# Patient Record
Sex: MALE | Race: WHITE | NOT HISPANIC OR LATINO | Employment: OTHER
[De-identification: names, ages, dates, MRNs, and addresses within clinical notes are randomized per-mention and may not be internally consistent; named-entity substitution may affect disease eponyms.]

---

## 2017-07-31 ENCOUNTER — RECORDS - HEALTHEAST (OUTPATIENT)
Dept: ADMINISTRATIVE | Facility: OTHER | Age: 77
End: 2017-07-31

## 2017-08-01 ENCOUNTER — HOSPITAL ENCOUNTER (OUTPATIENT)
Dept: ULTRASOUND IMAGING | Facility: CLINIC | Age: 77
Discharge: HOME | End: 2017-08-01
Attending: INTERNAL MEDICINE

## 2017-08-01 DIAGNOSIS — I10 ESSENTIAL HYPERTENSION, BENIGN: ICD-10-CM

## 2017-08-01 DIAGNOSIS — N18.30 STAGE 3 CHRONIC KIDNEY DISEASE (H): ICD-10-CM

## 2017-08-01 DIAGNOSIS — N18.3 CKD (CHRONIC KIDNEY DISEASE), STAGE 3 (MODERATE): ICD-10-CM

## 2021-08-18 ENCOUNTER — TRANSCRIBE ORDERS (OUTPATIENT)
Dept: OTHER | Age: 81
End: 2021-08-18

## 2021-08-26 ENCOUNTER — TRANSCRIBE ORDERS (OUTPATIENT)
Dept: OTHER | Age: 81
End: 2021-08-26

## 2021-08-26 DIAGNOSIS — I25.10 CAD (CORONARY ARTERY DISEASE): Primary | ICD-10-CM

## 2021-08-26 DIAGNOSIS — Z95.5 STATUS POST PLACEMENT OF STENT IN RIGHT CORONARY ARTERY: ICD-10-CM

## 2022-08-12 ENCOUNTER — APPOINTMENT (OUTPATIENT)
Dept: CARDIOLOGY | Facility: HOSPITAL | Age: 82
DRG: 871 | End: 2022-08-12
Attending: INTERNAL MEDICINE
Payer: MEDICARE

## 2022-08-12 ENCOUNTER — APPOINTMENT (OUTPATIENT)
Dept: RADIOLOGY | Facility: HOSPITAL | Age: 82
DRG: 871 | End: 2022-08-12
Attending: EMERGENCY MEDICINE
Payer: MEDICARE

## 2022-08-12 ENCOUNTER — HOSPITAL ENCOUNTER (INPATIENT)
Facility: HOSPITAL | Age: 82
LOS: 4 days | Discharge: HOME | DRG: 871 | End: 2022-08-16
Attending: EMERGENCY MEDICINE | Admitting: INTERNAL MEDICINE
Payer: MEDICARE

## 2022-08-12 ENCOUNTER — APPOINTMENT (OUTPATIENT)
Dept: CT IMAGING | Facility: HOSPITAL | Age: 82
DRG: 871 | End: 2022-08-12
Attending: EMERGENCY MEDICINE
Payer: MEDICARE

## 2022-08-12 DIAGNOSIS — J96.01 ACUTE RESPIRATORY FAILURE WITH HYPOXIA (H): ICD-10-CM

## 2022-08-12 DIAGNOSIS — D86.9 SARCOIDOSIS: ICD-10-CM

## 2022-08-12 DIAGNOSIS — R78.81 GRAM-POSITIVE BACTEREMIA: ICD-10-CM

## 2022-08-12 DIAGNOSIS — E11.9 TYPE 2 DIABETES MELLITUS WITHOUT COMPLICATION, WITHOUT LONG-TERM CURRENT USE OF INSULIN (H): Primary | ICD-10-CM

## 2022-08-12 DIAGNOSIS — I50.32 CHRONIC DIASTOLIC HEART FAILURE (H): ICD-10-CM

## 2022-08-12 DIAGNOSIS — E87.70 HYPERVOLEMIA, UNSPECIFIED HYPERVOLEMIA TYPE: ICD-10-CM

## 2022-08-12 DIAGNOSIS — R06.2 WHEEZING: ICD-10-CM

## 2022-08-12 LAB
ALBUMIN SERPL-MCNC: 3.9 G/DL (ref 3.5–5)
ALBUMIN UR-MCNC: 100 MG/DL
ALP SERPL-CCNC: 122 U/L (ref 45–120)
ALT SERPL W P-5'-P-CCNC: 26 U/L (ref 0–45)
ANION GAP SERPL CALCULATED.3IONS-SCNC: 9 MMOL/L (ref 5–18)
APPEARANCE UR: CLEAR
AST SERPL W P-5'-P-CCNC: 31 U/L (ref 0–40)
BASE EXCESS BLDV CALC-SCNC: -2.1 MMOL/L
BASOPHILS # BLD AUTO: 0 10E3/UL (ref 0–0.2)
BASOPHILS NFR BLD AUTO: 0 %
BILIRUB SERPL-MCNC: 0.9 MG/DL (ref 0–1)
BILIRUB UR QL STRIP: NEGATIVE
BNP SERPL-MCNC: 225 PG/ML (ref 0–93)
BUN SERPL-MCNC: 24 MG/DL (ref 8–28)
C REACTIVE PROTEIN LHE: 0.6 MG/DL (ref 0–?)
CALCIUM SERPL-MCNC: 9.4 MG/DL (ref 8.5–10.5)
CHLORIDE BLD-SCNC: 105 MMOL/L (ref 98–107)
CO2 SERPL-SCNC: 25 MMOL/L (ref 22–31)
COLOR UR AUTO: ABNORMAL
CREAT SERPL-MCNC: 1.52 MG/DL (ref 0.7–1.3)
CREAT SERPL-MCNC: 1.52 MG/DL (ref 0.7–1.3)
EOSINOPHIL # BLD AUTO: 0 10E3/UL (ref 0–0.7)
EOSINOPHIL NFR BLD AUTO: 0 %
ERYTHROCYTE [DISTWIDTH] IN BLOOD BY AUTOMATED COUNT: 13.2 % (ref 10–15)
FLUAV RNA SPEC QL NAA+PROBE: NEGATIVE
FLUBV RNA RESP QL NAA+PROBE: NEGATIVE
GFR SERPL CREATININE-BSD FRML MDRD: 45 ML/MIN/1.73M2
GFR SERPL CREATININE-BSD FRML MDRD: 45 ML/MIN/1.73M2
GLUCOSE BLD-MCNC: 112 MG/DL (ref 70–125)
GLUCOSE BLDC GLUCOMTR-MCNC: 123 MG/DL (ref 70–99)
GLUCOSE BLDC GLUCOMTR-MCNC: 207 MG/DL (ref 70–99)
GLUCOSE UR STRIP-MCNC: 150 MG/DL
HBA1C MFR BLD: 6.8 %
HCO3 BLDV-SCNC: 23 MMOL/L (ref 24–30)
HCT VFR BLD AUTO: 43.1 % (ref 40–53)
HGB BLD-MCNC: 14.2 G/DL (ref 13.3–17.7)
HGB UR QL STRIP: ABNORMAL
HOLD SPECIMEN: NORMAL
IMM GRANULOCYTES # BLD: 0 10E3/UL
IMM GRANULOCYTES NFR BLD: 0 %
KETONES UR STRIP-MCNC: NEGATIVE MG/DL
LACTATE SERPL-SCNC: 1.6 MMOL/L (ref 0.7–2)
LACTATE SERPL-SCNC: 2.6 MMOL/L (ref 0.7–2)
LEUKOCYTE ESTERASE UR QL STRIP: NEGATIVE
LVEF ECHO: NORMAL
LYMPHOCYTES # BLD AUTO: 1.9 10E3/UL (ref 0.8–5.3)
LYMPHOCYTES NFR BLD AUTO: 20 %
MAGNESIUM SERPL-MCNC: 1.5 MG/DL (ref 1.8–2.6)
MAGNESIUM SERPL-MCNC: 2.8 MG/DL (ref 1.8–2.6)
MCH RBC QN AUTO: 29.6 PG (ref 26.5–33)
MCHC RBC AUTO-ENTMCNC: 32.9 G/DL (ref 31.5–36.5)
MCV RBC AUTO: 90 FL (ref 78–100)
MONOCYTES # BLD AUTO: 0.7 10E3/UL (ref 0–1.3)
MONOCYTES NFR BLD AUTO: 7 %
MUCOUS THREADS #/AREA URNS LPF: PRESENT /LPF
NEUTROPHILS # BLD AUTO: 6.8 10E3/UL (ref 1.6–8.3)
NEUTROPHILS NFR BLD AUTO: 73 %
NITRATE UR QL: NEGATIVE
NRBC # BLD AUTO: 0 10E3/UL
NRBC BLD AUTO-RTO: 0 /100
OXYHGB MFR BLDV: 84.9 % (ref 70–75)
PCO2 BLDV: 38 MM HG (ref 35–50)
PH BLDV: 7.39 [PH] (ref 7.35–7.45)
PH UR STRIP: 6 [PH] (ref 5–7)
PLATELET # BLD AUTO: 141 10E3/UL (ref 150–450)
PO2 BLDV: 50 MM HG (ref 25–47)
POTASSIUM BLD-SCNC: 4.3 MMOL/L (ref 3.5–5)
PROCALCITONIN SERPL-MCNC: 0.97 NG/ML (ref 0–0.49)
PROT SERPL-MCNC: 7.7 G/DL (ref 6–8)
RBC # BLD AUTO: 4.79 10E6/UL (ref 4.4–5.9)
RBC URINE: <1 /HPF
RSV RNA SPEC NAA+PROBE: NEGATIVE
SAO2 % BLDV: 85.8 % (ref 70–75)
SARS-COV-2 RNA RESP QL NAA+PROBE: NEGATIVE
SODIUM SERPL-SCNC: 139 MMOL/L (ref 136–145)
SP GR UR STRIP: 1.01 (ref 1–1.03)
SQUAMOUS EPITHELIAL: 1 /HPF
TROPONIN I SERPL-MCNC: 0.02 NG/ML (ref 0–0.29)
TROPONIN I SERPL-MCNC: 0.04 NG/ML (ref 0–0.29)
TROPONIN I SERPL-MCNC: 0.05 NG/ML (ref 0–0.29)
UROBILINOGEN UR STRIP-MCNC: <2 MG/DL
WBC # BLD AUTO: 9.4 10E3/UL (ref 4–11)
WBC URINE: 6 /HPF

## 2022-08-12 PROCEDURE — 84484 ASSAY OF TROPONIN QUANT: CPT | Performed by: EMERGENCY MEDICINE

## 2022-08-12 PROCEDURE — 250N000013 HC RX MED GY IP 250 OP 250 PS 637: Performed by: EMERGENCY MEDICINE

## 2022-08-12 PROCEDURE — 36415 COLL VENOUS BLD VENIPUNCTURE: CPT | Performed by: INTERNAL MEDICINE

## 2022-08-12 PROCEDURE — 71045 X-RAY EXAM CHEST 1 VIEW: CPT

## 2022-08-12 PROCEDURE — 250N000013 HC RX MED GY IP 250 OP 250 PS 637: Performed by: INTERNAL MEDICINE

## 2022-08-12 PROCEDURE — 87149 DNA/RNA DIRECT PROBE: CPT | Performed by: EMERGENCY MEDICINE

## 2022-08-12 PROCEDURE — 96361 HYDRATE IV INFUSION ADD-ON: CPT

## 2022-08-12 PROCEDURE — 83036 HEMOGLOBIN GLYCOSYLATED A1C: CPT | Performed by: INTERNAL MEDICINE

## 2022-08-12 PROCEDURE — 96367 TX/PROPH/DG ADDL SEQ IV INF: CPT

## 2022-08-12 PROCEDURE — 87637 SARSCOV2&INF A&B&RSV AMP PRB: CPT | Performed by: EMERGENCY MEDICINE

## 2022-08-12 PROCEDURE — 85004 AUTOMATED DIFF WBC COUNT: CPT | Performed by: EMERGENCY MEDICINE

## 2022-08-12 PROCEDURE — 99285 EMERGENCY DEPT VISIT HI MDM: CPT | Mod: CS,25

## 2022-08-12 PROCEDURE — 255N000002 HC RX 255 OP 636: Performed by: INTERNAL MEDICINE

## 2022-08-12 PROCEDURE — 96374 THER/PROPH/DIAG INJ IV PUSH: CPT

## 2022-08-12 PROCEDURE — 83605 ASSAY OF LACTIC ACID: CPT | Performed by: INTERNAL MEDICINE

## 2022-08-12 PROCEDURE — 87077 CULTURE AEROBIC IDENTIFY: CPT | Performed by: EMERGENCY MEDICINE

## 2022-08-12 PROCEDURE — 83880 ASSAY OF NATRIURETIC PEPTIDE: CPT | Performed by: EMERGENCY MEDICINE

## 2022-08-12 PROCEDURE — 36415 COLL VENOUS BLD VENIPUNCTURE: CPT | Performed by: EMERGENCY MEDICINE

## 2022-08-12 PROCEDURE — 83735 ASSAY OF MAGNESIUM: CPT | Performed by: INTERNAL MEDICINE

## 2022-08-12 PROCEDURE — 258N000003 HC RX IP 258 OP 636: Performed by: INTERNAL MEDICINE

## 2022-08-12 PROCEDURE — 86140 C-REACTIVE PROTEIN: CPT | Performed by: EMERGENCY MEDICINE

## 2022-08-12 PROCEDURE — 94640 AIRWAY INHALATION TREATMENT: CPT

## 2022-08-12 PROCEDURE — 250N000011 HC RX IP 250 OP 636: Performed by: EMERGENCY MEDICINE

## 2022-08-12 PROCEDURE — 81001 URINALYSIS AUTO W/SCOPE: CPT | Performed by: STUDENT IN AN ORGANIZED HEALTH CARE EDUCATION/TRAINING PROGRAM

## 2022-08-12 PROCEDURE — 96365 THER/PROPH/DIAG IV INF INIT: CPT | Mod: 59

## 2022-08-12 PROCEDURE — 96366 THER/PROPH/DIAG IV INF ADDON: CPT

## 2022-08-12 PROCEDURE — 83605 ASSAY OF LACTIC ACID: CPT | Performed by: EMERGENCY MEDICINE

## 2022-08-12 PROCEDURE — G1010 CDSM STANSON: HCPCS

## 2022-08-12 PROCEDURE — 250N000011 HC RX IP 250 OP 636: Performed by: INTERNAL MEDICINE

## 2022-08-12 PROCEDURE — 82040 ASSAY OF SERUM ALBUMIN: CPT | Performed by: EMERGENCY MEDICINE

## 2022-08-12 PROCEDURE — 210N000001 HC R&B IMCU HEART CARE

## 2022-08-12 PROCEDURE — 93005 ELECTROCARDIOGRAM TRACING: CPT | Performed by: EMERGENCY MEDICINE

## 2022-08-12 PROCEDURE — C9803 HOPD COVID-19 SPEC COLLECT: HCPCS

## 2022-08-12 PROCEDURE — 93306 TTE W/DOPPLER COMPLETE: CPT | Mod: 26 | Performed by: INTERNAL MEDICINE

## 2022-08-12 PROCEDURE — 80053 COMPREHEN METABOLIC PANEL: CPT | Performed by: EMERGENCY MEDICINE

## 2022-08-12 PROCEDURE — 84484 ASSAY OF TROPONIN QUANT: CPT | Performed by: INTERNAL MEDICINE

## 2022-08-12 PROCEDURE — 250N000009 HC RX 250: Performed by: EMERGENCY MEDICINE

## 2022-08-12 PROCEDURE — 82805 BLOOD GASES W/O2 SATURATION: CPT | Performed by: INTERNAL MEDICINE

## 2022-08-12 PROCEDURE — 84145 PROCALCITONIN (PCT): CPT | Performed by: EMERGENCY MEDICINE

## 2022-08-12 PROCEDURE — 258N000003 HC RX IP 258 OP 636: Performed by: EMERGENCY MEDICINE

## 2022-08-12 PROCEDURE — 99223 1ST HOSP IP/OBS HIGH 75: CPT | Performed by: INTERNAL MEDICINE

## 2022-08-12 PROCEDURE — 96368 THER/DIAG CONCURRENT INF: CPT

## 2022-08-12 RX ORDER — NITROGLYCERIN 80 MG/1
1 PATCH TRANSDERMAL ONCE
Status: COMPLETED | OUTPATIENT
Start: 2022-08-12 | End: 2022-08-12

## 2022-08-12 RX ORDER — ATORVASTATIN CALCIUM 40 MG/1
40 TABLET, FILM COATED ORAL DAILY
Status: DISCONTINUED | OUTPATIENT
Start: 2022-08-12 | End: 2022-08-16 | Stop reason: HOSPADM

## 2022-08-12 RX ORDER — IPRATROPIUM BROMIDE AND ALBUTEROL SULFATE 2.5; .5 MG/3ML; MG/3ML
3 SOLUTION RESPIRATORY (INHALATION) ONCE
Status: COMPLETED | OUTPATIENT
Start: 2022-08-12 | End: 2022-08-12

## 2022-08-12 RX ORDER — CLOPIDOGREL BISULFATE 75 MG/1
75 TABLET ORAL DAILY
Status: ON HOLD | COMMUNITY
End: 2024-07-04

## 2022-08-12 RX ORDER — FUROSEMIDE 10 MG/ML
40 INJECTION INTRAMUSCULAR; INTRAVENOUS ONCE
Status: COMPLETED | OUTPATIENT
Start: 2022-08-12 | End: 2022-08-12

## 2022-08-12 RX ORDER — IOPAMIDOL 755 MG/ML
75 INJECTION, SOLUTION INTRAVASCULAR ONCE
Status: COMPLETED | OUTPATIENT
Start: 2022-08-12 | End: 2022-08-12

## 2022-08-12 RX ORDER — ASPIRIN 81 MG/1
81 TABLET, CHEWABLE ORAL DAILY
Status: DISCONTINUED | OUTPATIENT
Start: 2022-08-12 | End: 2022-08-16 | Stop reason: HOSPADM

## 2022-08-12 RX ORDER — FUROSEMIDE 10 MG/ML
20 INJECTION INTRAMUSCULAR; INTRAVENOUS EVERY 6 HOURS
Status: COMPLETED | OUTPATIENT
Start: 2022-08-12 | End: 2022-08-13

## 2022-08-12 RX ORDER — VANCOMYCIN HYDROCHLORIDE 1 G/200ML
1000 INJECTION, SOLUTION INTRAVENOUS EVERY 24 HOURS
Status: DISCONTINUED | OUTPATIENT
Start: 2022-08-13 | End: 2022-08-13

## 2022-08-12 RX ORDER — CEFAZOLIN SODIUM 1 G/50ML
1750 SOLUTION INTRAVENOUS ONCE
Status: COMPLETED | OUTPATIENT
Start: 2022-08-12 | End: 2022-08-12

## 2022-08-12 RX ORDER — SODIUM CHLORIDE 9 MG/ML
INJECTION, SOLUTION INTRAVENOUS CONTINUOUS
Status: DISCONTINUED | OUTPATIENT
Start: 2022-08-12 | End: 2022-08-12

## 2022-08-12 RX ORDER — ENOXAPARIN SODIUM 100 MG/ML
40 INJECTION SUBCUTANEOUS EVERY 24 HOURS
Status: DISCONTINUED | OUTPATIENT
Start: 2022-08-12 | End: 2022-08-16 | Stop reason: HOSPADM

## 2022-08-12 RX ORDER — NICOTINE POLACRILEX 4 MG
15-30 LOZENGE BUCCAL
Status: DISCONTINUED | OUTPATIENT
Start: 2022-08-12 | End: 2022-08-16 | Stop reason: HOSPADM

## 2022-08-12 RX ORDER — LIDOCAINE 40 MG/G
CREAM TOPICAL
Status: DISCONTINUED | OUTPATIENT
Start: 2022-08-12 | End: 2022-08-16 | Stop reason: HOSPADM

## 2022-08-12 RX ORDER — ACETAMINOPHEN 325 MG/1
650 TABLET ORAL ONCE
Status: COMPLETED | OUTPATIENT
Start: 2022-08-12 | End: 2022-08-12

## 2022-08-12 RX ORDER — CLOPIDOGREL BISULFATE 75 MG/1
75 TABLET ORAL DAILY
Status: DISCONTINUED | OUTPATIENT
Start: 2022-08-12 | End: 2022-08-16 | Stop reason: HOSPADM

## 2022-08-12 RX ORDER — ATORVASTATIN CALCIUM 40 MG/1
40 TABLET, FILM COATED ORAL DAILY
Status: ON HOLD | COMMUNITY
End: 2024-08-21

## 2022-08-12 RX ORDER — DEXTROSE MONOHYDRATE 25 G/50ML
25-50 INJECTION, SOLUTION INTRAVENOUS
Status: DISCONTINUED | OUTPATIENT
Start: 2022-08-12 | End: 2022-08-16 | Stop reason: HOSPADM

## 2022-08-12 RX ORDER — AMLODIPINE BESYLATE 5 MG/1
5 TABLET ORAL DAILY
Status: DISCONTINUED | OUTPATIENT
Start: 2022-08-12 | End: 2022-08-16 | Stop reason: HOSPADM

## 2022-08-12 RX ORDER — AMLODIPINE BESYLATE 5 MG/1
5 TABLET ORAL DAILY
Status: ON HOLD | COMMUNITY
End: 2024-07-04

## 2022-08-12 RX ORDER — AMOXICILLIN 250 MG
1 CAPSULE ORAL 2 TIMES DAILY PRN
Status: DISCONTINUED | OUTPATIENT
Start: 2022-08-12 | End: 2022-08-16 | Stop reason: HOSPADM

## 2022-08-12 RX ORDER — MAGNESIUM SULFATE 4 G/50ML
4 INJECTION INTRAVENOUS ONCE
Status: COMPLETED | OUTPATIENT
Start: 2022-08-12 | End: 2022-08-12

## 2022-08-12 RX ORDER — METOPROLOL TARTRATE 25 MG/1
12.5 TABLET, FILM COATED ORAL 2 TIMES DAILY
Status: ON HOLD | COMMUNITY
End: 2024-07-04

## 2022-08-12 RX ORDER — GLIPIZIDE 10 MG/1
10 TABLET ORAL
COMMUNITY
End: 2022-08-16

## 2022-08-12 RX ORDER — PIPERACILLIN SODIUM, TAZOBACTAM SODIUM 4; .5 G/20ML; G/20ML
4.5 INJECTION, POWDER, LYOPHILIZED, FOR SOLUTION INTRAVENOUS EVERY 6 HOURS
Status: DISCONTINUED | OUTPATIENT
Start: 2022-08-12 | End: 2022-08-12 | Stop reason: CLARIF

## 2022-08-12 RX ORDER — PIPERACILLIN SODIUM, TAZOBACTAM SODIUM 3; .375 G/15ML; G/15ML
3.38 INJECTION, POWDER, LYOPHILIZED, FOR SOLUTION INTRAVENOUS ONCE
Status: COMPLETED | OUTPATIENT
Start: 2022-08-12 | End: 2022-08-12

## 2022-08-12 RX ORDER — PIPERACILLIN SODIUM, TAZOBACTAM SODIUM 3; .375 G/15ML; G/15ML
3.38 INJECTION, POWDER, LYOPHILIZED, FOR SOLUTION INTRAVENOUS EVERY 8 HOURS
Status: DISCONTINUED | OUTPATIENT
Start: 2022-08-12 | End: 2022-08-15

## 2022-08-12 RX ORDER — AMOXICILLIN 250 MG
2 CAPSULE ORAL 2 TIMES DAILY PRN
Status: DISCONTINUED | OUTPATIENT
Start: 2022-08-12 | End: 2022-08-16 | Stop reason: HOSPADM

## 2022-08-12 RX ORDER — ASPIRIN 81 MG/1
81 TABLET, CHEWABLE ORAL DAILY
Status: ON HOLD | COMMUNITY
End: 2024-08-21

## 2022-08-12 RX ADMIN — FUROSEMIDE 20 MG: 10 INJECTION, SOLUTION INTRAMUSCULAR; INTRAVENOUS at 21:06

## 2022-08-12 RX ADMIN — ATORVASTATIN CALCIUM 40 MG: 40 TABLET, FILM COATED ORAL at 14:25

## 2022-08-12 RX ADMIN — ACETAMINOPHEN 650 MG: 325 TABLET, FILM COATED ORAL at 06:25

## 2022-08-12 RX ADMIN — SODIUM CHLORIDE: 9 INJECTION, SOLUTION INTRAVENOUS at 08:07

## 2022-08-12 RX ADMIN — SODIUM CHLORIDE 1000 ML: 9 INJECTION, SOLUTION INTRAVENOUS at 06:24

## 2022-08-12 RX ADMIN — FUROSEMIDE 20 MG: 10 INJECTION, SOLUTION INTRAMUSCULAR; INTRAVENOUS at 14:20

## 2022-08-12 RX ADMIN — PERFLUTREN 3 ML: 6.52 INJECTION, SUSPENSION INTRAVENOUS at 10:10

## 2022-08-12 RX ADMIN — CLOPIDOGREL BISULFATE 75 MG: 75 TABLET ORAL at 14:25

## 2022-08-12 RX ADMIN — IOPAMIDOL 75 ML: 755 INJECTION, SOLUTION INTRAVENOUS at 07:39

## 2022-08-12 RX ADMIN — FUROSEMIDE 40 MG: 10 INJECTION, SOLUTION INTRAMUSCULAR; INTRAVENOUS at 08:32

## 2022-08-12 RX ADMIN — AMLODIPINE BESYLATE 5 MG: 5 TABLET ORAL at 15:00

## 2022-08-12 RX ADMIN — IPRATROPIUM BROMIDE AND ALBUTEROL SULFATE 3 ML: .5; 3 SOLUTION RESPIRATORY (INHALATION) at 06:25

## 2022-08-12 RX ADMIN — MAGNESIUM SULFATE HEPTAHYDRATE 4 G: 80 INJECTION, SOLUTION INTRAVENOUS at 14:23

## 2022-08-12 RX ADMIN — ASPIRIN 81 MG: 81 TABLET, CHEWABLE ORAL at 14:25

## 2022-08-12 RX ADMIN — ENOXAPARIN SODIUM 40 MG: 40 INJECTION SUBCUTANEOUS at 09:26

## 2022-08-12 RX ADMIN — PIPERACILLIN AND TAZOBACTAM 3.38 G: 3; .375 INJECTION, POWDER, LYOPHILIZED, FOR SOLUTION INTRAVENOUS at 16:07

## 2022-08-12 RX ADMIN — VANCOMYCIN HYDROCHLORIDE 1750 MG: 5 INJECTION, POWDER, LYOPHILIZED, FOR SOLUTION INTRAVENOUS at 10:17

## 2022-08-12 RX ADMIN — NITROGLYCERIN 1 PATCH: 0.4 PATCH TRANSDERMAL at 08:01

## 2022-08-12 RX ADMIN — PIPERACILLIN AND TAZOBACTAM 3.38 G: 3; .375 INJECTION, POWDER, LYOPHILIZED, FOR SOLUTION INTRAVENOUS at 09:26

## 2022-08-12 RX ADMIN — METOPROLOL TARTRATE 12.5 MG: 25 TABLET, FILM COATED ORAL at 21:05

## 2022-08-12 ASSESSMENT — ACTIVITIES OF DAILY LIVING (ADL)
ADLS_ACUITY_SCORE: 35
ADLS_ACUITY_SCORE: 35
WALKING_OR_CLIMBING_STAIRS_DIFFICULTY: NO
CONCENTRATING,_REMEMBERING_OR_MAKING_DECISIONS_DIFFICULTY: NO
DRESSING/BATHING_DIFFICULTY: NO
ADLS_ACUITY_SCORE: 35
CHANGE_IN_FUNCTIONAL_STATUS_SINCE_ONSET_OF_CURRENT_ILLNESS/INJURY: YES
ADLS_ACUITY_SCORE: 35
ADLS_ACUITY_SCORE: 35
HEARING_DIFFICULTY_OR_DEAF: NO
ADLS_ACUITY_SCORE: 18
DIFFICULTY_EATING/SWALLOWING: NO
DOING_ERRANDS_INDEPENDENTLY_DIFFICULTY: NO
FALL_HISTORY_WITHIN_LAST_SIX_MONTHS: YES
ADLS_ACUITY_SCORE: 35
TOILETING_ISSUES: NO
WEAR_GLASSES_OR_BLIND: NO
ADLS_ACUITY_SCORE: 35
ADLS_ACUITY_SCORE: 35
NUMBER_OF_TIMES_PATIENT_HAS_FALLEN_WITHIN_LAST_SIX_MONTHS: 1
DIFFICULTY_COMMUNICATING: NO

## 2022-08-12 ASSESSMENT — ENCOUNTER SYMPTOMS
COLOR CHANGE: 0
ARTHRALGIAS: 0
SHORTNESS OF BREATH: 1
EYE REDNESS: 0
NECK STIFFNESS: 0
ABDOMINAL PAIN: 0
CONFUSION: 0
HEADACHES: 0
FEVER: 1
WHEEZING: 1
DIFFICULTY URINATING: 0
CHILLS: 1
FATIGUE: 1
CHEST TIGHTNESS: 1

## 2022-08-12 NOTE — ED NOTES
Meal tray cleared from room, garbage emptied. Pt has tolerated the oral intake of mashed potatoes, chicken and gravy and diet sprite well. He ate all the food on the tray.

## 2022-08-12 NOTE — ED PROVIDER NOTES
EMERGENCY DEPARTMENT ENCOUNTER       ED Course & Medical Decision Making     I saw and examined the patient.  IV was established and he was placed on the monitor.  Diagnostics ordered.    I did independently interpret EKG done this morning at 0634.  Sinus tachycardia with a rate of 111 bpm.  Prolonged KY with first-degree AV block.  Narrow complex QRS, the QT C is slightly prolonged. Axis is normal.  No significant ST elevation or depression.  No pathological Q waves.  No previous for comparison    He is tachycardic, tachypneic and is having wheezing.  He is given a DuoNeb treatment.  I will start with a chest x-ray but given his tachycardia I cannot PERC him out and will need to evaluate for PE.      Returns negative for COVID and influenza.  There is no infiltrate on x-ray or CT.  No PE.  All signs are pointing to volume overload at this point.    I did still send cultures and there is mildly elevated lactic and procalcitonin and so I will discuss this with the admitting physician but I have held off on antibiotics at this point.  I did start 40 of Lasix.    I did reevaluate him and his wife tells me that he was supposed to be on a diuretic but self discontinued it about 3 months ago and has been worsening since that time.    Plan is for admission, diuresis, weaning off of oxygen, currently requiring 4 L nasal cannula to maintain sats greater than 92    I will contact the admitting physician, discussed the case and have him admitted at this time.    He is stable, updated and in agreement      6:01 AM I introduced myself to the patient, obtained patient history, performed a physical exam, and discussed plan for ED workup including potential diagnostic laboratory/imaging studies and interventions.  8:22 AM Rechecked and updated the patient.             Prior to making a final disposition on this patient the results of patient's tests and other diagnostic studies were discussed with the patient. All questions  were answered. Patient expressed understanding of the plan and was amenable to it.    Medications   nitroGLYcerin (NITRODUR) 0.4 MG/HR 24 hr patch 1 patch (1 patch Transdermal Patch/Med Applied 8/12/22 0801)   acetaminophen (TYLENOL) tablet 650 mg (650 mg Oral Given 8/12/22 0625)   0.9% sodium chloride BOLUS (0 mLs Intravenous Stopped 8/12/22 0800)   ipratropium - albuterol 0.5 mg/2.5 mg/3 mL (DUONEB) neb solution 3 mL (3 mLs Nebulization Given 8/12/22 0625)   iopamidol (ISOVUE-370) solution 75 mL (75 mLs Intravenous Given 8/12/22 0739)   furosemide (LASIX) injection 40 mg (40 mg Intravenous Given 8/12/22 0832)       Final Impression     1. Acute respiratory failure with hypoxia (H)    2. Hypervolemia, unspecified hypervolemia type    3. Wheezing            Chief Complaint     Chief Complaint   Patient presents with     Shortness of Breath            HPI       Per Beasley is a 82 year old male with a past pertinent history of heart failure, TAVR, CKD stage III, COPD, DM2, HTN who presents for evaluation of shortness of breath.     Patient is a previously healthy 82 year old who denies heart or lung problems. He reports that the past 6 months, he has had persistent shortness of breath that worsened dramatically in the past 24 hours. He denies anything making the shortness of breath better or worse, and also denies chest pain. He endorses some subjective fever, and was reportedly febrile for EMS in the 100's, as well as 86% O2 saturation on room air.     Patient reports having had multiple positive COVID tests over the past several months, and denies any known sick contacts.     I, Smith Jackson am serving as a scribe to document services personally performed by Ricardo Ruby M.D. based on my observation and the provider's statements to me. I, Ricardo Ruby M.D attest that Smith Jackson is acting in a scribe capacity, has observed my performance of the services and has documented them in accordance with my  "direction.    Past Medical History     No past medical history on file.  No past surgical history on file.  No family history on file.        Relevant past medical, surgical, family and social history as documented above, has been reviewed and discussed with patient. No changes or additions, unless otherwise noted in the HPI.    Current Medications     No current outpatient medications on file.      Allergies     No Known Allergies    Review of Systems     Review of Systems   Constitutional: Positive for chills, fatigue and fever.   HENT: Negative for congestion.    Eyes: Negative for redness.   Respiratory: Positive for chest tightness, shortness of breath and wheezing.    Cardiovascular: Negative for chest pain.   Gastrointestinal: Negative for abdominal pain.   Genitourinary: Negative for difficulty urinating.   Musculoskeletal: Negative for arthralgias and neck stiffness.   Skin: Negative for color change.   Neurological: Negative for headaches.   Psychiatric/Behavioral: Negative for confusion.   All other systems reviewed and are negative.       Remainder of systems reviewed, unless noted in HPI all others negative.    Physical Exam     /59   Pulse 104   Temp 99.2  F (37.3  C) (Oral)   Resp 23   Ht 1.753 m (5' 9\")   Wt 93 kg (205 lb)   SpO2 93%   BMI 30.27 kg/m      Physical Exam  Vitals and nursing note reviewed.   Constitutional:       General: He is not in acute distress.  HENT:      Head: Normocephalic.      Nose: Nose normal.   Eyes:      General: No scleral icterus.  Cardiovascular:      Rate and Rhythm: Tachycardia present.   Pulmonary:      Breath sounds: Wheezing present.      Comments: Significant tachypnea.  Diffuse inspiratory and expiratory wheezing, slightly prolonged expiratory phase.  Breath sounds are equal/symmetric  Musculoskeletal:      Cervical back: Neck supple.   Skin:     Findings: No rash.   Neurological:      Mental Status: He is alert. Mental status is at baseline. "   Psychiatric:         Mood and Affect: Mood normal.             Labs & Imaging         Labs Ordered and Resulted from Time of ED Arrival to Time of ED Departure   COMPREHENSIVE METABOLIC PANEL - Abnormal       Result Value    Sodium 139      Potassium 4.3      Chloride 105      Carbon Dioxide (CO2) 25      Anion Gap 9      Urea Nitrogen 24      Creatinine 1.52 (*)     Calcium 9.4      Glucose 112      Alkaline Phosphatase 122 (*)     AST 31      ALT 26      Protein Total 7.7      Albumin 3.9      Bilirubin Total 0.9      GFR Estimate 45 (*)    LACTIC ACID WHOLE BLOOD - Abnormal    Lactic Acid 2.6 (*)    B-TYPE NATRIURETIC PEPTIDE ([facility] ONLY) - Abnormal     (*)    PROCALCITONIN - Abnormal    Procalcitonin 0.97 (*)    CBC WITH PLATELETS AND DIFFERENTIAL - Abnormal    WBC Count 9.4      RBC Count 4.79      Hemoglobin 14.2      Hematocrit 43.1      MCV 90      MCH 29.6      MCHC 32.9      RDW 13.2      Platelet Count 141 (*)     % Neutrophils 73      % Lymphocytes 20      % Monocytes 7      % Eosinophils 0      % Basophils 0      % Immature Granulocytes 0      NRBCs per 100 WBC 0      Absolute Neutrophils 6.8      Absolute Lymphocytes 1.9      Absolute Monocytes 0.7      Absolute Eosinophils 0.0      Absolute Basophils 0.0      Absolute Immature Granulocytes 0.0      Absolute NRBCs 0.0     ROUTINE UA WITH MICROSCOPIC REFLEX TO CULTURE - Abnormal    Color Urine Light Yellow      Appearance Urine Clear      Glucose Urine 150  (*)     Bilirubin Urine Negative      Ketones Urine Negative      Specific Gravity Urine 1.015      Blood Urine 0.03 mg/dL (*)     pH Urine 6.0      Protein Albumin Urine 100  (*)     Urobilinogen Urine <2.0      Nitrite Urine Negative      Leukocyte Esterase Urine Negative      Mucus Urine Present (*)     RBC Urine <1      WBC Urine 6 (*)     Squamous Epithelials Urine 1     TROPONIN I - Normal    Troponin I 0.02     CRP INFLAMMATION - Normal    CRP 0.6     INFLUENZA A/B & SARS-COV2 PCR  MULTIPLEX - Normal    Influenza A PCR Negative      Influenza B PCR Negative      RSV PCR Negative      SARS CoV2 PCR Negative     BLOOD CULTURE         Results for orders placed or performed during the hospital encounter of 08/12/22   XR Chest Port 1 View    Impression    IMPRESSION: The cardiac silhouette is unchanged in size and contour. Prominence of the central pulmonary venous markings is noted, likely exaggerated by portable technique, correlate for symptoms of volume overload. Left hilar scarring versus atelectasis   is again noted, not significant change. Old healed left-sided rib fractures are again identified.   CT Chest Pulmonary Embolism w Contrast    Impression    IMPRESSION:    1.  No pulmonary embolism.    2.  Findings of pulmonary edema. Tiny pleural effusions.    3.  Basilar predominant opacities and groundglass are presumed manifestations of pulmonary edema. However, correlate for superimposed inflammatory symptoms.    4.  Moderate-severe airway thickening, from edema or airways disease.    5.  Interval increased mediastinal and hilar adenopathy. Although reactive change is possible, recommend 3 month follow-up contrast enhanced chest CT.    6.  No significant change of scattered pulmonary micronodules. Given nerissa calcifications and several of these nodules being perilymphatic, sequela of old granulomatous disease disease suspected (to include sarcoid).     7.  Septal and airway thickening has been present on the prior chest CTs. Sequela of edema is statistically more likely (particularly if correlates with a history of cardiac dysfunction clinically). However, given other findings on this exam which can be   seen with sarcoid, alveolar-septal sarcoid can uncommonly be seen. Sarcoid airway involvement can also be seen. Pulmonary consultation as warranted.      NOTE: ABNORMAL REPORT    THE DICTATION ABOVE DESCRIBES AN ABNORMALITY FOR WHICH FOLLOW-UP IS NEEDED.    Comprehensive metabolic panel    Result Value Ref Range    Sodium 139 136 - 145 mmol/L    Potassium 4.3 3.5 - 5.0 mmol/L    Chloride 105 98 - 107 mmol/L    Carbon Dioxide (CO2) 25 22 - 31 mmol/L    Anion Gap 9 5 - 18 mmol/L    Urea Nitrogen 24 8 - 28 mg/dL    Creatinine 1.52 (H) 0.70 - 1.30 mg/dL    Calcium 9.4 8.5 - 10.5 mg/dL    Glucose 112 70 - 125 mg/dL    Alkaline Phosphatase 122 (H) 45 - 120 U/L    AST 31 0 - 40 U/L    ALT 26 0 - 45 U/L    Protein Total 7.7 6.0 - 8.0 g/dL    Albumin 3.9 3.5 - 5.0 g/dL    Bilirubin Total 0.9 0.0 - 1.0 mg/dL    GFR Estimate 45 (L) >60 mL/min/1.73m2   Lactic acid whole blood   Result Value Ref Range    Lactic Acid 2.6 (H) 0.7 - 2.0 mmol/L   Result Value Ref Range    Troponin I 0.02 0.00 - 0.29 ng/mL   B-Type Natriuretic Peptide ([facility] Only)   Result Value Ref Range     (H) 0 - 93 pg/mL   CRP inflammation   Result Value Ref Range    CRP 0.6 0.0 - <0.8 mg/dL   Result Value Ref Range    Procalcitonin 0.97 (H) 0.00 - 0.49 ng/mL   Symptomatic; Unknown Influenza A/B & SARS-CoV2 (COVID-19) Virus PCR Multiplex Nasopharyngeal    Specimen: Nasopharyngeal; Swab   Result Value Ref Range    Influenza A PCR Negative Negative    Influenza B PCR Negative Negative    RSV PCR Negative Negative    SARS CoV2 PCR Negative Negative   CBC with platelets and differential   Result Value Ref Range    WBC Count 9.4 4.0 - 11.0 10e3/uL    RBC Count 4.79 4.40 - 5.90 10e6/uL    Hemoglobin 14.2 13.3 - 17.7 g/dL    Hematocrit 43.1 40.0 - 53.0 %    MCV 90 78 - 100 fL    MCH 29.6 26.5 - 33.0 pg    MCHC 32.9 31.5 - 36.5 g/dL    RDW 13.2 10.0 - 15.0 %    Platelet Count 141 (L) 150 - 450 10e3/uL    % Neutrophils 73 %    % Lymphocytes 20 %    % Monocytes 7 %    % Eosinophils 0 %    % Basophils 0 %    % Immature Granulocytes 0 %    NRBCs per 100 WBC 0 <1 /100    Absolute Neutrophils 6.8 1.6 - 8.3 10e3/uL    Absolute Lymphocytes 1.9 0.8 - 5.3 10e3/uL    Absolute Monocytes 0.7 0.0 - 1.3 10e3/uL    Absolute Eosinophils 0.0 0.0 - 0.7 10e3/uL     Absolute Basophils 0.0 0.0 - 0.2 10e3/uL    Absolute Immature Granulocytes 0.0 <=0.4 10e3/uL    Absolute NRBCs 0.0 10e3/uL   UA with Microscopic reflex to Culture    Specimen: Urine, Midstream   Result Value Ref Range    Color Urine Light Yellow Colorless, Straw, Light Yellow, Yellow    Appearance Urine Clear Clear    Glucose Urine 150  (A) Negative mg/dL    Bilirubin Urine Negative Negative    Ketones Urine Negative Negative mg/dL    Specific Gravity Urine 1.015 1.001 - 1.030    Blood Urine 0.03 mg/dL (A) Negative    pH Urine 6.0 5.0 - 7.0    Protein Albumin Urine 100  (A) Negative mg/dL    Urobilinogen Urine <2.0 <2.0 mg/dL    Nitrite Urine Negative Negative    Leukocyte Esterase Urine Negative Negative    Mucus Urine Present (A) None Seen /LPF    RBC Urine <1 <=2 /HPF    WBC Urine 6 (H) <=5 /HPF    Squamous Epithelials Urine 1 <=1 /HPF   Extra Blue Top Tube   Result Value Ref Range    Hold Specimen JI        Ricardo Ruby MD  Emergency Medicine  [facility] EMERGENCY DEPARTMENT  [facility] [facility] [id number]  [phone]  8/12/2022         Ricardo Ruby MD  08/12/22 5192

## 2022-08-12 NOTE — H&P
[facility]    History and Physical - Hospitalist Service       Date of Admission:  8/12/2022    Assessment & Plan      Per Beasley is a 82 year old male with past medical history significant for severe aortic valve stenosis status post TAVR in 2021, coronary artery disease status post PCI with NUBIA to mid LCx in 2021, persistent atrial fibrillation, hypertension, dyslipidemia, diabetes mellitus type 2, PVD, and CKD stage III who was admitted on 8/12/2022.     Acute hypoxic respiratory failure likely secondary to volume overload  Probable Sarcoidosis  Former smoker  Mediastinal and hilar lymphadenopathy  -CT PE 8/12: Pulmonary edema, tiny pleural effusions, basilar groundglass opacities, moderate to severe airway thickening from edema or airway disease, interval increase in mediastinal and hilar lymphadenopathy, septal and airway thickening could correlate with sarcoidosis  -Will need follow-up CT in 3 months for lymphadenopathy  -Does follow with a pulmonologist outpatient with concerns for sarcoidosis  -May need steroid therapy if breathing does not improve with volume status improving  -Furosemide 20 mg IV every 6 for 3 doses    Suspected sepsis without definitive source, Ddx: bronchitis, LLE celluitis  Lactic acidosis-improved  -Was noted to be febrile by EMS, presented with tachycardia, tachypnea, and hypoxia  -Has left lower extremity redness and warmth without any open wounds, will monitor for improvement   -Lactic acid 2.6>1.6, procalcitonin 0.97  -UA negative for signs of infection  -Blood cultures drawn, pending  -Due to volume overload will not be starting IVF    Acute heart failure  Volume overload  History of severe aortic stenosis status post TAVR 9/14/21  -Has not been taking home diuretic for the past 3 months with progressive shortness of breath at baseline  -Given Lasix 40 mg IV in the ED, started on 20 mg every 6 for 3 doses  -Echo ordered    Persistent atrial  "fibrillation  -Continue metoprolol 12.5 mg twice daily    Essential hypertension  -Continue amlodipine 5 mg daily    CKD stage IIIb  -Baseline creatinine approximately 1.2-1.5, currently at baseline  -We will monitor closely while on diuretics  -Avoid nephrotoxic agents    Type 2 diabetes mellitus not on long-term insulin  -Most recent A1c 6.8%  -Takes oral medications at home, holding glipizide and metformin  -Sliding scale insulin with meals and at bedtime    Coronary artery disease without angina  Dyslipidemia  -s/p NUBIA x 1 to mid LCX into OM1, 8/13/2021  -Continue Plavix 75 mg daily, atorvastatin 40 mg daily    Obesity class I  -BMI 30       Diet: Combination Diet Moderate Consistent Carb (60 g CHO per Meal) Diet; Low Saturated Fat Diet, Low Saturated Fat Na <2400mg Diet  DVT Prophylaxis: Enoxaparin (Lovenox) SQ  Helm Catheter: Not present  Central Lines: None  Cardiac Monitoring: None  Code Status: Full Code    Clinically Significant Risk Factors Present on Admission                # Platelet Defect: home medication list includes an antiplatelet medication     # DMII: A1C = 6.8 % (Ref range: <=5.6 %) within past 3 months  # Obesity: Estimated body mass index is 30.27 kg/m  as calculated from the following:    Height as of this encounter: 1.753 m (5' 9\").    Weight as of this encounter: 93 kg (205 lb).        Disposition Plan      Expected Discharge Date: 08/14/2022,  9:00 AM              The patient's care was discussed with the Patient and Patient's Family.    Yaz Peña MD  Hospitalist Service  [facility]  Securely message with the Vocera Web Console (learn more here)  Text page via Photoblog Paging/Directory         ______________________________________________________________________    Chief Complaint   Shortness of breath    History is obtained from the patient    History of Present Illness   Per Beasley is a 82 year old male who has been having increasing shortness of " "breath over the past 3 months since stopping his diuretic.  It got to the point today where he was having so much difficulty breathing him and his wife decided to call EMS and he was brought to the hospital.  On examination by EMS at his house he was febrile which resolved by the time he got to the ED.  Otherwise he is not been having any other significant symptoms besides the shortness of breath.  He is somewhat noncompliant with medications, his wife states that he is \"his own doctor\".  His wife states that since 2 years ago when he had COVID he has not had very good health since with episodes of heart failure and shortness of breath.  He was prescribed an inhaler in the past but stopped taking it.  He does see a pulmonologist outpatient and was being worked up for sarcoidosis.  He is left lower extremity from the knee down is red and warm compared to the other leg.  Both are edematous but the redness and warmth is only on the left lower extremity.  There is no ulceration but could be early cellulitis.  He is on antibiotics for concern for sepsis with the source being pulmonary, bronchitis, or cellulitis.  He admits to having episodes of fever and chills.  During exam he was having chills but temperature was 99.      Review of Systems    The 10 point Review of Systems is negative other than noted in the HPI.    Past Medical History    I have reviewed this patient's medical history and updated it with pertinent information if needed.   No past medical history on file.    Past Surgical History   I have reviewed this patient's surgical history and updated it with pertinent information if needed.  No past surgical history on file.    Social History   I have reviewed this patient's social history and updated it with pertinent information if needed.       Family History     Prior to Admission Medications   Prior to Admission Medications   Prescriptions Last Dose Informant Patient Reported? Taking?   amLODIPine (NORVASC) " 5 MG tablet 8/11/2022 at am  Yes Yes   Sig: Take 5 mg by mouth daily   aspirin (ASA) 81 MG chewable tablet 8/11/2022 at am  Yes Yes   Sig: Take 81 mg by mouth daily   atorvastatin (LIPITOR) 40 MG tablet 8/11/2022 at am  Yes Yes   Sig: Take 40 mg by mouth daily   clopidogrel (PLAVIX) 75 MG tablet 8/11/2022 at am  Yes Yes   Sig: Take 75 mg by mouth daily   glipiZIDE (GLUCOTROL) 10 MG tablet 8/11/2022 at pm  Yes Yes   Sig: Take 10 mg by mouth 2 times daily (before meals)   metFORMIN (GLUCOPHAGE) 1000 MG tablet 8/11/2022 at pm  Yes Yes   Sig: Take 1,000 mg by mouth 2 times daily (with meals)   metoprolol tartrate (LOPRESSOR) 25 MG tablet 8/11/2022 at am  Yes Yes   Sig: Take 12.5 mg by mouth 2 times daily      Facility-Administered Medications: None     Allergies   No Known Allergies    Physical Exam   Vital Signs: Temp: 98.1  F (36.7  C) Temp src: Oral BP: 125/56 Pulse: 96   Resp: (!) 39 SpO2: 92 % O2 Device: Nasal cannula Oxygen Delivery: 3 LPM  Weight: 205 lbs 0 oz  Constitutional: awake, alert, cooperative, no apparent distress, and appears stated age  Eyes: Lids and lashes normal, pupils equal, round, conjunctiva normal  Respiratory: Scattered wheezing inspiratory and expiratory, tachypnea  Cardiovascular: Normal apical impulse, tachycardia, and no murmur noted  GI: normal bowel sounds, soft, non-distended, non-tender  Skin: Redness on the left lower extremity from the knee down with warmth, no clear demarcation, no wound or lesion noted other than the erythema  Musculoskeletal: 2+ pitting edema in the bilateral lower extremities  tone is normal, no weakness noted  Neurologic: Awake, alert, No gross focal abnormalities   Neuropsychiatric: Appropriate mood and affect    Data   Data reviewed today: I reviewed all medications, new labs and imaging results over the last 24 hours. I personally reviewed no images or EKG's today.    Recent Labs   Lab 08/12/22  1259 08/12/22  0628   WBC  --  9.4   HGB  --  14.2   MCV  --   90   PLT  --  141*   NA  --  139   POTASSIUM  --  4.3   CHLORIDE  --  105   CO2  --  25   BUN  --  24   CR  --  1.52*  1.52*   ANIONGAP  --  9   SANDRA  --  9.4   * 112   ALBUMIN  --  3.9   PROTTOTAL  --  7.7   BILITOTAL  --  0.9   ALKPHOS  --  122*   ALT  --  26   AST  --  31     Recent Results (from the past 24 hour(s))   XR Chest Port 1 View    Narrative    EXAM: XR CHEST PORT 1 VIEW  LOCATION: [facility]  DATE/TIME: 8/12/2022 6:51 AM    INDICATION: SOB, Sirs  COMPARISON: 9/13/2021      Impression    IMPRESSION: The cardiac silhouette is unchanged in size and contour. Prominence of the central pulmonary venous markings is noted, likely exaggerated by portable technique, correlate for symptoms of volume overload. Left hilar scarring versus atelectasis   is again noted, not significant change. Old healed left-sided rib fractures are again identified.   CT Chest Pulmonary Embolism w Contrast    Narrative    EXAM: CT CHEST PULMONARY EMBOLISM W CONTRAST  LOCATION: [facility]  DATE/TIME: 8/12/2022 7:36 AM    INDICATION: Chest pain, shortness of breath, tachycardia, hypoxia.  COMPARISON: 08/25/2021. 05/18/2021.  TECHNIQUE: CT chest pulmonary angiogram during arterial phase injection of IV contrast. Multiplanar reformats and MIP reconstructions were performed. Dose reduction techniques were used.   CONTRAST: IsoVue 370 75mL.    FINDINGS:  ANGIOGRAM CHEST: No pulmonary embolism. Nonaneurysmal aorta without dissection. Mild to moderate aortic mixed plaque.    LUNGS AND PLEURA: Motion artifact. Moderate to severe bilateral airway thickening, pronounced in the lung bases. Mild to moderate basal predominant septal thickening. Superimposed groundglass and patchy opacities, again pronounced in the lung bases.   Redemonstrated scattered bilateral micronodules under 5 mm (many are perilymphatic in distribution). Tiny pleural effusions. No pneumothorax.    MEDIASTINUM/AXILLAE:  Adenopathy has increased since 2021. Right subcarinal adenopathy measures 20 mm short axis versus 17 mm (series 5, image 52). Left perihilar adenopathy has also increased measuring 18 x 30 mm versus 12 x 28 mm (series 5, image 54).   Some nodes are partially calcified, as before.    Upper normal heart size. No pericardial effusion. TAVR.    CORONARY ARTERY CALCIFICATION: Moderate. Coronary stenting.    UPPER ABDOMEN: Mild to moderate narrowing of the superior mesenteric artery off the abdominal aorta. At least mild left renal artery origin narrowing. Severe partially seen abdominal aortic mixed plaque.    MUSCULOSKELETAL: Bony demineralization and degenerative changes.      Impression    IMPRESSION:    1.  No pulmonary embolism.    2.  Findings of pulmonary edema. Tiny pleural effusions.    3.  Basilar predominant opacities and groundglass are presumed manifestations of pulmonary edema. However, correlate for superimposed inflammatory symptoms.    4.  Moderate-severe airway thickening, from edema or airways disease.    5.  Interval increased mediastinal and hilar adenopathy. Although reactive change is possible, recommend 3 month follow-up contrast enhanced chest CT.    6.  No significant change of scattered pulmonary micronodules. Given nerissa calcifications and several of these nodules being perilymphatic, sequela of old granulomatous disease disease suspected (to include sarcoid).     7.  Septal and airway thickening has been present on the prior chest CTs. Sequela of edema is statistically more likely (particularly if correlates with a history of cardiac dysfunction clinically). However, given other findings on this exam which can be   seen with sarcoid, alveolar-septal sarcoid can uncommonly be seen. Sarcoid airway involvement can also be seen. Pulmonary consultation as warranted.      NOTE: ABNORMAL REPORT    THE DICTATION ABOVE DESCRIBES AN ABNORMALITY FOR WHICH FOLLOW-UP IS NEEDED.    Echocardiogram Complete    Result Value    LVEF  60-65%    [facility]    [id number]  [id number]  [id number]  [id number]^ASHLYN^[place] [address]     Name: MAT NOVA  MRN: [id number]  : 1940  Study Date: 2022 09:37 AM  Age: 82 yrs  Gender: Male  Patient Location: [facility]  Reason For Study: Heart Failure  Ordering Physician: EITAN GOLDBERG  Performed By: CM     BSA: 2.1 m2  Height: 69 in  Weight: 205 lb  HR: 101  ______________________________________________________________________________  Procedure  Complete Echo Adult. Definity (NDC #12733-331) given intravenously.  ______________________________________________________________________________  Interpretation Summary     Left ventricular size, wall motion and function are normal. The ejection  fraction is 60-65%.  Normal right ventricle size and systolic function.  There is a bioprosthetic aortic valve.  The prosthetic aortic valve appears abnormal.  The study was technically difficult.  ______________________________________________________________________________  Left Ventricle  Left ventricular size, wall motion and function are normal. The ejection  fraction is 60-65%. Left ventricular diastolic function is indeterminate. No  regional wall motion abnormalities noted.     Right Ventricle  Normal right ventricle size and systolic function.     Atria  The left atrium is mildly dilated. The right atrium is mildly dilated. There  is no color Doppler evidence of an atrial shunt.     Mitral Valve  Mitral valve leaflets appear normal. There is no evidence of mitral stenosis  or clinically significant mitral regurgitation.     Tricuspid Valve  Tricuspid valve leaflets appear normal. Right ventricle systolic pressure  estimate normal. There is mild (1+) tricuspid regurgitation.     Aortic Valve  There is a bioprosthetic aortic valve. The prosthetic aortic valve appears  abnormal.     Pulmonic Valve  The pulmonic valve is  not well seen, but is grossly normal. This degree of  valvular regurgitation is within normal limits.     Vessels  The aorta root is normal. Normal size ascending aorta. Inferior vena cava not  well visualized for estimation of right atrial pressure.     Pericardium  There is no pericardial effusion.     ______________________________________________________________________________  MMode/2D Measurements & Calculations     LA Volume Indexed (AL/bp): 34.0 ml/m2     Doppler Measurements & Calculations  Ao V2 max: 199.5 cm/sec  Ao max P.0 mmHg  Ao V2 mean: 122.2 cm/sec  Ao mean P.7 mmHg  Ao V2 VTI: 31.0 cm  LV V1 max P.8 mmHg  LV V1 max: 130.5 cm/sec  LV V1 VTI: 19.3 cm  TR max kevin: 243.0 cm/sec  TR max P.6 mmHg  AV Kevin Ratio (DI): 0.65     ______________________________________________________________________________  Report approved by: Amalia Walsh 2022 11:17 AM

## 2022-08-12 NOTE — ED TRIAGE NOTES
Triage Assessment     Row Name 08/12/22 0643       Triage Assessment (Adult)    Airway WDL WDL       Respiratory WDL    Respiratory WDL cough;rhythm/pattern;expansion/retractions    Rhythm/Pattern, Respiratory breathlessness;labored;shallow;tachypneic    Cough Frequency infrequent       Skin Circulation/Temperature WDL    Skin Circulation/Temperature WDL temperature    Skin Temperature warm       Cardiac WDL    Cardiac WDL rhythm    Cardiac Rhythm tachycardic       Peripheral/Neurovascular WDL    Peripheral Neurovascular WDL WDL       Cognitive/Neuro/Behavioral WDL    Cognitive/Neuro/Behavioral WDL WDL

## 2022-08-12 NOTE — ED NOTES
Pt is sleeping-he awakens easily to voice; wife at bedside. Per reports no CP but endorses sob at rest. Vancomycin infusing. Right mouth droops at rest. Per is able to smile without any right sided facial/mouth droop. Sensation is equal bilaterally. Neuro WNL.

## 2022-08-12 NOTE — ED NOTES
Bed: JNED-23  Expected date: 8/12/22  Expected time: 5:28 AM  Means of arrival: Ambulance  Comments:  Mely

## 2022-08-12 NOTE — ED NOTES
"[facility] ED Handoff Report    ED Chief Complaint: sob    ED Diagnosis:  (J96.01) Acute respiratory failure with hypoxia (H)  Comment: admission  Plan: admission    (E87.70) Hypervolemia, unspecified hypervolemia type  Comment: admission  Plan: admission    (R06.2) Wheezing  Comment: admission  Plan: admission       PMH:  No past medical history on file.     Code Status:  Full Code     Falls Risk: Yes Band: Applied    Current Living Situation/Residence: lives with a significant other     Elimination Status: Continent: Yes     Activity Level: 2 assist    Patients Preferred Language:  English     Needed: No    Vital Signs:  /56   Pulse 96   Temp 98.1  F (36.7  C) (Oral)   Resp (!) 39   Ht 1.753 m (5' 9\")   Wt 93 kg (205 lb)   SpO2 92%   BMI 30.27 kg/m       Cardiac Rhythm: sinus tachycardia    Pain Score: 0/10    Is the Patient Confused:  No    Last Food or Drink: 08/12/22 at 1500    Focused Assessment:  LLE redness, swelling, warmth, sob at rest and with activity, faint wheezes upon auscultation; diuresing well in urinal in bed. Supportive wife at beside most of the time; she is away at dinner right now.    Tests Performed: Done: Labs and Imaging    Treatments Provided:  IV antibiotics    Family Dynamics/Concerns: No    Family Updated On Visitor Policy: Yes    Plan of Care Communicated to Family: Yes    Who Was Updated about Plan of Care: wife    Belongings Checklist Done and Signed by Patient: Yes    Medications sent with patient: No    Covid: symptomatic, negative    Additional Information: none    RN:   Darling Villa 8/12/2022 6:21 PM       "

## 2022-08-12 NOTE — PHARMACY-VANCOMYCIN DOSING SERVICE
"Pharmacy Vancomycin Initial Note  Date of Service 2022  Patient's  1940  82 year old, male    Indication: Skin and Soft Tissue Infection    Current estimated CrCl = Estimated Creatinine Clearance: 41.5 mL/min (A) (based on SCr of 1.52 mg/dL (H)).    Creatinine for last 3 days  2022:  6:28 AM Creatinine 1.52 mg/dL;  6:28 AM Creatinine 1.52 mg/dL    Recent Vancomycin Level(s) for last 3 days  No results found for requested labs within last 72 hours.      Vancomycin IV Administrations (past 72 hours)      No vancomycin orders with administrations in past 72 hours.                Nephrotoxins and other renal medications (From now, onward)    Start     Dose/Rate Route Frequency Ordered Stop    22 1000  vancomycin (VANCOCIN) 1000 mg in dextrose 5% 200 mL PREMIX        \"Followed by\" Linked Group Details    1,000 mg  200 mL/hr over 1 Hours Intravenous EVERY 24 HOURS 22 0918      22 1506  piperacillin-tazobactam (ZOSYN) 3.375 g vial to attach to  mL bag        Note to Pharmacy: Extended infusion dosing to start 6 hours after initial infusion.   \"Followed by\" Linked Group Details    3.375 g  over 240 Minutes Intravenous EVERY 8 HOURS 22 0906      22 1400  furosemide (LASIX) injection 20 mg         20 mg  over 1-3 Minutes Intravenous EVERY 6 HOURS 22 0905 22 0759    22 1000  vancomycin (VANCOCIN) 1,750 mg in sodium chloride 0.9 % 500 mL intermittent infusion        \"Followed by\" Linked Group Details    1,750 mg  over 2 Hours Intravenous ONCE 22 0918      22 0930  piperacillin-tazobactam (ZOSYN) 3.375 g vial to attach to  mL bag        \"Followed by\" Linked Group Details    3.375 g  over 30 Minutes Intravenous ONCE 22 0906            Contrast Orders - past 72 hours (72h ago, onward)    Start     Dose/Rate Route Frequency Stop    22 0800  iopamidol (ISOVUE-370) solution 75 mL         75 mL Intravenous ONCE 22 0739    "       [employer] Prediction of Planned Initial Vancomycin Regimen  Loading dose: 1750 mg at 10:00 08/12/2022.  Regimen: 1000 mg IV every 24 hours.  Start time: 09:17 on 08/12/2022  Exposure target: AUC24 (range)400-600 mg/L.hr   AUC24,ss: 450 mg/L.hr  Probability of AUC24 > 400: 63 %  Ctrough,ss: 14.5 mg/L  Probability of Ctrough,ss > 20: 22 %  Probability of nephrotoxicity (Lodise QUINN 2009): 10 %          Plan:  1. Start vancomycin  1750mg IV once, followed by vancomycin 1000 mg IV q24h.   2. Vancomycin monitoring method: AUC  3. Vancomycin therapeutic monitoring goal: 400-600 mg*h/L  4. Pharmacy will check vancomycin levels as appropriate in 1-3 Days.    5. Serum creatinine levels will be ordered daily for the first week of therapy and at least twice weekly for subsequent weeks.      Sarahi Mclaughlin, [facility]

## 2022-08-12 NOTE — PHARMACY-ADMISSION MEDICATION HISTORY
Pharmacy Note - Admission Medication History   ______________________________________________________________________    Prior To Admission (PTA) med list completed and updated in EMR.       PTA Med List   Medication Sig Last Dose     amLODIPine (NORVASC) 5 MG tablet Take 5 mg by mouth daily 8/11/2022 at am     aspirin (ASA) 81 MG chewable tablet Take 81 mg by mouth daily 8/11/2022 at am     atorvastatin (LIPITOR) 40 MG tablet Take 40 mg by mouth daily 8/11/2022 at am     clopidogrel (PLAVIX) 75 MG tablet Take 75 mg by mouth daily 8/11/2022 at am     glipiZIDE (GLUCOTROL) 10 MG tablet Take 10 mg by mouth 2 times daily (before meals) 8/11/2022 at pm     metFORMIN (GLUCOPHAGE) 1000 MG tablet Take 1,000 mg by mouth 2 times daily (with meals) 8/11/2022 at pm     metoprolol tartrate (LOPRESSOR) 25 MG tablet Take 12.5 mg by mouth 2 times daily 8/11/2022 at am       Information source(s): Patient and Clinic records  Method of interview communication: in-person    Summary of Changes to PTA Med List  New: amlodipine, Aspirin, atorvastatin, clopidogrel, glipizide, metformin, metoprolol  Discontinued: None  Changed: None    Patient was asked about OTC/herbal products specifically.  PTA med list reflects this.    In the past week, patient estimated taking medication this percent of the time:  greater than 90%.    Allergies were reviewed, assessed, and updated with the patient.      Patient does not use any multi-dose medications prior to admission.    The information provided in this note is only as accurate as the sources available at the time of the update(s).    Thank you.    Gloria Wilson, Pharmacy Resident  8/12/2022 11:44 AM

## 2022-08-12 NOTE — ED NOTES
Pt remains in the sitting up position in bed. He is comfortable. His MG infusion is complete. He is getting his antibiotic infusion. Urinal emptied, rinsed and returned to room.

## 2022-08-12 NOTE — ED NOTES
Pt walks to bathroom and back with steady gait and no return of tachycardia. He has had a normal BM.

## 2022-08-12 NOTE — ED TRIAGE NOTES
"    [facility]. Pt comes from home after what he reports as issues with his breathing for the past 6 months, but \"got worse tonight, couldn't move anymore.\" Pt was unable to catch his breath, was 86% on room air when ems arrived. Pt lives at home with his wife, was febrile for ems in the 100's, tachycardic and very short of breath with expiratory wheezes. Pt is alert and oriented.   "

## 2022-08-13 LAB
ALBUMIN SERPL-MCNC: 2.7 G/DL (ref 3.5–5)
ALP SERPL-CCNC: 64 U/L (ref 45–120)
ALT SERPL W P-5'-P-CCNC: 16 U/L (ref 0–45)
ANION GAP SERPL CALCULATED.3IONS-SCNC: 8 MMOL/L (ref 5–18)
AST SERPL W P-5'-P-CCNC: 19 U/L (ref 0–40)
ATRIAL RATE - MUSE: 111 BPM
BILIRUB SERPL-MCNC: 1 MG/DL (ref 0–1)
BUN SERPL-MCNC: 30 MG/DL (ref 8–28)
CALCIUM SERPL-MCNC: 8.8 MG/DL (ref 8.5–10.5)
CHLORIDE BLD-SCNC: 104 MMOL/L (ref 98–107)
CO2 SERPL-SCNC: 25 MMOL/L (ref 22–31)
CREAT SERPL-MCNC: 1.88 MG/DL (ref 0.7–1.3)
DIASTOLIC BLOOD PRESSURE - MUSE: 81 MMHG
ENTEROCOCCUS FAECALIS: NOT DETECTED
ENTEROCOCCUS FAECIUM: NOT DETECTED
ERYTHROCYTE [DISTWIDTH] IN BLOOD BY AUTOMATED COUNT: 13.7 % (ref 10–15)
GFR SERPL CREATININE-BSD FRML MDRD: 35 ML/MIN/1.73M2
GLUCOSE BLD-MCNC: 155 MG/DL (ref 70–125)
GLUCOSE BLDC GLUCOMTR-MCNC: 117 MG/DL (ref 70–99)
GLUCOSE BLDC GLUCOMTR-MCNC: 149 MG/DL (ref 70–99)
GLUCOSE BLDC GLUCOMTR-MCNC: 178 MG/DL (ref 70–99)
GLUCOSE BLDC GLUCOMTR-MCNC: 209 MG/DL (ref 70–99)
HCT VFR BLD AUTO: 33.9 % (ref 40–53)
HGB BLD-MCNC: 11.4 G/DL (ref 13.3–17.7)
INTERPRETATION ECG - MUSE: NORMAL
LISTERIA SPECIES (DETECTED/NOT DETECTED): NOT DETECTED
MAGNESIUM SERPL-MCNC: 2.3 MG/DL (ref 1.8–2.6)
MCH RBC QN AUTO: 29.5 PG (ref 26.5–33)
MCHC RBC AUTO-ENTMCNC: 33.6 G/DL (ref 31.5–36.5)
MCV RBC AUTO: 88 FL (ref 78–100)
P AXIS - MUSE: 59 DEGREES
PLATELET # BLD AUTO: 108 10E3/UL (ref 150–450)
POTASSIUM BLD-SCNC: 4.1 MMOL/L (ref 3.5–5)
PR INTERVAL - MUSE: 230 MS
PROT SERPL-MCNC: 6.2 G/DL (ref 6–8)
QRS DURATION - MUSE: 70 MS
QT - MUSE: 298 MS
QTC - MUSE: 405 MS
R AXIS - MUSE: 32 DEGREES
RBC # BLD AUTO: 3.87 10E6/UL (ref 4.4–5.9)
SODIUM SERPL-SCNC: 137 MMOL/L (ref 136–145)
STAPHYLOCOCCUS AUREUS: NOT DETECTED
STAPHYLOCOCCUS EPIDERMIDIS: NOT DETECTED
STAPHYLOCOCCUS LUGDUNENSIS: NOT DETECTED
STAPHYLOCOCCUS SPECIES: NOT DETECTED
STREPTOCOCCUS AGALACTIAE: NOT DETECTED
STREPTOCOCCUS ANGINOSUS GROUP: NOT DETECTED
STREPTOCOCCUS PNEUMONIAE: NOT DETECTED
STREPTOCOCCUS PYOGENES: NOT DETECTED
STREPTOCOCCUS SPECIES: DETECTED
SYSTOLIC BLOOD PRESSURE - MUSE: 186 MMHG
T AXIS - MUSE: 74 DEGREES
VENTRICULAR RATE- MUSE: 111 BPM
WBC # BLD AUTO: 9.3 10E3/UL (ref 4–11)

## 2022-08-13 PROCEDURE — 85014 HEMATOCRIT: CPT | Performed by: INTERNAL MEDICINE

## 2022-08-13 PROCEDURE — 250N000011 HC RX IP 250 OP 636: Performed by: INTERNAL MEDICINE

## 2022-08-13 PROCEDURE — 80053 COMPREHEN METABOLIC PANEL: CPT | Performed by: INTERNAL MEDICINE

## 2022-08-13 PROCEDURE — 36415 COLL VENOUS BLD VENIPUNCTURE: CPT | Performed by: INTERNAL MEDICINE

## 2022-08-13 PROCEDURE — 87040 BLOOD CULTURE FOR BACTERIA: CPT | Performed by: INTERNAL MEDICINE

## 2022-08-13 PROCEDURE — 250N000012 HC RX MED GY IP 250 OP 636 PS 637: Performed by: INTERNAL MEDICINE

## 2022-08-13 PROCEDURE — 210N000001 HC R&B IMCU HEART CARE

## 2022-08-13 PROCEDURE — 99233 SBSQ HOSP IP/OBS HIGH 50: CPT | Performed by: INTERNAL MEDICINE

## 2022-08-13 PROCEDURE — 83735 ASSAY OF MAGNESIUM: CPT | Performed by: INTERNAL MEDICINE

## 2022-08-13 PROCEDURE — 250N000013 HC RX MED GY IP 250 OP 250 PS 637: Performed by: INTERNAL MEDICINE

## 2022-08-13 RX ORDER — PREDNISONE 20 MG/1
40 TABLET ORAL DAILY
Status: DISCONTINUED | OUTPATIENT
Start: 2022-08-13 | End: 2022-08-16 | Stop reason: HOSPADM

## 2022-08-13 RX ORDER — FUROSEMIDE 20 MG
20 TABLET ORAL DAILY
Status: DISCONTINUED | OUTPATIENT
Start: 2022-08-14 | End: 2022-08-14

## 2022-08-13 RX ADMIN — ENOXAPARIN SODIUM 40 MG: 40 INJECTION SUBCUTANEOUS at 08:46

## 2022-08-13 RX ADMIN — PREDNISONE 40 MG: 20 TABLET ORAL at 19:48

## 2022-08-13 RX ADMIN — FUROSEMIDE 20 MG: 10 INJECTION, SOLUTION INTRAMUSCULAR; INTRAVENOUS at 01:41

## 2022-08-13 RX ADMIN — INSULIN ASPART 1 UNITS: 100 INJECTION, SOLUTION INTRAVENOUS; SUBCUTANEOUS at 08:45

## 2022-08-13 RX ADMIN — CLOPIDOGREL BISULFATE 75 MG: 75 TABLET ORAL at 08:46

## 2022-08-13 RX ADMIN — AMLODIPINE BESYLATE 5 MG: 5 TABLET ORAL at 08:46

## 2022-08-13 RX ADMIN — INSULIN ASPART 1 UNITS: 100 INJECTION, SOLUTION INTRAVENOUS; SUBCUTANEOUS at 12:15

## 2022-08-13 RX ADMIN — METOPROLOL TARTRATE 12.5 MG: 25 TABLET, FILM COATED ORAL at 08:46

## 2022-08-13 RX ADMIN — ATORVASTATIN CALCIUM 40 MG: 40 TABLET, FILM COATED ORAL at 08:46

## 2022-08-13 RX ADMIN — PIPERACILLIN AND TAZOBACTAM 3.38 G: 3; .375 INJECTION, POWDER, LYOPHILIZED, FOR SOLUTION INTRAVENOUS at 16:27

## 2022-08-13 RX ADMIN — PIPERACILLIN AND TAZOBACTAM 3.38 G: 3; .375 INJECTION, POWDER, LYOPHILIZED, FOR SOLUTION INTRAVENOUS at 00:07

## 2022-08-13 RX ADMIN — ASPIRIN 81 MG: 81 TABLET, CHEWABLE ORAL at 08:46

## 2022-08-13 RX ADMIN — VANCOMYCIN HYDROCHLORIDE 1000 MG: 1 INJECTION, SOLUTION INTRAVENOUS at 10:47

## 2022-08-13 RX ADMIN — METOPROLOL TARTRATE 12.5 MG: 25 TABLET, FILM COATED ORAL at 19:47

## 2022-08-13 RX ADMIN — PIPERACILLIN AND TAZOBACTAM 3.38 G: 3; .375 INJECTION, POWDER, LYOPHILIZED, FOR SOLUTION INTRAVENOUS at 08:45

## 2022-08-13 ASSESSMENT — ACTIVITIES OF DAILY LIVING (ADL)
ADLS_ACUITY_SCORE: 20
ADLS_ACUITY_SCORE: 26
DEPENDENT_IADLS:: INDEPENDENT
ADLS_ACUITY_SCORE: 26

## 2022-08-13 NOTE — PHARMACY-VANCOMYCIN DOSING SERVICE
Given increased creatinine today, will hold next vanco dose and get level with AM labs tomorrow.    Juana Adams, [facility]  1:58 PM

## 2022-08-13 NOTE — PROGRESS NOTES
Patient arrived to unit at 1915. Patient oriented to room and call light use. On 3 L O2. Denies pain. Wife at bedside. Patient and wife express no immediate concerns at this time.

## 2022-08-13 NOTE — PROGRESS NOTES
[facility]    Medicine Progress Note - Hospitalist Service    Date of Admission:  8/12/2022    Assessment & Plan        Per Beasley is a 82 year old male with past medical history significant for severe aortic valve stenosis status post TAVR in 2021, coronary artery disease status post PCI with NUBIA to mid LCx in 2021, persistent atrial fibrillation, hypertension, dyslipidemia, diabetes mellitus type 2, PVD, and CKD stage III who was admitted on 8/12/2022.     Acute hypoxic respiratory failure  Probable Sarcoidosis  Former smoker  Mediastinal and hilar lymphadenopathy  -CT PE 8/12: Pulmonary edema, tiny pleural effusions, basilar groundglass opacities, moderate to severe airway thickening from edema or airway disease, interval increase in mediastinal and hilar lymphadenopathy, septal and airway thickening could correlate with sarcoidosis  -Will need follow-up CT in 3 months for lymphadenopathy  -Does follow with a pulmonologist outpatient with concerns for sarcoidosis  -Significant wheezing today despite diuresis, starting prednisone 40 mg daily for sarcoidosis treatment, will discuss need for follow-up with pulmonologist at discharge  -Furosemide 20 mg IV every 6 for 3 doses-completed    Group G strep bacteremia  LLE cellulitis- improving  Lactic acidosis-resolved  Sepsis- resolved  -Was noted to be febrile by EMS, presented with tachycardia, tachypnea, and hypoxia  -Has left lower extremity redness and warmth without any open wounds, now improving   -Lactic acid 2.6>1.6, procalcitonin 0.97  -UA negative for signs of infection  -Blood cultures drawn and positive for group G strep this collected UA, repeat to be drawn today  -Due to volume overload will not be starting IVF    Volume overload- improved  History of severe aortic stenosis status post TAVR 9/14/21  -Has not been taking home diuretic for the past 3 months with progressive shortness of breath at baseline  -Given Lasix 40 mg IV  "in the ED, started on 20 mg every 6 for 3 doses  -Echo 8/12: EF 60 to 65%, prosthetic aortic valve appears normal    Persistent atrial fibrillation  -Continue metoprolol 12.5 mg twice daily    Essential hypertension  -Continue amlodipine 5 mg daily    JONNATHAN on CKD stage IIIb  -JONNATHAN secondary to diuretics, have put on hold for now  -Baseline creatinine approximately 1.2-1.5  -We will monitor closely while on diuretics  -Avoid nephrotoxic agents    Type 2 diabetes mellitus not on long-term insulin  -Most recent A1c 6.8%  -Takes oral medications at home, holding glipizide and metformin  -Sliding scale insulin with meals and at bedtime  -We will need to monitor closely with addition of glucocorticoids    Coronary artery disease without angina  Dyslipidemia  -s/p NUBIA x 1 to mid LCX into OM1, 8/13/2021  -Continue Plavix 75 mg daily, atorvastatin 40 mg daily    Obesity class I  -BMI 30     Diet: Combination Diet Moderate Consistent Carb (60 g CHO per Meal) Diet; Low Saturated Fat Diet, Low Saturated Fat Na <2400mg Diet    DVT Prophylaxis: Enoxaparin (Lovenox) SQ  Helm Catheter: Not present  Central Lines: None  Cardiac Monitoring: None  Code Status: Full Code      Disposition Plan      Expected Discharge Date: 08/14/2022      Destination: home;home with family  Discharge Comments: + BC, IV Vanco, Zosyn, IV Lasix        The patient's care was discussed with the Patient.    Yaz Peña MD  Hospitalist Service  [facility]  Securely message with the Vocera Web Console (learn more here)  Text page via JumpIn Paging/Directory         Clinically Significant Risk Factors Present on Admission               # DMII: A1C = 6.8 % (Ref range: <=5.6 %) within past 3 months  # Overweight: Estimated body mass index is 29.49 kg/m  as calculated from the following:    Height as of this encounter: 1.753 m (5' 9\").    Weight as of this encounter: 90.6 kg (199 lb 11.2 oz).    "     ______________________________________________________________________    Interval History   Mr. Rubin is having an easier time breathing and his lung exam does sound better.  He continues to have significant wheezing which I think is less likely due to volume overload and more likely due to sarcoidosis so I will be starting him on prednisone.  He will need to follow-up with his pulmonologist for this and likely be on a long-term taper.  Otherwise he is not having any complaints.  His blood cultures were positive for gram-positive cocci growing group G strep which is known to cause bacteremia.  Likely the source of his sepsis.  This can be from a multipple sources including respiratory or skin source so still unsure where the infection came from but his lower extremity is looking better today after 24 hours of antibiotics.  Could have been from cellulitis most likely.    Data reviewed today: I reviewed all medications, new labs and imaging results over the last 24 hours. I personally reviewed no images or EKG's today.    Physical Exam   Vital Signs: Temp: 98.1  F (36.7  C) Temp src: Oral BP: 128/59 Pulse: 78   Resp: 18 SpO2: 98 % O2 Device: Nasal cannula Oxygen Delivery: 3 LPM  Weight: 199 lbs 11.2 oz  General Appearance: Awake, alert, in no acute distress  Respiratory: Scattered wheeze mainly on the left side, improvement overall from yesterday  Cardiovascular: RRR, no murmur noted  GI: soft, nontender, non distended, normal bowel sounds  Skin: no jaundice, left lower extremity has decreased redness, still warmer than the right side but improved      Data   Recent Labs   Lab 08/13/22  1636 08/13/22  1200 08/13/22  0805 08/13/22  0527 08/12/22  1259 08/12/22  0628   WBC  --   --   --  9.3  --  9.4   HGB  --   --   --  11.4*  --  14.2   MCV  --   --   --  88  --  90   PLT  --   --   --  108*  --  141*   NA  --   --   --  137  --  139   POTASSIUM  --   --   --  4.1  --  4.3   CHLORIDE  --   --   --  104  --  105    CO2  --   --   --  25  --  25   BUN  --   --   --  30*  --  24   CR  --   --   --  1.88*  --  1.52*  1.52*   ANIONGAP  --   --   --  8  --  9   SANDRA  --   --   --  8.8  --  9.4   * 178* 149* 155*   < > 112   ALBUMIN  --   --   --  2.7*  --  3.9   PROTTOTAL  --   --   --  6.2  --  7.7   BILITOTAL  --   --   --  1.0  --  0.9   ALKPHOS  --   --   --  64  --  122*   ALT  --   --   --  16  --  26   AST  --   --   --  19  --  31    < > = values in this interval not displayed.     Medications       amLODIPine  5 mg Oral Daily     aspirin  81 mg Oral Daily     atorvastatin  40 mg Oral Daily     clopidogrel  75 mg Oral Daily     enoxaparin ANTICOAGULANT  40 mg Subcutaneous Q24H     [Held by provider] furosemide  20 mg Oral Daily     insulin aspart  1-7 Units Subcutaneous TID AC     insulin aspart  1-5 Units Subcutaneous At Bedtime     metoprolol tartrate  12.5 mg Oral BID     piperacillin-tazobactam  3.375 g Intravenous Q8H     predniSONE  40 mg Oral Daily     sodium chloride (PF)  3 mL Intracatheter Q8H     sodium chloride (PF)  3 mL Intracatheter Q8H     vancomycin place chavez - receiving intermittent dosing  1 each Intravenous See Admin Instructions

## 2022-08-13 NOTE — PLAN OF CARE
Goal Outcome Evaluation:      Pt alert and oriented x4, VSS, tele afib earlier but now SR with 1 degree block, sats wnl on 3LNC, BLE edema elevated with pillows, critical lab blood cultures from 8/12 per record MD informed, will continue to  monitor.  Problem: Dysrhythmia (Heart Failure)  Goal: Stable Heart Rate and Rhythm  Outcome: Ongoing, Progressing     Problem: Fluid Imbalance (Heart Failure)  Goal: Fluid Balance  Outcome: Ongoing, Progressing     Problem: Respiratory Compromise (Heart Failure)  Goal: Effective Oxygenation and Ventilation  Outcome: Ongoing, Progressing  Intervention: Promote Airway Secretion Clearance  Recent Flowsheet Documentation  Taken 8/13/2022 0030 by Walter Truong, RN  Cough And Deep Breathing: done with encouragement  Taken 8/12/2022 2100 by Walter Truong, RN  Cough And Deep Breathing: done with encouragement

## 2022-08-13 NOTE — PLAN OF CARE
Problem: Dysrhythmia (Heart Failure)  Goal: Stable Heart Rate and Rhythm  Outcome: Ongoing, Progressing     Problem: Respiratory Compromise (Heart Failure)  Goal: Effective Oxygenation and Ventilation  Outcome: Ongoing, Progressing  Intervention: Promote Airway Secretion Clearance  Recent Flowsheet Documentation  Taken 8/13/2022 1200 by Yolanda Richardson, RN  Cough And Deep Breathing: done with encouragement  Taken 8/13/2022 0800 by Yolanda Richardson, RN  Cough And Deep Breathing: done with encouragement   Goal Outcome Evaluation:      VSS. Monitor shows NSR 1st degree AV Block with freq. PAC's,? occ, Afib. Denied any chest pain or SOB. O2 at 3L/NC with O2 sats of 97%. Continued on IV antibiotics for + BC.

## 2022-08-13 NOTE — SIGNIFICANT EVENT
Significant Event Note    Time of event: 10:56 PM August 12, 2022    Description of event:  Patient is admitted today for sepsis of unknown source. Blood culture 1/2 shows gram positive cocci, pt is on vancomycin and zosyn.     Plan:  Pt has adequate antibiotic coverage.      Haleigh Falcon MD    Addendum 1:36 AM - Streptococcus species

## 2022-08-13 NOTE — CONSULTS
Care Management Initial Consult    General Information  Assessment completed with: Patient, Per  Type of CM/SW Visit: Initial Assessment    Primary Care Provider verified and updated as needed: Yes   Readmission within the last 30 days:        Reason for Consult: discharge planning  Advance Care Planning:            Communication Assessment  Patient's communication style: spoken language (English or Bilingual)    Hearing Difficulty or Deaf: no   Wear Glasses or Blind: no    Cognitive  Cognitive/Neuro/Behavioral: WDL                      Living Environment:   People in home: spouse  Tana  Current living Arrangements: apartment      Able to return to prior arrangements: yes       Family/Social Support:  Care provided by: self  Provides care for: no one  Marital Status:   Wife, Children  Tana       Description of Support System: Supportive, Involved    Support Assessment: Adequate social supports, Adequate family and caregiver support    Current Resources:   Patient receiving home care services:       Community Resources:    Equipment currently used at home: none  Supplies currently used at home:      Employment/Financial:  Employment Status: retired        Financial Concerns:             Lifestyle & Psychosocial Needs:  Social Determinants of Health     Tobacco Use: Not on file   Alcohol Use: Not on file   Financial Resource Strain: Not on file   Food Insecurity: Not on file   Transportation Needs: Not on file   Physical Activity: Not on file   Stress: Not on file   Social Connections: Not on file   Intimate Partner Violence: Not on file   Depression: Not on file   Housing Stability: Not on file       Functional Status:  Prior to admission patient needed assistance:   Dependent ADLs:: Independent  Dependent IADLs:: Independent       Mental Health Status:  Mental Health Status: No Current Concerns       Chemical Dependency Status:                Values/Beliefs:  Spiritual, Cultural Beliefs, Episcopal  Practices, Values that affect care:                 Additional Information:  Met with patient at bedside. Patient is independent at home with wife, Tana. Lives in an apt. With no stairs. Son will provide transportation at time of discharge.  Patient reported that he is a  - [employer]. [facility] notified. Notification ID: [id number].     Wendi Angulo RN

## 2022-08-14 ENCOUNTER — APPOINTMENT (OUTPATIENT)
Dept: PHYSICAL THERAPY | Facility: HOSPITAL | Age: 82
DRG: 871 | End: 2022-08-14
Attending: INTERNAL MEDICINE
Payer: MEDICARE

## 2022-08-14 ENCOUNTER — APPOINTMENT (OUTPATIENT)
Dept: OCCUPATIONAL THERAPY | Facility: HOSPITAL | Age: 82
DRG: 871 | End: 2022-08-14
Attending: INTERNAL MEDICINE
Payer: MEDICARE

## 2022-08-14 LAB
ANION GAP SERPL CALCULATED.3IONS-SCNC: 8 MMOL/L (ref 5–18)
BUN SERPL-MCNC: 38 MG/DL (ref 8–28)
CALCIUM SERPL-MCNC: 8.8 MG/DL (ref 8.5–10.5)
CHLORIDE BLD-SCNC: 102 MMOL/L (ref 98–107)
CO2 SERPL-SCNC: 23 MMOL/L (ref 22–31)
CREAT SERPL-MCNC: 1.76 MG/DL (ref 0.7–1.3)
ERYTHROCYTE [DISTWIDTH] IN BLOOD BY AUTOMATED COUNT: 13.4 % (ref 10–15)
GFR SERPL CREATININE-BSD FRML MDRD: 38 ML/MIN/1.73M2
GLUCOSE BLD-MCNC: 217 MG/DL (ref 70–125)
GLUCOSE BLDC GLUCOMTR-MCNC: 215 MG/DL (ref 70–99)
GLUCOSE BLDC GLUCOMTR-MCNC: 271 MG/DL (ref 70–99)
GLUCOSE BLDC GLUCOMTR-MCNC: 389 MG/DL (ref 70–99)
HCT VFR BLD AUTO: 35.1 % (ref 40–53)
HGB BLD-MCNC: 11.6 G/DL (ref 13.3–17.7)
MAGNESIUM SERPL-MCNC: 2.3 MG/DL (ref 1.8–2.6)
MCH RBC QN AUTO: 29.1 PG (ref 26.5–33)
MCHC RBC AUTO-ENTMCNC: 33 G/DL (ref 31.5–36.5)
MCV RBC AUTO: 88 FL (ref 78–100)
PLATELET # BLD AUTO: 106 10E3/UL (ref 150–450)
POTASSIUM BLD-SCNC: 4.2 MMOL/L (ref 3.5–5)
RBC # BLD AUTO: 3.98 10E6/UL (ref 4.4–5.9)
SODIUM SERPL-SCNC: 133 MMOL/L (ref 136–145)
VANCOMYCIN SERPL-MCNC: 13.3 MG/L
WBC # BLD AUTO: 9.3 10E3/UL (ref 4–11)

## 2022-08-14 PROCEDURE — 97162 PT EVAL MOD COMPLEX 30 MIN: CPT | Mod: GP

## 2022-08-14 PROCEDURE — 97165 OT EVAL LOW COMPLEX 30 MIN: CPT | Mod: GO

## 2022-08-14 PROCEDURE — 36415 COLL VENOUS BLD VENIPUNCTURE: CPT | Performed by: INTERNAL MEDICINE

## 2022-08-14 PROCEDURE — 250N000012 HC RX MED GY IP 250 OP 636 PS 637: Performed by: INTERNAL MEDICINE

## 2022-08-14 PROCEDURE — 250N000013 HC RX MED GY IP 250 OP 250 PS 637: Performed by: INTERNAL MEDICINE

## 2022-08-14 PROCEDURE — 83735 ASSAY OF MAGNESIUM: CPT | Performed by: INTERNAL MEDICINE

## 2022-08-14 PROCEDURE — 99232 SBSQ HOSP IP/OBS MODERATE 35: CPT | Performed by: INTERNAL MEDICINE

## 2022-08-14 PROCEDURE — 250N000011 HC RX IP 250 OP 636: Performed by: INTERNAL MEDICINE

## 2022-08-14 PROCEDURE — 99207 PR CDG-CUT & PASTE-POTENTIAL IMPACT ON LEVEL: CPT | Performed by: INTERNAL MEDICINE

## 2022-08-14 PROCEDURE — 80048 BASIC METABOLIC PNL TOTAL CA: CPT | Performed by: INTERNAL MEDICINE

## 2022-08-14 PROCEDURE — 97530 THERAPEUTIC ACTIVITIES: CPT | Mod: GO

## 2022-08-14 PROCEDURE — 85027 COMPLETE CBC AUTOMATED: CPT | Performed by: INTERNAL MEDICINE

## 2022-08-14 PROCEDURE — 97116 GAIT TRAINING THERAPY: CPT | Mod: GP

## 2022-08-14 PROCEDURE — 80202 ASSAY OF VANCOMYCIN: CPT | Performed by: INTERNAL MEDICINE

## 2022-08-14 PROCEDURE — 210N000001 HC R&B IMCU HEART CARE

## 2022-08-14 RX ORDER — VANCOMYCIN HYDROCHLORIDE 1 G/200ML
1000 INJECTION, SOLUTION INTRAVENOUS EVERY 24 HOURS
Status: DISCONTINUED | OUTPATIENT
Start: 2022-08-14 | End: 2022-08-15

## 2022-08-14 RX ADMIN — ENOXAPARIN SODIUM 40 MG: 40 INJECTION SUBCUTANEOUS at 08:05

## 2022-08-14 RX ADMIN — CLOPIDOGREL BISULFATE 75 MG: 75 TABLET ORAL at 08:04

## 2022-08-14 RX ADMIN — VANCOMYCIN HYDROCHLORIDE 1000 MG: 1 INJECTION, SOLUTION INTRAVENOUS at 10:20

## 2022-08-14 RX ADMIN — PIPERACILLIN AND TAZOBACTAM 3.38 G: 3; .375 INJECTION, POWDER, LYOPHILIZED, FOR SOLUTION INTRAVENOUS at 00:07

## 2022-08-14 RX ADMIN — PIPERACILLIN AND TAZOBACTAM 3.38 G: 3; .375 INJECTION, POWDER, LYOPHILIZED, FOR SOLUTION INTRAVENOUS at 15:53

## 2022-08-14 RX ADMIN — METOPROLOL TARTRATE 12.5 MG: 25 TABLET, FILM COATED ORAL at 20:47

## 2022-08-14 RX ADMIN — ASPIRIN 81 MG: 81 TABLET, CHEWABLE ORAL at 08:04

## 2022-08-14 RX ADMIN — PREDNISONE 40 MG: 20 TABLET ORAL at 08:05

## 2022-08-14 RX ADMIN — PIPERACILLIN AND TAZOBACTAM 3.38 G: 3; .375 INJECTION, POWDER, LYOPHILIZED, FOR SOLUTION INTRAVENOUS at 07:07

## 2022-08-14 RX ADMIN — METOPROLOL TARTRATE 12.5 MG: 25 TABLET, FILM COATED ORAL at 08:04

## 2022-08-14 RX ADMIN — AMLODIPINE BESYLATE 5 MG: 5 TABLET ORAL at 08:04

## 2022-08-14 RX ADMIN — INSULIN ASPART 2 UNITS: 100 INJECTION, SOLUTION INTRAVENOUS; SUBCUTANEOUS at 07:57

## 2022-08-14 RX ADMIN — ATORVASTATIN CALCIUM 40 MG: 40 TABLET, FILM COATED ORAL at 08:04

## 2022-08-14 RX ADMIN — INSULIN ASPART 3 UNITS: 100 INJECTION, SOLUTION INTRAVENOUS; SUBCUTANEOUS at 11:46

## 2022-08-14 RX ADMIN — INSULIN ASPART 5 UNITS: 100 INJECTION, SOLUTION INTRAVENOUS; SUBCUTANEOUS at 16:54

## 2022-08-14 RX ADMIN — INSULIN GLARGINE 5 UNITS: 100 INJECTION, SOLUTION SUBCUTANEOUS at 10:19

## 2022-08-14 ASSESSMENT — ACTIVITIES OF DAILY LIVING (ADL)
ADLS_ACUITY_SCORE: 26

## 2022-08-14 NOTE — PLAN OF CARE
Problem: Dysrhythmia (Heart Failure)  Goal: Stable Heart Rate and Rhythm  Outcome: Ongoing, Progressing     Problem: Respiratory Compromise (Heart Failure)  Goal: Effective Oxygenation and Ventilation  Outcome: Ongoing, Progressing  Intervention: Promote Airway Secretion Clearance  Recent Flowsheet Documentation  Taken 8/14/2022 1200 by Yolanda Richardson RN  Cough And Deep Breathing: done with encouragement  Taken 8/14/2022 0800 by Yolanda Richardson RN  Cough And Deep Breathing: done with encouragement   Goal Outcome Evaluation:      VSS. Oxygen off and pt's O2 sats on RA were 96%. Denied any dyspnea at rest. Up in chair for several hours over lunch. PT saw pt and  ambulated in stat[place] with walker length of staton to elevator x2. Continued on IV Antibiotics for + BC. A&O x 4. Wife here visiting this afternoon. Heart Monitor shows NSR 1st Degree AVBlock with freq. PAC's.

## 2022-08-14 NOTE — PROGRESS NOTES
Occupational Therapy       08/14/22 0931   Quick Adds   Type of Visit Initial Occupational Therapy Evaluation   Living Environment   People in Home spouse   Current Living Arrangements house   Living Environment Comments Patient independent at baseline. Tub/shower with grab bars, no shower chair. (pt typically showers at the [facility])   Self-Care   Usual Activity Tolerance good   Regular Exercise Yes   Activity/Exercise Type   (pt goes to the gym 5x/wk and does leg strengthening)   Equipment Currently Used at Home none   Fall history within last six months yes   Number of times patient has fallen within last six months 1   General Information   Onset of Illness/Injury or Date of Surgery 08/12/22   Referring Physician Yaz Peña MD   Patient/Family Therapy Goal Statement (OT) get home   Additional Occupational Profile Info/Pertinent History of Current Problem Per Beasley is a 82 year old male with past medical history significant for severe aortic valve stenosis status post TAVR in 2021, coronary artery disease status post PCI with NUBIA to mid LCx in 2021, persistent atrial fibrillation, hypertension, dyslipidemia, diabetes mellitus type 2, PVD, and CKD stage III who was admitted on 8/12/2022 and found to havhe respiratory failure, LLE cellulitis and volume overload.   Existing Precautions/Restrictions fall   Cognitive Status Examination   Orientation Status orientation to person, place and time   Range of Motion Comprehensive   General Range of Motion no range of motion deficits identified   Strength Comprehensive (MMT)   General Manual Muscle Testing (MMT) Assessment upper extremity strength deficits identified   Bed Mobility   Bed Mobility supine-sit   Supine-Sit [place] (Bed Mobility) contact guard   Comment (Bed Mobility) HOB elevated, use of bedrail   Transfers   Transfers sit-stand transfer   Transfer Comments Patient ambulated short distance in room without an AD, required  Min-Mod A, several  LOB.   Sit-Stand Transfer   Sit-Stand [place] (Transfers) contact guard   Sit/Stand Transfer Comments Min A for donning socks while seated.   Clinical Impression   Criteria for Skilled Therapeutic Interventions Met (OT) Yes, treatment indicated   OT Diagnosis Decreased ADL independence   OT Problem List-Impairments impacting ADL problems related to;activity tolerance impaired;balance   Assessment of Occupational Performance 1-3 Performance Deficits   Identified Performance Deficits trsfs, dressing, endurance   Planned Therapy Interventions (OT) ADL retraining;balance training;bed mobility training;home program guidelines   Clinical Decision Making Complexity (OT) low complexity   Risk & Benefits of therapy have been explained evaluation/treatment results reviewed;care plan/treatment goals reviewed   OT Discharge Planning   OT Discharge Recommendation (DC Rec) home with home care occupational therapy   OT Rationale for DC Rec Patient likely able to discharge home, pending physical therapy's evaluation of appropriate assistive device. Patient would benefit from home care occupational therapy assessment to improve home safety at discharge.   Total Evaluation Time (Minutes)   Total Evaluation Time (Minutes) 16   OT Goals   Therapy Frequency (OT) Daily   OT Predicted Duration/Target Date for Goal Attainment 08/21/22   OT Goals Hygiene/Grooming;Lower Body Dressing;Toilet Transfer/Toileting   OT: Hygiene/Grooming independent   OT: Lower Body Dressing Modified independent;using adaptive equipment   OT: Toilet Transfer/Toileting Independent

## 2022-08-14 NOTE — PLAN OF CARE
Problem: Plan of Care - These are the overarching goals to be used throughout the patient stay.    Goal: Plan of Care Review/Shift Note  Description: The Plan of Care Review/Shift note should be completed every shift.  The Outcome Evaluation is a brief statement about your assessment that the patient is improving, declining, or no change.  This information will be displayed automatically on your shift note.  Outcome: Ongoing, Progressing     Problem: Plan of Care - These are the overarching goals to be used throughout the patient stay.    Goal: Optimal Comfort and Wellbeing  Outcome: Ongoing, Progressing     Goal Outcome Evaluation:  Pt denies pain, slept mostly the night.  3 lnc sating >95%. Pt states breathing a little better now.  No acute changes overnight, IV antibiotics in place.

## 2022-08-14 NOTE — PROGRESS NOTES
08/14/22 1500   Quick Adds   Type of Visit Initial PT Evaluation   Living Environment   People in Home spouse   Current Living Arrangements house   Self-Care   Usual Activity Tolerance good   Current Activity Tolerance moderate   Regular Exercise Yes   Equipment Currently Used at Home none   Activity/Exercise/Self-Care Comment very active, indep all ADL's/IADL's   General Information   Onset of Illness/Injury or Date of Surgery 08/12/22   Referring Physician CHELSEA Peña   Patient/Family Therapy Goals Statement (PT) go home   Pertinent History of Current Problem (include personal factors and/or comorbidities that impact the POC) 82 year old male with past medical history significant for severe aortic valve stenosis status post TAVR in 2021, coronary artery disease status post PCI with NUBIA to mid LCx in 2021, persistent atrial fibrillation, hypertension, dyslipidemia, diabetes mellitus type 2, PVD, and CKD stage III who was admitted on 8/12/2022.      Acute hypoxic respiratory failure  Probable Sarcoidosis   General Observations alert, very willing to participate with PT, on RA at 96%   Cognition   Affect/Mental Status (Cognition) WFL   Orientation Status (Cognition) oriented x 4   Follows Commands (Cognition) WFL   Pain Assessment   Patient Currently in Pain No   Range of Motion (ROM)   Range of Motion ROM is WNL   Strength (Manual Muscle Testing)   Strength Comments LE strength WFL, fatigues fairly easily   Bed Mobility   Bed Mobility sit-supine;scooting/bridging   Scooting/Bridging [place] (Bed Mobility) modified independence;verbal cues   Sit-Supine [place] (Bed Mobility) independent   Assistive Device (Bed Mobility) bed rails   Comment, (Bed Mobility) self assists LE's up on to the bed   Transfers   Transfers sit-stand transfer   Sit-Stand Transfer   Sit-Stand [place] (Transfers) minimum assist (75% patient effort)   Assistive Device (Sit-Stand Transfers) other (see comments)  (none)   Comment,  (Sit-Stand Transfer) very unsteady, sat quickly back sown in the chair.  Will use FWW.   Gait/Stairs (Locomotion)   [place] Level (Gait) contact guard   Assistive Device (Gait) walker, front-wheeled   Distance in Feet (Required for LE Total Joints) 300'   Pattern (Gait) step-through   Comment, (Gait/Stairs) slower pace, no LOB but noted some fatigue, one standing rest at group home point   Clinical Impression   Criteria for Skilled Therapeutic Intervention Yes, treatment indicated   PT Diagnosis (PT) decreased functional mobility   Influenced by the following impairments fatigue/deconditioned   Functional limitations due to impairments transfers, gait   Clinical Presentation (PT Evaluation Complexity) Stable/Uncomplicated   Clinical Presentation Rationale presents as diagnosed   Clinical Decision Making (Complexity) moderate complexity   Planned Therapy Interventions (PT) bed mobility training;transfer training;gait training;strengthening;balance training   Anticipated Equipment Needs at Discharge (PT) walker, rolling;other (see comments)  (may not need)   Risk & Benefits of therapy have been explained care plan/treatment goals reviewed;patient;spouse/significant other   PT Discharge Planning   PT Discharge Recommendation (DC Rec) home with assist   PT Rationale for DC Rec assist with ADL's, safetyas he continues to recover   PT Brief overview of current status pt tolerated PT well   Total Evaluation Time   Total Evaluation Time (Minutes) 10   Physical Therapy Goals   PT Frequency Daily   PT Predicted Duration/Target Date for Goal Attainment 08/17/22   PT Goals Bed Mobility;Transfers;Gait;Stairs   PT: Bed Mobility Independent   PT: Transfers Modified independent;Sit to/from stand;Bed to/from chair;Assistive device   PT: Gait Supervision/stand-by assist;Rolling walker;Greater than 200 feet  (or without AD)   PT: Stairs Supervision/stand-by assist;4 stairs  (one rail)

## 2022-08-14 NOTE — PROGRESS NOTES
"Care Management Follow Up    Length of Stay (days): 2    Expected Discharge Date: 08/15/2022     Concerns to be Addressed:       Patient plan of care discussed at interdisciplinary rounds: Yes    Anticipated Discharge Disposition: Home     Anticipated Discharge Services:    Anticipated Discharge DME:      Patient/family educated on Medicare website which has current facility and service quality ratings:    Education Provided on the Discharge Plan:    Patient/Family in Agreement with the Plan:      Referrals Placed by CM/SW:    Private pay costs discussed: Not applicable    Additional Information:  Reviewed PT/ OT eval recommendations. Met with patient at bedside, discussed discharge options, patient declined homecare. Stated \"I go to the gym and will keep going and get exercise there\". No further needs at this time.     Wendi Angulo RN        "

## 2022-08-14 NOTE — PHARMACY-VANCOMYCIN DOSING SERVICE
Pharmacy Vancomycin Note  Date of Service 2022  Patient's  1940   82 year old, male    Indication: Sepsis  Day of Therapy: 3  Current vancomycin regimen:  1000 mg IV q24h  Current vancomycin monitoring method: AUC  Current vancomycin therapeutic monitoring goal: 400-600 mg*h/L    InsightRX Prediction of Current Vancomycin Regimen  Regimen: 1000 mg IV every 24 hours.  Start time: 08:09 on 2022  Exposure target: AUC24 (range)400-600 mg/L.hr   AUC24,ss: 504 mg/L.hr  Probability of AUC24 > 400: 86 %  Ctrough,ss: 16.8 mg/L  Probability of Ctrough,ss > 20: 28 %  Probability of nephrotoxicity (Lodise QUINN ): 12 %      Current estimated CrCl = Estimated Creatinine Clearance: 35.6 mL/min (A) (based on SCr of 1.76 mg/dL (H)).    Creatinine for last 3 days  2022:  6:28 AM Creatinine 1.52 mg/dL;  6:28 AM Creatinine 1.52 mg/dL  2022:  5:27 AM Creatinine 1.88 mg/dL  2022:  4:42 AM Creatinine 1.76 mg/dL    Recent Vancomycin Levels (past 3 days)  2022:  4:42 AM Vancomycin 13.3 mg/L    Vancomycin IV Administrations (past 72 hours)                   vancomycin (VANCOCIN) 1000 mg in dextrose 5% 200 mL PREMIX (mg) 1,000 mg New Bag 22 1047    vancomycin (VANCOCIN) 1,750 mg in sodium chloride 0.9 % 500 mL intermittent infusion (mg) 1,750 mg Given 22 1017                Nephrotoxins and other renal medications (From now, onward)    Start     Dose/Rate Route Frequency Ordered Stop    22 1000  vancomycin (VANCOCIN) 1000 mg in dextrose 5% 200 mL PREMIX         1,000 mg  200 mL/hr over 1 Hours Intravenous EVERY 24 HOURS 22 0711      22 0900  [Held by provider]  furosemide (LASIX) tablet 20 mg        (Held by provider since Sat 2022 at 1836 by Yaz Peña MD.Hold Reason: Acute Kidney Injury.)    20 mg Oral DAILY 22 1832      22 1506  piperacillin-tazobactam (ZOSYN) 3.375 g vial to attach to  mL bag        Note to Pharmacy: Extended  "infusion dosing to start 6 hours after initial infusion.   \"Followed by\" Linked Group Details    3.375 g  over 240 Minutes Intravenous EVERY 8 HOURS 08/12/22 0906               Contrast Orders - past 72 hours (72h ago, onward)    Start     Dose/Rate Route Frequency Stop    08/12/22 1010  perflutren lipid microsphere (DEFINITY) injection SUSP 3 mL         3 mL Intravenous ONCE 08/12/22 1010    08/12/22 0800  iopamidol (ISOVUE-370) solution 75 mL         75 mL Intravenous ONCE 08/12/22 0739          Interpretation of levels and current regimen:  Vancomycin level is reflective of -600    Has serum creatinine changed greater than 50% in last 72 hours: No    Urine output:  good urine output    Renal Function: Stable    Plan:  1. Continue Current Dose  2. Vancomycin monitoring method: AUC  3. Vancomycin therapeutic monitoring goal: 400-600 mg*h/L  4. Pharmacy will check vancomycin levels as appropriate  5. Serum creatinine levels will be ordered daily for the first week of therapy and at least twice weekly for subsequent weeks.    [facility]    "

## 2022-08-15 LAB
ANION GAP SERPL CALCULATED.3IONS-SCNC: 8 MMOL/L (ref 5–18)
BACTERIA BLD CULT: ABNORMAL
BACTERIA BLD CULT: ABNORMAL
BUN SERPL-MCNC: 31 MG/DL (ref 8–28)
CALCIUM SERPL-MCNC: 9 MG/DL (ref 8.5–10.5)
CHLORIDE BLD-SCNC: 103 MMOL/L (ref 98–107)
CO2 SERPL-SCNC: 25 MMOL/L (ref 22–31)
CREAT SERPL-MCNC: 1.52 MG/DL (ref 0.7–1.3)
ERYTHROCYTE [DISTWIDTH] IN BLOOD BY AUTOMATED COUNT: 13.2 % (ref 10–15)
GFR SERPL CREATININE-BSD FRML MDRD: 45 ML/MIN/1.73M2
GLUCOSE BLD-MCNC: 204 MG/DL (ref 70–125)
GLUCOSE BLDC GLUCOMTR-MCNC: 255 MG/DL (ref 70–99)
GLUCOSE BLDC GLUCOMTR-MCNC: 277 MG/DL (ref 70–99)
GLUCOSE BLDC GLUCOMTR-MCNC: 315 MG/DL (ref 70–99)
GLUCOSE BLDC GLUCOMTR-MCNC: 344 MG/DL (ref 70–99)
HCT VFR BLD AUTO: 34.6 % (ref 40–53)
HGB BLD-MCNC: 11.6 G/DL (ref 13.3–17.7)
MAGNESIUM SERPL-MCNC: 2.1 MG/DL (ref 1.8–2.6)
MCH RBC QN AUTO: 29.4 PG (ref 26.5–33)
MCHC RBC AUTO-ENTMCNC: 33.5 G/DL (ref 31.5–36.5)
MCV RBC AUTO: 88 FL (ref 78–100)
PLATELET # BLD AUTO: 130 10E3/UL (ref 150–450)
POTASSIUM BLD-SCNC: 4.1 MMOL/L (ref 3.5–5)
RBC # BLD AUTO: 3.94 10E6/UL (ref 4.4–5.9)
SODIUM SERPL-SCNC: 136 MMOL/L (ref 136–145)
WBC # BLD AUTO: 11.1 10E3/UL (ref 4–11)

## 2022-08-15 PROCEDURE — 250N000011 HC RX IP 250 OP 636: Performed by: INTERNAL MEDICINE

## 2022-08-15 PROCEDURE — 120N000004 HC R&B MS OVERFLOW

## 2022-08-15 PROCEDURE — 99232 SBSQ HOSP IP/OBS MODERATE 35: CPT | Performed by: INTERNAL MEDICINE

## 2022-08-15 PROCEDURE — 99222 1ST HOSP IP/OBS MODERATE 55: CPT | Performed by: INTERNAL MEDICINE

## 2022-08-15 PROCEDURE — 250N000012 HC RX MED GY IP 250 OP 636 PS 637: Performed by: INTERNAL MEDICINE

## 2022-08-15 PROCEDURE — 83735 ASSAY OF MAGNESIUM: CPT | Performed by: INTERNAL MEDICINE

## 2022-08-15 PROCEDURE — 85027 COMPLETE CBC AUTOMATED: CPT | Performed by: INTERNAL MEDICINE

## 2022-08-15 PROCEDURE — 36415 COLL VENOUS BLD VENIPUNCTURE: CPT | Performed by: INTERNAL MEDICINE

## 2022-08-15 PROCEDURE — 80048 BASIC METABOLIC PNL TOTAL CA: CPT | Performed by: INTERNAL MEDICINE

## 2022-08-15 PROCEDURE — 250N000013 HC RX MED GY IP 250 OP 250 PS 637: Performed by: INTERNAL MEDICINE

## 2022-08-15 RX ORDER — AMPICILLIN 2 G/1
2 INJECTION, POWDER, FOR SOLUTION INTRAVENOUS EVERY 4 HOURS
Status: DISCONTINUED | OUTPATIENT
Start: 2022-08-15 | End: 2022-08-15

## 2022-08-15 RX ORDER — FUROSEMIDE 20 MG
20 TABLET ORAL DAILY
Status: DISCONTINUED | OUTPATIENT
Start: 2022-08-15 | End: 2022-08-16 | Stop reason: HOSPADM

## 2022-08-15 RX ORDER — CEFTRIAXONE 1 G/1
1 INJECTION, POWDER, FOR SOLUTION INTRAMUSCULAR; INTRAVENOUS EVERY 24 HOURS
Status: DISCONTINUED | OUTPATIENT
Start: 2022-08-15 | End: 2022-08-16 | Stop reason: HOSPADM

## 2022-08-15 RX ADMIN — CEFTRIAXONE SODIUM 1 G: 1 INJECTION, POWDER, FOR SOLUTION INTRAMUSCULAR; INTRAVENOUS at 17:45

## 2022-08-15 RX ADMIN — AMLODIPINE BESYLATE 5 MG: 5 TABLET ORAL at 08:51

## 2022-08-15 RX ADMIN — METOPROLOL TARTRATE 12.5 MG: 25 TABLET, FILM COATED ORAL at 08:51

## 2022-08-15 RX ADMIN — PREDNISONE 40 MG: 20 TABLET ORAL at 08:51

## 2022-08-15 RX ADMIN — CLOPIDOGREL BISULFATE 75 MG: 75 TABLET ORAL at 08:51

## 2022-08-15 RX ADMIN — FUROSEMIDE 20 MG: 20 TABLET ORAL at 17:45

## 2022-08-15 RX ADMIN — ASPIRIN 81 MG: 81 TABLET, CHEWABLE ORAL at 08:51

## 2022-08-15 RX ADMIN — INSULIN ASPART 2 UNITS: 100 INJECTION, SOLUTION INTRAVENOUS; SUBCUTANEOUS at 08:51

## 2022-08-15 RX ADMIN — ENOXAPARIN SODIUM 40 MG: 40 INJECTION SUBCUTANEOUS at 08:51

## 2022-08-15 RX ADMIN — INSULIN ASPART 4 UNITS: 100 INJECTION, SOLUTION INTRAVENOUS; SUBCUTANEOUS at 12:37

## 2022-08-15 RX ADMIN — ATORVASTATIN CALCIUM 40 MG: 40 TABLET, FILM COATED ORAL at 08:51

## 2022-08-15 RX ADMIN — PIPERACILLIN AND TAZOBACTAM 3.38 G: 3; .375 INJECTION, POWDER, LYOPHILIZED, FOR SOLUTION INTRAVENOUS at 00:08

## 2022-08-15 RX ADMIN — PIPERACILLIN AND TAZOBACTAM 3.38 G: 3; .375 INJECTION, POWDER, LYOPHILIZED, FOR SOLUTION INTRAVENOUS at 09:05

## 2022-08-15 RX ADMIN — VANCOMYCIN HYDROCHLORIDE 1000 MG: 1 INJECTION, SOLUTION INTRAVENOUS at 09:32

## 2022-08-15 RX ADMIN — METOPROLOL TARTRATE 12.5 MG: 25 TABLET, FILM COATED ORAL at 21:21

## 2022-08-15 ASSESSMENT — ACTIVITIES OF DAILY LIVING (ADL)
ADLS_ACUITY_SCORE: 26

## 2022-08-15 NOTE — PLAN OF CARE
Problem: Plan of Care - These are the overarching goals to be used throughout the patient stay.    Goal: Plan of Care Review/Shift Note  Description: The Plan of Care Review/Shift note should be completed every shift.  The Outcome Evaluation is a brief statement about your assessment that the patient is improving, declining, or no change.  This information will be displayed automatically on your shift note.  Outcome: Ongoing, Progressing     Problem: Plan of Care - These are the overarching goals to be used throughout the patient stay.    Goal: Optimal Comfort and Wellbeing  Outcome: Ongoing, Progressing     Goal Outcome Evaluation:  Pt denies pain, alert and oriented.  Primofit applied on with good output 1050 ml overnight.   Room air sating >92%.   No fevers overnight, IV antibiotics in place.

## 2022-08-15 NOTE — PLAN OF CARE
Problem: Plan of Care - These are the overarching goals to be used throughout the patient stay.    Goal: Absence of Hospital-Acquired Illness or Injury  Outcome: Ongoing, Progressing  Intervention: Identify and Manage Fall Risk  Recent Flowsheet Documentation  Taken 8/15/2022 1244 by Shyann Anaya RN  Safety Promotion/Fall Prevention:   activity supervised   bed alarm on   chair alarm on   clutter free environment maintained   fall prevention program maintained   nonskid shoes/slippers when out of bed   patient and family education   room organization consistent   safety round/check completed   supervised activity  Taken 8/15/2022 1000 by Shyann Anaya RN  Safety Promotion/Fall Prevention:   activity supervised   bed alarm on   chair alarm on   clutter free environment maintained   fall prevention program maintained   nonskid shoes/slippers when out of bed   patient and family education   room organization consistent   safety round/check completed   supervised activity  Intervention: Prevent Skin Injury  Recent Flowsheet Documentation  Taken 8/15/2022 0723 by Shyann Anaya RN  Body Position: position changed independently  Intervention: Prevent and Manage VTE (Venous Thromboembolism) Risk  Recent Flowsheet Documentation  Taken 8/15/2022 0723 by Shyann Anaya RN  Activity Management:   activity adjusted per tolerance   activity encouraged  Intervention: Prevent Infection  Recent Flowsheet Documentation  Taken 8/15/2022 1244 by Shyann Anaya RN  Infection Prevention: hand hygiene promoted  Taken 8/15/2022 1000 by Shyann Anaya RN  Infection Prevention: hand hygiene promoted     Problem: Dysrhythmia (Heart Failure)  Goal: Stable Heart Rate and Rhythm  Outcome: Ongoing, Progressing     Problem: Fluid Imbalance (Heart Failure)  Goal: Fluid Balance  Outcome: Ongoing, Progressing     Goal Outcome Evaluation:    Pt aox4 but forgetful. 2+ edema on legs and feet, 3+ edema on L leg and  redness. Tele a-fib. Pt continues on IV antibiotics. Plan is for pt to go home tomorrow after RICHIE. K+ and Mg protocol AM recheck.

## 2022-08-15 NOTE — PROGRESS NOTES
[facility]    Medicine Progress Note - Hospitalist Service    Date of Admission:  8/12/2022    Assessment & Plan        Per Beasley is a 82 year old male with past medical history significant for severe aortic valve stenosis status post TAVR in 2021, coronary artery disease status post PCI with NUBIA to mid LCx in 2021, persistent atrial fibrillation, hypertension, dyslipidemia, diabetes mellitus type 2, PVD, and CKD stage III who was admitted on 8/12/2022.     Acute hypoxic respiratory failure-resolved  Probable Sarcoidosis  Former smoker  Mediastinal and hilar lymphadenopathy  -CT PE 8/12: Pulmonary edema, tiny pleural effusions, basilar groundglass opacities, moderate to severe airway thickening from edema or airway disease, interval increase in mediastinal and hilar lymphadenopathy, septal and airway thickening could correlate with sarcoidosis  -Will need follow-up CT in 3 months for lymphadenopathy  -Does follow with a pulmonologist outpatient with concerns for sarcoidosis  -prednisone 40 mg daily for sarcoidosis treatment, discussed need for follow-up with pulmonologist at discharge, wheezing has significantly improved since yesterday  -Furosemide 20 mg IV every 6 for 3 doses-completed    Group G strep bacteremia  LLE cellulitis- improving  Lactic acidosis-resolved  Sepsis- resolved  -Was noted to be febrile by EMS, presented with tachycardia, tachypnea, and hypoxia  -Has left lower extremity redness and warmth without any open wounds, now improving   -Lactic acid 2.6>1.6, procalcitonin 0.97  -UA negative for signs of infection  -Blood cultures drawn and positive for group G strep this collected UA, repeat showing no growth so far  -Due to volume overload will not be starting IVF    Volume overload-resolved  History of severe aortic stenosis status post TAVR 9/14/21  -Has not been taking home diuretic for the past 3 months with progressive shortness of breath at baseline  -Given  Lasix 40 mg IV in the ED, started on 20 mg every 6 for 3 doses  -Echo 8/12: EF 60 to 65%, prosthetic aortic valve appears normal    Persistent atrial fibrillation  -Continue metoprolol 12.5 mg twice daily    Essential hypertension  -Continue amlodipine 5 mg daily    JONNATHAN on CKD stage IIIb- improving  -JONNATHAN secondary to diuretics, have put on hold for now  -Baseline creatinine approximately 1.2-1.5  -We will monitor closely while on diuretics  -Avoid nephrotoxic agents    Type 2 diabetes mellitus not on long-term insulin  Hyperglycemia secondary to steroid use  -Most recent A1c 6.8%  -Takes oral medications at home, holding glipizide and metformin  -Sliding scale insulin with meals and at bedtime  -Starting on Lantus 9 units at bedtime    Coronary artery disease without angina  Dyslipidemia  -s/p NUBIA x 1 to mid LCX into OM1, 8/13/2021  -Continue Plavix 75 mg daily, atorvastatin 40 mg daily    Obesity class I  -BMI 30     Diet: Combination Diet Moderate Consistent Carb (60 g CHO per Meal) Diet; Low Saturated Fat Diet, Low Saturated Fat Na <2400mg Diet    DVT Prophylaxis: Enoxaparin (Lovenox) SQ  Helm Catheter: Not present  Central Lines: None  Cardiac Monitoring: None  Code Status: Full Code      Disposition Plan      Expected Discharge Date: 08/15/2022      Destination: home;home with family  Discharge Comments: + BC, IV Vanco, Zosyn, PO prednisone, On O2 at 3L/NC, PT/OT  home; son        The patient's care was discussed with the Bedside Nurse, Care Coordinator/, Patient and Patient's Family.    Yaz Peña MD  Hospitalist Service  [facility]  Securely message with the [employer] Web Console (learn more here)  Text page via [employer] Paging/Directory         Clinically Significant Risk Factors Present on Admission               # DMII: A1C = 6.8 % (Ref range: <=5.6 %) within past 3 months  # Overweight: Estimated body mass index is 29.84 kg/m  as calculated from the following:     "Height as of this encounter: 1.753 m (5' 9\").    Weight as of this encounter: 91.7 kg (202 lb 1.6 oz).        ______________________________________________________________________    Interval History   Mr. Beasley is doing well today.  I discussed with him and his wife at bedside the issue with glucose management while on prednisone.  As he will likely be on prednisone for an extended time we may have to send him home with insulin.  Will have diabetes educator speak with him.  Otherwise his breathing has significantly improved.  His oxygen was set at 3 L however it was not on his nose and he was saturating in the mid 90s throughout our conversation.  His respiratory failure has resolved.  He was also able to walk in the halls today without oxygen.  He was seen by PT and OT today and he will be going home at discharge which he is very happy about.    Data reviewed today: I reviewed all medications, new labs and imaging results over the last 24 hours. I personally reviewed no images or EKG's today.    Physical Exam   Vital Signs: Temp: 98.2  F (36.8  C) Temp src: Oral BP: (!) 151/75 Pulse: 81   Resp: 20 SpO2: 95 % O2 Device: None (Room air) Oxygen Delivery: 3 LPM  Weight: 202 lbs 1.6 oz  General Appearance: Awake, alert, in no acute distress  Respiratory: CTAB, no wheeze  Cardiovascular: RRR, no murmur noted  GI: soft, nontender, non distended, normal bowel sounds  Skin: no jaundice, no rash      Data   Recent Labs   Lab 08/14/22  1648 08/14/22  1135 08/14/22  0730 08/14/22  0442 08/13/22  0805 08/13/22  0527 08/12/22  1259 08/12/22  0628   WBC  --   --   --  9.3  --  9.3  --  9.4   HGB  --   --   --  11.6*  --  11.4*  --  14.2   MCV  --   --   --  88  --  88  --  90   PLT  --   --   --  106*  --  108*  --  141*   NA  --   --   --  133*  --  137  --  139   POTASSIUM  --   --   --  4.2  --  4.1  --  4.3   CHLORIDE  --   --   --  102  --  104  --  105   CO2  --   --   --  23  --  25  --  25   BUN  --   --   --  38*  -- "  30*  --  24   CR  --   --   --  1.76*  --  1.88*  --  1.52*  1.52*   ANIONGAP  --   --   --  8  --  8  --  9   SANDRA  --   --   --  8.8  --  8.8  --  9.4   * 271* 215* 217*   < > 155*   < > 112   ALBUMIN  --   --   --   --   --  2.7*  --  3.9   PROTTOTAL  --   --   --   --   --  6.2  --  7.7   BILITOTAL  --   --   --   --   --  1.0  --  0.9   ALKPHOS  --   --   --   --   --  64  --  122*   ALT  --   --   --   --   --  16  --  26   AST  --   --   --   --   --  19  --  31    < > = values in this interval not displayed.     Medications       amLODIPine  5 mg Oral Daily     aspirin  81 mg Oral Daily     atorvastatin  40 mg Oral Daily     clopidogrel  75 mg Oral Daily     enoxaparin ANTICOAGULANT  40 mg Subcutaneous Q24H     insulin aspart  1-7 Units Subcutaneous TID AC     insulin aspart  1-5 Units Subcutaneous At Bedtime     [START ON 8/15/2022] insulin glargine  9 Units Subcutaneous QAM AC     metoprolol tartrate  12.5 mg Oral BID     piperacillin-tazobactam  3.375 g Intravenous Q8H     predniSONE  40 mg Oral Daily     sodium chloride (PF)  3 mL Intracatheter Q8H     sodium chloride (PF)  3 mL Intracatheter Q8H     vancomycin  1,000 mg Intravenous Q24H

## 2022-08-15 NOTE — DISCHARGE INSTRUCTIONS
DIABETES REMINDERS:  1) Check your blood sugar first thing in morning and occassionally 2 hours after meals. Goals in #2  Always bring your blood sugar log and meter to your diabetes-related appointments.  2) Your blood sugar goals:  80-130mg/dL before eating  and  mg/dL 2 hours after eating (or per your doctor).  3) Always be prepared to treat a low blood sugar should it happen. Keep a sugar-containing beverage or food nearby.  4) When to call your clinic:   Blood sugar over 400 mg/dL.   If you have 2 to 3 low blood sugars (under 70mg/dL) in a row,   Low reading the same time of day several days in a row,  Blood sugars elevated and you can not get them down with your usual diabetes regimen,  You are ill and can't keep blood sugars controlled.   5) When to call 911:  If your blood glucose does not get better with treatment, or if you/someone else is unable to give you treatment.  6) Follow insulin regimen on discharge orders until able to see provider where doses may be adjusted based on blood sugar patterns.

## 2022-08-15 NOTE — PROGRESS NOTES
Care Management Follow Up    Length of Stay (days): 3    Expected Discharge Date: 08/18/2022     Concerns to be Addressed:       Patient plan of care discussed at interdisciplinary rounds: Yes    Anticipated Discharge Disposition: Home     Anticipated Discharge Services:    Anticipated Discharge DME:      Patient/family educated on Medicare website which has current facility and service quality ratings:    Education Provided on the Discharge Plan:    Patient/Family in Agreement with the Plan:      Referrals Placed by CM/SW:    Private pay costs discussed: Not applicable    Additional Information:  MD provided update that patient on IV abx due to cultures, ID consulted, will likely need long term IV abx. [facility] referral sent to review benefit.     Wendi Angulo RN

## 2022-08-15 NOTE — PROGRESS NOTES
[facility]    Medicine Progress Note - Hospitalist Service    Date of Admission:  8/12/2022    Assessment & Plan        Per Beasley is a 82 year old male with past medical history significant for severe aortic valve stenosis status post TAVR in 2021, coronary artery disease status post PCI with NUBIA to mid LCx in 2021, persistent atrial fibrillation, hypertension, dyslipidemia, diabetes mellitus type 2, PVD, and CKD stage III who was admitted on 8/12/2022.     Acute hypoxic respiratory failure-resolved  Probable Sarcoidosis  Former smoker  Mediastinal and hilar lymphadenopathy  -CT PE 8/12: Pulmonary edema, tiny pleural effusions, basilar groundglass opacities, moderate to severe airway thickening from edema or airway disease, interval increase in mediastinal and hilar lymphadenopathy, septal and airway thickening could correlate with sarcoidosis  -Will need follow-up CT in 3 months for lymphadenopathy  -Does follow with a pulmonologist outpatient with concerns for sarcoidosis  -prednisone 40 mg daily for sarcoidosis treatment, he will need to be on this dose for 4 to 6 weeks and then taper, discussed need for follow-up with pulmonologist at discharge, wheezing has significantly improved since yesterday  -Furosemide 20 mg IV every 6 for 3 doses-completed    Group G strep bacteremia  LLE cellulitis- improving  Lactic acidosis-resolved  Sepsis- resolved  -Was noted to be febrile by EMS, presented with tachycardia, tachypnea, and hypoxia  -Has left lower extremity redness and warmth without any open wounds, now improving   -Lactic acid 2.6>1.6, procalcitonin 0.97  -UA negative for signs of infection  -Blood cultures drawn and positive for group G strep, repeat negative so far  -Due to volume overload will not be starting IVF  -ID consulted, appreciate recommendations: Proceed with RICHIE, if negative can discharge on amoxicillin p.o. x10 days  -Updated social work on possibility of need for IV  infusion pending RICHIE results    Volume overload-resolved  History of severe aortic stenosis status post TAVR 9/14/21  -Has not been taking home diuretic for the past 3 months with progressive shortness of breath at baseline  -Given Lasix 40 mg IV in the ED, started on 20 mg every 6 for 3 doses  -Echo 8/12: EF 60 to 65%, prosthetic aortic valve appears normal    Persistent atrial fibrillation  -Continue metoprolol 12.5 mg twice daily    Essential hypertension  -Continue amlodipine 5 mg daily  -Restarted home furosemide dose today    CKD stage IIIb  JONNATHAN-resolved  -Baseline creatinine approximately 1.2-1.5  -Avoid nephrotoxic agents    Type 2 diabetes mellitus not on long-term insulin  Steroid-induced hyperglycemia  -Most recent A1c 6.8%  -Takes oral medications at home, holding glipizide and metformin  -Sliding scale insulin with meals and at bedtime increased to high correction dose  -Increasing Lantus to 12 units every morning, adding mealtime insulin 4 units  -On moderate consistent carb diet  -Was seen by diabetes educator today, recommending NPH at discharge as he does not have prescription coverage for long-acting insulin, may have coverage through [facility]    Coronary artery disease without angina  Dyslipidemia  -s/p NUBIA x 1 to mid LCX into OM1, 8/13/2021  -Continue Plavix 75 mg daily, atorvastatin 40 mg daily    Obesity class I  -BMI 30       Diet: Combination Diet Moderate Consistent Carb (60 g CHO per Meal) Diet; Low Saturated Fat Diet, Low Saturated Fat Na <2400mg Diet    DVT Prophylaxis: Enoxaparin (Lovenox) SQ  Helm Catheter: Not present  Central Lines: None  Cardiac Monitoring: None  Code Status: Full Code      Disposition Plan      Expected Discharge Date: 08/18/2022      Destination: home;home with family  Discharge Comments: + BC, IV Vanco, Zosyn, PO prednisone, home; son        The patient's care was discussed with the Bedside Nurse, Care Coordinator/ and Patient.    Yaz Peña,  "MD  Hospitalist Service  [facility]  Securely message with the [employer] Web Console (learn more here)  Text page via [employer] Paging/Directory         Clinically Significant Risk Factors Present on Admission               # DMII: A1C = N/A within past 3 months  # Overweight: Estimated body mass index is 29.84 kg/m  as calculated from the following:    Height as of this encounter: 1.753 m (5' 9\").    Weight as of this encounter: 91.7 kg (202 lb 1.6 oz).        ______________________________________________________________________    Interval History   Mr. Beasley is doing well today.  His edema has increased and his kidney function has improved so will restart his home dose of furosemide today.  ID was consulted and are recommending RICHIE.  Echo was abnormal but no vegetations noted.  If RICHIE is negative he will be able to go home on oral amoxicillin.  I updated social work in the event that it is abnormal so they can prepare for possible IV infusion if need be.  They have already sent referrals for this.  Otherwise he is feeling well and not having any complaints today.  His left leg is still somewhat erythematous.  His blood sugars continue to worsen on steroids.  I did have the diabetes educator come talk to him as he will likely need insulin until he is off the steroids.  He is only covered under part B but may have [facility] benefits so we will have to see what insulin we can send him home on.  NPH was suggested as at [facility] can be somewhat affordable.  His blood pressure has been elevated so have started amlodipine 5 mg daily.    Data reviewed today: I reviewed all medications, new labs and imaging results over the last 24 hours. I personally reviewed no images or EKG's today.    Physical Exam   Vital Signs: Temp: 97.8  F (36.6  C) Temp src: Oral BP: (!) 169/75 Pulse: 96   Resp: 18 SpO2: 97 % O2 Device: None (Room air)    Weight: 202 lbs 1.6 oz  General Appearance: Awake, alert, in no acute " distress  Respiratory: CTAB, no wheeze  Cardiovascular: RRR, no murmur noted, edema L>R  GI: soft, nontender, non distended, normal bowel sounds  Skin: no jaundice, left LE redness       Data   Recent Labs   Lab 08/15/22  1235 08/15/22  0505 08/14/22  2101 08/14/22  0730 08/14/22  0442 08/13/22  0805 08/13/22  0527 08/12/22  1259 08/12/22  0628   WBC  --  11.1*  --   --  9.3  --  9.3  --  9.4   HGB  --  11.6*  --   --  11.6*  --  11.4*  --  14.2   MCV  --  88  --   --  88  --  88  --  90   PLT  --  130*  --   --  106*  --  108*  --  141*   NA  --  136  --   --  133*  --  137  --  139   POTASSIUM  --  4.1  --   --  4.2  --  4.1  --  4.3   CHLORIDE  --  103  --   --  102  --  104  --  105   CO2  --  25  --   --  23  --  25  --  25   BUN  --  31*  --   --  38*  --  30*  --  24   CR  --  1.52*  --   --  1.76*  --  1.88*  --  1.52*  1.52*   ANIONGAP  --  8  --   --  8  --  8  --  9   SANDRA  --  9.0  --   --  8.8  --  8.8  --  9.4   * 204* 344*   < > 217*   < > 155*   < > 112   ALBUMIN  --   --   --   --   --   --  2.7*  --  3.9   PROTTOTAL  --   --   --   --   --   --  6.2  --  7.7   BILITOTAL  --   --   --   --   --   --  1.0  --  0.9   ALKPHOS  --   --   --   --   --   --  64  --  122*   ALT  --   --   --   --   --   --  16  --  26   AST  --   --   --   --   --   --  19  --  31    < > = values in this interval not displayed.   Medications       amLODIPine  5 mg Oral Daily     aspirin  81 mg Oral Daily     atorvastatin  40 mg Oral Daily     cefTRIAXone  1 g Intravenous Q24H     clopidogrel  75 mg Oral Daily     enoxaparin ANTICOAGULANT  40 mg Subcutaneous Q24H     furosemide  20 mg Oral Daily     insulin aspart  1-9 Units Subcutaneous TID AC     insulin aspart  4 Units Subcutaneous TID w/meals     insulin aspart  1-5 Units Subcutaneous At Bedtime     [START ON 8/16/2022] insulin glargine  12 Units Subcutaneous QAM AC     metoprolol tartrate  12.5 mg Oral BID     predniSONE  40 mg Oral Daily     sodium chloride  (PF)  3 mL Intracatheter Q8H     sodium chloride (PF)  3 mL Intracatheter Q8H

## 2022-08-15 NOTE — CONSULTS
"DIABETES CARE  Consulted by Provider for Diabetes Education    82 year old male with type 2 diabetes. Patient was admitted for acute hypoxic respiratory failure and suspected sepsis with out definitive source, acute heart failure likely due to volume overload.  Related Co-morbidities include: HF, AF, HTN, CKD, type 2 diabetes, CAD and dyslipidemia    PCP: Maykel Copeland  Social:Lives in apartment with spouse.     Nutrition & Diabetes History: History of type 2 Diabetes managed with orals.  Will be put on basal insulin for use while on steroid.     Meds for BG Management PTA:  Glipizide 10 mg  Twice daily before meals  -Metformin 1000 mg twice daily with meals    Current Inpatient Meds for BG Management:  -Novolog medium insulin correction 1 drops 50 over 140 mg/dl  -Lantus 9 units every am.       Labs:  Hemoglobin A1C:6.8            Hgb:14.2 SCr:1.52  GFR:45   BGs:    Latest Reference Range & Units 08/13/22 20:31 08/14/22 04:42 08/14/22 07:30 08/14/22 11:35 08/14/22 16:48 08/14/22 21:01 08/15/22 05:05   Glucose 70 - 125 mg/dL  217 (H)     204 (H)   GLUCOSE BY METER POCT 70 - 99 mg/dL 209 (H)  215 (H) 271 (H) 389 (H) 344 (H)      Diet Order:  60 gram CHO, low sat fat, Na ,2400 mg Intake: Good   Weight: 91.7kg  BMI: 29.84kg    DM EDUCATION/COUNSELING:  Barriers to Learning and/or DM Self-Management: None known  Previous DM Education:   Yes  Current Education and/or visit with Patient and/or caregiver(s):  Reviewed diabetes disease with patient .     Reviewed/Instructed patient on basal insulin pen use. Medications were explained, including how they each acted on blood sugar and timing. . The insulin pen was demonstrated by patient. . Also discussed site rotation, proper storage and safe needle disposal.   Gave patient handouts about injections, rotation of site, site selection and low blood sugars.  Also about safe needle disposal hand out given.   Patient has meter and uses.         (See also \"Diabetic Ed " "Flowsheet\"  Or Education tab-diabetes for any education topic details.)    ASSESSMENT:  Discussed that patient will be taking long acting insulin only while on steroid at home.  If steroid is stopped he will stop the long acting insulin. Patient seems able to do insulin injections.     RECOMMENDATIONS:  Patient will need to know dose of steroid and dose of long acting insulin at discharge.     For inpatient diabetes management guidelines refer to \"Guidelines for Subcutaneous Insulin Dosing\" found in the DIAB Subcutaneous Insulin Management Adult order set.       DISCHARGE NEEDS:   Long acting insulin, only part B coverage. [facility]. Otherwise NPH pens available at [facility] for 42.88 per box.    Pen needles 32 gauge x 4 mm , box of needles.        Thank you,   Lindsay Cazares RN, [facility]  Diabetes Care   Pager: [phone]             "

## 2022-08-15 NOTE — PROVIDER NOTIFICATION
CHRISTEN Vivar. There are no active cardiac telemetry orders. Do you still want pt on tele? Please place order for telemetry if needed. Thank you! Rosita JIMENEZ RN [id number]    Yaz Peña notified.    Telemetry orders discharged. Pt is now med/surg.

## 2022-08-15 NOTE — CONSULTS
"[facility]  General ID Service Consult      Patient: Per Beasley  YOB: 1940, MRN: [id number]  Date of Admission:  8/12/2022  Date of Consult: 08/15/2022  Consult Requested by: Yaz Peña MD  Admission Diagnosis: Wheezing [R06.2]  Acute respiratory failure with hypoxia (H) [J96.01]  Hypervolemia, unspecified hypervolemia type [E87.70]  Consult Question: BC    ID Assessment & Plan   Strep G bacteremia, 1 out of 1 set  With described cellulitis of the left lower extremity on admission  Transthoracic echocardiogram no vegetation but described as abnormal  Diabetes  TAVR September 2021  Prosthetic knees    Plan  I discussed with cardio who reports the echo is \"very poor quality\"  Proceed with RICHIE  If Negative, can discontinue on Po amoxicillin x 10 days    Narendra Dodge MD  [facility]  ______________________________________________________________________    Chief Complaint   SOB    History of Present Illness   This is a 89 years old male with history of diabetes, severe aortic stenosis status post TAVR September 2021 not taking his diuretics, prosthetic knee infections, atrial fibrillation hypertension, coronary artery disease and obesity, who was admitted with weakness, shortness of breath, chilliness, left lower extremity erythema noted on admission (but not today).  CT chest as below no PE but edema.  COVID-negative.  He was started on IV Zosyn and vancomycin.  Blood culture 1 out of 1 set positive for Streptococcus G.  He is evidently significantly improved both in terms of erythema of the lower extremity and shortness of breath.  No URTI syx.  He was with his wife who is working today.  He has no pets.  He is anticipating discharge today.    Transthoracic echocardiogram done no vegetation described but for some reason it was read as abnormal aortic valve    Review of Systems   The 10 point Review of Systems is negative other than noted " in the [facility] or here.     Past Medical History    No past medical history on file.  As above  Past Surgical History   No past surgical history on file.    Social History        Family History     negative fever    Medications   I have reviewed this patient's current medications    Allergies   No Known Allergies    Physical Exam   Vital Signs: Temp: 97.4  F (36.3  C) Temp src: Oral BP: (!) 140/76 Pulse: 77   Resp: 18 SpO2: 97 % O2 Device: None (Room air)    Weight: 202 lbs 1.6 oz    Gen. appearance nontoxic  Eyes no conjunctivitis or icterus  Neck no stiffness or neck vein distention, no LN  Heart  No edema  Lungs breathing comfortably  Abdomen soft not tender  Extremities no synovitis, severe edema left lower extremity pitting in nature  Prosthetic knees without inflammation  Skin  no rash or emboli  Neurologic alert oriented no focal deficits  }      Data   Inflammatory Markers   Recent Labs   Lab Test 08/12/22  0628   CRP 0.6        Hematology Studies   Recent Labs   Lab Test 08/15/22  0505 08/14/22  0442 08/13/22  0527 08/12/22  0628   WBC 11.1* 9.3 9.3 9.4   HGB 11.6* 11.6* 11.4* 14.2   MCV 88 88 88 90   * 106* 108* 141*       Metabolic Studies   Recent Labs   Lab Test 08/15/22  0505 08/14/22  0442 08/13/22  0527 08/12/22  0628    133* 137 139   POTASSIUM 4.1 4.2 4.1 4.3   CHLORIDE 103 102 104 105   CO2 25 23 25 25   BUN 31* 38* 30* 24   CR 1.52* 1.76* 1.88* 1.52*  1.52*   GFRESTIMATED 45* 38* 35* 45*  45*       Hepatic Studies    Recent Labs   Lab Test 08/13/22  0527 08/12/22  0628   BILITOTAL 1.0 0.9   ALKPHOS 64 122*   ALBUMIN 2.7* 3.9   AST 19 31   ALT 16 26       Most Recent 6 Bacteria Isolates From Any Culture (See EPIC Reports for Culture Details):No lab results found.    Urine Studies    Recent Labs   Lab Test 08/12/22  0629   LEUKEST Negative   WBCU 6*       Vancomycin Levels    Recent Labs   Lab Test 08/14/22  0442   VANCOMYCIN 13.3       Hepatitis B Testing No lab results  found.  Hepatitis C Testing   No results found for: HCVAB, HQTG, HCGENO, HCPCR, HQTRNA, HEPRNA  HIVTesting No lab results found.    Respiratory Virus Testing    No results found for: RS, FLUAG  COVID-19 Antibody Results, Testing for Immunity    COVID-19 Antibody Results, Testing for Immunity   No data to display.         COVID-19 PCR Results    COVID-19 PCR Results 8/10/21 9/3/21 9/10/21 10/27/21 12/30/21 8/12/22   COVID-19 Virus by PCR (External Result) Negative Negative Negative Negative Negative    SARS CoV2 PCR      Negative      Comments are available for some flowsheets but are not being displayed.

## 2022-08-16 ENCOUNTER — APPOINTMENT (OUTPATIENT)
Dept: OCCUPATIONAL THERAPY | Facility: HOSPITAL | Age: 82
DRG: 871 | End: 2022-08-16
Payer: MEDICARE

## 2022-08-16 ENCOUNTER — HOME INFUSION (PRE-WILLOW HOME INFUSION) (OUTPATIENT)
Dept: PHARMACY | Facility: CLINIC | Age: 82
End: 2022-08-16

## 2022-08-16 ENCOUNTER — APPOINTMENT (OUTPATIENT)
Dept: CARDIOLOGY | Facility: HOSPITAL | Age: 82
DRG: 871 | End: 2022-08-16
Attending: INTERNAL MEDICINE
Payer: MEDICARE

## 2022-08-16 VITALS
RESPIRATION RATE: 25 BRPM | OXYGEN SATURATION: 95 % | TEMPERATURE: 97.5 F | SYSTOLIC BLOOD PRESSURE: 162 MMHG | HEART RATE: 86 BPM | BODY MASS INDEX: 29.93 KG/M2 | HEIGHT: 69 IN | DIASTOLIC BLOOD PRESSURE: 77 MMHG | WEIGHT: 202.1 LBS

## 2022-08-16 LAB
ANION GAP SERPL CALCULATED.3IONS-SCNC: 8 MMOL/L (ref 5–18)
BUN SERPL-MCNC: 33 MG/DL (ref 8–28)
CALCIUM SERPL-MCNC: 9.1 MG/DL (ref 8.5–10.5)
CHLORIDE BLD-SCNC: 102 MMOL/L (ref 98–107)
CO2 SERPL-SCNC: 25 MMOL/L (ref 22–31)
CREAT SERPL-MCNC: 1.53 MG/DL (ref 0.7–1.3)
ERYTHROCYTE [DISTWIDTH] IN BLOOD BY AUTOMATED COUNT: 13.2 % (ref 10–15)
GFR SERPL CREATININE-BSD FRML MDRD: 45 ML/MIN/1.73M2
GLUCOSE BLD-MCNC: 201 MG/DL (ref 70–125)
GLUCOSE BLDC GLUCOMTR-MCNC: 165 MG/DL (ref 70–99)
GLUCOSE BLDC GLUCOMTR-MCNC: 174 MG/DL (ref 70–99)
HCT VFR BLD AUTO: 35.9 % (ref 40–53)
HGB BLD-MCNC: 12.1 G/DL (ref 13.3–17.7)
LVEF ECHO: NORMAL
MAGNESIUM SERPL-MCNC: 2.1 MG/DL (ref 1.8–2.6)
MCH RBC QN AUTO: 29.4 PG (ref 26.5–33)
MCHC RBC AUTO-ENTMCNC: 33.7 G/DL (ref 31.5–36.5)
MCV RBC AUTO: 87 FL (ref 78–100)
PLATELET # BLD AUTO: 145 10E3/UL (ref 150–450)
POTASSIUM BLD-SCNC: 4.1 MMOL/L (ref 3.5–5)
RBC # BLD AUTO: 4.12 10E6/UL (ref 4.4–5.9)
SODIUM SERPL-SCNC: 135 MMOL/L (ref 136–145)
WBC # BLD AUTO: 8.7 10E3/UL (ref 4–11)

## 2022-08-16 PROCEDURE — 258N000003 HC RX IP 258 OP 636: Performed by: INTERNAL MEDICINE

## 2022-08-16 PROCEDURE — 85027 COMPLETE CBC AUTOMATED: CPT | Performed by: INTERNAL MEDICINE

## 2022-08-16 PROCEDURE — 250N000013 HC RX MED GY IP 250 OP 250 PS 637: Performed by: INTERNAL MEDICINE

## 2022-08-16 PROCEDURE — 36415 COLL VENOUS BLD VENIPUNCTURE: CPT | Performed by: INTERNAL MEDICINE

## 2022-08-16 PROCEDURE — 93312 ECHO TRANSESOPHAGEAL: CPT | Mod: 26 | Performed by: INTERNAL MEDICINE

## 2022-08-16 PROCEDURE — 250N000009 HC RX 250: Performed by: INTERNAL MEDICINE

## 2022-08-16 PROCEDURE — 93320 DOPPLER ECHO COMPLETE: CPT | Mod: 26 | Performed by: INTERNAL MEDICINE

## 2022-08-16 PROCEDURE — 99152 MOD SED SAME PHYS/QHP 5/>YRS: CPT | Performed by: INTERNAL MEDICINE

## 2022-08-16 PROCEDURE — 83735 ASSAY OF MAGNESIUM: CPT | Performed by: INTERNAL MEDICINE

## 2022-08-16 PROCEDURE — 250N000012 HC RX MED GY IP 250 OP 636 PS 637: Performed by: INTERNAL MEDICINE

## 2022-08-16 PROCEDURE — 99239 HOSP IP/OBS DSCHRG MGMT >30: CPT | Performed by: INTERNAL MEDICINE

## 2022-08-16 PROCEDURE — 97535 SELF CARE MNGMENT TRAINING: CPT | Mod: GO

## 2022-08-16 PROCEDURE — 250N000011 HC RX IP 250 OP 636: Performed by: INTERNAL MEDICINE

## 2022-08-16 PROCEDURE — 93325 DOPPLER ECHO COLOR FLOW MAPG: CPT

## 2022-08-16 PROCEDURE — 93325 DOPPLER ECHO COLOR FLOW MAPG: CPT | Mod: 26 | Performed by: INTERNAL MEDICINE

## 2022-08-16 PROCEDURE — 80048 BASIC METABOLIC PNL TOTAL CA: CPT | Performed by: INTERNAL MEDICINE

## 2022-08-16 RX ORDER — FUROSEMIDE 20 MG
20 TABLET ORAL DAILY
Qty: 30 TABLET | Refills: 0 | Status: SHIPPED | OUTPATIENT
Start: 2022-08-17 | End: 2024-01-11

## 2022-08-16 RX ORDER — LIDOCAINE HYDROCHLORIDE 20 MG/ML
SOLUTION OROPHARYNGEAL
Status: COMPLETED | OUTPATIENT
Start: 2022-08-16 | End: 2022-08-16

## 2022-08-16 RX ORDER — CONTAINER,EMPTY
EACH MISCELLANEOUS
Qty: 1 EACH | Refills: 0 | Status: SHIPPED | OUTPATIENT
Start: 2022-08-16

## 2022-08-16 RX ORDER — PREDNISONE 20 MG/1
40 TABLET ORAL DAILY
Qty: 84 TABLET | Refills: 0 | Status: SHIPPED | OUTPATIENT
Start: 2022-08-17 | End: 2022-09-28

## 2022-08-16 RX ORDER — AMOXICILLIN 500 MG/1
500 CAPSULE ORAL 2 TIMES DAILY
Qty: 20 CAPSULE | Refills: 0 | Status: SHIPPED | OUTPATIENT
Start: 2022-08-16 | End: 2022-08-26

## 2022-08-16 RX ORDER — BLOOD PRESSURE TEST KIT
KIT MISCELLANEOUS
Qty: 100 EACH | Refills: 0 | Status: SHIPPED | OUTPATIENT
Start: 2022-08-16

## 2022-08-16 RX ORDER — INSULIN GLARGINE 100 [IU]/ML
12 INJECTION, SOLUTION SUBCUTANEOUS EVERY MORNING
Qty: 15 ML | Refills: 1 | Status: SHIPPED | OUTPATIENT
Start: 2022-08-16 | End: 2024-01-11

## 2022-08-16 RX ORDER — FENTANYL CITRATE 50 UG/ML
INJECTION, SOLUTION INTRAMUSCULAR; INTRAVENOUS
Status: COMPLETED | OUTPATIENT
Start: 2022-08-16 | End: 2022-08-16

## 2022-08-16 RX ORDER — SODIUM CHLORIDE 9 MG/ML
INJECTION, SOLUTION INTRAVENOUS CONTINUOUS
Status: DISCONTINUED | OUTPATIENT
Start: 2022-08-16 | End: 2022-08-16

## 2022-08-16 RX ADMIN — FENTANYL CITRATE 25 MCG: 50 INJECTION, SOLUTION INTRAMUSCULAR; INTRAVENOUS at 09:06

## 2022-08-16 RX ADMIN — MIDAZOLAM 1 MG: 1 INJECTION INTRAMUSCULAR; INTRAVENOUS at 09:01

## 2022-08-16 RX ADMIN — FENTANYL CITRATE 25 MCG: 50 INJECTION, SOLUTION INTRAMUSCULAR; INTRAVENOUS at 09:02

## 2022-08-16 RX ADMIN — LIDOCAINE HYDROCHLORIDE 15 ML: 20 SOLUTION ORAL; TOPICAL at 08:58

## 2022-08-16 RX ADMIN — AMLODIPINE BESYLATE 5 MG: 5 TABLET ORAL at 08:00

## 2022-08-16 RX ADMIN — SODIUM CHLORIDE: 9 INJECTION, SOLUTION INTRAVENOUS at 08:48

## 2022-08-16 RX ADMIN — CLOPIDOGREL BISULFATE 75 MG: 75 TABLET ORAL at 10:32

## 2022-08-16 RX ADMIN — FUROSEMIDE 20 MG: 20 TABLET ORAL at 10:32

## 2022-08-16 RX ADMIN — ATORVASTATIN CALCIUM 40 MG: 40 TABLET, FILM COATED ORAL at 07:57

## 2022-08-16 RX ADMIN — ENOXAPARIN SODIUM 40 MG: 40 INJECTION SUBCUTANEOUS at 08:01

## 2022-08-16 RX ADMIN — MIDAZOLAM 1 MG: 1 INJECTION INTRAMUSCULAR; INTRAVENOUS at 09:04

## 2022-08-16 RX ADMIN — METOPROLOL TARTRATE 12.5 MG: 25 TABLET, FILM COATED ORAL at 07:57

## 2022-08-16 RX ADMIN — PREDNISONE 40 MG: 20 TABLET ORAL at 10:32

## 2022-08-16 RX ADMIN — BENZOCAINE 3 SPRAY: 220 SPRAY, METERED PERIODONTAL at 08:59

## 2022-08-16 RX ADMIN — ASPIRIN 81 MG: 81 TABLET, CHEWABLE ORAL at 07:57

## 2022-08-16 ASSESSMENT — ACTIVITIES OF DAILY LIVING (ADL)
ADLS_ACUITY_SCORE: 20
ADLS_ACUITY_SCORE: 26
ADLS_ACUITY_SCORE: 20
ADLS_ACUITY_SCORE: 26
ADLS_ACUITY_SCORE: 26

## 2022-08-16 NOTE — PLAN OF CARE
Assumed care 1500 to 0730. A&O x 4. Stand by assist. Tele discontinued by provider. Denies pain. Urinal at bedside, up to toilet. Pt refuses urinal and uses toilet. NPO @ 0000. Perseverating on discharge. Call light within reach, able to make needs known. Bed alarm on for safety.    Problem: Respiratory Compromise (Heart Failure)  Goal: Effective Oxygenation and Ventilation  Outcome: Ongoing, Progressing     Problem: Functional Ability Impaired (Heart Failure)  Goal: Optimal Functional Ability  Intervention: Optimize Functional Ability  Recent Flowsheet Documentation  Taken 8/16/2022 0435 by SUYAPA PHAM  Activity Management: activity encouraged

## 2022-08-16 NOTE — INTERVAL H&P NOTE
I have reviewed the surgical (or preoperative) H&P that is linked to this encounter, and examined the patient. There are no significant changes      Clinical Conditions Present on Arrival:  Clinically Significant Risk Factors Present on Admission

## 2022-08-16 NOTE — PLAN OF CARE
Physical Therapy Discharge Summary    Reason for therapy discharge:    Discharged to home.    Progress towards therapy goal(s). See goals on Care Plan in [facility] electronic health record for goal details.  Goals partially met.  Barriers to achieving goals:   discharge from facility.    Therapy recommendation(s):    Further recommended therapy is related to documented deficits, and is necessary to maximize functional independence in order for patient to return to prior level of function.      Nicole Galaviz, SARIKA 8/16/2022

## 2022-08-16 NOTE — PLAN OF CARE
"  Problem: Plan of Care - These are the overarching goals to be used throughout the patient stay.    Goal: Plan of Care Review/Shift Note  Description: The Plan of Care Review/Shift note should be completed every shift.  The Outcome Evaluation is a brief statement about your assessment that the patient is improving, declining, or no change.  This information will be displayed automatically on your shift note.  Outcome: Met  Goal: Patient-Specific Goal (Individualized)  Description: You can add care plan individualizations to a care plan. Examples of Individualization might be:  \"Parent requests to be called daily at 9am for status\", \"I have a hard time hearing out of my right ear\", or \"Do not touch me to wake me up as it startles me\".  Outcome: Met  Goal: Absence of Hospital-Acquired Illness or Injury  Outcome: Met  Intervention: Identify and Manage Fall Risk  Recent Flowsheet Documentation  Taken 8/16/2022 1600 by Yolanda Richardson RN  Safety Promotion/Fall Prevention:   fall prevention program maintained   nonskid shoes/slippers when out of bed   patient and family education   clutter free environment maintained   activity supervised  Taken 8/16/2022 0745 by Yolanda Richardson RN  Safety Promotion/Fall Prevention:   fall prevention program maintained   nonskid shoes/slippers when out of bed   patient and family education   clutter free environment maintained   activity supervised  Intervention: Prevent Skin Injury  Recent Flowsheet Documentation  Taken 8/16/2022 1600 by Yolanda Richardson RN  Body Position: position changed independently  Taken 8/16/2022 0745 by Yolanda Richardson RN  Body Position: position changed independently  Intervention: Prevent and Manage VTE (Venous Thromboembolism) Risk  Recent Flowsheet Documentation  Taken 8/16/2022 1600 by Yolanda Richardson, RN  Activity Management: activity encouraged  Taken 8/16/2022 0745 by Yolanda Richardson, RN  Activity Management: activity " encouraged  Goal: Optimal Comfort and Wellbeing  Outcome: Met  Goal: Readiness for Transition of Care  Outcome: Met     Problem: Risk for Delirium  Goal: Optimal Coping  Outcome: Met  Goal: Improved Behavioral Control  Outcome: Met  Goal: Improved Attention and Thought Clarity  Outcome: Met  Goal: Improved Sleep  Outcome: Met     Problem: Dysrhythmia (Heart Failure)  Goal: Stable Heart Rate and Rhythm  8/16/2022 1608 by Yolanda Richardson RN  Outcome: Met  8/16/2022 1352 by Yolanda Richardson RN  Outcome: Ongoing, Progressing     Problem: Fluid Imbalance (Heart Failure)  Goal: Fluid Balance  Outcome: Met     Problem: Functional Ability Impaired (Heart Failure)  Goal: Optimal Functional Ability  Outcome: Met  Intervention: Optimize Functional Ability  Recent Flowsheet Documentation  Taken 8/16/2022 1600 by Yolanda Richardson RN  Activity Management: activity encouraged  Taken 8/16/2022 0745 by Yolanda Richardson RN  Activity Management: activity encouraged     Problem: Respiratory Compromise (Heart Failure)  Goal: Effective Oxygenation and Ventilation  8/16/2022 1608 by Yolanda Richardson RN  Outcome: Met  8/16/2022 1352 by Yolanda Richardson RN  Outcome: Ongoing, Progressing   Goal Outcome Evaluation:

## 2022-08-16 NOTE — PLAN OF CARE
Occupational Therapy Discharge Summary    Reason for therapy discharge:    Discharged to home with home therapy.    Progress towards therapy goal(s). See goals on Care Plan in [facility] electronic health record for goal details.  Goals partially met.  Barriers to achieving goals:   discharge from facility.    Therapy recommendation(s):    Continued therapy is recommended.  Rationale/Recommendations:  Assist with safe transition home. .

## 2022-08-16 NOTE — PROGRESS NOTES
Therapy: IV ABX  Insurance:Medicare & BCBS Supplement    Pt has all Medicare products, which does not cover IV ABX in the home. (Pt would have coverage for short term TCU or IC). Below is what pt would be responsible for if pt wanted to go w [facility]      Drug would go to Part-D (pt would be responsible for the co-pay per dispense)    Pt would have to self-pay for the per-shamika (daily)    If not homebound, nursing would also be self-pay ($90.00 per visit)    Cost for Ceftriaxone 1GM Q24 is $34.90 per day     Please contact Intake with any questions, [phone] or In Basket pool, [facility] ([phone]).

## 2022-08-16 NOTE — PLAN OF CARE
Goal Outcome Evaluation:      VSS. Discharge instructions reviewed with pt. Including Diet, Activity, Medications and F/U with primary MD in a week, to check A1C at clinic. Pt also to make appt.. with pulmonologist to discuss tapering prednisone. Pt had been seen per Diabetic Educator yesterday and pt instructed in giving own Lantus insulin per pen and checking Blood glucose per Dr. Pñea. Pt to F/u with primary clinic on Blood sugars. To lobby per w/c with belongings per Nurse.

## 2022-08-16 NOTE — PROGRESS NOTES
Transesophageal Echocardiogram:  Patient tolerated procedure well. VSS. SBP initially elevated but improved after sedation given. See MAR for specific sedation dosages. NPO until 1018 and no hot foods/liquids til 1518 today. Talked to patient's nurse Yeison to update. Pt transferred back to his room 329 at 0945.     Results pending cardiology interpretation.    Elen Teague RN

## 2022-08-16 NOTE — PROGRESS NOTES
Care Management Discharge Note    Discharge Date: 08/16/2022       Discharge Disposition: Home    Discharge Services:  Per team    Discharge DME:  none    Discharge Transportation: family or friend will provide    Private pay costs discussed: Not applicable  Patient/family educated on Medicare website which has current facility and service quality ratings:      Education Provided on the Discharge Plan:  yes  Persons Notified of Discharge Plans: patient and family  Patient/Family in Agreement with the Plan: yes    Handoff Referral Completed: Yes    Additional Information:  Chart reviewed, RICHIE negative and patient will be discharged home with follow up as recommended. Family will provide transportation.         Heike Huber RN

## 2022-08-16 NOTE — PLAN OF CARE
Problem: Dysrhythmia (Heart Failure)  Goal: Stable Heart Rate and Rhythm  Outcome: Ongoing, Progressing     Problem: Respiratory Compromise (Heart Failure)  Goal: Effective Oxygenation and Ventilation  Outcome: Ongoing, Progressing   Goal Outcome Evaluation:      BP was 192/58 this am. Given AM BP meds. Not on telemetry. NPO in am for RICHIE. Down to GI lab per w/c for RICHIE at 0830. Report given to Romi CERON  Returned from RICHIE at 0930. VSS. NPO till 1015. No hot foods till 3:15PM (6 hrs). Pt hoping to discharge home today. Denies pain, SOB. Up independently to BR.

## 2022-08-18 LAB
BACTERIA BLD CULT: NO GROWTH
BACTERIA BLD CULT: NO GROWTH

## 2022-08-19 NOTE — DISCHARGE SUMMARY
[facility]  Hospitalist Discharge Summary      Date of Admission:  8/12/2022  Date of Discharge:  8/16/2022  5:01 PM  Discharging Provider: Yaz Peña MD  Discharge Service: Hospitalist Service    Discharge Diagnoses   Acute on chronic heart failure with preserved ejection fraction, volume overload  Sarcoidosis, mediastinal and hilar lymphadenopathy  Former smoker  Group G strep bacteremia  Left lower extremity cellulitis-improved  Lactic acidosis-resolved  Sepsis-resolved  History of severe aortic stenosis status post TAVR 9/14/2021  Persistent atrial fibrillation  Essential hypertension  CKD stage IIIb  JONNATHAN-resolved  Diabetes mellitus type 2 not on long-term insulin  Steroid-induced hyperglycemia  Coronary artery disease without angina  Dyslipidemia  Obesity class I    Follow-ups Needed After Discharge   Follow-up Appointments     Follow-up and recommended labs and tests       Follow up with primary care provider, LUPE HYMAN, within 7 days to   evaluate medication change, to evaluate treatment change, for hospital   follow- up, and regarding new diagnosis.  The following labs/tests are   recommended: A1C.  You will need to see your pulmonologist as soon as   possible because you will need to taper the prednisone. You can not stop   the medication without tapering.             Unresulted Labs Ordered in the Past 30 Days of this Admission     Date and Time Order Name Status Description    8/13/2022  6:35 PM Blood Culture Peripheral Blood Preliminary     8/13/2022  6:35 PM Blood Culture Peripheral Blood Preliminary       These results will be followed up by Yaz Peña    Discharge Disposition   Discharged to home  Condition at discharge: Stable    Hospital Course   Per Beasley is a 82 year old male with past medical history significant for severe aortic valve stenosis status post TAVR in 2021, coronary artery disease status post PCI with NUBIA to mid LCx in 2021,  persistent atrial fibrillation, hypertension, dyslipidemia, diabetes mellitus type 2, PVD, and CKD stage III who was admitted on 8/12/2022.      Acute hypoxic respiratory failure-resolved  Sarcoidosis  Former smoker  Mediastinal and hilar lymphadenopathy  -Progressive shortness of breath over the past 3 months  -CT PE 8/12: Pulmonary edema, tiny pleural effusions, basilar groundglass opacities, moderate to severe airway thickening from edema or airway disease, interval increase in mediastinal and hilar lymphadenopathy, septal and airway thickening could correlate with sarcoidosis  -Will need follow-up CT in 3 months for lymphadenopathy, discussed with pt  -Does follow with a pulmonologist outpatient with concerns for sarcoidosis  -prednisone 40 mg daily for sarcoidosis treatment, he will need to be on this dose for 4 to 6 weeks and then taper, discussed need for follow-up with pulmonologist at discharge, wheezing has significantly improved since yesterday  -Furosemide 20 mg IV every 6 for 3 doses-completed     Group G strep bacteremia  LLE cellulitis- improving  Lactic acidosis-resolved  Sepsis- resolved  -Was noted to be febrile by EMS, presented with tachycardia, tachypnea, and hypoxia  -Has left lower extremity redness and warmth without any open wounds, now improving  -Lactic acid 2.6>1.6, procalcitonin 0.97  -UA negative for signs of infection  -Blood cultures drawn and positive for group G strep, repeat negative  -Due to volume overload will not be starting IVF  -ID consulted, appreciate recommendations  -RICHIE 8/16: Negative for signs of endocarditis     Mild acute on chronic heart failure with preserved ejection fraction mild volume overload-resolved  History of severe aortic stenosis status post TAVR 9/14/21  -Has not been taking home diuretic for the past 3 months with progressive shortness of breath at baseline  -Given Lasix 40 mg IV in the ED, started on 20 mg every 6 for 3 doses  -Echo 8/12: EF 60 to 65%,  prosthetic aortic valve appears normal     Persistent atrial fibrillation  -Continue metoprolol 12.5 mg twice daily     Essential hypertension  -Continue amlodipine 5 mg daily  -Restarted home furosemide dose     CKD stage IIIb  JONNATHAN-resolved  -Baseline creatinine approximately 1.2-1.5  -Avoid nephrotoxic agents     Type 2 diabetes mellitus not on long-term insulin  Steroid-induced hyperglycemia  -Most recent A1c 6.8%  -Takes oral medications at home, holding glipizide and metformin  -Sliding scale insulin with meals and at bedtime increased to high correction dose  -Increasing Lantus to 12 units every morning, adding mealtime insulin 4 units  -On moderate consistent carb diet  -Was seen by diabetes educator and shown how to use insulin pens as will be discharged on daily insulin due to steroids, shown how to check blood sugars  -Contacted outpatient pharmacy to verify coverage for insulin pens prior to discharge     Coronary artery disease without angina  Dyslipidemia  -s/p NUBIA x 1 to mid LCX into OM1, 8/13/2021  -Continue Plavix 75 mg daily, atorvastatin 40 mg daily     Obesity class I  -BMI 30       Consultations This Hospital Stay   CARE MANAGEMENT / SOCIAL WORK IP CONSULT  PHARMACY TO DOSE VANCO  PHYSICAL THERAPY ADULT IP CONSULT  OCCUPATIONAL THERAPY ADULT IP CONSULT  DIABETES EDUCATION IP CONSULT  INFECTIOUS DISEASES IP CONSULT    Code Status   Prior    Time Spent on this Encounter   I, Yaz Peña MD, personally saw the patient today and spent greater than 30 minutes discharging this patient.       Yaz Peña MD  [facility]  [address] [place] [id number]  Phone: [phone]  Fax: [phone]  ______________________________________________________________________    Physical Exam   Vital Signs:                   Weight: 202 lbs 1.6 oz  General Appearance: Awake, alert, in no acute distress  Respiratory: CTAB, no wheeze  Cardiovascular: RRR, no  murmur noted  GI: soft, nontender, non distended, normal bowel sounds  Skin: no jaundice, left lower extremity still has some erythema and significant edema         Primary Care Physician   LUPE HYMAN    Discharge Orders      Reason for your hospital stay    Volume overload, diastolic heart failure, group G strep bacteremia, sarcoidosis     Follow-up and recommended labs and tests     Follow up with primary care provider, LUPE HYMAN, within 7 days to evaluate medication change, to evaluate treatment change, for hospital follow- up, and regarding new diagnosis.  The following labs/tests are recommended: A1C.  You will need to see your pulmonologist as soon as possible because you will need to taper the prednisone. You can not stop the medication without tapering.     Activity    Your activity upon discharge: activity as tolerated     Diet    Follow this diet upon discharge:       Moderate Consistent Carb (60 g CHO per Meal) Diet Other - please comment       Significant Results and Procedures   Most Recent 3 CBC's:Recent Labs   Lab Test 08/16/22  0444 08/15/22  0505 08/14/22  0442   WBC 8.7 11.1* 9.3   HGB 12.1* 11.6* 11.6*   MCV 87 88 88   * 130* 106*     Most Recent 3 BMP's:Recent Labs   Lab Test 08/16/22  1142 08/16/22  0748 08/16/22  0444 08/15/22  1235 08/15/22  0505 08/14/22  0730 08/14/22  0442   NA  --   --  135*  --  136  --  133*   POTASSIUM  --   --  4.1  --  4.1  --  4.2   CHLORIDE  --   --  102  --  103  --  102   CO2  --   --  25  --  25  --  23   BUN  --   --  33*  --  31*  --  38*   CR  --   --  1.53*  --  1.52*  --  1.76*   ANIONGAP  --   --  8  --  8  --  8   SANDRA  --   --  9.1  --  9.0  --  8.8   * 174* 201*   < > 204*   < > 217*    < > = values in this interval not displayed.   ,   Results for orders placed or performed during the hospital encounter of 08/12/22   XR Chest Port 1 View    Narrative    EXAM: XR CHEST PORT 1 VIEW  LOCATION: [facility]  HOSPITAL  DATE/TIME: 8/12/2022 6:51 AM    INDICATION: SOB, Sirs  COMPARISON: 9/13/2021      Impression    IMPRESSION: The cardiac silhouette is unchanged in size and contour. Prominence of the central pulmonary venous markings is noted, likely exaggerated by portable technique, correlate for symptoms of volume overload. Left hilar scarring versus atelectasis   is again noted, not significant change. Old healed left-sided rib fractures are again identified.   CT Chest Pulmonary Embolism w Contrast    Narrative    EXAM: CT CHEST PULMONARY EMBOLISM W CONTRAST  LOCATION: [facility]  DATE/TIME: 8/12/2022 7:36 AM    INDICATION: Chest pain, shortness of breath, tachycardia, hypoxia.  COMPARISON: 08/25/2021. 05/18/2021.  TECHNIQUE: CT chest pulmonary angiogram during arterial phase injection of IV contrast. Multiplanar reformats and MIP reconstructions were performed. Dose reduction techniques were used.   CONTRAST: IsoVue 370 75mL.    FINDINGS:  ANGIOGRAM CHEST: No pulmonary embolism. Nonaneurysmal aorta without dissection. Mild to moderate aortic mixed plaque.    LUNGS AND PLEURA: Motion artifact. Moderate to severe bilateral airway thickening, pronounced in the lung bases. Mild to moderate basal predominant septal thickening. Superimposed groundglass and patchy opacities, again pronounced in the lung bases.   Redemonstrated scattered bilateral micronodules under 5 mm (many are perilymphatic in distribution). Tiny pleural effusions. No pneumothorax.    MEDIASTINUM/AXILLAE: Adenopathy has increased since 2021. Right subcarinal adenopathy measures 20 mm short axis versus 17 mm (series 5, image 52). Left perihilar adenopathy has also increased measuring 18 x 30 mm versus 12 x 28 mm (series 5, image 54).   Some nodes are partially calcified, as before.    Upper normal heart size. No pericardial effusion. TAVR.    CORONARY ARTERY CALCIFICATION: Moderate. Coronary stenting.    UPPER ABDOMEN: Mild to  moderate narrowing of the superior mesenteric artery off the abdominal aorta. At least mild left renal artery origin narrowing. Severe partially seen abdominal aortic mixed plaque.    MUSCULOSKELETAL: Bony demineralization and degenerative changes.      Impression    IMPRESSION:    1.  No pulmonary embolism.    2.  Findings of pulmonary edema. Tiny pleural effusions.    3.  Basilar predominant opacities and groundglass are presumed manifestations of pulmonary edema. However, correlate for superimposed inflammatory symptoms.    4.  Moderate-severe airway thickening, from edema or airways disease.    5.  Interval increased mediastinal and hilar adenopathy. Although reactive change is possible, recommend 3 month follow-up contrast enhanced chest CT.    6.  No significant change of scattered pulmonary micronodules. Given nerissa calcifications and several of these nodules being perilymphatic, sequela of old granulomatous disease disease suspected (to include sarcoid).     7.  Septal and airway thickening has been present on the prior chest CTs. Sequela of edema is statistically more likely (particularly if correlates with a history of cardiac dysfunction clinically). However, given other findings on this exam which can be   seen with sarcoid, alveolar-septal sarcoid can uncommonly be seen. Sarcoid airway involvement can also be seen. Pulmonary consultation as warranted.      NOTE: ABNORMAL REPORT    THE DICTATION ABOVE DESCRIBES AN ABNORMALITY FOR WHICH FOLLOW-UP IS NEEDED.    Echocardiogram Complete     Value    LVEF  60-65%    Narrative    [id number]  [id number]  [id number]  [id number]^ASHLYN^[place] [address]     Name: MAT NOVA  MRN: [id number]  : 1940  Study Date: 2022 09:37 AM  Age: 82 yrs  Gender: Male  Patient Location: [facility]  Reason For Study: Heart Failure  Ordering Physician: EITAN GOLDBERG  Performed By: CM     BSA: 2.1 m2  Height: 69  in  Weight: 205 lb  HR: 101  ______________________________________________________________________________  Procedure  Complete Echo Adult. Definity (NDC #42727-390) given intravenously.  ______________________________________________________________________________  Interpretation Summary     Left ventricular size, wall motion and function are normal. The ejection  fraction is 60-65%.  Normal right ventricle size and systolic function.  There is a bioprosthetic aortic valve.  The prosthetic aortic valve appears abnormal.  The study was technically difficult.  ______________________________________________________________________________  Left Ventricle  Left ventricular size, wall motion and function are normal. The ejection  fraction is 60-65%. Left ventricular diastolic function is indeterminate. No  regional wall motion abnormalities noted.     Right Ventricle  Normal right ventricle size and systolic function.     Atria  The left atrium is mildly dilated. The right atrium is mildly dilated. There  is no color Doppler evidence of an atrial shunt.     Mitral Valve  Mitral valve leaflets appear normal. There is no evidence of mitral stenosis  or clinically significant mitral regurgitation.     Tricuspid Valve  Tricuspid valve leaflets appear normal. Right ventricle systolic pressure  estimate normal. There is mild (1+) tricuspid regurgitation.     Aortic Valve  There is a bioprosthetic aortic valve. The prosthetic aortic valve appears  abnormal.     Pulmonic Valve  The pulmonic valve is not well seen, but is grossly normal. This degree of  valvular regurgitation is within normal limits.     Vessels  The aorta root is normal. Normal size ascending aorta. Inferior vena cava not  well visualized for estimation of right atrial pressure.     Pericardium  There is no pericardial effusion.     ______________________________________________________________________________  MMode/2D Measurements & Calculations     LA  Volume Indexed (AL/bp): 34.0 ml/m2     Doppler Measurements & Calculations  Ao V2 max: 199.5 cm/sec  Ao max P.0 mmHg  Ao V2 mean: 122.2 cm/sec  Ao mean P.7 mmHg  Ao V2 VTI: 31.0 cm  LV V1 max P.8 mmHg  LV V1 max: 130.5 cm/sec  LV V1 VTI: 19.3 cm  TR max kevin: 243.0 cm/sec  TR max P.6 mmHg  AV Kevin Ratio (DI): 0.65     ______________________________________________________________________________  Report approved by: Amalia Walsh 2022 11:17 AM         Echocardiogram RICHIE     Value    LVEF  60-65%    Narrative    [id number]  [id number]  [id number]  [id number]^COREEN^SWATHI^[place] [address]     Name: MAT NOVA  MRN: [id number]  : 1940  Study Date: 2022 08:48 AM  Age: 82 yrs  Gender: Male  Patient Location: [facility]  Reason For Study: Endocarditis  Ordering Physician: SWATHI ANGELA  Referring Physician: SWATHI ANGELA  Performed By: CL/LH     BSA: 2.1 m2  Height: 69 in  Weight: 202 lb  HR: 73  ______________________________________________________________________________  Procedure  Complete RICHIE Adult. Good quality two-dimensional was performed and  interpreted. Good quality color and spectral Doppler were performed and  interpreted.  ______________________________________________________________________________  Interpretation Summary     The left ventricle is normal in size.  Left ventricular function is normal.The ejection fraction is 60-65%.  Normal right ventricle size and systolic function.  The left atrium is moderately dilated.  No thrombus is detected in the left atrial appendage.  A contrast injection (Bubble Study) was performed that was negative for flow  across the interatrial septum.  There is a documented 26-mm Mooeny Catarino 3 Ultra (transcatheter) aortic  bioprosthetic valve.  Aortic prosthetic systolic mean gradient is 7 mmHg.  Trivial paravalvular  leak.  ______________________________________________________________________________  RICHIE  Consent to the procedure was obtained prior to sedation. There were no  complications associated with this procedure. Patient was sedated using  Fentanyl 50 mcg. Patient was sedated using Versed 2 mg. The heart rate,  respiratory rate, oxygen saturations, blood pressure, and response to care  were monitored throughout the procedure with the assistance of the nurse. I  determined this patient to be an appropriate candidate for the planned  sedation and procedure and have reassessed the patient immediately prior to  sedation and procedure. Total sedation time: 18 minutes of continuous bedside  1:1 monitoring. 3 sprays of Benzocaine, 15mL of Lidocaine. The Transducer was  inserted without difficulty . The procedure was performed in the Echo Lab.     Left Ventricle  The left ventricle is normal in size. Left ventricular function is normal.The  ejection fraction is 60-65%. There is mild concentric left ventricular  hypertrophy. No regional wall motion abnormalities noted.     Right Ventricle  Normal right ventricle size and systolic function.     Atria  The left atrium is moderately dilated. Spontaneous contrast in left atrium.  The right atrium is mildly dilated. There is no color Doppler evidence of an  atrial shunt. A contrast injection (Bubble Study) was performed that was  negative for flow across the interatrial septum. No thrombus is detected in  the left atrial appendage.     Mitral Valve  Mitral valve leaflets appear normal. There is mild (1+) mitral regurgitation.     Tricuspid Valve  The tricuspid valve is normal in structure and function. There is mild (1+)  tricuspid regurgitation.     Aortic Valve  There is a documented 26-mm Mooney Catarino 3 Ultra (transcatheter) aortic  bioprosthetic valve.  Aortic prosthetic systolic mean gradient is 7 mmHg.  Trivial paravalvular leak.     Pulmonic Valve  The pulmonic valve is not  well seen, but is grossly normal. There is trace  pulmonic valvular regurgitation.     Vessels  The aortic root is normal size. Normal size ascending aorta. The inferior vena  cava is normal.     Pericardial/Pleural  There is no pericardial effusion.  ______________________________________________________________________________  Doppler Measurements & Calculations  Ao V2 max: 192.5 cm/sec  Ao max PG: 15.0 mmHg  Ao V2 mean: 121.8 cm/sec  Ao mean P.1 mmHg  Ao V2 VTI: 28.3 cm     ______________________________________________________________________________  Report approved by: Amalia Curiel 2022 10:12 AM               Discharge Medications   Discharge Medication List as of 2022  4:34 PM      START taking these medications    Details   Alcohol Swabs PADS Use to swab the area of the injection or rosenda as directed Per insurance coverage, Disp-100 each, R-0, E-Prescribe      amoxicillin (AMOXIL) 500 MG capsule Take 1 capsule (500 mg) by mouth 2 times daily for 10 days, Disp-20 capsule, R-0, E-Prescribe      blood glucose (NO BRAND SPECIFIED) lancets standard To use to test glucose level in the blood Use to test blood sugar  2  times daily as directed. To accompany glucose monitor brands per insurance coverage.Disp-100 each, [id number]      blood glucose (NO BRAND SPECIFIED) test strip To use to test glucose level in the blood Use to test blood sugar  2 times daily as directed. To accompany glucose monitor brands per insurance coverage., Disp-100 strip, R-0, E-Prescribe      blood glucose calibration (NO BRAND SPECIFIED) solution Used to calibrate the blood glucose monitor as needed and as directed.  To accompany  blood glucose brands per insurance coverage, Disp-1 each, R-0, E-Prescribe      blood glucose monitoring (NO BRAND SPECIFIED) meter device kit Use as directed , Per insurance coverageDisp-1 kit, [id number]      furosemide (LASIX) 20 MG tablet Take 1 tablet (20 mg) by mouth daily  for 30 days, Disp-30 tablet, R-0, E-Prescribe      glucose (BD GLUCOSE) 4 g chewable tablet Take 4 tablets by mouth every 15 minutes as needed for low blood sugar, Disp-50 tablet, R-0, E-Prescribe      insulin glargine (BASAGLAR KWIKPEN) 100 UNIT/ML pen Inject 12 Units Subcutaneous every morning for 90 days, Disp-15 mL, R-1, E-PrescribeIf Basaglar is not covered by insurance, may substitute Lantus or Semglee or other insulin glargine product per insurance preference at same dose and frequency.        insulin pen needle (32G X 4 MM) 32G X 4 MM miscellaneous Use as directed by provider Per insurance coverageDisp-100 each, [id number]      predniSONE (DELTASONE) 20 MG tablet Take 2 tablets (40 mg) by mouth daily for 42 days, Disp-84 tablet, R-0, E-Prescribe      Sharps Container MISC Use as directed to dispose of needles, lancets and other sharps Per Insurance coverage, Disp-1 each, R-0, E-Prescribe         CONTINUE these medications which have NOT CHANGED    Details   amLODIPine (NORVASC) 5 MG tablet Take 5 mg by mouth daily, Historical      aspirin (ASA) 81 MG chewable tablet Take 81 mg by mouth daily, Historical      atorvastatin (LIPITOR) 40 MG tablet Take 40 mg by mouth daily, Historical      clopidogrel (PLAVIX) 75 MG tablet Take 75 mg by mouth daily, Historical      metFORMIN (GLUCOPHAGE) 1000 MG tablet Take 1,000 mg by mouth 2 times daily (with meals), Historical      metoprolol tartrate (LOPRESSOR) 25 MG tablet Take 12.5 mg by mouth 2 times daily, Historical         STOP taking these medications       glipiZIDE (GLUCOTROL) 10 MG tablet Comments:   Reason for Stopping:             Allergies   No Known Allergies

## 2022-09-16 ENCOUNTER — HOSPITAL ENCOUNTER (EMERGENCY)
Facility: HOSPITAL | Age: 82
Discharge: HOME | End: 2022-09-16
Attending: EMERGENCY MEDICINE | Admitting: EMERGENCY MEDICINE
Payer: MEDICARE

## 2022-09-16 ENCOUNTER — APPOINTMENT (OUTPATIENT)
Dept: ULTRASOUND IMAGING | Facility: HOSPITAL | Age: 82
End: 2022-09-16
Attending: EMERGENCY MEDICINE
Payer: MEDICARE

## 2022-09-16 VITALS
OXYGEN SATURATION: 98 % | TEMPERATURE: 96.6 F | BODY MASS INDEX: 30.96 KG/M2 | HEIGHT: 69 IN | DIASTOLIC BLOOD PRESSURE: 80 MMHG | WEIGHT: 209 LBS | RESPIRATION RATE: 12 BRPM | SYSTOLIC BLOOD PRESSURE: 162 MMHG | HEART RATE: 82 BPM

## 2022-09-16 DIAGNOSIS — L03.116 CELLULITIS OF LEFT LOWER EXTREMITY: ICD-10-CM

## 2022-09-16 PROBLEM — J84.10 GRANULOMATOUS LUNG DISEASE (H): Status: ACTIVE | Noted: 2019-12-04

## 2022-09-16 PROBLEM — R06.09 EXERTIONAL DYSPNEA: Status: ACTIVE | Noted: 2021-07-09

## 2022-09-16 PROBLEM — J18.9 CAP (COMMUNITY ACQUIRED PNEUMONIA): Status: ACTIVE | Noted: 2019-12-04

## 2022-09-16 PROBLEM — H02.834 DERMATOCHALASIS OF BOTH UPPER EYELIDS: Status: ACTIVE | Noted: 2022-03-15

## 2022-09-16 PROBLEM — H02.831 DERMATOCHALASIS OF BOTH UPPER EYELIDS: Status: ACTIVE | Noted: 2022-03-15

## 2022-09-16 PROBLEM — I35.0 AORTIC VALVE STENOSIS: Status: ACTIVE | Noted: 2021-07-09

## 2022-09-16 PROBLEM — I50.9 HEART FAILURE (H): Status: ACTIVE | Noted: 2022-02-28

## 2022-09-16 PROBLEM — E66.01 MORBID (SEVERE) OBESITY DUE TO EXCESS CALORIES (H): Status: ACTIVE | Noted: 2022-02-28

## 2022-09-16 LAB
ALBUMIN SERPL-MCNC: 3.3 G/DL (ref 3.5–5)
ALP SERPL-CCNC: 131 U/L (ref 45–120)
ALT SERPL W P-5'-P-CCNC: 12 U/L (ref 0–45)
ANION GAP SERPL CALCULATED.3IONS-SCNC: 10 MMOL/L (ref 5–18)
AST SERPL W P-5'-P-CCNC: 16 U/L (ref 0–40)
BASOPHILS # BLD AUTO: 0 10E3/UL (ref 0–0.2)
BASOPHILS NFR BLD AUTO: 1 %
BILIRUB SERPL-MCNC: 0.6 MG/DL (ref 0–1)
BUN SERPL-MCNC: 14 MG/DL (ref 8–28)
CALCIUM SERPL-MCNC: 9.5 MG/DL (ref 8.5–10.5)
CHLORIDE BLD-SCNC: 101 MMOL/L (ref 98–107)
CO2 SERPL-SCNC: 25 MMOL/L (ref 22–31)
CREAT SERPL-MCNC: 1.32 MG/DL (ref 0.7–1.3)
EOSINOPHIL # BLD AUTO: 0.2 10E3/UL (ref 0–0.7)
EOSINOPHIL NFR BLD AUTO: 3 %
ERYTHROCYTE [DISTWIDTH] IN BLOOD BY AUTOMATED COUNT: 13.4 % (ref 10–15)
GFR SERPL CREATININE-BSD FRML MDRD: 54 ML/MIN/1.73M2
GLUCOSE BLD-MCNC: 192 MG/DL (ref 70–125)
HCT VFR BLD AUTO: 37.1 % (ref 40–53)
HGB BLD-MCNC: 12 G/DL (ref 13.3–17.7)
HOLD SPECIMEN: NORMAL
IMM GRANULOCYTES # BLD: 0 10E3/UL
IMM GRANULOCYTES NFR BLD: 1 %
LYMPHOCYTES # BLD AUTO: 1.9 10E3/UL (ref 0.8–5.3)
LYMPHOCYTES NFR BLD AUTO: 34 %
MCH RBC QN AUTO: 28.9 PG (ref 26.5–33)
MCHC RBC AUTO-ENTMCNC: 32.3 G/DL (ref 31.5–36.5)
MCV RBC AUTO: 89 FL (ref 78–100)
MONOCYTES # BLD AUTO: 0.7 10E3/UL (ref 0–1.3)
MONOCYTES NFR BLD AUTO: 12 %
NEUTROPHILS # BLD AUTO: 2.9 10E3/UL (ref 1.6–8.3)
NEUTROPHILS NFR BLD AUTO: 49 %
NRBC # BLD AUTO: 0 10E3/UL
NRBC BLD AUTO-RTO: 0 /100
PLATELET # BLD AUTO: 234 10E3/UL (ref 150–450)
POTASSIUM BLD-SCNC: 4.4 MMOL/L (ref 3.5–5)
PROT SERPL-MCNC: 7.4 G/DL (ref 6–8)
RBC # BLD AUTO: 4.15 10E6/UL (ref 4.4–5.9)
SODIUM SERPL-SCNC: 136 MMOL/L (ref 136–145)
WBC # BLD AUTO: 5.7 10E3/UL (ref 4–11)

## 2022-09-16 PROCEDURE — 80053 COMPREHEN METABOLIC PANEL: CPT | Performed by: EMERGENCY MEDICINE

## 2022-09-16 PROCEDURE — 99284 EMERGENCY DEPT VISIT MOD MDM: CPT | Mod: 25

## 2022-09-16 PROCEDURE — 36415 COLL VENOUS BLD VENIPUNCTURE: CPT | Performed by: STUDENT IN AN ORGANIZED HEALTH CARE EDUCATION/TRAINING PROGRAM

## 2022-09-16 PROCEDURE — 93971 EXTREMITY STUDY: CPT | Mod: LT

## 2022-09-16 PROCEDURE — 85025 COMPLETE CBC W/AUTO DIFF WBC: CPT | Performed by: EMERGENCY MEDICINE

## 2022-09-16 PROCEDURE — 250N000013 HC RX MED GY IP 250 OP 250 PS 637: Performed by: EMERGENCY MEDICINE

## 2022-09-16 RX ORDER — SULFAMETHOXAZOLE/TRIMETHOPRIM 800-160 MG
1 TABLET ORAL ONCE
Status: COMPLETED | OUTPATIENT
Start: 2022-09-16 | End: 2022-09-16

## 2022-09-16 RX ORDER — SULFAMETHOXAZOLE/TRIMETHOPRIM 800-160 MG
1 TABLET ORAL 2 TIMES DAILY
Qty: 14 TABLET | Refills: 0 | Status: SHIPPED | OUTPATIENT
Start: 2022-09-16 | End: 2022-09-23

## 2022-09-16 RX ADMIN — SULFAMETHOXAZOLE AND TRIMETHOPRIM 1 TABLET: 800; 160 TABLET ORAL at 12:36

## 2022-09-16 ASSESSMENT — ACTIVITIES OF DAILY LIVING (ADL): ADLS_ACUITY_SCORE: 33

## 2022-09-16 NOTE — ED TRIAGE NOTES
Pt has had increasing swelling and redness over the last 3 weeks in his left inner foot and in the leg to knee. The foot is most painful. Pt was hospitalized 1 month ago for CHF. No hx of blood clot. Not on thinners.

## 2022-09-16 NOTE — ED PROVIDER NOTES
"EMERGENCY DEPARTMENT NOTE     Name: Per Beasley    Age/Sex: 82 year old male   MRN: [id number]   Evaluation Date & Time:  No admission date for patient encounter.    PCP:    Maykel Copeland   ED Provider: Reggie Johnson D.O.       CHIEF COMPLAINT    Leg Swelling       DIAGNOSIS & DISPOSITION     1. Cellulitis of left lower extremity      DISPOSITION: Home    At the conclusion of the encounter I discussed the results of all of the tests and the disposition. The questions were answered. The patient or family acknowledged understanding and was agreeable with the care plan.    TOTAL CRITICAL CARE TIME (EXCLUDING PROCEDURES): Not applicable    PROCEDURES:   None    EMERGENCY DEPARTMENT COURSE/MEDICAL DECISION MAKING   11:27 AM I met with the patient to gather history and to perform my initial exam.  We discussed treatment options and the plan for care while in the Emergency Department.    PPE: Provider wore gloves and N95 mask.     Per Beasley is a 82 year old year old male with a relevant past history of anemia, diabetes mellitus type II, cellulitis of lower extremity, HTN, hyperlipidemia, and heart failure, who presents to this ED for evaluation of leg swelling.    Triage note reviewed:   Differential  diagnosis  considered included but not limited to cellulitis, fasciitis, DVT  Vital signs:BP (!) 162/80   Pulse 82   Temp (!) 96.6  F (35.9  C) (Tympanic)   Resp 12   Ht 1.753 m (5' 9\")   Wt 94.8 kg (209 lb)   SpO2 98%   BMI 30.86 kg/m    Pertinent physical exam findings:            Diagnostic studies:  Imaging:  US Lower Extremity Venous Duplex Left   Final Result   IMPRESSION:      1.  No deep venous thrombosis in the left lower extremity.   2.  Circumscribed ovoid hypoechoic structure within the soft tissues of the left mid calf without vascularity, most likely small hematoma.         Lab:  Labs Ordered and Resulted from Time of ED Arrival to Time of ED Departure   COMPREHENSIVE METABOLIC PANEL - " Abnormal       Result Value    Sodium 136      Potassium 4.4      Chloride 101      Carbon Dioxide (CO2) 25      Anion Gap 10      Urea Nitrogen 14      Creatinine 1.32 (*)     Calcium 9.5      Glucose 192 (*)     Alkaline Phosphatase 131 (*)     AST 16      ALT 12      Protein Total 7.4      Albumin 3.3 (*)     Bilirubin Total 0.6      GFR Estimate 54 (*)    CBC WITH PLATELETS AND DIFFERENTIAL - Abnormal    WBC Count 5.7      RBC Count 4.15 (*)     Hemoglobin 12.0 (*)     Hematocrit 37.1 (*)     MCV 89      MCH 28.9      MCHC 32.3      RDW 13.4      Platelet Count 234      % Neutrophils 49      % Lymphocytes 34      % Monocytes 12      % Eosinophils 3      % Basophils 1      % Immature Granulocytes 1      NRBCs per 100 WBC 0      Absolute Neutrophils 2.9      Absolute Lymphocytes 1.9      Absolute Monocytes 0.7      Absolute Eosinophils 0.2      Absolute Basophils 0.0      Absolute Immature Granulocytes 0.0      Absolute NRBCs 0.0        Interventions: Bactrim  Medical decision making: He is very male presenting the emergency department for evaluation of left lower extremity swelling and erythema.  Patient states he had erythema left leg earlier this week.  He has been doing local care with application of warm compresses alternating with ice compresses and elevating.  Patient denied any history of trauma.  Exam showed circumscribed erythema over the medial malleolus of the left leg with minimal desquamation.  Does not appear to be ecchymotic.  No bony tenderness.  Ultrasound negative for DVT.  Patient does have a left knee replacement.  Discussed options with the patient.  He prefer not to be hospitalized.  Will initiate Bactrim twice daily with instructions for continued local care with warm compresses elevation limitation ambulation.  Patient will see primary care physician in follow-up early next week.  If progressive of swelling or redness of the leg or new symptoms fever will return to the emergency  department.    ED INTERVENTIONS     Medications   sulfamethoxazole-trimethoprim (BACTRIM DS) 800-160 MG per tablet 1 tablet (1 tablet Oral Given 9/16/22 1236)       DISCHARGE MEDICATIONS        Review of your medicines      UNREVIEWED medicines. Ask your doctor about these medicines      Dose / Directions   amLODIPine 5 MG tablet  Commonly known as: NORVASC      Dose: 5 mg  Take 5 mg by mouth daily  Refills: 0     aspirin 81 MG chewable tablet  Commonly known as: ASA      Dose: 81 mg  Take 81 mg by mouth daily  Refills: 0     atorvastatin 40 MG tablet  Commonly known as: LIPITOR      Dose: 40 mg  Take 40 mg by mouth daily  Refills: 0     clopidogrel 75 MG tablet  Commonly known as: PLAVIX      Dose: 75 mg  Take 75 mg by mouth daily  Refills: 0     furosemide 20 MG tablet  Commonly known as: LASIX  Used for: Chronic diastolic heart failure (H)      Dose: 20 mg  Take 1 tablet (20 mg) by mouth daily for 30 days  Quantity: 30 tablet  Refills: 0     glucose 4 g chewable tablet  Commonly known as: BD GLUCOSE  Used for: Type 2 diabetes mellitus without complication, without long-term current use of insulin (H)      Dose: 4 tablet  Take 4 tablets by mouth every 15 minutes as needed for low blood sugar  Quantity: 50 tablet  Refills: 0     insulin glargine 100 UNIT/ML pen  Used for: Type 2 diabetes mellitus without complication, without long-term current use of insulin (H)      Dose: 12 Units  Inject 12 Units Subcutaneous every morning for 90 days  Quantity: 15 mL  Refills: 1     metFORMIN 1000 MG tablet  Commonly known as: GLUCOPHAGE      Dose: 1,000 mg  Take 1,000 mg by mouth 2 times daily (with meals)  Refills: 0     metoprolol tartrate 25 MG tablet  Commonly known as: LOPRESSOR      Dose: 12.5 mg  Take 12.5 mg by mouth 2 times daily  Refills: 0     predniSONE 20 MG tablet  Commonly known as: DELTASONE  Used for: Sarcoidosis      Dose: 40 mg  Take 2 tablets (40 mg) by mouth daily for 42 days  Quantity: 84 tablet  Refills:  0        START taking      Dose / Directions   sulfamethoxazole-trimethoprim 800-160 MG tablet  Commonly known as: Bactrim DS      Dose: 1 tablet  Take 1 tablet by mouth 2 times daily for 7 days  Quantity: 14 tablet  Refills: 0        CONTINUE these medicines which have NOT CHANGED      Dose / Directions   Alcohol Swabs Pads  Used for: Type 2 diabetes mellitus without complication, without long-term current use of insulin (H)      Use to swab the area of the injection or rosenda as directed Per insurance coverage  Quantity: 100 each  Refills: 0     blood glucose calibration solution  Commonly known as: NO BRAND SPECIFIED  Used for: Type 2 diabetes mellitus without complication, without long-term current use of insulin (H)      Used to calibrate the blood glucose monitor as needed and as directed.  To accompany  blood glucose brands per insurance coverage  Quantity: 1 each  Refills: 0     blood glucose lancets standard  Commonly known as: NO BRAND SPECIFIED  Used for: Type 2 diabetes mellitus without complication, without long-term current use of insulin (H)      To use to test glucose level in the blood Use to test blood sugar  2  times daily as directed. To accompany glucose monitor brands per insurance coverage.  Quantity: 100 each  Refills: 0     blood glucose monitoring meter device kit  Commonly known as: NO BRAND SPECIFIED  Used for: Type 2 diabetes mellitus without complication, without long-term current use of insulin (H)      Use as directed , Per insurance coverage  Quantity: 1 kit  Refills: 0     blood glucose test strip  Commonly known as: NO BRAND SPECIFIED  Used for: Type 2 diabetes mellitus without complication, without long-term current use of insulin (H)      To use to test glucose level in the blood Use to test blood sugar  2 times daily as directed. To accompany glucose monitor brands per insurance coverage.  Quantity: 100 strip  Refills: 0     insulin pen needle 32G X 4 MM miscellaneous  Commonly  known as: 32G X 4 MM  Used for: Type 2 diabetes mellitus without complication, without long-term current use of insulin (H)      Use as directed by provider Per insurance coverage  Quantity: 100 each  Refills: 0     Sharps Container Misc  Used for: Type 2 diabetes mellitus without complication, without long-term current use of insulin (H)      Use as directed to dispose of needles, lancets and other sharps Per Insurance coverage  Quantity: 1 each  Refills: 0           Where to get your medicines      Some of these will need a paper prescription and others can be bought over the counter. Ask your nurse if you have questions.    Bring a paper prescription for each of these medications    sulfamethoxazole-trimethoprim 800-160 MG tablet           INFORMATION SOURCE AND LIMITATIONS    History/Exam limitations: N/A   Patient information was obtained from: The patient   Use of : N/A    HISTORY OF PRESENT ILLNESS   Per Beasley is a 82 year old year old male with a relevant past history of anemia, diabetes mellitus type II, cellulitis of lower extremity, HTN, hyperlipidemia, and heart failure, who presents to this ED for evaluation of leg swelling.    Patient endorses swelling and erythema in his left lower extremity. Notes that one month ago he was in the hospital for a breathing issue which was accompanied with similar swelling. Notes the erythema is a new symptom, but has been getting better. No other concerns or symptoms reported at this time.      REVIEW OF SYSTEMS:   Constitutional: Negative for fever.   HENT: Negative for URI symptoms or sore throat.    Cardiac: Negative for chest pain, palpitations, near syncope or syncope. Positive for edema and erythema of left lower extremity.   Respiratory: Negative for cough and shortness of breath.    Gastrointestinal: Negative for abdominal pain, nausea, vomiting, constipation, diarrhea, rectal bleeding or melena.  Genitourinary: Negative for dysuria, flank pain  and hematuria.   Musculoskeletal: Negative for back pain.   Skin: Negative for rash.  Neurological: Negative for dizziness, headache, syncope, speech difficulty, unilateral weakness or imbalance with walking.   Hematological: Negative for adenopathy. Does not bruise/bleed easily.   Psychiatric/Behavioral: Negative for confusion.       PATIENT HISTORY   History reviewed. No pertinent past medical history.  Patient Active Problem List   Diagnosis     Wheezing     Acute respiratory failure with hypoxia (H)     Hypervolemia, unspecified hypervolemia type     Anemia, unspecified     Aortic valve stenosis     CAP (community acquired pneumonia)     Cellulitis of left lower extremity     Chronic obstructive pulmonary disease (H)     Coronary artery disease involving native coronary artery of native heart with angina pectoris (H)     Dermatochalasis of both upper eyelids     Type 2 diabetes mellitus with other circulatory complications (H)     Essential hypertension     Exertional dyspnea     Granulomatous lung disease (H)     Heart failure (H)     Hyperlipidemia     Hyposmolality and/or hyponatremia     Morbid (severe) obesity due to excess calories (H)     Status post transcatheter aortic valve replacement (TAVR) using bioprosthesis     Routine general medical examination at a health care facility     Pseudophakia, both eyes     Persistent atrial fibrillation (H)     History reviewed. No pertinent surgical history.  Social Histrory  Smoking:  Alcohol Use:  No Known Allergies      OUTPATIENT MEDICATIONS     Discharge Medication List as of 9/16/2022 12:40 PM      START taking these medications    Details   sulfamethoxazole-trimethoprim (BACTRIM DS) 800-160 MG tablet Take 1 tablet by mouth 2 times daily for 7 days, Disp-14 tablet, R-0, Local Print            Vitals:    09/16/22 0826   BP: (!) 162/80   Pulse: 82   Resp: 12   Temp: (!) 96.6  F (35.9  C)   TempSrc: Tympanic   SpO2: 98%   Weight: 94.8 kg (209 lb)   Height: 1.753  "m (5' 9\")       Physical Exam   Constitutional: Oriented to person, place, and time. Appears well-developed and well-nourished.   HEENT: Normal.  Head: Atraumatic.   Neck: Normal range of motion. Neck supple.   Cardiovascular: Normal rate, regular rhythm and normal heart sounds.    Pulmonary/Chest: Normal effort  and breath sounds normal.   Abdominal: Soft. Bowel sounds are normal.   Musculoskeletal: Normal range of motion.   Neurological: Alert and oriented to person, place, and time. Normal strength.No sensory deficit. No cranial nerve deficit .   Skin: Skin is warm and dry. See photos.  Psychiatric: Normal mood and affect. Behavior is normal. Thought content normal.       DIAGNOSTICS    LABORATORY FINDINGS (REVIEWED AND INTERPRETED):  Labs Ordered and Resulted from Time of ED Arrival to Time of ED Departure   COMPREHENSIVE METABOLIC PANEL - Abnormal       Result Value    Sodium 136      Potassium 4.4      Chloride 101      Carbon Dioxide (CO2) 25      Anion Gap 10      Urea Nitrogen 14      Creatinine 1.32 (*)     Calcium 9.5      Glucose 192 (*)     Alkaline Phosphatase 131 (*)     AST 16      ALT 12      Protein Total 7.4      Albumin 3.3 (*)     Bilirubin Total 0.6      GFR Estimate 54 (*)    CBC WITH PLATELETS AND DIFFERENTIAL - Abnormal    WBC Count 5.7      RBC Count 4.15 (*)     Hemoglobin 12.0 (*)     Hematocrit 37.1 (*)     MCV 89      MCH 28.9      MCHC 32.3      RDW 13.4      Platelet Count 234      % Neutrophils 49      % Lymphocytes 34      % Monocytes 12      % Eosinophils 3      % Basophils 1      % Immature Granulocytes 1      NRBCs per 100 WBC 0      Absolute Neutrophils 2.9      Absolute Lymphocytes 1.9      Absolute Monocytes 0.7      Absolute Eosinophils 0.2      Absolute Basophils 0.0      Absolute Immature Granulocytes 0.0      Absolute NRBCs 0.0           IMAGING (REVIEWED AND INTERPRETED):  US Lower Extremity Venous Duplex Left   Final Result   IMPRESSION:      1.  No deep venous " thrombosis in the left lower extremity.   2.  Circumscribed ovoid hypoechoic structure within the soft tissues of the left mid calf without vascularity, most likely small hematoma.            I, Anamaria Groves, am serving as a scribe to document services personally performed by Reggie Johnson D.O., based on my observation and the provider s statements to me.    I, Reggie Johnson D.O., attest that Anamaria Groves is acting in a scribe capacity, has observed my performance of the services and has documented them in accordance with my direction.    Reggie Johnson D.O.  EMERGENCY MEDICINE   09/16/22  [facility] EMERGENCY DEPARTMENT  [facility] [facility] [id number] [phone]  Dept: [phone]     Reggie Johnson DO  09/17/22 0747

## 2022-09-16 NOTE — DISCHARGE INSTRUCTIONS
Continue warm compresses several times a day and elevate the leg.  Limit ambulation as possible.  Continue Bactrim twice daily for 7 days.  See your primary care physician in follow-up on Monday.  If you have increased redness of your leg despite the antibiotics or develop new symptoms including fever return to the emergency department.

## 2024-01-11 ENCOUNTER — APPOINTMENT (OUTPATIENT)
Dept: RADIOLOGY | Facility: HOSPITAL | Age: 84
End: 2024-01-11
Attending: EMERGENCY MEDICINE
Payer: MEDICARE

## 2024-01-11 ENCOUNTER — APPOINTMENT (OUTPATIENT)
Dept: CARDIOLOGY | Facility: HOSPITAL | Age: 84
End: 2024-01-11
Attending: INTERNAL MEDICINE
Payer: MEDICARE

## 2024-01-11 ENCOUNTER — HOSPITAL ENCOUNTER (OUTPATIENT)
Facility: HOSPITAL | Age: 84
Setting detail: OBSERVATION
Discharge: HOME | End: 2024-01-12
Attending: EMERGENCY MEDICINE | Admitting: INTERNAL MEDICINE
Payer: MEDICARE

## 2024-01-11 DIAGNOSIS — R06.09 DYSPNEA ON EXERTION: ICD-10-CM

## 2024-01-11 DIAGNOSIS — J90 BILATERAL PLEURAL EFFUSION: ICD-10-CM

## 2024-01-11 DIAGNOSIS — J93.9 PNEUMOTHORAX ON RIGHT: ICD-10-CM

## 2024-01-11 LAB
ALBUMIN SERPL BCG-MCNC: 4.1 G/DL (ref 3.5–5.2)
ALP SERPL-CCNC: 136 U/L (ref 40–150)
ALT SERPL W P-5'-P-CCNC: 12 U/L (ref 0–70)
ANION GAP SERPL CALCULATED.3IONS-SCNC: 13 MMOL/L (ref 7–15)
AST SERPL W P-5'-P-CCNC: 20 U/L (ref 0–45)
BASOPHILS # BLD AUTO: 0 10E3/UL (ref 0–0.2)
BASOPHILS NFR BLD AUTO: 0 %
BILIRUB SERPL-MCNC: 0.4 MG/DL
BUN SERPL-MCNC: 19.8 MG/DL (ref 8–23)
CALCIUM SERPL-MCNC: 9.5 MG/DL (ref 8.8–10.2)
CHLORIDE SERPL-SCNC: 102 MMOL/L (ref 98–107)
CREAT SERPL-MCNC: 1.52 MG/DL (ref 0.67–1.17)
DEPRECATED HCO3 PLAS-SCNC: 25 MMOL/L (ref 22–29)
EGFRCR SERPLBLD CKD-EPI 2021: 45 ML/MIN/1.73M2
EOSINOPHIL # BLD AUTO: 0.4 10E3/UL (ref 0–0.7)
EOSINOPHIL NFR BLD AUTO: 5 %
ERYTHROCYTE [DISTWIDTH] IN BLOOD BY AUTOMATED COUNT: 14.6 % (ref 10–15)
GLUCOSE BLDC GLUCOMTR-MCNC: 141 MG/DL (ref 70–99)
GLUCOSE BLDC GLUCOMTR-MCNC: 224 MG/DL (ref 70–99)
GLUCOSE SERPL-MCNC: 171 MG/DL (ref 70–99)
HCT VFR BLD AUTO: 39.1 % (ref 40–53)
HGB BLD-MCNC: 12.6 G/DL (ref 13.3–17.7)
HOLD SPECIMEN: NORMAL
IMM GRANULOCYTES # BLD: 0 10E3/UL
IMM GRANULOCYTES NFR BLD: 0 %
LIPASE SERPL-CCNC: 24 U/L (ref 13–60)
LVEF ECHO: NORMAL
LYMPHOCYTES # BLD AUTO: 3.6 10E3/UL (ref 0.8–5.3)
LYMPHOCYTES NFR BLD AUTO: 41 %
MAGNESIUM SERPL-MCNC: 1.6 MG/DL (ref 1.7–2.3)
MCH RBC QN AUTO: 28.3 PG (ref 26.5–33)
MCHC RBC AUTO-ENTMCNC: 32.2 G/DL (ref 31.5–36.5)
MCV RBC AUTO: 88 FL (ref 78–100)
MONOCYTES # BLD AUTO: 0.9 10E3/UL (ref 0–1.3)
MONOCYTES NFR BLD AUTO: 10 %
NEUTROPHILS # BLD AUTO: 3.9 10E3/UL (ref 1.6–8.3)
NEUTROPHILS NFR BLD AUTO: 44 %
NRBC # BLD AUTO: 0 10E3/UL
NRBC BLD AUTO-RTO: 0 /100
NT-PROBNP SERPL-MCNC: 3056 PG/ML (ref 0–1800)
PLATELET # BLD AUTO: 154 10E3/UL (ref 150–450)
POTASSIUM SERPL-SCNC: 4.2 MMOL/L (ref 3.4–5.3)
PROT SERPL-MCNC: 7.2 G/DL (ref 6.4–8.3)
RBC # BLD AUTO: 4.46 10E6/UL (ref 4.4–5.9)
SODIUM SERPL-SCNC: 140 MMOL/L (ref 135–145)
TROPONIN T SERPL HS-MCNC: 21 NG/L
TSH SERPL DL<=0.005 MIU/L-ACNC: 1.57 UIU/ML (ref 0.3–4.2)
WBC # BLD AUTO: 8.8 10E3/UL (ref 4–11)

## 2024-01-11 PROCEDURE — 250N000013 HC RX MED GY IP 250 OP 250 PS 637: Performed by: EMERGENCY MEDICINE

## 2024-01-11 PROCEDURE — 250N000012 HC RX MED GY IP 250 OP 636 PS 637: Performed by: INTERNAL MEDICINE

## 2024-01-11 PROCEDURE — 96372 THER/PROPH/DIAG INJ SC/IM: CPT | Performed by: INTERNAL MEDICINE

## 2024-01-11 PROCEDURE — 96375 TX/PRO/DX INJ NEW DRUG ADDON: CPT | Mod: 59

## 2024-01-11 PROCEDURE — 85004 AUTOMATED DIFF WBC COUNT: CPT | Performed by: EMERGENCY MEDICINE

## 2024-01-11 PROCEDURE — 71046 X-RAY EXAM CHEST 2 VIEWS: CPT

## 2024-01-11 PROCEDURE — 93306 TTE W/DOPPLER COMPLETE: CPT

## 2024-01-11 PROCEDURE — 93306 TTE W/DOPPLER COMPLETE: CPT | Mod: 26 | Performed by: INTERNAL MEDICINE

## 2024-01-11 PROCEDURE — 250N000013 HC RX MED GY IP 250 OP 250 PS 637: Performed by: INTERNAL MEDICINE

## 2024-01-11 PROCEDURE — 83880 ASSAY OF NATRIURETIC PEPTIDE: CPT | Performed by: EMERGENCY MEDICINE

## 2024-01-11 PROCEDURE — 96374 THER/PROPH/DIAG INJ IV PUSH: CPT

## 2024-01-11 PROCEDURE — 120N000001 HC R&B MED SURG/OB

## 2024-01-11 PROCEDURE — 93005 ELECTROCARDIOGRAM TRACING: CPT | Performed by: STUDENT IN AN ORGANIZED HEALTH CARE EDUCATION/TRAINING PROGRAM

## 2024-01-11 PROCEDURE — 80053 COMPREHEN METABOLIC PANEL: CPT | Performed by: EMERGENCY MEDICINE

## 2024-01-11 PROCEDURE — 250N000011 HC RX IP 250 OP 636: Performed by: EMERGENCY MEDICINE

## 2024-01-11 PROCEDURE — 99285 EMERGENCY DEPT VISIT HI MDM: CPT | Mod: 25

## 2024-01-11 PROCEDURE — 36415 COLL VENOUS BLD VENIPUNCTURE: CPT | Performed by: INTERNAL MEDICINE

## 2024-01-11 PROCEDURE — 36415 COLL VENOUS BLD VENIPUNCTURE: CPT | Performed by: STUDENT IN AN ORGANIZED HEALTH CARE EDUCATION/TRAINING PROGRAM

## 2024-01-11 PROCEDURE — 83735 ASSAY OF MAGNESIUM: CPT | Performed by: INTERNAL MEDICINE

## 2024-01-11 PROCEDURE — 250N000011 HC RX IP 250 OP 636: Performed by: INTERNAL MEDICINE

## 2024-01-11 PROCEDURE — 93005 ELECTROCARDIOGRAM TRACING: CPT | Performed by: EMERGENCY MEDICINE

## 2024-01-11 PROCEDURE — 83690 ASSAY OF LIPASE: CPT | Performed by: EMERGENCY MEDICINE

## 2024-01-11 PROCEDURE — 84443 ASSAY THYROID STIM HORMONE: CPT | Performed by: EMERGENCY MEDICINE

## 2024-01-11 PROCEDURE — 99222 1ST HOSP IP/OBS MODERATE 55: CPT | Mod: AI | Performed by: INTERNAL MEDICINE

## 2024-01-11 PROCEDURE — 84484 ASSAY OF TROPONIN QUANT: CPT | Performed by: EMERGENCY MEDICINE

## 2024-01-11 RX ORDER — HYDRALAZINE HYDROCHLORIDE 20 MG/ML
10 INJECTION INTRAMUSCULAR; INTRAVENOUS EVERY 6 HOURS PRN
Status: DISCONTINUED | OUTPATIENT
Start: 2024-01-11 | End: 2024-01-12 | Stop reason: HOSPADM

## 2024-01-11 RX ORDER — ASPIRIN 81 MG/1
81 TABLET, CHEWABLE ORAL DAILY
Status: DISCONTINUED | OUTPATIENT
Start: 2024-01-11 | End: 2024-01-12 | Stop reason: HOSPADM

## 2024-01-11 RX ORDER — ENOXAPARIN SODIUM 100 MG/ML
40 INJECTION SUBCUTANEOUS EVERY 24 HOURS
Status: DISCONTINUED | OUTPATIENT
Start: 2024-01-11 | End: 2024-01-12 | Stop reason: HOSPADM

## 2024-01-11 RX ORDER — AMOXICILLIN 250 MG
2 CAPSULE ORAL 2 TIMES DAILY PRN
Status: DISCONTINUED | OUTPATIENT
Start: 2024-01-11 | End: 2024-01-12 | Stop reason: HOSPADM

## 2024-01-11 RX ORDER — FUROSEMIDE 20 MG
20 TABLET ORAL DAILY
COMMUNITY
End: 2024-04-03

## 2024-01-11 RX ORDER — LIDOCAINE 40 MG/G
CREAM TOPICAL
Status: DISCONTINUED | OUTPATIENT
Start: 2024-01-11 | End: 2024-01-12 | Stop reason: HOSPADM

## 2024-01-11 RX ORDER — AMOXICILLIN 250 MG
1 CAPSULE ORAL 2 TIMES DAILY PRN
Status: DISCONTINUED | OUTPATIENT
Start: 2024-01-11 | End: 2024-01-12 | Stop reason: HOSPADM

## 2024-01-11 RX ORDER — DEXTROSE MONOHYDRATE 25 G/50ML
25-50 INJECTION, SOLUTION INTRAVENOUS
Status: DISCONTINUED | OUTPATIENT
Start: 2024-01-11 | End: 2024-01-12 | Stop reason: HOSPADM

## 2024-01-11 RX ORDER — AMLODIPINE BESYLATE 5 MG/1
5 TABLET ORAL DAILY
Status: DISCONTINUED | OUTPATIENT
Start: 2024-01-11 | End: 2024-01-12 | Stop reason: HOSPADM

## 2024-01-11 RX ORDER — FUROSEMIDE 10 MG/ML
40 INJECTION INTRAMUSCULAR; INTRAVENOUS DAILY
Status: DISCONTINUED | OUTPATIENT
Start: 2024-01-12 | End: 2024-01-12 | Stop reason: HOSPADM

## 2024-01-11 RX ORDER — CLOPIDOGREL BISULFATE 75 MG/1
75 TABLET ORAL DAILY
Status: DISCONTINUED | OUTPATIENT
Start: 2024-01-11 | End: 2024-01-12 | Stop reason: HOSPADM

## 2024-01-11 RX ORDER — NICOTINE POLACRILEX 4 MG
15-30 LOZENGE BUCCAL
Status: DISCONTINUED | OUTPATIENT
Start: 2024-01-11 | End: 2024-01-12 | Stop reason: HOSPADM

## 2024-01-11 RX ORDER — CALCIUM CARBONATE 500 MG/1
1000 TABLET, CHEWABLE ORAL 4 TIMES DAILY PRN
Status: DISCONTINUED | OUTPATIENT
Start: 2024-01-11 | End: 2024-01-12 | Stop reason: HOSPADM

## 2024-01-11 RX ORDER — FUROSEMIDE 10 MG/ML
20 INJECTION INTRAMUSCULAR; INTRAVENOUS ONCE
Status: COMPLETED | OUTPATIENT
Start: 2024-01-11 | End: 2024-01-11

## 2024-01-11 RX ORDER — ATORVASTATIN CALCIUM 40 MG/1
40 TABLET, FILM COATED ORAL DAILY
Status: DISCONTINUED | OUTPATIENT
Start: 2024-01-11 | End: 2024-01-12 | Stop reason: HOSPADM

## 2024-01-11 RX ADMIN — AMLODIPINE BESYLATE 5 MG: 5 TABLET ORAL at 15:44

## 2024-01-11 RX ADMIN — INSULIN ASPART 1 UNITS: 100 INJECTION, SOLUTION INTRAVENOUS; SUBCUTANEOUS at 18:10

## 2024-01-11 RX ADMIN — FUROSEMIDE 20 MG: 10 INJECTION, SOLUTION INTRAMUSCULAR; INTRAVENOUS at 11:53

## 2024-01-11 RX ADMIN — CLOPIDOGREL BISULFATE 75 MG: 75 TABLET ORAL at 15:44

## 2024-01-11 RX ADMIN — ENOXAPARIN SODIUM 40 MG: 40 INJECTION SUBCUTANEOUS at 15:44

## 2024-01-11 RX ADMIN — ASPIRIN 81 MG CHEWABLE TABLET 81 MG: 81 TABLET CHEWABLE at 15:44

## 2024-01-11 RX ADMIN — NITROGLYCERIN 15 MG: 20 OINTMENT TOPICAL at 11:51

## 2024-01-11 RX ADMIN — METOPROLOL TARTRATE 12.5 MG: 25 TABLET, FILM COATED ORAL at 20:44

## 2024-01-11 RX ADMIN — ATORVASTATIN CALCIUM 40 MG: 40 TABLET, FILM COATED ORAL at 15:44

## 2024-01-11 RX ADMIN — INSULIN ASPART 1 UNITS: 100 INJECTION, SOLUTION INTRAVENOUS; SUBCUTANEOUS at 21:51

## 2024-01-11 RX ADMIN — HYDRALAZINE HYDROCHLORIDE 10 MG: 20 INJECTION INTRAMUSCULAR; INTRAVENOUS at 18:11

## 2024-01-11 RX ADMIN — FUROSEMIDE 20 MG: 10 INJECTION, SOLUTION INTRAMUSCULAR; INTRAVENOUS at 11:51

## 2024-01-11 ASSESSMENT — ENCOUNTER SYMPTOMS
SHORTNESS OF BREATH: 1
APPETITE CHANGE: 1
CHEST TIGHTNESS: 1
FEVER: 0
UNEXPECTED WEIGHT CHANGE: 1
COUGH: 0

## 2024-01-11 ASSESSMENT — ACTIVITIES OF DAILY LIVING (ADL)
ADLS_ACUITY_SCORE: 18
ADLS_ACUITY_SCORE: 18
ADLS_ACUITY_SCORE: 35
ADLS_ACUITY_SCORE: 35
DEPENDENT_IADLS:: INDEPENDENT
ADLS_ACUITY_SCORE: 35
ADLS_ACUITY_SCORE: 18
ADLS_ACUITY_SCORE: 18
ADLS_ACUITY_SCORE: 35

## 2024-01-11 NOTE — H&P
[facility]    History and Physical - Hospitalist Service       Date of Admission:  1/11/2024    Assessment & Plan      This is a very pleasant 84-year-old  male presented to [facility] emergency department on 1/11/2024 with dyspnea on exertion.  Per report, patient was picking his wife up from work today and felt so short of breath he could barely make it back to the house.  Patient's pertinent chronic past medical history is positive for heart failure with likely preserved ejection fraction, history of atrial fibrillation, non-insulin-requiring type 2 diabetes melitis, hypertension, and coronary artery disease.  Upon evaluation in the emergency department, patient was found to be bradycardic and profoundly hypertensive.  Emergency department provider administered Lasix 40 mg IV times once and Nitropaste.  Patient will be admitted to the internal medicine hospital service for further evaluation management.    # Dyspnea on exertion  # Heart failure with likely preserved ejection fraction  # Hypertensive urgency  # Bradycardia  - Most recent 2D echocardiogram in 2022 demonstrated preserved ejection fraction  - BNP significantly elevated  - Continue Lasix 40 mg IV daily  - Check 2D echocardiogram  - Fluid restriction  - Will defer cardiology consultation at present  - Continue PTA antihypertensive regimen  - Monitor vital signs every 4 hours    # Type 2 diabetes mellitus  - Check hemoglobin A1c  - Consistent carbohydrate diet  - Hold PTA metformin  - Medium dose correction scale NovoLog    # Presumed coronary artery disease  - Continue PTA aspirin, Plavix  - Continue PTA statin therapy    # Atrial fibrillation  - EKG pending review  - Consider chronic anticoagulation  - Patient is already on dual antiplatelet therapy    # Small right apical pneumothorax   - Continue to monitor  - Likely no role for chest tube  - Will check a.m. chest x-ray    # Chronic kidney  "disease with possible superimposed acute kidney injury  - Continue to monitor renal function with gentle IV hydration  - Continue to avoid nephrotoxic agents          Diet: Low Saturated Fat Na <2400 mg    DVT Prophylaxis: Enoxaparin (Lovenox) SQ  Helm Catheter: Not present  Lines: None     Cardiac Monitoring: None  Code Status:  DNR/DNI    Clinically Significant Risk Factors Present on Admission                # Drug Induced Platelet Defect: home medication list includes an antiplatelet medication   # Hypertension: Noted on problem list      # Overweight: Estimated body mass index is 25.3 kg/m  as calculated from the following:    Height as of this encounter: 1.803 m (5' 11\").    Weight as of this encounter: 82.3 kg (181 lb 6.4 oz).              Disposition Plan      Expected Discharge Date: 01/13/2024                  Prince Bella DO, [facility]  Hospitalist Service  [facility]  Securely message with [employer] (more info)  Text page via [employer] Paging/Directory     ______________________________________________________________________    Chief Complaint   Dyspnea on exertion; suspect heart failure.    History is obtained from the patient & electronic medical record.    History of Present Illness   This is a very pleasant 84-year-old  male presented to [facility] emergency department on 1/11/2024 with dyspnea on exertion.  Per report, patient was picking his wife up from work today and felt so short of breath he could barely make it back to the house.  Patient's pertinent chronic past medical history is positive for heart failure with likely preserved ejection fraction, history of atrial fibrillation, non-insulin-requiring type 2 diabetes melitis, hypertension, and coronary artery disease.  Upon evaluation in the emergency department, patient was found to be bradycardic and profoundly hypertensive.  Emergency department provider administered Lasix 40 mg IV times once and " Nitropaste.  Patient will be admitted to the internal medicine hospital service for further evaluation management.    Past Medical History    History reviewed. No pertinent past medical history.    Past Surgical History   History reviewed. No pertinent surgical history.    Prior to Admission Medications   Prior to Admission Medications   Prescriptions Last Dose Informant Patient Reported? Taking?   Alcohol Swabs PADS   No No   Sig: Use to swab the area of the injection or rosenda as directed Per insurance coverage   Sharps Container MISC   No No   Sig: Use as directed to dispose of needles, lancets and other sharps Per Insurance coverage   amLODIPine (NORVASC) 5 MG tablet 1/10/2024  Yes Yes   Sig: Take 5 mg by mouth daily   aspirin (ASA) 81 MG chewable tablet Past Week  Yes Yes   Sig: Take 81 mg by mouth daily   atorvastatin (LIPITOR) 40 MG tablet 1/10/2024  Yes Yes   Sig: Take 40 mg by mouth daily   blood glucose (NO BRAND SPECIFIED) lancets standard   No No   Sig: To use to test glucose level in the blood Use to test blood sugar  2  times daily as directed. To accompany glucose monitor brands per insurance coverage.   blood glucose (NO BRAND SPECIFIED) test strip   No No   Sig: To use to test glucose level in the blood Use to test blood sugar  2 times daily as directed. To accompany glucose monitor brands per insurance coverage.   blood glucose calibration (NO BRAND SPECIFIED) solution   No No   Sig: Used to calibrate the blood glucose monitor as needed and as directed.  To accompany  blood glucose brands per insurance coverage   blood glucose monitoring (NO BRAND SPECIFIED) meter device kit   No No   Sig: Use as directed , Per insurance coverage   clopidogrel (PLAVIX) 75 MG tablet 1/10/2024  Yes Yes   Sig: Take 75 mg by mouth daily   furosemide (LASIX) 20 MG tablet 1/10/2024  Yes Yes   Sig: Take 20 mg by mouth daily   glucose (BD GLUCOSE) 4 g chewable tablet Unknown  No Yes   Sig: Take 4 tablets by mouth every 15  minutes as needed for low blood sugar   insulin pen needle (32G X 4 MM) 32G X 4 MM miscellaneous   No No   Sig: Use as directed by provider Per insurance coverage   metFORMIN (GLUCOPHAGE) 1000 MG tablet 1/10/2024 at pm  Yes Yes   Sig: Take 1,000 mg by mouth 2 times daily (with meals)   metoprolol tartrate (LOPRESSOR) 25 MG tablet 1/10/2024 at pm  Yes Yes   Sig: Take 12.5 mg by mouth 2 times daily      Facility-Administered Medications: None        Physical Exam   Vital Signs: Temp: 97  F (36.1  C) Temp src: Temporal BP: (!) 232/98 Pulse: 69   Resp: 26 SpO2: 95 % O2 Device: None (Room air)    Weight: 181 lbs 6.4 oz    GENERAL: Alert and oriented x 3; no acute distress; well-nourished.  HEENT: Normocephalic; atraumatic; PERRLA; MMM.  CV: Irregularly irregular rhyth; normal S1, S2; systolic ejection murmur.  RESP: Lung fields clear to aucultation B/L; no wheezing or crepitations.  GI: Abdomen is soft, nontender, nondistended; no organomegaly; normal bowel sounds.  : Deferred genital examination.   MSK: No clubbing, cyanosis, or edema.  DERM: Skin is intact; no rash, lesions, or skin breakdown.  NEURO: No focal deficits appreciated; strength & sensorium are grossly intact.  PSYCH: No active hallucinations; affect, insight appear within normal limits.    Medical Decision Making       55 MINUTES SPENT BY ME on the date of service doing chart review, history, exam, documentation & further activities per the note.      Data     I have personally reviewed the following data over the past 24 hrs:    8.8  \   12.6 (L)   / 154     140 102 19.8 /  171 (H)   4.2 25 1.52 (H) \     ALT: 12 AST: 20 AP: 136 TBILI: 0.4   ALB: 4.1 TOT PROTEIN: 7.2 LIPASE: 24     Trop: 21 BNP: 3,056 (H)     TSH: 1.57 T4: N/A A1C: N/A       Imaging results reviewed over the past 24 hrs:   Recent Results (from the past 24 hour(s))   XR Chest 2 Views    Narrative    EXAM: XR CHEST 2 VIEWS  LOCATION: [facility]  DATE:  1/11/2024    INDICATION: progressive dyspnea and chest tightness  COMPARISON: None.      Impression    IMPRESSION: Small bilateral effusions. Patchy bibasilar atelectasis and/or consolidation, right greater than left. There is a small right apical pneumothorax with an apical pleural distance of 6 mm. Heart size is at the upper limits of normal. With   pulmonary vascular congestion/early interstitial edema. Aortic valve replacement changes. Degenerative change of the spine. Age-indeterminate but likely old left eighth posterior rib fracture. Correlate for point tenderness.    Findings discussed with SHERRI SMITH at 1/11/2024 11:03 AM CST.

## 2024-01-11 NOTE — ED NOTES
Patient ambulated to the bathroom down the [address] with use of his walker and standby assist.  His oxygenation remained stable on room air at 95% with activity.  Patient now placed on oxygen for the pneumo found on xray.  Spoke with patients wife on the phone and updated her on plan of care for admission.

## 2024-01-11 NOTE — ED PROVIDER NOTES
Emergency Department Encounter     Evaluation Date & Time:   2024  8:58 AM    CHIEF COMPLAINT:  Shortness of Breath      Triage Note:Amb to triage with walker.  Pt states has been SOB for 6 months.  He has to use steps to get into the house so it has been progressively harder due to SOB.  Also reports has been losing wt.  Pt with pursed lip breathing in triage after short amb with walker.      Triage Assessment (Adult)       Row Name [id number]          Triage Assessment    Airway WDL WDL        Respiratory WDL    Respiratory WDL X;all     Rhythm/Pattern, Respiratory shortness of breath;tachypneic        Skin Circulation/Temperature WDL    Skin Circulation/Temperature WDL WDL        Cardiac WDL    Cardiac WDL WDL        Peripheral/Neurovascular WDL    Peripheral Neurovascular WDL WDL        Cognitive/Neuro/Behavioral WDL    Cognitive/Neuro/Behavioral WDL WDL                         FINAL IMPRESSION:    ICD-10-CM    1. Pneumothorax on right  J93.9     spontaneous, atraumatic      2. Bilateral pleural effusion  J90       3. Dyspnea on exertion  R06.09           Impression and Plan     ED COURSE & MEDICAL DECISION MAKIN:55 AM I met with the patient for the initial interview and physical examination. Discussed plan for treatment and workup in the ED.    11:30 AM Updated patient on results and need for admission, patient in agreement with the plan. Discussed with the patient that there are limited in-patient beds currently available at this hospital and there are a large number of patients boarding in the emergency department. Offered patient the option to transfer to another hospital where there are beds available. The patient is declining transfer and understands they will remain in the emergency department until an in-patient bed becomes available at this hospital.  11:40 AM I discussed the case with hospitalist, Dr Bella, who accepts the patient for admission.     ED Course as of 24 0718   Thu  Jan 11, 2024   1012 Per is here for slowly progressive sob with chest tightness.  May have been progressive angina now getting to the point of having significant limiting symptoms whether from coronary artery disease, CHF, or valvular disease.  However, he has been told in the past that he also has COPD so this may also be related to end-stage COPD.  I think that is less likely given that he has no wheezing on examination and seems to have good air exchange.  Certainly may be a combination of both but I would favor this being related to some form of CHF as he does have peripheral edema.  Additionally he has been having unintentional weight loss from poor appetite which can be tied to either lung or cardiac disease or may also be primarily due to neoplasm.  Will do LFTs and lipase to see if there is any sign of obstructing mass they are causing nausea, otherwise plan imaging of the chest and his leg swelling is somewhat asymmetric so should get ultrasounds of his lower extremities to rule out a DVT although I suspect it is more just asymmetric swelling based on position.   1016 Will have them ambulate him down the staton.  His EKG appears to be a slow bradycardic atrial fibrillation and so will also get TSH to evaluate for possible hypothyroidism.   1029 I did review his last office visit from September 1, 2023, as well as his last echocardiogram from August of last year.  Echocardiogram shows good ejection fraction and bioprosthetic aortic valve really with no significant valvular insufficiency on any of his valves.  I do see on his chart from the office that he has a history of persistent atrial fibrillation but we have an EKG from last summer where he was in normal sinus rhythm so he may be in and out of atrial fibrillation but this part of his history is unclear based on my review of his previous charts.   1057 Renal failure does not appear to be to the degree that it would cause this amount of swelling and  symptoms.  BNP is elevated and troponin is not elevated over gender max at 21.  No acute event in the last couple of days to need to repeat the troponin level.   1102 Called by radiologist about patient's chest x-ray.  I am reviewing the images with him in real-time and it appears that the patient has a very small right apical pneumothorax.  Certainly this can cause some discomfort but is unlikely to be causing the degree of difficulty breathing that he has been having.  He does also have bilateral pleural effusions with no significant pulmonary edema.  With the BNP and the pleural effusions I am going to do conservative diuresis for him as I do not want to drop his blood pressure with that pneumothorax significantly, and will plan to do repeat chest imaging in a few hours, place him on nasal cannula oxygen for now and continue to monitor his breathing by admitting to the hospital.  Given the size of the pneumothorax and history of likely COPD I would not do a chest tube at this time as benefit would not outweigh risk and this can be managed conservatively at the size.   1144 To be clear, his pneumothorax is spontaneous likely related to his COPD and no trauma consult is therefore needed.  It whether or not they will need to consult someone for the pneumothorax on admission will be up to the   1144  hospitalist service but they are coming to see the patient now to arrange admission put in orders       At the conclusion of the encounter I discussed the results of all the tests and the disposition. The questions were answered. The patient or family acknowledged understanding and was agreeable with the care plan.          0 minutes of critical care time        MEDICATIONS GIVEN IN THE EMERGENCY DEPARTMENT:  Medications   furosemide (LASIX) injection 20 mg (20 mg Intravenous $Given 1/11/24 1151)   nitroGLYcerin (NITRO-BID) 2 % ointment 15 mg (15 mg Transdermal Patch/Med Removed 1/12/24 0023)   furosemide (LASIX)  injection 20 mg (20 mg Intravenous $Given 1/11/24 1153)   potassium chloride ER (KLOR-CON M) CR tablet 40 mEq (40 mEq Oral $Given 1/12/24 3020)       NEW PRESCRIPTIONS STARTED AT TODAY'S ED VISIT:  Discharge Medication List as of 1/12/2024  1:46 PM          HPI     Per Beasley is a 84 year old male with a pertinent history of hyperlipidemia, chronic kidney disease stage 3, diabetes mellitus type II, s/p transcatheter aortic valve replacement, COPD, coronary artery disease, hypertension, obesity and atrial fibrillation who presents to this ED via walk-in for evaluation of shortness of breath.    The patient presents with shortness of breath that began to worsen 3 months ago (10/2024). He reports that he decided to come in after he struggled to walk 30 steps to the elevator in his building last night (1/10). He also endorses swelling in legs that is normal for him, unintentional weight loss, decreased appetite, and chest tightness with exertion. He notes that he has COPD and uses a steroid inhaler BID. He also takes blood thinners and is compliant with all his medications. The patient denies feeling bloated, fever, cough, chest pain, chest pressure, and any other symptoms or complaints at this time.    He does not use supplemental O2 at home or sleep apnea treatment. He denies any history of heart problems, history of myocardial infarctions, congestive heart failure, or emphysema.     REVIEW OF SYSTEMS:  Review of Systems   Constitutional:  Positive for appetite change (decrease) and unexpected weight change (weight loss). Negative for fever.   Respiratory:  Positive for chest tightness and shortness of breath. Negative for cough.    Cardiovascular:  Positive for leg swelling. Negative for chest pain.   Remainder of systems are all otherwise negative.        Medical History     History reviewed. No pertinent past medical history.    History reviewed. No pertinent surgical history.    No family history on file.          amLODIPine (NORVASC) 5 MG tablet  aspirin (ASA) 81 MG chewable tablet  atorvastatin (LIPITOR) 40 MG tablet  clopidogrel (PLAVIX) 75 MG tablet  furosemide (LASIX) 20 MG tablet  glucose (BD GLUCOSE) 4 g chewable tablet  metFORMIN (GLUCOPHAGE) 1000 MG tablet  metoprolol tartrate (LOPRESSOR) 25 MG tablet  Alcohol Swabs PADS  blood glucose (NO BRAND SPECIFIED) lancets standard  blood glucose (NO BRAND SPECIFIED) test strip  blood glucose calibration (NO BRAND SPECIFIED) solution  blood glucose monitoring (NO BRAND SPECIFIED) meter device kit  insulin pen needle (32G X 4 MM) 32G X 4 MM miscellaneous  Sharps Container MISC        Physical Exam     First Vitals:  Patient Vitals for the past 24 hrs:   BP Temp Temp src Pulse Resp SpO2   01/12/24 1211 (!) 162/69 97.5  F (36.4  C) Oral 60 18 93 %       PHYSICAL EXAM:   Constitutional:   Lying down, resting   HENT:  Normocephalic, posterior pharynx wnl, mucous membranes moist and dark pink   Eyes:  PERRL, EOMI, Conjunctiva normal, No discharge, no scleral icterus.  Respiratory:  Breathing easily, slightly decreased at right base, otherwise clear to auscultation.  Cardiovascular:  Slightly distant heart sounds, no loud murmurs, presence of ectopy, irregular rhythm but rate-controlled, nl s1s2 0 murmurs, rubs, or gallops.  Peripheral pulses dp, pt, and radial are wnl.  2+ edema in left lower extremity, 1+ edema in right lower extremity.  GI:  Bowel sounds normal, Soft, No tenderness, No flank tenderness, nondistended.  :No CVA tenderness.   Musculoskeletal:  Moves all extremities.  No erythematous or swollen major joints,   Integument:  Normal color  Lymphatic:  No cervical lymphadenopathy  Neurologic:  Alert & oriented x 3, Normal motor function, Normal sensory function, No focal deficits noted. Normal speech.  Psychiatric:  Affect normal, Judgment normal, Mood normal.     Results     LAB AND RADIOLOGY:  All pertinent labs reviewed and interpreted  Results for orders placed  or performed during the hospital encounter of 01/11/24   XR Chest 2 Views     Status: None    Narrative    EXAM: XR CHEST 2 VIEWS  LOCATION: [facility]  DATE: 1/11/2024    INDICATION: progressive dyspnea and chest tightness  COMPARISON: None.      Impression    IMPRESSION: Small bilateral effusions. Patchy bibasilar atelectasis and/or consolidation, right greater than left. There is a small right apical pneumothorax with an apical pleural distance of 6 mm. Heart size is at the upper limits of normal. With   pulmonary vascular congestion/early interstitial edema. Aortic valve replacement changes. Degenerative change of the spine. Age-indeterminate but likely old left eighth posterior rib fracture. Correlate for point tenderness.    Findings discussed with SHERRI SMITH at 1/11/2024 11:03 AM CST.    XR Chest 2 Views     Status: None    Narrative    EXAM: XR CHEST 2 VIEWS  LOCATION: [facility]  DATE: 1/12/2024    INDICATION: Small right apical pneumothorax  COMPARISON: 01/11/2024.      Impression    IMPRESSION: No change in very small right apical pneumothorax. No shift of the midline. Bibasilar opacities which may represent atelectasis or minimal consolidation are stable. Blunting of the right lateral costophrenic angle is unchanged.TAVR. Old left   eighth posterior rib fracture. No significant change from 01/11/2024..   [place] Draw     Status: None    Narrative    The following orders were created for panel order [place] Draw.  Procedure                               Abnormality         Status                     ---------                               -----------         ------                     Extra Blue Top Tube[[id number]]                              Final result               Extra Green Top (Lithium...[[id number]]                      Final result               Extra Purple Top Tube[[id number]]                            Final result               Extra  Blood Bank Purple ...[[id number]]                      Final result               Extra Heparinized Syringe[[id number]]                        Final result               Extra Green Top (Lithium...[[id number]]                      Final result                 Please view results for these tests on the individual orders.   Extra Blue Top Tube     Status: None   Result Value Ref Range    Hold Specimen JIC    Extra Green Top (Lithium Heparin) Tube     Status: None   Result Value Ref Range    Hold Specimen JIC    Extra Purple Top Tube     Status: None   Result Value Ref Range    Hold Specimen JIC    Extra Blood Bank Purple Top Tube     Status: None   Result Value Ref Range    Hold Specimen JIC    Extra Heparinized Syringe     Status: None   Result Value Ref Range    Hold Specimen JIC    Extra Green Top (Lithium Heparin) ON ICE     Status: None   Result Value Ref Range    Hold Specimen JIC    Comprehensive metabolic panel     Status: Abnormal   Result Value Ref Range    Sodium 140 135 - 145 mmol/L    Potassium 4.2 3.4 - 5.3 mmol/L    Carbon Dioxide (CO2) 25 22 - 29 mmol/L    Anion Gap 13 7 - 15 mmol/L    Urea Nitrogen 19.8 8.0 - 23.0 mg/dL    Creatinine 1.52 (H) 0.67 - 1.17 mg/dL    GFR Estimate 45 (L) >60 mL/min/1.73m2    Calcium 9.5 8.8 - 10.2 mg/dL    Chloride 102 98 - 107 mmol/L    Glucose 171 (H) 70 - 99 mg/dL    Alkaline Phosphatase 136 40 - 150 U/L    AST 20 0 - 45 U/L    ALT 12 0 - 70 U/L    Protein Total 7.2 6.4 - 8.3 g/dL    Albumin 4.1 3.5 - 5.2 g/dL    Bilirubin Total 0.4 <=1.2 mg/dL   Lipase     Status: Normal   Result Value Ref Range    Lipase 24 13 - 60 U/L   Troponin T, High Sensitivity     Status: Normal   Result Value Ref Range    Troponin T, High Sensitivity 21 <=22 ng/L   TSH with free T4 reflex     Status: Normal   Result Value Ref Range    TSH 1.57 0.30 - 4.20 uIU/mL   Nt probnp inpatient (BNP)     Status: Abnormal   Result Value Ref Range    N terminal Pro BNP Inpatient 3,056 (H) 0 - 1,800 pg/mL   CBC  with platelets and differential     Status: Abnormal   Result Value Ref Range    WBC Count 8.8 4.0 - 11.0 10e3/uL    RBC Count 4.46 4.40 - 5.90 10e6/uL    Hemoglobin 12.6 (L) 13.3 - 17.7 g/dL    Hematocrit 39.1 (L) 40.0 - 53.0 %    MCV 88 78 - 100 fL    MCH 28.3 26.5 - 33.0 pg    MCHC 32.2 31.5 - 36.5 g/dL    RDW 14.6 10.0 - 15.0 %    Platelet Count 154 150 - 450 10e3/uL    % Neutrophils 44 %    % Lymphocytes 41 %    % Monocytes 10 %    % Eosinophils 5 %    % Basophils 0 %    % Immature Granulocytes 0 %    NRBCs per 100 WBC 0 <1 /100    Absolute Neutrophils 3.9 1.6 - 8.3 10e3/uL    Absolute Lymphocytes 3.6 0.8 - 5.3 10e3/uL    Absolute Monocytes 0.9 0.0 - 1.3 10e3/uL    Absolute Eosinophils 0.4 0.0 - 0.7 10e3/uL    Absolute Basophils 0.0 0.0 - 0.2 10e3/uL    Absolute Immature Granulocytes 0.0 <=0.4 10e3/uL    Absolute NRBCs 0.0 10e3/uL   Glucose by meter     Status: Abnormal   Result Value Ref Range    GLUCOSE BY METER POCT 141 (H) 70 - 99 mg/dL   Magnesium     Status: Abnormal   Result Value Ref Range    Magnesium 1.6 (L) 1.7 - 2.3 mg/dL   Glucose by meter     Status: Abnormal   Result Value Ref Range    GLUCOSE BY METER POCT 224 (H) 70 - 99 mg/dL   Comprehensive metabolic panel     Status: Abnormal   Result Value Ref Range    Sodium 137 135 - 145 mmol/L    Potassium 3.4 3.4 - 5.3 mmol/L    Carbon Dioxide (CO2) 27 22 - 29 mmol/L    Anion Gap 9 7 - 15 mmol/L    Urea Nitrogen 18.9 8.0 - 23.0 mg/dL    Creatinine 1.38 (H) 0.67 - 1.17 mg/dL    GFR Estimate 50 (L) >60 mL/min/1.73m2    Calcium 9.2 8.8 - 10.2 mg/dL    Chloride 101 98 - 107 mmol/L    Glucose 177 (H) 70 - 99 mg/dL    Alkaline Phosphatase 121 40 - 150 U/L    AST 21 0 - 45 U/L    ALT 12 0 - 70 U/L    Protein Total 6.9 6.4 - 8.3 g/dL    Albumin 3.9 3.5 - 5.2 g/dL    Bilirubin Total 0.6 <=1.2 mg/dL   Hemoglobin A1c     Status: Abnormal   Result Value Ref Range    Hemoglobin A1C 8.2 (H) <5.7 %   Magnesium     Status: Abnormal   Result Value Ref Range    Magnesium  1.6 (L) 1.7 - 2.3 mg/dL   Glucose by meter     Status: Abnormal   Result Value Ref Range    GLUCOSE BY METER POCT 150 (H) 70 - 99 mg/dL   CBC with platelets and differential     Status: Abnormal   Result Value Ref Range    WBC Count 7.2 4.0 - 11.0 10e3/uL    RBC Count 4.60 4.40 - 5.90 10e6/uL    Hemoglobin 12.7 (L) 13.3 - 17.7 g/dL    Hematocrit 39.2 (L) 40.0 - 53.0 %    MCV 85 78 - 100 fL    MCH 27.6 26.5 - 33.0 pg    MCHC 32.4 31.5 - 36.5 g/dL    RDW 14.4 10.0 - 15.0 %    Platelet Count 151 150 - 450 10e3/uL    % Neutrophils 44 %    % Lymphocytes 41 %    % Monocytes 10 %    % Eosinophils 4 %    % Basophils 1 %    % Immature Granulocytes 0 %    NRBCs per 100 WBC 0 <1 /100    Absolute Neutrophils 3.2 1.6 - 8.3 10e3/uL    Absolute Lymphocytes 2.9 0.8 - 5.3 10e3/uL    Absolute Monocytes 0.7 0.0 - 1.3 10e3/uL    Absolute Eosinophils 0.3 0.0 - 0.7 10e3/uL    Absolute Basophils 0.0 0.0 - 0.2 10e3/uL    Absolute Immature Granulocytes 0.0 <=0.4 10e3/uL    Absolute NRBCs 0.0 10e3/uL   Glucose by meter     Status: Abnormal   Result Value Ref Range    GLUCOSE BY METER POCT 164 (H) 70 - 99 mg/dL   Glucose by meter     Status: Abnormal   Result Value Ref Range    GLUCOSE BY METER POCT 167 (H) 70 - 99 mg/dL   ECG 12-LEAD WITH MUSE (L[facility])     Status: None   Result Value Ref Range    Systolic Blood Pressure  mmHg    Diastolic Blood Pressure  mmHg    Ventricular Rate 60 BPM    Atrial Rate 97 BPM    NH Interval  ms    QRS Duration 84 ms     ms    QTc 420 ms    P Axis  degrees    R AXIS 35 degrees    T Axis 59 degrees    Interpretation ECG       Atrial fibrillation  Low voltage QRS  Nonspecific ST and T wave abnormality , probably digitalis effect  Abnormal ECG  When compared with ECG of 12-AUG-2022 06:34,  Atrial fibrillation has replaced Sinus rhythm  Vent. rate has decreased BY  51 BPM  Confirmed by SEE ED PROVIDER NOTE FOR, ECG INTERPRETATION ([id number]),  PAULA ENNIS ([id number]) on 1/12/2024 2:37:35 AM     Echocardiogram  Complete     Status: None   Result Value Ref Range    LVEF  55-60%     [facility]    [id number]  [id number]  [id number]  [id number]^LAURE^JESSE     [id number] [place] [address]     Name: MAT NOVA  MRN: [id number]  : 1940  Study Date: 2024 12:33 PM  Age: 84 yrs  Gender: Male  Patient Location: [facility]  Reason For Study: CHF  Ordering Physician: JESSE KELSEY  Performed By: CONNIE     BSA: 2.0 m2  Height: 71 in  Weight: 181 lb  HR: 72  BP: 174/79 mmHg  ______________________________________________________________________________  Procedure  Complete Portable Echo Adult.  ______________________________________________________________________________  Interpretation Summary     The left ventricle is normal in size.  Left ventricular function is normal.The ejection fraction is 55-60%.  Normal right ventricle size and systolic function.  The left atrium is moderately dilated.  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  Valve appears to be functioning normally with a mean gradient of 9 mmHg, peak  velocity of 2.3 m/s, DI 0.38. Mild paravalvular leak.  ______________________________________________________________________________  Left Ventricle  The left ventricle is normal in size. Left ventricular function is normal.The  ejection fraction is 55-60%. There is normal left ventricular wall thickness.  Left ventricular diastolic function is abnormal. No regional wall motion  abnormalities noted.     Right Ventricle  Normal right ventricle size and systolic function.     Atria  The left atrium is moderately dilated. Right atrial size is normal. There is  no color Doppler evidence of an atrial shunt.     Mitral Valve  Mitral valve leaflets appear normal. There is no evidence of mitral stenosis  or clinically significant mitral regurgitation. There is mild (1+) mitral  regurgitation.     Tricuspid Valve  The tricuspid valve is not well visualized. Right  ventricle systolic pressure  estimate normal. There is mild (1+) tricuspid regurgitation.     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  Valve appears to be functioning normally with a mean gradient of 9 mmHg, peak  velocity of 2.3 m/s, DI 0.38. Mild paravalvular leak.     Pulmonic Valve  The pulmonic valve is not well seen, but is grossly normal. This degree of  valvular regurgitation is within normal limits.     Vessels  The aorta root is normal. Normal size ascending aorta. IVC diameter <2.1 cm  collapsing >50% with sniff suggests a normal RA pressure of 3 mmHg.     Pericardium  There is no pericardial effusion.     ______________________________________________________________________________  MMode/2D Measurements & Calculations  IVSd: 1.2 cm  LVIDd: 4.1 cm  LVIDs: 3.1 cm  LVPWd: 1.1 cm  FS: 24.1 %     LV mass(C)d: 152.3 grams  LV mass(C)dI: 75.3 grams/m2  Ao root diam: 2.5 cm  asc Aorta Diam: 3.2 cm  LVOT diam: 1.9 cm  LVOT area: 2.8 cm2  Ao root diam index Ht(cm/m): 1.4  Ao root diam index BSA (cm/m2): 1.2  Asc Ao diam index BSA (cm/m2): 1.6  Asc Ao diam index Ht(cm/m): 1.8  LA Volume (BP): 34.9 ml  LA Volume Indexed (AL/bp): 20.8 ml/m2     RV Base: 3.7 cm  RWT: 0.52  TAPSE: 2.0 cm     Time Measurements  MM HR: 70.0 BPM     Doppler Measurements & Calculations  MV E max kevin: 153.0 cm/sec  MV A max kevin: 35.1 cm/sec  MV E/A: 4.4  MV dec time: 0.26 sec  Ao V2 max: 227.0 cm/sec  Ao max P.0 mmHg  Ao V2 mean: 130.0 cm/sec  Ao mean P.0 mmHg  Ao V2 VTI: 42.9 cm  CORIE(I,D): 1.5 cm2  CORIE(V,D): 1.1 cm2  AI P1/2t: 654.1 msec  LV V1 max PG: 3.0 mmHg  LV V1 max: 86.8 cm/sec  LV V1 VTI: 22.9 cm  SV(LVOT): 64.9 ml  SI(LVOT): 32.1 ml/m2  PA acc time: 0.07 sec  TR max kevin: 297.0 cm/sec  TR max P.3 mmHg     AV Kevin Ratio (DI): 0.38  CORIE Index (cm2/m2): 0.75  E/E': 15.3  E/E' av.3  Lateral E/e': 17.4  Medial E/e': 15.3  Peak E' Kevin: 10.0 cm/sec  RV S Kevin: 11.5  cm/sec     ______________________________________________________________________________  Report approved by: Amalia Curiel 01/11/2024 02:50 PM         CBC with platelets differential     Status: Abnormal    Narrative    The following orders were created for panel order CBC with platelets differential.  Procedure                               Abnormality         Status                     ---------                               -----------         ------                     CBC with platelets and d...[[id number]]  Abnormal            Final result                 Please view results for these tests on the individual orders.   CBC with Platelets & Differential     Status: Abnormal    Narrative    The following orders were created for panel order CBC with Platelets & Differential.  Procedure                               Abnormality         Status                     ---------                               -----------         ------                     CBC with platelets and d...[[id number]]  Abnormal            Final result                 Please view results for these tests on the individual orders.         ECG:    Performed at: 0907    Impression: atrial fibrillation with slow ventricular response at 59, pr *, qrs 84ms, qtc  420ms, prt axes * 35 69.  Low voltage EKG.  Compared to previous EKG dated 8/12/22 atrial fib has replaced nsr with first degree av block, and hr has decreased.  Nonspecific st findings are now present in anterolateral precordial leads and inferior limb leads, qrs voltage has diffusely decreased.    I have independently reviewed and interpreted the EKS(s) documented above    PROCEDURES:  Procedures:      [facility] Documentation     Medical Decision Making  Obtained supplemental history:Supplemental history obtained?: No  Reviewed external records: External records reviewed?: Documented in chart  Care impacted by chronic illness:Chronic Kidney Disease, Diabetes, Heart Disease,  Hyperlipidemia, and Hypertension  Care significantly affected by social determinants of health:N/A  Did you consider but not order tests?: Work up considered but not performed and documented in chart, if applicable  Did you interpret images independently?: Independent interpretation of ECG and images noted in documentation, when applicable.  Consultation discussion with other provider:Did you involve another provider (consultant, MH, pharmacy, etc.)?: I discussed the care with another health care provider, see documentation for details.  Admit.    CMS Diagnoses:         The creation of this record is based on the annie Mckeon observations of the work being performed by Aimee Stewart MD and the provider s statements to them. This document has been checked and approved by MD Aimee Jordan MD  Emergency Medicine  [facility] EMERGENCY DEPARTMENT        Aimee Stewart MD  01/13/24 0718

## 2024-01-11 NOTE — PHARMACY-ADMISSION MEDICATION HISTORY
Pharmacist Admission Medication History    Admission medication history is complete. The information provided in this note is only as accurate as the sources available at the time of the update.    Information Source(s): Patient and CareEverywhere/SureScripts via in-person    Pertinent Information: None    Changes made to PTA medication list:  Added: None  Deleted: Basaglar  Changed: None    Medication Affordability:       Allergies reviewed with patient and updates made in EHR: yes    Medication History Completed By: Rae Christina [facility] 1/11/2024 10:41 AM    PTA Med List   Medication Sig Last Dose    amLODIPine (NORVASC) 5 MG tablet Take 5 mg by mouth daily 1/10/2024    aspirin (ASA) 81 MG chewable tablet Take 81 mg by mouth daily Past Week    atorvastatin (LIPITOR) 40 MG tablet Take 40 mg by mouth daily 1/10/2024    clopidogrel (PLAVIX) 75 MG tablet Take 75 mg by mouth daily 1/10/2024    furosemide (LASIX) 20 MG tablet Take 20 mg by mouth daily 1/10/2024    glucose (BD GLUCOSE) 4 g chewable tablet Take 4 tablets by mouth every 15 minutes as needed for low blood sugar Unknown    metFORMIN (GLUCOPHAGE) 1000 MG tablet Take 1,000 mg by mouth 2 times daily (with meals) 1/10/2024 at pm    metoprolol tartrate (LOPRESSOR) 25 MG tablet Take 12.5 mg by mouth 2 times daily 1/10/2024 at pm

## 2024-01-11 NOTE — ED TRIAGE NOTES
Amb to triage with walker.  Pt states has been SOB for 6 months.  He has to use steps to get into the house so it has been progressively harder due to SOB.  Also reports has been losing wt.  Pt with pursed lip breathing in triage after short amb with walker.      Triage Assessment (Adult)       Row Name 01/11/24 0901          Triage Assessment    Airway WDL WDL        Respiratory WDL    Respiratory WDL X;all     Rhythm/Pattern, Respiratory shortness of breath;tachypneic        Skin Circulation/Temperature WDL    Skin Circulation/Temperature WDL WDL        Cardiac WDL    Cardiac WDL WDL        Peripheral/Neurovascular WDL    Peripheral Neurovascular WDL WDL        Cognitive/Neuro/Behavioral WDL    Cognitive/Neuro/Behavioral WDL WDL

## 2024-01-11 NOTE — ED NOTES
"[facility] ED Handoff Report    ED Chief Complaint: SOB which has been going on for several months    ED Diagnosis:  (J93.9) Pneumothorax on right  Comment: spontaneous, atraumatic  Plan:   no intervention required--monitor--placed on oxygen    (J90) Bilateral pleural effusion  Comment:   Plan:     (R06.09) Dyspnea on exertion  Comment: Patient has been using walker to the bathroom  Plan:   oxygen level checked and remained 95% on room air        PMH:  History reviewed. No pertinent past medical history.     Code Status:  Prior     Falls Risk: Yes Band: Applied    Current Living Situation/Residence: lives with a significant other     Elimination Status: Continent: Yes     Activity Level: SBA w/ walker    Patients Preferred Language:  English     Needed: No    Vital Signs:  BP (!) 194/89   Pulse 68   Temp 97  F (36.1  C) (Temporal)   Resp 29   Ht 1.803 m (5' 11\")   Wt 82.3 kg (181 lb 6.4 oz)   SpO2 92%   BMI 25.30 kg/m       Cardiac Rhythm: Afib--rate david    Pain Score: 0/10    Is the Patient Confused:  No    Last Food or Drink: 01/11/24 at 0800    Focused Assessment:      Patient lives with his wife.  He has been having SOB especially with exertion for several months.  Drove himself here.  A small spontaneous pneumo found on xray.  Placed on oxygen because of it.  Is alert and oriented X 3.  No pain.  Had an echo done.  Given 40 of lasix IV--states his SOB is getting better.         Tests Performed: Done: Labs and Imaging    Treatments Provided:  oxygen/lasix    Family Dynamics/Concerns: No    Family Updated On Visitor Policy: Yes    Plan of Care Communicated to Family: Yes    Who Was Updated about Plan of Care: spoke with his wife at home    Belongings Checklist Done and Signed by Patient: No    Medications sent with patient: none    Covid: asymptomatic , deffered    Additional Information:     RN: Cecilia Luevano RN   1/11/2024 1:55 PM      "

## 2024-01-11 NOTE — CONSULTS
"Care Management Initial Consult    General Information  Assessment completed with: Patient, Per  Type of CM/SW Visit: Initial Assessment    Primary Care Provider verified and updated as needed: Yes   Readmission within the last 30 days: no previous admission in last 30 days      Reason for Consult: discharge planning  Advance Care Planning: Advance Care Planning Reviewed: no concerns identified          Communication Assessment  Patient's communication style: spoken language (English or Bilingual)             Cognitive  Cognitive/Neuro/Behavioral: WDL                      Living Environment:   People in home: spouse  Per and wife Tana  Current living Arrangements: apartment, condominium (\"We live in a condo with an elevator\")      Able to return to prior arrangements: yes       Family/Social Support:  Care provided by: self  Provides care for: no one  Marital Status:   Wife  Tana       Description of Support System: Involved, Supportive    Support Assessment: Adequate family and caregiver support, Patient communicates needs well met, Adequate social supports    Current Resources:   Patient receiving home care services: No     Community Resources: None  Equipment currently used at home: walker, rolling, glucometer (\"4WW\")  Supplies currently used at home: Diabetic Supplies, Other (\"dentures, glasses\")    Employment/Financial:  Employment Status: retired, , previous service     Employment/ Comments: \"I was in the , but I don't use any of my benefits\".  Financial Concerns:     Referral to Financial Worker: No       Does the patient's insurance plan have a 3 day qualifying hospital stay waiver?  No      Functional Status:  Prior to admission patient needed assistance:   Dependent ADLs:: Ambulation-walker, Independent  Dependent IADLs:: Independent (\"I drive, but my wife doesn't drive.\")       Mental Health Status:          Chemical Dependency Status:            " "    Values/Beliefs:  Spiritual, Cultural Beliefs, Restorationism Practices, Values that affect care:                 Additional Information:  Per lives in an apartment with wife. \"We live in a condo with an elevator\".    He is independent with ADLs and IADLs. \"I drive, but my wife doesn't drive. I don't have any services that come in to help us\".    Unknown discharge needs at this time, but should be able to return home.     Plans to drive himself home, car is in the parking lot.    CM to follow for medical progression of care, discharge recommendations, and final discharge plan.    Julienne Franco RN    "

## 2024-01-12 ENCOUNTER — APPOINTMENT (OUTPATIENT)
Dept: RADIOLOGY | Facility: HOSPITAL | Age: 84
End: 2024-01-12
Attending: INTERNAL MEDICINE
Payer: MEDICARE

## 2024-01-12 VITALS
TEMPERATURE: 97.5 F | HEIGHT: 71 IN | BODY MASS INDEX: 23.72 KG/M2 | HEART RATE: 60 BPM | RESPIRATION RATE: 18 BRPM | WEIGHT: 169.4 LBS | DIASTOLIC BLOOD PRESSURE: 69 MMHG | SYSTOLIC BLOOD PRESSURE: 162 MMHG | OXYGEN SATURATION: 93 %

## 2024-01-12 LAB
ALBUMIN SERPL BCG-MCNC: 3.9 G/DL (ref 3.5–5.2)
ALP SERPL-CCNC: 121 U/L (ref 40–150)
ALT SERPL W P-5'-P-CCNC: 12 U/L (ref 0–70)
ANION GAP SERPL CALCULATED.3IONS-SCNC: 9 MMOL/L (ref 7–15)
AST SERPL W P-5'-P-CCNC: 21 U/L (ref 0–45)
ATRIAL RATE - MUSE: 97 BPM
BASOPHILS # BLD AUTO: 0 10E3/UL (ref 0–0.2)
BASOPHILS NFR BLD AUTO: 1 %
BILIRUB SERPL-MCNC: 0.6 MG/DL
BUN SERPL-MCNC: 18.9 MG/DL (ref 8–23)
CALCIUM SERPL-MCNC: 9.2 MG/DL (ref 8.8–10.2)
CHLORIDE SERPL-SCNC: 101 MMOL/L (ref 98–107)
CREAT SERPL-MCNC: 1.38 MG/DL (ref 0.67–1.17)
DEPRECATED HCO3 PLAS-SCNC: 27 MMOL/L (ref 22–29)
DIASTOLIC BLOOD PRESSURE - MUSE: NORMAL MMHG
EGFRCR SERPLBLD CKD-EPI 2021: 50 ML/MIN/1.73M2
EOSINOPHIL # BLD AUTO: 0.3 10E3/UL (ref 0–0.7)
EOSINOPHIL NFR BLD AUTO: 4 %
ERYTHROCYTE [DISTWIDTH] IN BLOOD BY AUTOMATED COUNT: 14.4 % (ref 10–15)
GLUCOSE BLDC GLUCOMTR-MCNC: 150 MG/DL (ref 70–99)
GLUCOSE BLDC GLUCOMTR-MCNC: 164 MG/DL (ref 70–99)
GLUCOSE BLDC GLUCOMTR-MCNC: 167 MG/DL (ref 70–99)
GLUCOSE SERPL-MCNC: 177 MG/DL (ref 70–99)
HBA1C MFR BLD: 8.2 %
HCT VFR BLD AUTO: 39.2 % (ref 40–53)
HGB BLD-MCNC: 12.7 G/DL (ref 13.3–17.7)
IMM GRANULOCYTES # BLD: 0 10E3/UL
IMM GRANULOCYTES NFR BLD: 0 %
INTERPRETATION ECG - MUSE: NORMAL
LYMPHOCYTES # BLD AUTO: 2.9 10E3/UL (ref 0.8–5.3)
LYMPHOCYTES NFR BLD AUTO: 41 %
MAGNESIUM SERPL-MCNC: 1.6 MG/DL (ref 1.7–2.3)
MCH RBC QN AUTO: 27.6 PG (ref 26.5–33)
MCHC RBC AUTO-ENTMCNC: 32.4 G/DL (ref 31.5–36.5)
MCV RBC AUTO: 85 FL (ref 78–100)
MONOCYTES # BLD AUTO: 0.7 10E3/UL (ref 0–1.3)
MONOCYTES NFR BLD AUTO: 10 %
NEUTROPHILS # BLD AUTO: 3.2 10E3/UL (ref 1.6–8.3)
NEUTROPHILS NFR BLD AUTO: 44 %
NRBC # BLD AUTO: 0 10E3/UL
NRBC BLD AUTO-RTO: 0 /100
P AXIS - MUSE: NORMAL DEGREES
PLATELET # BLD AUTO: 151 10E3/UL (ref 150–450)
POTASSIUM SERPL-SCNC: 3.4 MMOL/L (ref 3.4–5.3)
PR INTERVAL - MUSE: NORMAL MS
PROT SERPL-MCNC: 6.9 G/DL (ref 6.4–8.3)
QRS DURATION - MUSE: 84 MS
QT - MUSE: 420 MS
QTC - MUSE: 420 MS
R AXIS - MUSE: 35 DEGREES
RBC # BLD AUTO: 4.6 10E6/UL (ref 4.4–5.9)
SODIUM SERPL-SCNC: 137 MMOL/L (ref 135–145)
SYSTOLIC BLOOD PRESSURE - MUSE: NORMAL MMHG
T AXIS - MUSE: 59 DEGREES
VENTRICULAR RATE- MUSE: 60 BPM
WBC # BLD AUTO: 7.2 10E3/UL (ref 4–11)

## 2024-01-12 PROCEDURE — 83735 ASSAY OF MAGNESIUM: CPT | Performed by: INTERNAL MEDICINE

## 2024-01-12 PROCEDURE — 999N000147 HC STATISTIC PT IP EVAL DEFER: Performed by: PHYSICAL THERAPIST

## 2024-01-12 PROCEDURE — 99239 HOSP IP/OBS DSCHRG MGMT >30: CPT | Performed by: INTERNAL MEDICINE

## 2024-01-12 PROCEDURE — 36415 COLL VENOUS BLD VENIPUNCTURE: CPT | Performed by: INTERNAL MEDICINE

## 2024-01-12 PROCEDURE — 85025 COMPLETE CBC W/AUTO DIFF WBC: CPT | Performed by: INTERNAL MEDICINE

## 2024-01-12 PROCEDURE — 71046 X-RAY EXAM CHEST 2 VIEWS: CPT

## 2024-01-12 PROCEDURE — 83036 HEMOGLOBIN GLYCOSYLATED A1C: CPT | Performed by: INTERNAL MEDICINE

## 2024-01-12 PROCEDURE — 80053 COMPREHEN METABOLIC PANEL: CPT | Performed by: INTERNAL MEDICINE

## 2024-01-12 PROCEDURE — 250N000013 HC RX MED GY IP 250 OP 250 PS 637: Performed by: INTERNAL MEDICINE

## 2024-01-12 PROCEDURE — 250N000011 HC RX IP 250 OP 636: Performed by: INTERNAL MEDICINE

## 2024-01-12 PROCEDURE — 96376 TX/PRO/DX INJ SAME DRUG ADON: CPT

## 2024-01-12 RX ORDER — POTASSIUM CHLORIDE 1500 MG/1
40 TABLET, EXTENDED RELEASE ORAL ONCE
Status: COMPLETED | OUTPATIENT
Start: 2024-01-12 | End: 2024-01-12

## 2024-01-12 RX ADMIN — ASPIRIN 81 MG CHEWABLE TABLET 81 MG: 81 TABLET CHEWABLE at 09:31

## 2024-01-12 RX ADMIN — POTASSIUM CHLORIDE 40 MEQ: 1500 TABLET, EXTENDED RELEASE ORAL at 09:31

## 2024-01-12 RX ADMIN — INSULIN ASPART 1 UNITS: 100 INJECTION, SOLUTION INTRAVENOUS; SUBCUTANEOUS at 12:53

## 2024-01-12 RX ADMIN — INSULIN ASPART 1 UNITS: 100 INJECTION, SOLUTION INTRAVENOUS; SUBCUTANEOUS at 09:25

## 2024-01-12 RX ADMIN — METOPROLOL TARTRATE 12.5 MG: 25 TABLET, FILM COATED ORAL at 09:31

## 2024-01-12 RX ADMIN — AMLODIPINE BESYLATE 5 MG: 5 TABLET ORAL at 09:31

## 2024-01-12 RX ADMIN — HYDRALAZINE HYDROCHLORIDE 10 MG: 20 INJECTION INTRAMUSCULAR; INTRAVENOUS at 03:40

## 2024-01-12 RX ADMIN — ATORVASTATIN CALCIUM 40 MG: 40 TABLET, FILM COATED ORAL at 09:31

## 2024-01-12 RX ADMIN — FUROSEMIDE 40 MG: 10 INJECTION, SOLUTION INTRAMUSCULAR; INTRAVENOUS at 09:27

## 2024-01-12 RX ADMIN — CLOPIDOGREL BISULFATE 75 MG: 75 TABLET ORAL at 09:31

## 2024-01-12 ASSESSMENT — ACTIVITIES OF DAILY LIVING (ADL)
ADLS_ACUITY_SCORE: 18

## 2024-01-12 NOTE — UTILIZATION REVIEW
"Admission Status; Secondary Review Determination     Under the authority of the Utilization Management Committee, the utilization review process indicated a secondary review on Per Beasley.  The review outcome is based on review of the medical records, discussions with staff, and applying clinical experience noted on the date of the review.     (x) Observation Status Appropriate - This patient does not meet hospital inpatient criteria and is placed in observation status. If this patient's primary payer is Medicare and was admitted as an inpatient, Condition Code 44 should be used and patient status changed to \"observation\".     RATIONALE FOR DETERMINATION   Per Beasley is an 84 yr old male with DM2, CAD, Afib, CKD and HFpEF.  He presented 1/11/24 with dyspnea.  He had no hypoxia. BNP was elevated in setting of CKD.  He was treated with IV lasix for 3 doses and discharged after one night.      The severity of illness, intensity of service provided, expected LOS and risk for adverse outcome make the care appropriate for further observation; however, doesn't meet criteria for hospital inpatient admission. Dr Bella notified of this determination and agrees with downgrade of status.      The information on this document is developed by the utilization review team in order for the business office to ensure compliance.  This only denotes the appropriateness of proper admission status and does not reflect the quality of care rendered.         The definitions of Inpatient Status and Observation Status used in making the determination above are those provided in the CMS Coverage Manual, Chapter 1 and Chapter 6, section 70.4.      Sincerely,  Purnima Garces MD  Utilization Review  Physician Advisor  [facility]    "

## 2024-01-12 NOTE — PROGRESS NOTES
Physical Therapy: Orders received. Chart reviewed and discussed with care team.? Physical Therapy not indicated due to patient declining evaluation. Spoke with patient, introduced role of and rationale for physical therapy. Per chart review, patient has been ambulating independently around hospital room. Patient reports no concerns for mobility and declines physical therapy evaluation.? Defer discharge recommendations to medical team.? Will complete orders.     Brigid Hugo, PT  1/12/2024

## 2024-01-12 NOTE — PLAN OF CARE
Problem: Risk for Delirium  Goal: Improved Sleep  Outcome: Progressing     Problem: Gas Exchange Impaired  Goal: Optimal Gas Exchange  Outcome: Progressing  Intervention: Optimize Oxygenation and Ventilation  Recent Flowsheet Documentation  Taken 1/12/2024 0430 by Heike Diallo RN  Head of Bed (HOB) Positioning: HOB lowered  Taken 1/12/2024 0100 by Heike Diallo RN  Head of Bed (HOB) Positioning: HOB at 20 degrees     Problem: Hypertension Acute  Goal: Blood Pressure Within Desired Range  Outcome: Progressing  Intervention: Normalize Blood Pressure  Recent Flowsheet Documentation  Taken 1/12/2024 0025 by Heike Diallo RN  Medication Review/Management: medications reviewed   Goal Outcome Evaluation:  Pt hypertensive this shift.  Improved after iv hydralazine.  Pt initially reluctant to have hat in toilet for output monitoring but finally agreed.  Up independently in room.  Sats in the low 90's on room air.  Pt does have shortness of breath with exertion.  Denied pain.

## 2024-01-12 NOTE — CARE PLAN
[id number]  Patient Aox4. Vss on RA. Tele afib. Denies chest pain and n/v. Sob with exertion. Up independently with fww, steady gait. Pt refuses bed alarm. Pt refuses to use urinal or hat for output measurement. 2000ml fluid restriction. Mg/K protocol; recheck tomorrow am.

## 2024-01-12 NOTE — PROGRESS NOTES
Went over all discharge paper work with Pt. Pt states he is ready to go home. Pt will make follow up appointments. All belongings sent with Pt. W/c ride out to own car.

## 2024-01-12 NOTE — PROVIDER NOTIFICATION
Pt is on tele.  Rhythm is a-fib.  Bradycardic with rates in the low 40's.  Lowest seen is 43.  Pt asymptomatic. House officer notified.      0240-okay to monitor for now.  No new treatment orders.

## 2024-01-12 NOTE — DISCHARGE SUMMARY
[facility]  Hospitalist Discharge Summary      Date of Admission:  1/11/2024  Date of Discharge:  1/12/2024  Discharging Provider: Prince Bella DO  Discharge Service: Hospitalist Service    Discharge Diagnoses   Dyspnea on exertion  Heart failure with likely preserved ejection fraction  Hypertensive urgency  Bradycardia  Type 2 diabetes mellitus  Presumed coronary artery disease  Atrial fibrillation  Small right apical pneumothorax  Chronic kidney disease  Concern for fall risk    Clinically Significant Risk Factors     # DMII: A1C = 8.2 % (Ref range: <5.7 %) within past 6 months       Follow-ups Needed After Discharge   Follow-up Appointments     Follow-up and recommended labs and tests       Please follow-up with primary care provider in 1 to 2 weeks.  Recommend follow-up chest x-ray to evaluate resolution of small right   apical pneumothorax.  Please specifically discuss chronic anticoagulation for atrial   fibrillation.  Patient endorses he has fallen multiple times on the ice while shoveling   snow at home.  Concern for fall risk.            Unresulted Labs Ordered in the Past 30 Days of this Admission       No orders found for last 31 day(s).          Discharge Disposition   Discharged to home  Condition at discharge: Stable  Care management will evaluate need for home health services    Hospital Course      This is a very pleasant 84-year-old  male presented to [facility] emergency department on 1/11/2024 with dyspnea on exertion.  Per report, patient was picking his wife up from work today and felt so short of breath he could barely make it back to the house.  Patient's pertinent chronic past medical history is positive for heart failure with likely preserved ejection fraction, history of atrial fibrillation, non-insulin-requiring type 2 diabetes melitis, hypertension, and coronary artery disease.  Upon evaluation in the emergency department, patient was  found to be bradycardic and profoundly hypertensive.  Emergency department provider administered Lasix 40 mg IV times once and Nitropaste.  Patient will be admitted to the internal medicine hospital service for further evaluation management.    # Dyspnea on exertion  # Heart failure with likely preserved ejection fraction  # Hypertensive urgency  # Bradycardia  - Most recent 2D echocardiogram in 2022 demonstrated preserved ejection fraction  - BNP significantly elevated  - Continue Lasix 40 mg IV daily  - Check 2D echocardiogram  - Fluid restriction  - Will defer cardiology consultation at present  - Continue PTA antihypertensive regimen  - Monitor vital signs every 4 hours    # Type 2 diabetes mellitus  - Check hemoglobin A1c  - Consistent carbohydrate diet  - Hold PTA metformin  - Medium dose correction scale NovoLog    # Presumed coronary artery disease  - Continue PTA aspirin, Plavix  - Continue PTA statin therapy    # Atrial fibrillation  - EKG pending review  - Consider chronic anticoagulation  - Patient is already on dual antiplatelet therapy    # Small right apical pneumothorax   - Continue to monitor  - Likely no role for chest tube  - Will check a.m. chest x-ray    # Chronic kidney disease with possible superimposed acute kidney injury  - Continue to monitor renal function with gentle IV hydration  - Continue to avoid nephrotoxic agents    Consultations This Hospital Stay   PHYSICAL THERAPY ADULT IP CONSULT  OCCUPATIONAL THERAPY ADULT IP CONSULT  CARE MANAGEMENT / SOCIAL WORK IP CONSULT    Code Status   No CPR- Do NOT Intubate    Time Spent on this Encounter   IPrince DO, personally saw the patient today and spent greater than 30 minutes discharging this patient.       Prince Bella DO  [address] [address]  Phone: [phone]  Fax: [phone]  ______________________________________________________________________       Primary Care  Physician   Chuy Copeland    Discharge Orders      Reason for your hospital stay    Patient was admitted to the hospital for dyspnea on exertion.  Patient was found to have small apical pneumothorax.     Follow-up and recommended labs and tests     Please follow-up with primary care provider in 1 to 2 weeks.  Recommend follow-up chest x-ray to evaluate resolution of small right apical pneumothorax.  Please specifically discuss chronic anticoagulation for atrial fibrillation.  Patient endorses he has fallen multiple times on the ice while shoveling snow at home.  Concern for fall risk.     Activity    Your activity upon discharge: activity as tolerated     Diet    Follow this diet upon discharge: Orders Placed This Encounter      Fluid restriction 2000 ML FLUID      Consistent Carbohydrate Diet Moderate Consistent Carb (60 g CHO per Meal) Diet       Significant Results and Procedures   Results for orders placed or performed during the hospital encounter of 01/11/24   XR Chest 2 Views    Narrative    EXAM: XR CHEST 2 VIEWS  LOCATION: [facility]  DATE: 1/11/2024    INDICATION: progressive dyspnea and chest tightness  COMPARISON: None.      Impression    IMPRESSION: Small bilateral effusions. Patchy bibasilar atelectasis and/or consolidation, right greater than left. There is a small right apical pneumothorax with an apical pleural distance of 6 mm. Heart size is at the upper limits of normal. With   pulmonary vascular congestion/early interstitial edema. Aortic valve replacement changes. Degenerative change of the spine. Age-indeterminate but likely old left eighth posterior rib fracture. Correlate for point tenderness.    Findings discussed with SHERRI SMITH at 1/11/2024 11:03 AM CST.    XR Chest 2 Views    Narrative    EXAM: XR CHEST 2 VIEWS  LOCATION: [facility]  DATE: 1/12/2024    INDICATION: Small right apical pneumothorax  COMPARISON: 01/11/2024.       Impression    IMPRESSION: No change in very small right apical pneumothorax. No shift of the midline. Bibasilar opacities which may represent atelectasis or minimal consolidation are stable. Blunting of the right lateral costophrenic angle is unchanged.TAVR. Old left   eighth posterior rib fracture. No significant change from 2024..   Echocardiogram Complete     Value    LVEF  55-60%    [facility]    [id number]  [id number]  [id number]  [id number]^LAURE^[place] [address]     Name: MAT NOVA  MRN: [id number]  : 1940  Study Date: 2024 12:33 PM  Age: 84 yrs  Gender: Male  Patient Location: [facility]  Reason For Study: CHF  Ordering Physician: JESSE KELSEY  Performed By: CONNIE     BSA: 2.0 m2  Height: 71 in  Weight: 181 lb  HR: 72  BP: 174/79 mmHg  ______________________________________________________________________________  Procedure  Complete Portable Echo Adult.  ______________________________________________________________________________  Interpretation Summary     The left ventricle is normal in size.  Left ventricular function is normal.The ejection fraction is 55-60%.  Normal right ventricle size and systolic function.  The left atrium is moderately dilated.  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  Valve appears to be functioning normally with a mean gradient of 9 mmHg, peak  velocity of 2.3 m/s, DI 0.38. Mild paravalvular leak.  ______________________________________________________________________________  Left Ventricle  The left ventricle is normal in size. Left ventricular function is normal.The  ejection fraction is 55-60%. There is normal left ventricular wall thickness.  Left ventricular diastolic function is abnormal. No regional wall motion  abnormalities noted.     Right Ventricle  Normal right ventricle size and systolic function.     Atria  The left atrium is moderately dilated. Right atrial size  is normal. There is  no color Doppler evidence of an atrial shunt.     Mitral Valve  Mitral valve leaflets appear normal. There is no evidence of mitral stenosis  or clinically significant mitral regurgitation. There is mild (1+) mitral  regurgitation.     Tricuspid Valve  The tricuspid valve is not well visualized. Right ventricle systolic pressure  estimate normal. There is mild (1+) tricuspid regurgitation.     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  Valve appears to be functioning normally with a mean gradient of 9 mmHg, peak  velocity of 2.3 m/s, DI 0.38. Mild paravalvular leak.     Pulmonic Valve  The pulmonic valve is not well seen, but is grossly normal. This degree of  valvular regurgitation is within normal limits.     Vessels  The aorta root is normal. Normal size ascending aorta. IVC diameter <2.1 cm  collapsing >50% with sniff suggests a normal RA pressure of 3 mmHg.     Pericardium  There is no pericardial effusion.     ______________________________________________________________________________  MMode/2D Measurements & Calculations  IVSd: 1.2 cm  LVIDd: 4.1 cm  LVIDs: 3.1 cm  LVPWd: 1.1 cm  FS: 24.1 %     LV mass(C)d: 152.3 grams  LV mass(C)dI: 75.3 grams/m2  Ao root diam: 2.5 cm  asc Aorta Diam: 3.2 cm  LVOT diam: 1.9 cm  LVOT area: 2.8 cm2  Ao root diam index Ht(cm/m): 1.4  Ao root diam index BSA (cm/m2): 1.2  Asc Ao diam index BSA (cm/m2): 1.6  Asc Ao diam index Ht(cm/m): 1.8  LA Volume (BP): 34.9 ml  LA Volume Indexed (AL/bp): 20.8 ml/m2     RV Base: 3.7 cm  RWT: 0.52  TAPSE: 2.0 cm     Time Measurements  MM HR: 70.0 BPM     Doppler Measurements & Calculations  MV E max dieudonne: 153.0 cm/sec  MV A max dieudonne: 35.1 cm/sec  MV E/A: 4.4  MV dec time: 0.26 sec  Ao V2 max: 227.0 cm/sec  Ao max P.0 mmHg  Ao V2 mean: 130.0 cm/sec  Ao mean P.0 mmHg  Ao V2 VTI: 42.9 cm  CORIE(I,D): 1.5 cm2  CORIE(V,D): 1.1 cm2  AI P1/2t: 654.1 msec  LV V1 max PG: 3.0  mmHg  LV V1 max: 86.8 cm/sec  LV V1 VTI: 22.9 cm  SV(LVOT): 64.9 ml  SI(LVOT): 32.1 ml/m2  PA acc time: 0.07 sec  TR max kevin: 297.0 cm/sec  TR max P.3 mmHg     AV Kevin Ratio (DI): 0.38  CORIE Index (cm2/m2): 0.75  E/E': 15.3  E/E' av.3  Lateral E/e': 17.4  Medial E/e': 15.3  Peak E' Kevin: 10.0 cm/sec  RV S Kevin: 11.5 cm/sec     ______________________________________________________________________________  Report approved by: Amalia Curiel 2024 02:50 PM             Discharge Medications   Current Discharge Medication List        CONTINUE these medications which have NOT CHANGED    Details   amLODIPine (NORVASC) 5 MG tablet Take 5 mg by mouth daily      aspirin (ASA) 81 MG chewable tablet Take 81 mg by mouth daily      atorvastatin (LIPITOR) 40 MG tablet Take 40 mg by mouth daily      clopidogrel (PLAVIX) 75 MG tablet Take 75 mg by mouth daily      furosemide (LASIX) 20 MG tablet Take 20 mg by mouth daily      glucose (BD GLUCOSE) 4 g chewable tablet Take 4 tablets by mouth every 15 minutes as needed for low blood sugar  Qty: 50 tablet, Refills: 0    Associated Diagnoses: Type 2 diabetes mellitus without complication, without long-term current use of insulin (H)      metFORMIN (GLUCOPHAGE) 1000 MG tablet Take 1,000 mg by mouth 2 times daily (with meals)      metoprolol tartrate (LOPRESSOR) 25 MG tablet Take 12.5 mg by mouth 2 times daily      Alcohol Swabs PADS Use to swab the area of the injection or rosenda as directed Per insurance coverage  Qty: 100 each, Refills: 0    Associated Diagnoses: Type 2 diabetes mellitus without complication, without long-term current use of insulin (H)      blood glucose (NO BRAND SPECIFIED) lancets standard To use to test glucose level in the blood Use to test blood sugar  2  times daily as directed. To accompany glucose monitor brands per insurance coverage.  Qty: 100 each, Refills: 0    Associated Diagnoses: Type 2 diabetes mellitus without complication, without  long-term current use of insulin (H)      blood glucose (NO BRAND SPECIFIED) test strip To use to test glucose level in the blood Use to test blood sugar  2 times daily as directed. To accompany glucose monitor brands per insurance coverage.  Qty: 100 strip, Refills: 0    Associated Diagnoses: Type 2 diabetes mellitus without complication, without long-term current use of insulin (H)      blood glucose calibration (NO BRAND SPECIFIED) solution Used to calibrate the blood glucose monitor as needed and as directed.  To accompany  blood glucose brands per insurance coverage  Qty: 1 each, Refills: 0    Associated Diagnoses: Type 2 diabetes mellitus without complication, without long-term current use of insulin (H)      blood glucose monitoring (NO BRAND SPECIFIED) meter device kit Use as directed , Per insurance coverage  Qty: 1 kit, Refills: 0    Associated Diagnoses: Type 2 diabetes mellitus without complication, without long-term current use of insulin (H)      insulin pen needle (32G X 4 MM) 32G X 4 MM miscellaneous Use as directed by provider Per insurance coverage  Qty: 100 each, Refills: 0    Associated Diagnoses: Type 2 diabetes mellitus without complication, without long-term current use of insulin (H)      Sharps Container MISC Use as directed to dispose of needles, lancets and other sharps Per Insurance coverage  Qty: 1 each, Refills: 0    Associated Diagnoses: Type 2 diabetes mellitus without complication, without long-term current use of insulin (H)           Allergies   No Known Allergies

## 2024-01-12 NOTE — PLAN OF CARE
"  Problem: Adult Inpatient Plan of Care  Goal: Plan of Care Review  Description: The Plan of Care Review/Shift note should be completed every shift.  The Outcome Evaluation is a brief statement about your assessment that the patient is improving, declining, or no change.  This information will be displayed automatically on your shift  note.  Outcome: Adequate for Care Transition  Goal: Patient-Specific Goal (Individualized)  Description: You can add care plan individualizations to a care plan. Examples of Individualization might be:  \"Parent requests to be called daily at 9am for status\", \"I have a hard time hearing out of my right ear\", or \"Do not touch me to wake me up as it startles  me\".  Outcome: Adequate for Care Transition  Goal: Absence of Hospital-Acquired Illness or Injury  Outcome: Adequate for Care Transition  Intervention: Identify and Manage Fall Risk  Recent Flowsheet Documentation  Taken 1/12/2024 0900 by Meaghan Collado RN  Safety Promotion/Fall Prevention: nonskid shoes/slippers when out of bed  Goal: Optimal Comfort and Wellbeing  Outcome: Adequate for Care Transition  Goal: Readiness for Transition of Care  Outcome: Adequate for Care Transition     Problem: Risk for Delirium  Goal: Optimal Coping  Outcome: Adequate for Care Transition  Goal: Improved Behavioral Control  Outcome: Adequate for Care Transition  Goal: Improved Attention and Thought Clarity  Outcome: Adequate for Care Transition  Goal: Improved Sleep  Outcome: Adequate for Care Transition     Problem: Gas Exchange Impaired  Goal: Optimal Gas Exchange  Outcome: Adequate for Care Transition     Problem: Hypertension Acute  Goal: Blood Pressure Within Desired Range  Outcome: Adequate for Care Transition    Pt alert and oriented. Independent in room. LS clear, diminished. SOB with activity. Recovers quickly once at rest. Maintains sats above 90%. Strict I&O with fluid restriction. Plan to discharge home this afternoon. Pt plans to drive " self.

## 2024-04-03 ENCOUNTER — HOSPITAL ENCOUNTER (INPATIENT)
Facility: HOSPITAL | Age: 84
LOS: 2 days | Discharge: HOME | DRG: 199 | End: 2024-04-05
Attending: EMERGENCY MEDICINE | Admitting: INTERNAL MEDICINE
Payer: MEDICARE

## 2024-04-03 ENCOUNTER — APPOINTMENT (OUTPATIENT)
Dept: RADIOLOGY | Facility: HOSPITAL | Age: 84
DRG: 199 | End: 2024-04-03
Attending: INTERNAL MEDICINE
Payer: MEDICARE

## 2024-04-03 ENCOUNTER — APPOINTMENT (OUTPATIENT)
Dept: ULTRASOUND IMAGING | Facility: HOSPITAL | Age: 84
DRG: 199 | End: 2024-04-03
Attending: INTERNAL MEDICINE
Payer: MEDICARE

## 2024-04-03 ENCOUNTER — APPOINTMENT (OUTPATIENT)
Dept: RADIOLOGY | Facility: HOSPITAL | Age: 84
DRG: 199 | End: 2024-04-03
Attending: EMERGENCY MEDICINE
Payer: MEDICARE

## 2024-04-03 DIAGNOSIS — J93.9 PNEUMOTHORAX ON RIGHT: ICD-10-CM

## 2024-04-03 DIAGNOSIS — N17.9 AKI (ACUTE KIDNEY INJURY) (H): ICD-10-CM

## 2024-04-03 DIAGNOSIS — I50.9 ACUTE ON CHRONIC CONGESTIVE HEART FAILURE, UNSPECIFIED HEART FAILURE TYPE (H): ICD-10-CM

## 2024-04-03 LAB
ANION GAP SERPL CALCULATED.3IONS-SCNC: 14 MMOL/L (ref 7–15)
BASOPHILS # BLD AUTO: 0 10E3/UL (ref 0–0.2)
BASOPHILS NFR BLD AUTO: 0 %
BUN SERPL-MCNC: 35 MG/DL (ref 8–23)
CALCIUM SERPL-MCNC: 9.5 MG/DL (ref 8.8–10.2)
CHLORIDE SERPL-SCNC: 100 MMOL/L (ref 98–107)
CREAT SERPL-MCNC: 1.99 MG/DL (ref 0.67–1.17)
DEPRECATED HCO3 PLAS-SCNC: 25 MMOL/L (ref 22–29)
EGFRCR SERPLBLD CKD-EPI 2021: 32 ML/MIN/1.73M2
EOSINOPHIL # BLD AUTO: 0.2 10E3/UL (ref 0–0.7)
EOSINOPHIL NFR BLD AUTO: 3 %
ERYTHROCYTE [DISTWIDTH] IN BLOOD BY AUTOMATED COUNT: 14.8 % (ref 10–15)
GLUCOSE BLDC GLUCOMTR-MCNC: 124 MG/DL (ref 70–99)
GLUCOSE BLDC GLUCOMTR-MCNC: 153 MG/DL (ref 70–99)
GLUCOSE SERPL-MCNC: 135 MG/DL (ref 70–99)
HCT VFR BLD AUTO: 36.8 % (ref 40–53)
HGB BLD-MCNC: 11.5 G/DL (ref 13.3–17.7)
IMM GRANULOCYTES # BLD: 0 10E3/UL
IMM GRANULOCYTES NFR BLD: 0 %
INR PPP: 2.29 (ref 0.85–1.15)
LYMPHOCYTES # BLD AUTO: 2.9 10E3/UL (ref 0.8–5.3)
LYMPHOCYTES NFR BLD AUTO: 41 %
MAGNESIUM SERPL-MCNC: 1.8 MG/DL (ref 1.7–2.3)
MCH RBC QN AUTO: 27.9 PG (ref 26.5–33)
MCHC RBC AUTO-ENTMCNC: 31.3 G/DL (ref 31.5–36.5)
MCV RBC AUTO: 89 FL (ref 78–100)
MONOCYTES # BLD AUTO: 0.6 10E3/UL (ref 0–1.3)
MONOCYTES NFR BLD AUTO: 9 %
NEUTROPHILS # BLD AUTO: 3.4 10E3/UL (ref 1.6–8.3)
NEUTROPHILS NFR BLD AUTO: 47 %
NRBC # BLD AUTO: 0 10E3/UL
NRBC BLD AUTO-RTO: 0 /100
NT-PROBNP SERPL-MCNC: 4984 PG/ML (ref 0–1800)
PLATELET # BLD AUTO: 154 10E3/UL (ref 150–450)
POTASSIUM SERPL-SCNC: 4.4 MMOL/L (ref 3.4–5.3)
RBC # BLD AUTO: 4.12 10E6/UL (ref 4.4–5.9)
SODIUM SERPL-SCNC: 139 MMOL/L (ref 135–145)
TROPONIN T SERPL HS-MCNC: 27 NG/L
WBC # BLD AUTO: 7.1 10E3/UL (ref 4–11)

## 2024-04-03 PROCEDURE — 99285 EMERGENCY DEPT VISIT HI MDM: CPT | Mod: 25

## 2024-04-03 PROCEDURE — 999N000157 HC STATISTIC RCP TIME EA 10 MIN

## 2024-04-03 PROCEDURE — 36415 COLL VENOUS BLD VENIPUNCTURE: CPT | Performed by: INTERNAL MEDICINE

## 2024-04-03 PROCEDURE — 99223 1ST HOSP IP/OBS HIGH 75: CPT | Performed by: INTERNAL MEDICINE

## 2024-04-03 PROCEDURE — 99223 1ST HOSP IP/OBS HIGH 75: CPT | Mod: AI | Performed by: INTERNAL MEDICINE

## 2024-04-03 PROCEDURE — 83880 ASSAY OF NATRIURETIC PEPTIDE: CPT | Performed by: EMERGENCY MEDICINE

## 2024-04-03 PROCEDURE — 83735 ASSAY OF MAGNESIUM: CPT | Performed by: INTERNAL MEDICINE

## 2024-04-03 PROCEDURE — 93005 ELECTROCARDIOGRAM TRACING: CPT | Performed by: EMERGENCY MEDICINE

## 2024-04-03 PROCEDURE — 120N000001 HC R&B MED SURG/OB

## 2024-04-03 PROCEDURE — 250N000011 HC RX IP 250 OP 636: Performed by: INTERNAL MEDICINE

## 2024-04-03 PROCEDURE — 85610 PROTHROMBIN TIME: CPT | Performed by: INTERNAL MEDICINE

## 2024-04-03 PROCEDURE — 82962 GLUCOSE BLOOD TEST: CPT

## 2024-04-03 PROCEDURE — 71045 X-RAY EXAM CHEST 1 VIEW: CPT

## 2024-04-03 PROCEDURE — 71045 X-RAY EXAM CHEST 1 VIEW: CPT | Mod: 77

## 2024-04-03 PROCEDURE — 84484 ASSAY OF TROPONIN QUANT: CPT | Performed by: EMERGENCY MEDICINE

## 2024-04-03 PROCEDURE — 85025 COMPLETE CBC W/AUTO DIFF WBC: CPT | Performed by: EMERGENCY MEDICINE

## 2024-04-03 PROCEDURE — 36415 COLL VENOUS BLD VENIPUNCTURE: CPT | Performed by: EMERGENCY MEDICINE

## 2024-04-03 PROCEDURE — 93970 EXTREMITY STUDY: CPT

## 2024-04-03 PROCEDURE — 80048 BASIC METABOLIC PNL TOTAL CA: CPT | Performed by: EMERGENCY MEDICINE

## 2024-04-03 RX ORDER — HYDRALAZINE HYDROCHLORIDE 10 MG/1
10 TABLET, FILM COATED ORAL EVERY 4 HOURS PRN
Status: DISCONTINUED | OUTPATIENT
Start: 2024-04-03 | End: 2024-04-05 | Stop reason: HOSPADM

## 2024-04-03 RX ORDER — WARFARIN SODIUM 5 MG/1
TABLET ORAL SEE ADMIN INSTRUCTIONS
Status: ON HOLD | COMMUNITY
Start: 2024-04-01 | End: 2024-04-05

## 2024-04-03 RX ORDER — NICOTINE POLACRILEX 4 MG
15-30 LOZENGE BUCCAL
Status: DISCONTINUED | OUTPATIENT
Start: 2024-04-03 | End: 2024-04-05 | Stop reason: HOSPADM

## 2024-04-03 RX ORDER — ATORVASTATIN CALCIUM 40 MG/1
40 TABLET, FILM COATED ORAL DAILY
Status: DISCONTINUED | OUTPATIENT
Start: 2024-04-04 | End: 2024-04-05 | Stop reason: HOSPADM

## 2024-04-03 RX ORDER — LIDOCAINE 40 MG/G
CREAM TOPICAL
Status: DISCONTINUED | OUTPATIENT
Start: 2024-04-03 | End: 2024-04-05 | Stop reason: HOSPADM

## 2024-04-03 RX ORDER — FUROSEMIDE 10 MG/ML
40 INJECTION INTRAMUSCULAR; INTRAVENOUS EVERY 8 HOURS
Status: DISCONTINUED | OUTPATIENT
Start: 2024-04-03 | End: 2024-04-05

## 2024-04-03 RX ORDER — AMOXICILLIN 250 MG
2 CAPSULE ORAL 2 TIMES DAILY PRN
Status: DISCONTINUED | OUTPATIENT
Start: 2024-04-03 | End: 2024-04-05 | Stop reason: HOSPADM

## 2024-04-03 RX ORDER — IPRATROPIUM BROMIDE AND ALBUTEROL SULFATE 2.5; .5 MG/3ML; MG/3ML
3 SOLUTION RESPIRATORY (INHALATION) 4 TIMES DAILY PRN
Status: DISCONTINUED | OUTPATIENT
Start: 2024-04-03 | End: 2024-04-05 | Stop reason: HOSPADM

## 2024-04-03 RX ORDER — AMOXICILLIN 250 MG
1 CAPSULE ORAL 2 TIMES DAILY PRN
Status: DISCONTINUED | OUTPATIENT
Start: 2024-04-03 | End: 2024-04-05 | Stop reason: HOSPADM

## 2024-04-03 RX ORDER — PROCHLORPERAZINE MALEATE 5 MG
5 TABLET ORAL EVERY 6 HOURS PRN
Status: DISCONTINUED | OUTPATIENT
Start: 2024-04-03 | End: 2024-04-05 | Stop reason: HOSPADM

## 2024-04-03 RX ORDER — HYDRALAZINE HYDROCHLORIDE 20 MG/ML
10 INJECTION INTRAMUSCULAR; INTRAVENOUS EVERY 4 HOURS PRN
Status: DISCONTINUED | OUTPATIENT
Start: 2024-04-03 | End: 2024-04-05 | Stop reason: HOSPADM

## 2024-04-03 RX ORDER — ACETAMINOPHEN 325 MG/1
650 TABLET ORAL EVERY 4 HOURS PRN
Status: DISCONTINUED | OUTPATIENT
Start: 2024-04-03 | End: 2024-04-05 | Stop reason: HOSPADM

## 2024-04-03 RX ORDER — CALCIUM CARBONATE 500 MG/1
1000 TABLET, CHEWABLE ORAL 4 TIMES DAILY PRN
Status: DISCONTINUED | OUTPATIENT
Start: 2024-04-03 | End: 2024-04-05 | Stop reason: HOSPADM

## 2024-04-03 RX ORDER — CLOPIDOGREL BISULFATE 75 MG/1
75 TABLET ORAL DAILY
Status: DISCONTINUED | OUTPATIENT
Start: 2024-04-04 | End: 2024-04-05 | Stop reason: HOSPADM

## 2024-04-03 RX ORDER — IPRATROPIUM BROMIDE AND ALBUTEROL SULFATE 2.5; .5 MG/3ML; MG/3ML
3 SOLUTION RESPIRATORY (INHALATION) 4 TIMES DAILY PRN
COMMUNITY
Start: 2023-03-15 | End: 2024-08-18

## 2024-04-03 RX ORDER — ASPIRIN 81 MG/1
81 TABLET, CHEWABLE ORAL DAILY
Status: DISCONTINUED | OUTPATIENT
Start: 2024-04-04 | End: 2024-04-05 | Stop reason: HOSPADM

## 2024-04-03 RX ORDER — TORSEMIDE 20 MG/1
TABLET ORAL SEE ADMIN INSTRUCTIONS
Status: ON HOLD | COMMUNITY
Start: 2024-03-28 | End: 2024-04-05

## 2024-04-03 RX ORDER — PROCHLORPERAZINE 25 MG
12.5 SUPPOSITORY, RECTAL RECTAL EVERY 12 HOURS PRN
Status: DISCONTINUED | OUTPATIENT
Start: 2024-04-03 | End: 2024-04-05 | Stop reason: HOSPADM

## 2024-04-03 RX ORDER — AMLODIPINE BESYLATE 5 MG/1
5 TABLET ORAL DAILY
Status: DISCONTINUED | OUTPATIENT
Start: 2024-04-04 | End: 2024-04-04

## 2024-04-03 RX ORDER — DEXTROSE MONOHYDRATE 25 G/50ML
25-50 INJECTION, SOLUTION INTRAVENOUS
Status: DISCONTINUED | OUTPATIENT
Start: 2024-04-03 | End: 2024-04-05 | Stop reason: HOSPADM

## 2024-04-03 RX ORDER — ACETAMINOPHEN 650 MG/1
650 SUPPOSITORY RECTAL EVERY 4 HOURS PRN
Status: DISCONTINUED | OUTPATIENT
Start: 2024-04-03 | End: 2024-04-05 | Stop reason: HOSPADM

## 2024-04-03 RX ADMIN — FUROSEMIDE 40 MG: 10 INJECTION, SOLUTION INTRAVENOUS at 17:41

## 2024-04-03 ASSESSMENT — COLUMBIA-SUICIDE SEVERITY RATING SCALE - C-SSRS
2. HAVE YOU ACTUALLY HAD ANY THOUGHTS OF KILLING YOURSELF IN THE PAST MONTH?: NO
1. IN THE PAST MONTH, HAVE YOU WISHED YOU WERE DEAD OR WISHED YOU COULD GO TO SLEEP AND NOT WAKE UP?: NO
6. HAVE YOU EVER DONE ANYTHING, STARTED TO DO ANYTHING, OR PREPARED TO DO ANYTHING TO END YOUR LIFE?: NO

## 2024-04-03 ASSESSMENT — ACTIVITIES OF DAILY LIVING (ADL)
DEPENDENT_IADLS:: INDEPENDENT
ADLS_ACUITY_SCORE: 37

## 2024-04-03 NOTE — ED PROVIDER NOTES
EMERGENCY DEPARTMENT ENCOUNTER      NAME: Per Beasley  AGE: 84 year old male  YOB: 1940  MRN: [id number]  EVALUATION DATE & TIME: 4/3/2024 12:52 PM    PCP: Chuy Copeland    ED PROVIDER: Obi Larry D.O.      Chief Complaint   Patient presents with    Shortness of Breath       FINAL IMPRESSION:  1. Pneumothorax on right    2. JONNATHAN (acute kidney injury) (H24)    3. Acute on chronic congestive heart failure, unspecified heart failure type (H)        ED COURSE & MEDICAL DECISION MAKIN:56 PM I met with the patient to gather history and to perform my initial exam. I discussed the plan for care while in the Emergency Department.  1:46 PM I was paged by consulting radiology regarding the patient's imaging results.   1:50 PM I paged pulmonology.   2:38 PM I spoke with on call pulmonologist Dr. Paige Ochoa regarding the patient's case.   2:40 PM Discussed the case with Dr. Ochoa in the ED.   2:49 PM I rechecked on the patient and we discussed plan for care. He wishes against the chest tube and is agreeable with admit.   3:20 PM I spoke with on call hospitalist Dr. Nielson who accepted the patient for admission.          Pertinent Labs & Imaging studies reviewed. (See chart for details)  84 year old male presents to the Emergency Department for evaluation of shortness of breath.  Initial differential did include ACS, PE, dissection, pneumothorax, pneumonia, other acute process.  Clinically did not appear to be consistent with COVID-19, influenza, RSV therefore swab testing was not performed.  EKG did not show any evidence of ischemia or arrhythmia, first troponin was negative.  Chest x-ray was performed, as there was concern for the potential for worsening pleural effusion, and there was no worsening of his right-sided pleural effusion, however it does appear that he has since last week developed a new very small pneumothorax, which I do believe is likely the cause of the patient's symptoms.  I  consulted with pulmonology, and we decided to give the patient the option for a chest tube versus oxygen therapy to see if this would resolve the pneumothorax.  At this time the patient is refusing the chest tube and  requesting for attempted oxygen therapy, and if this does not improve it then would consider the chest tube.  Therefore at this time plan is for admission for further management to the hospitalist service.  Pulmonology did evaluate the patient in the ER as well.          Medical Decision Making  Obtained supplemental history:Supplemental history obtained?: No  Reviewed external records: External records reviewed?: Documented in chart  Care impacted by chronic illness:Anticoagulated State, Diabetes, Heart Disease, Hyperlipidemia, and Hypertension  Care significantly affected by social determinants of health:N/A  Did you consider but not order tests?: Work up considered but not performed and documented in chart, if applicable  Did you interpret images independently?: Independent interpretation of ECG and images noted in documentation, when applicable.  Consultation discussion with other provider:Did you involve another provider (consultant, , pharmacy, etc.)?: I discussed the care with another health care provider, see documentation for details.  Admit.    At the conclusion of the encounter I discussed the results of all of the tests and the disposition. The questions were answered. The patient or family acknowledged understanding and was agreeable with the care plan.        HPI    Patient information was obtained from: the patient.    Use of : N/A     Per Beasley is a 84 year old male who presents due to worsening shortness of breath.     He reports of shortness of breath at baseline but states it has been worsening over the past few days, endorsing provocation with exertion. He endorses a history of pulmonary fluid overload, hypertension, atrial fibrillation and possible COPD but  denies the use of an inhaler. Endorses to drink alcohol once a day, denying any smoking history. He denies any medication allergies.     Denies any chest pain, fever, cough, congestion, sore throat, nausea, vomiting, abdominal pain, and lightheadedness.     Per Chart Review: Patient was [facility] at [facility] on 3/27/24 for follow up. Recommend repeating CXR but was ultimately not done. Furosemide 40 twice daily switched to Torsemide 40 mg in AM and 20 mg in PM.       REVIEW OF SYSTEMS  Constitutional:  Denies fever, chills, weight loss or weakness  Eyes:  No pain, discharge, redness  HENT:  Denies sore throat, ear pain, congestion  Respiratory: Positive for shortness of breath. No wheeze or cough  Cardiovascular:  No CP, palpitations  GI:  Denies abdominal pain, nausea, vomiting, diarrhea  : Denies dysuria, hematuria  Musculoskeletal:  Denies any new muscle/joint pain, swelling or loss of function.  Skin:  Denies rash, pallor  Neurologic:  Denies headache, focal weakness or sensory changes  Lymph: Denies swollen nodes    All other systems negative unless noted in HPI.    PAST MEDICAL HISTORY:  No past medical history on file.    PAST SURGICAL HISTORY:  No past surgical history on file.      CURRENT MEDICATIONS:    Current Facility-Administered Medications   Medication Dose Route Frequency Provider Last Rate Last Admin    - MEDICATION INSTRUCTIONS -   Does not apply DOES NOT GO TO Nabeel Garcia MD        acetaminophen (TYLENOL) tablet 650 mg  650 mg Oral Q4H PRN Nabeel Nielson MD        Or    acetaminophen (TYLENOL) Suppository 650 mg  650 mg Rectal Q4H PRN Nabeel Nielson MD        [START ON 4/4/2024] amLODIPine (NORVASC) tablet 5 mg  5 mg Oral Daily Nabeel Nielson MD        [START ON 4/4/2024] aspirin (ASA) chewable tablet 81 mg  81 mg Oral Daily Nabeel Nielson MD        [START ON 4/4/2024] atorvastatin (LIPITOR) tablet 40 mg  40 mg Oral Daily Nabeel Nielson MD         calcium carbonate (TUMS) chewable tablet 1,000 mg  1,000 mg Oral 4x Daily PRN Nabeel Nielson MD        [START ON 4/4/2024] clopidogrel (PLAVIX) tablet 75 mg  75 mg Oral Daily Nabeel Nielson MD        glucose gel 15-30 g  15-30 g Oral Q15 Min PRN Nabeel Nielson MD        Or    dextrose 50 % injection 25-50 mL  25-50 mL Intravenous Q15 Min PRN Nabeel Nielson MD        Or    glucagon injection 1 mg  1 mg Subcutaneous Q15 Min PRN Nabeel Nielson MD        furosemide (LASIX) injection 40 mg  40 mg Intravenous Q8H Nabeel Nielson MD        hydrALAZINE (APRESOLINE) tablet 10 mg  10 mg Oral Q4H PRN Nabeel Nielson MD        Or    hydrALAZINE (APRESOLINE) injection 10 mg  10 mg Intravenous Q4H PRN Nabeel Nielson MD        insulin aspart (NovoLOG) injection (RAPID ACTING)  1-3 Units Subcutaneous TID AC Nabeel Nielson MD        insulin aspart (NovoLOG) injection (RAPID ACTING)  1-3 Units Subcutaneous At Bedtime Nabeel Nielson MD        ipratropium - albuterol 0.5 mg/2.5 mg/3 mL (DUONEB) neb solution 3 mL  3 mL Nebulization 4x Daily PRN Nabeel Nielson MD        lidocaine (LMX4) cream   Topical Q1H PRN Nabeel Nielson MD        lidocaine 1 % 0.1-1 mL  0.1-1 mL Other Q1H PRN Nabeel Nielson MD        metoprolol tartrate (LOPRESSOR) half-tab 12.5 mg  12.5 mg Oral BID Nabeel Nielson MD        Patient is already receiving anticoagulation with heparin, enoxaparin (LOVENOX), warfarin (COUMADIN)  or other anticoagulant medication   Does not apply Continuous PRN Nabeel Nielson MD        prochlorperazine (COMPAZINE) injection 5 mg  5 mg Intravenous Q6H PRN Nabeel Nielson MD        Or    prochlorperazine (COMPAZINE) tablet 5 mg  5 mg Oral Q6H PRN Nabeel Nielson MD        Or    prochlorperazine (COMPAZINE) suppository 12.5 mg  12.5 mg Rectal Q12H PRN Nabeel Nielson MD        senna-docusate  (SENOKOT-S/PERICOLACE) 8.6-50 MG per tablet 1 tablet  1 tablet Oral BID PRN Nabeel Nielson MD        Or    senna-docusate (SENOKOT-S/PERICOLACE) 8.6-50 MG per tablet 2 tablet  2 tablet Oral BID PRN Nabeel Nielson MD        sodium chloride (PF) 0.9% PF flush 3 mL  3 mL Intracatheter Q8H Nabeel Nielson MD        sodium chloride (PF) 0.9% PF flush 3 mL  3 mL Intracatheter q1 min prNabeel Aviles MD         Current Outpatient Medications   Medication Sig Dispense Refill    amLODIPine (NORVASC) 5 MG tablet Take 5 mg by mouth daily      aspirin (ASA) 81 MG chewable tablet Take 81 mg by mouth daily      atorvastatin (LIPITOR) 40 MG tablet Take 40 mg by mouth daily      clopidogrel (PLAVIX) 75 MG tablet Take 75 mg by mouth daily      glucose (BD GLUCOSE) 4 g chewable tablet Take 4 tablets by mouth every 15 minutes as needed for low blood sugar 50 tablet 0    ipratropium - albuterol 0.5 mg/2.5 mg/3 mL (DUONEB) 0.5-2.5 (3) MG/3ML neb solution Take 3 mLs by nebulization 4 times daily as needed for shortness of breath      magnesium chloride 535 (64 Mg) MG TBEC CR tablet Take 1 tablet by mouth daily      metFORMIN (GLUCOPHAGE) 1000 MG tablet Take 1,000 mg by mouth 2 times daily (with meals)      metoprolol tartrate (LOPRESSOR) 25 MG tablet Take 12.5 mg by mouth 2 times daily      torsemide (DEMADEX) 20 MG tablet Take by mouth See Admin Instructions Take 2 tablets (40 mg) by mouth in the morning and 1 tablet (20 mg) by mouth at 2 PM      warfarin ANTICOAGULANT (COUMADIN) 5 MG tablet Take by mouth See Admin Instructions Take by mouth 4/1: 7.5 mg; 4/2: 5 mg; 4/3: 5 mg in the evening OR as directed.      Alcohol Swabs PADS Use to swab the area of the injection or rosenda as directed Per insurance coverage 100 each 0    blood glucose (NO BRAND SPECIFIED) lancets standard To use to test glucose level in the blood Use to test blood sugar  2  times daily as directed. To accompany glucose monitor brands  "per insurance coverage. 100 each 0    blood glucose (NO BRAND SPECIFIED) test strip To use to test glucose level in the blood Use to test blood sugar  2 times daily as directed. To accompany glucose monitor brands per insurance coverage. 100 strip 0    blood glucose calibration (NO BRAND SPECIFIED) solution Used to calibrate the blood glucose monitor as needed and as directed.  To accompany  blood glucose brands per insurance coverage 1 each 0    blood glucose monitoring (NO BRAND SPECIFIED) meter device kit Use as directed , Per insurance coverage 1 kit 0    insulin pen needle (32G X 4 MM) 32G X 4 MM miscellaneous Use as directed by provider Per insurance coverage 100 each 0    Sharps Container MISC Use as directed to dispose of needles, lancets and other sharps Per Insurance coverage 1 each 0         ALLERGIES:  No Known Allergies    FAMILY HISTORY:  No family history on file.    SOCIAL HISTORY:  Social History     Socioeconomic History    Marital status:        VITALS:  Patient Vitals for the past 24 hrs:   BP Temp Temp src Pulse Resp SpO2 Height Weight   04/03/24 1530 (!) 190/79 -- -- 58 -- 100 % -- --   04/03/24 1515 (!) 175/76 -- -- 60 -- 100 % -- --   04/03/24 1500 (!) 185/82 -- -- 58 -- 100 % -- --   04/03/24 1445 (!) 154/70 -- -- 56 22 98 % -- --   04/03/24 1330 (!) 151/70 -- -- 63 -- 90 % -- --   04/03/24 1315 (!) 187/81 -- -- 70 -- 91 % -- --   04/03/24 1246 (!) 187/79 96.8  F (36  C) Temporal 68 24 94 % 1.727 m (5' 8\") 77 kg (169 lb 11.2 oz)       PHYSICAL EXAM    VITAL SIGNS: BP (!) 190/79   Pulse 58   Temp 96.8  F (36  C) (Temporal)   Resp 22   Ht 1.727 m (5' 8\")   Wt 77 kg (169 lb 11.2 oz)   SpO2 100%   BMI 25.80 kg/m      General Appearance: Well-appearing, well-nourished, no acute distress   Head:  Normocephalic, without obvious abnormality, atraumatic  Eyes:  PERRL, conjunctiva/corneas clear, EOM's intact,  ENT:  Lips, mucosa, and tongue normal, membranes are moist without " pallor  Neck:  Normal ROM, symmetrical, trachea midline    Cardio:  Irregular rate and rhythm, no murmur, rub or gallop, 2+ pulses symmetric in all extremities  Pulm:  Clear to auscultation bilaterally, minimal increase in work of breathing  Abdomen:  Soft, non-tender, no rebound or guarding.  Musculoskeletal: Full ROM, no edema, no cyanosis, good ROM of major joints  Integument:  Warm, Dry, No erythema, No rash.    Neurologic:  Alert & oriented.  No focal deficits appreciated.  Ambulatory.  Psychiatric:  Affect normal, Judgment normal, Mood normal.      LABS  Results for orders placed or performed during the hospital encounter of 04/03/24 (from the past 24 hour(s))   CBC with platelets + differential    Narrative    The following orders were created for panel order CBC with platelets + differential.  Procedure                               Abnormality         Status                     ---------                               -----------         ------                     CBC with platelets and d...[[id number]]  Abnormal            Final result                 Please view results for these tests on the individual orders.   Basic metabolic panel   Result Value Ref Range    Sodium 139 135 - 145 mmol/L    Potassium 4.4 3.4 - 5.3 mmol/L    Chloride 100 98 - 107 mmol/L    Carbon Dioxide (CO2) 25 22 - 29 mmol/L    Anion Gap 14 7 - 15 mmol/L    Urea Nitrogen 35.0 (H) 8.0 - 23.0 mg/dL    Creatinine 1.99 (H) 0.67 - 1.17 mg/dL    GFR Estimate 32 (L) >60 mL/min/1.73m2    Calcium 9.5 8.8 - 10.2 mg/dL    Glucose 135 (H) 70 - 99 mg/dL   Troponin T, High Sensitivity (now)   Result Value Ref Range    Troponin T, High Sensitivity 27 (H) <=22 ng/L   N terminal pro BNP outpatient   Result Value Ref Range    N Terminal Pro BNP Outpatient 4,984 (H) 0 - 1,800 pg/mL   CBC with platelets and differential   Result Value Ref Range    WBC Count 7.1 4.0 - 11.0 10e3/uL    RBC Count 4.12 (L) 4.40 - 5.90 10e6/uL    Hemoglobin 11.5 (L) 13.3 - 17.7  g/dL    Hematocrit 36.8 (L) 40.0 - 53.0 %    MCV 89 78 - 100 fL    MCH 27.9 26.5 - 33.0 pg    MCHC 31.3 (L) 31.5 - 36.5 g/dL    RDW 14.8 10.0 - 15.0 %    Platelet Count 154 150 - 450 10e3/uL    % Neutrophils 47 %    % Lymphocytes 41 %    % Monocytes 9 %    % Eosinophils 3 %    % Basophils 0 %    % Immature Granulocytes 0 %    NRBCs per 100 WBC 0 <1 /100    Absolute Neutrophils 3.4 1.6 - 8.3 10e3/uL    Absolute Lymphocytes 2.9 0.8 - 5.3 10e3/uL    Absolute Monocytes 0.6 0.0 - 1.3 10e3/uL    Absolute Eosinophils 0.2 0.0 - 0.7 10e3/uL    Absolute Basophils 0.0 0.0 - 0.2 10e3/uL    Absolute Immature Granulocytes 0.0 <=0.4 10e3/uL    Absolute NRBCs 0.0 10e3/uL   XR Chest Port 1 View    Narrative    EXAM: Chest one view portable  LOCATION: [facility]  DATE: 4/3/2024    INDICATION: Dyspnea  COMPARISON: 03/27/2024.      Impression    IMPRESSION: There is a new right pneumothorax which appears moderate. Probable small right pleural effusion. Minimal atelectasis at the left lung base. Normal heart size and pulmonary vascularity. No shift of the midline. Old healed left eighth rib   fracture. Transcatheter aortic valve replacement.    NOTE: ABNORMAL REPORT    THE DICTATION ABOVE DESCRIBES AN ABNORMALITY FOR WHICH FOLLOW-UP IS NEEDED.  .    Critical  Result: Right pneumothorax]    Finding was identified on 4/3/2024 1:43 PM CDT.     Dr. Obi Larry was contacted by me on 4/3/2024 1:47 PM CDT and verbalized understanding of the critical result.         RADIOLOGY  XR Chest Port 1 View   Final Result   IMPRESSION: There is a new right pneumothorax which appears moderate. Probable small right pleural effusion. Minimal atelectasis at the left lung base. Normal heart size and pulmonary vascularity. No shift of the midline. Old healed left eighth rib    fracture. Transcatheter aortic valve replacement.      NOTE: ABNORMAL REPORT      THE DICTATION ABOVE DESCRIBES AN ABNORMALITY FOR WHICH FOLLOW-UP IS NEEDED.  .       Critical  Result: Right pneumothorax]      Finding was identified on 4/3/2024 1:43 PM CDT.       Dr. Obi Larry was contacted by me on 4/3/2024 1:47 PM CDT and verbalized understanding of the critical result.      US Lower Extremity Venous Duplex Bilateral    (Results Pending)        I have independently interpreted the above image, small right-sided pneumothorax on chest x-ray. See radiology report for detail.    EKG:    Rate: 69 bpm  Rhythm: Atrial fibrillation  Axis: Normal  Interval: Normal  Conduction: Normal  QRS: Normal  ST: Normal  T-wave: Normal  QT: Not prolonged  Comparison EKG: No significant change compared to 11 January 2024  Impression:  No acute ischemic change   I have independently reviewed and interpreted today's EKG, pending Cardiologist read    PROCEDURES:  None        MEDICATIONS GIVEN IN THE EMERGENCY:  Medications   amLODIPine (NORVASC) tablet 5 mg (has no administration in time range)   aspirin (ASA) chewable tablet 81 mg (has no administration in time range)   atorvastatin (LIPITOR) tablet 40 mg (has no administration in time range)   clopidogrel (PLAVIX) tablet 75 mg (has no administration in time range)   ipratropium - albuterol 0.5 mg/2.5 mg/3 mL (DUONEB) neb solution 3 mL (has no administration in time range)   metoprolol tartrate (LOPRESSOR) half-tab 12.5 mg (has no administration in time range)   lidocaine 1 % 0.1-1 mL (has no administration in time range)   lidocaine (LMX4) cream (has no administration in time range)   sodium chloride (PF) 0.9% PF flush 3 mL (has no administration in time range)   sodium chloride (PF) 0.9% PF flush 3 mL (has no administration in time range)   senna-docusate (SENOKOT-S/PERICOLACE) 8.6-50 MG per tablet 1 tablet (has no administration in time range)     Or   senna-docusate (SENOKOT-S/PERICOLACE) 8.6-50 MG per tablet 2 tablet (has no administration in time range)   calcium carbonate (TUMS) chewable tablet 1,000 mg (has no administration in time  range)   Patient is already receiving anticoagulation with heparin, enoxaparin (LOVENOX), warfarin (COUMADIN)  or other anticoagulant medication (has no administration in time range)   hydrALAZINE (APRESOLINE) tablet 10 mg (has no administration in time range)     Or   hydrALAZINE (APRESOLINE) injection 10 mg (has no administration in time range)   acetaminophen (TYLENOL) tablet 650 mg (has no administration in time range)     Or   acetaminophen (TYLENOL) Suppository 650 mg (has no administration in time range)   prochlorperazine (COMPAZINE) injection 5 mg (has no administration in time range)     Or   prochlorperazine (COMPAZINE) tablet 5 mg (has no administration in time range)     Or   prochlorperazine (COMPAZINE) suppository 12.5 mg (has no administration in time range)   glucose gel 15-30 g (has no administration in time range)     Or   dextrose 50 % injection 25-50 mL (has no administration in time range)     Or   glucagon injection 1 mg (has no administration in time range)   insulin aspart (NovoLOG) injection (RAPID ACTING) (has no administration in time range)   insulin aspart (NovoLOG) injection (RAPID ACTING) (has no administration in time range)   - MEDICATION INSTRUCTIONS - (has no administration in time range)   furosemide (LASIX) injection 40 mg (has no administration in time range)       NEW PRESCRIPTIONS STARTED AT TODAY'S ER VISIT  New Prescriptions    No medications on file        I, Erick Miranda, am serving as a scribe to document services personally performed by Obi Larry D.O., based on my observations and the provider's statements to me.  I, Obi Larry D.O., attest that Erick Miranda is acting in a scribe capacity, has observed my performance of the services and has documented them in accordance with my direction.     Obi Larry D.O.  Emergency Medicine  [facility] EMERGENCY DEPARTMENT  [address] [place] [id number] [phone]  Dept:  [phone]       Obi Larry,   04/03/24 1621

## 2024-04-03 NOTE — MEDICATION SCRIBE - ADMISSION MEDICATION HISTORY
Medication Scribe Admission Medication History    Admission medication history is complete. The information provided in this note is only as accurate as the sources available at the time of the update.    Information Source(s): Patient and CareEverywhere/SureScripts via in-person    Pertinent Information: pt states no longer using brovana completed    Changes made to PTA medication list:  Added: None  Deleted: None  Changed: None    Allergies reviewed with patient and updates made in EHR: yes    Medication History Completed By: PABLITO CORTES 4/3/2024 2:02 PM    PTA Med List   Medication Sig Last Dose    amLODIPine (NORVASC) 5 MG tablet Take 5 mg by mouth daily 4/3/2024 at am    aspirin (ASA) 81 MG chewable tablet Take 81 mg by mouth daily 4/3/2024 at am    atorvastatin (LIPITOR) 40 MG tablet Take 40 mg by mouth daily 4/3/2024 at am    clopidogrel (PLAVIX) 75 MG tablet Take 75 mg by mouth daily 4/3/2024 at am    glucose (BD GLUCOSE) 4 g chewable tablet Take 4 tablets by mouth every 15 minutes as needed for low blood sugar 4/3/2024 at am    ipratropium - albuterol 0.5 mg/2.5 mg/3 mL (DUONEB) 0.5-2.5 (3) MG/3ML neb solution Take 3 mLs by nebulization 4 times daily as needed for shortness of breath 4/2/2024 at pm    magnesium chloride 535 (64 Mg) MG TBEC CR tablet Take 1 tablet by mouth daily 4/3/2024 at am    metFORMIN (GLUCOPHAGE) 1000 MG tablet Take 1,000 mg by mouth 2 times daily (with meals) 4/3/2024 at am    metoprolol tartrate (LOPRESSOR) 25 MG tablet Take 12.5 mg by mouth 2 times daily 4/3/2024 at am    torsemide (DEMADEX) 20 MG tablet Take by mouth See Admin Instructions Take 2 tablets (40 mg) by mouth in the morning and 1 tablet (20 mg) by mouth at 2 PM 4/3/2024    warfarin ANTICOAGULANT (COUMADIN) 5 MG tablet Take by mouth See Admin Instructions Take by mouth 4/1: 7.5 mg; 4/2: 5 mg; 4/3: 5 mg in the evening OR as directed. 4/3/2024 at am- 5 mg

## 2024-04-03 NOTE — CONSULTS
[facility]:  PULMONARY CONSULT NOTE     Date / Time of Admission:  4/3/2024 12:52 PM    ID: Per Beasley is a 84 year old male, former smoker, with moderate COPD (PFTs 3/2124 with moderate OVD, coronary artery disease s/p stenting, severe aortic stenosis s/p TAVR, chronic heart failure with preserved EF, chronic atrial fibrillation, HTN, dyslipidemia, and prior hospitalization 01/2024 with small right pneumothorax (no interventions) with subsequent resolution on repeat CXR/CT images who represented to [facility] ED on 4/3/2024 with worsening dyspnea, subsequently found to have recurrent right sided pneumothorax.    Reason for Consult:  pneumothorax         Assessment:   Acute spontaneous right sided pneumothorax, history of R sided pneumothorax 01/2024 that self-resoled, moderate COPD without acute exacerbation, former smoker, CHF.  Patient with incidental finding of spontaneous right pneumothorax on chest imaging today.  Had prior small apical right pneumothorax in 01/2024 that was present on films 1/11 and 1/12 CXR but then no longer seen on follow-up CXR 1/19, 2/12, 3/5, 3/27. In addition, CT chest 3/8/24 also without pneumothorax reported.  No known trauma events or recent procedures.  Discussed small size of pneumothorax this presentation,  will try for conservative management with supplemental O2.  If not improved, and/or worsening, will plan for chest tube placement.     PFTs at [facility] on 3/21/24 with FEV1 1.25 L (53% predicted), FEV1/FVC 65%, TLC 5.20 (79%), RV 3.08 L (117%), DLCO 16.22 mL/min/mmHg (83%).     CODE STATUS: Full Code.        Plan:   Wean supplemental O2 as tolerates, goal O2 sat greater than 92%.  Reviewed chest tube placement with patient, would prefer to do Oxygen trial and assess if sx resolve without procedural intervention  Oxygen trial, 10 to 15 L/min oxy mask  Follow-up chest-xray 4 hours after initiated on 15 L/min OxyMask  Initially plan to have chest  x-ray done at 6:30 PM (4 hours of high O2 therapy), but was completed early at 5:00 after 2.5 hours of therapy.  CXR appears stable/unchanged  Follow-up CXR in the AM to assess size of PTx.  Please call Pulmonary once chest x-ray completed.  Diuresis management per primary team  Patient on ASA/Plavix/Warfarin at baseline.    Although INR unlikely to change, will hold warfarin dose tonight in event of additional procedures.    Patient and/or family were educated on the above recommendations.  Discussed care with ED provider and Hospitalist provider today.  Thank you for allowing our service to participate in the care of this patient.  Please call with any questions or concerns.    Total patient care time: 70 minutes, with over 50% spent in counseling/coordination of care.      Paige Ochoa MD, MPH  Pulmonary/Critical Care Medicine  04/03/2024  6:21 PM       Chief Complaint Chief Complaint   Patient presents with    Shortness of Breath               HPI:   Per Beasley is a 84 year old male, former smoker, with moderate COPD (PFTs 3/2124 with moderate OVD, coronary artery disease s/p stenting, severe aortic stenosis s/p TAVR, chronic heart failure with preserved EF, chronic atrial fibrillation, HTN, dyslipidemia, and prior hospitalization 01/2024 with small right pneumothorax (no interventions) with subsequent resolution on repeat CXR/CT images who represented to [facility] ED on 4/3/2024 with worsening dyspnea, subsequently found to have recurrent right sided pneumothorax.  History is provided by the patient, review of records.    Patient reports he had had longstanding shortness of breath and has been dealing with effusions in the lungs.  He has been followed up Cardiology Clinic and states some medication adjustments but still feeling short of breath.  Chest x-ray performed, this showed small right apical pneumothorax with some tracking laterally. No trauma, no recent procedures, denies any pleural  drainages/thoracenteses recently.  Had a hospitalization at [facility] 01/2024 and during that admission had a small R Ptx on admission.  It was followed with repeat imaging and stable. Follow-up CXR 1/19, 2/12, 3/5, 3/27 showed no pneumothorax. In addition, CT chest 3/8/24 also without pneumothorax reported.         Review of Systems:   Review of Systems:  Pertinent items are noted in HPI.  The Review of Systems is negative other than noted in the HPI        Medical/Surgical History:   No past medical history on file.   No past surgical history on file.         Allergies/Medications:   Allergies:   No Known Allergies    OUTPATIENT Medications:  (Not in a hospital admission)    INPATIENT Medications: Continuous Infusions:  Current Facility-Administered Medications   Medication Dose Route Frequency Provider Last Rate Last Admin    - MEDICATION INSTRUCTIONS -   Does not apply DOES NOT GO TO Nabeel Garcia MD        Patient is already receiving anticoagulation with heparin, enoxaparin (LOVENOX), warfarin (COUMADIN)  or other anticoagulant medication   Does not apply Continuous PRN Nabeel Nielson MD         INPATIENT Medications: Scheduled Medications:  Current Facility-Administered Medications   Medication Dose Route Frequency Provider Last Rate Last Admin    [START ON 4/4/2024] amLODIPine (NORVASC) tablet 5 mg  5 mg Oral Daily Nabeel Nielson MD        [START ON 4/4/2024] aspirin (ASA) chewable tablet 81 mg  81 mg Oral Daily Nabeel Nielson MD        [START ON 4/4/2024] atorvastatin (LIPITOR) tablet 40 mg  40 mg Oral Daily Nabeel Nielson MD        [START ON 4/4/2024] clopidogrel (PLAVIX) tablet 75 mg  75 mg Oral Daily Nabeel Nielson MD        furosemide (LASIX) injection 40 mg  40 mg Intravenous Q8H Nabeel Nielson MD   40 mg at 04/03/24 1741    insulin aspart (NovoLOG) injection (RAPID ACTING)  1-3 Units Subcutaneous TID AC Nabeel Nielson MD         "insulin aspart (NovoLOG) injection (RAPID ACTING)  1-3 Units Subcutaneous At Bedtime Nabeel Nielson MD        metoprolol tartrate (LOPRESSOR) half-tab 12.5 mg  12.5 mg Oral BID Nabeel Nielson MD        sodium chloride (PF) 0.9% PF flush 3 mL  3 mL Intracatheter Q8H Nabeel Nielson MD   3 mL at 04/03/24 1740    Warfarin Dose Required Daily - Pharmacist Managed  1 each Oral See Admin Instructions Nabeel Nielson MD                 Family History:        Social History:      Reviewed:  No family history on file. Reviewed:  Social History     Socioeconomic History    Marital status:      Spouse name: Not on file    Number of children: Not on file    Years of education: Not on file    Highest education level: Not on file   Occupational History    Not on file   Tobacco Use    Smoking status: Not on file    Smokeless tobacco: Not on file   Substance and Sexual Activity    Alcohol use: Not on file    Drug use: Not on file    Sexual activity: Not on file   Other Topics Concern    Not on file   Social History Narrative    Not on file     Social Determinants of Health     Financial Resource Strain: Not on file   Food Insecurity: Not on file   Transportation Needs: Not on file   Physical Activity: Not on file   Stress: Not on file   Social Connections: Not on file   Interpersonal Safety: Not on file   Housing Stability: Not on file              Objective:   Vitals:  BP (!) 178/76 (BP Location: Left arm, Patient Position: Semi-Gates's, Cuff Size: Adult Regular)   Pulse 63   Temp 97  F (36.1  C) (Oral)   Resp 27   Ht 1.727 m (5' 8\")   Wt 77 kg (169 lb 11.2 oz)   SpO2 99%   BMI 25.80 kg/m    GEN: Pleasant male, lying in the bed in no acute distress  HEENT: Normocephalic, atraumatic.  Extraoccular eye movements intact, anicteric sclera.  Moist mucous membranes.   NECK: Supple.    PULM: Non-labored breathing.  No use of accessory muscles.  Fine inspiratory rales bilateral bases.  CVS: " "Irregularly irregular rate/rhythm, bradycardic intermittently.  Normal S1, S2.  No rubs, murmurs, or gallops.    ABDOMEN: Normoactive bowel sounds.  Non-tender to palpation.  Non-distended.    EXTREMITES:  No clubbing, cyanosis.  Trace pitting edema lower extremities.  NEURO:  Awake.  Oriented to person, place, time and situation.  Cranial nerves 2-12 grossly intact.  Moving all extremities.      Intake/Output:  No intake/output data recorded.        Pertinent Studies:   All laboratory data reviewed  Serum Glucose range:   Recent Labs   Lab 04/03/24  1748 04/03/24  1317   * 135*     ABG: No lab results found in last 7 days.  CBC:   Recent Labs   Lab 04/03/24  1317   WBC 7.1   HGB 11.5*   HCT 36.8*   MCV 89      NEUTROPHIL 47   LYMPH 41   MONOCYTE 9   EOSINOPHIL 3     Chemistry:   Recent Labs   Lab 04/03/24  1317      POTASSIUM 4.4   CHLORIDE 100   CO2 25   BUN 35.0*   CR 1.99*   GFRESTIMATED 32*   SANDRA 9.5     Coags:  No results for input(s): \"INR\", \"PROTIME\", \"PTT\" in the last 168 hours.    Invalid input(s): \"APTT\"  Cardiac Markers:  No results for input(s): \"CKTOTAL\", \"TROPONINI\" in the last 168 hours.     Microbiology:  None        Cardiology/Radiology:   Cardiac: All cardiac studies reviewed by me.  EKG:  Reviewed  TTE, 1/11/2024:  Summary: The left ventricle is normal in size. Left ventricular function is normal.The ejection fraction is 55-60%. Normal right ventricle size and systolic function. The left atrium is moderately dilated. 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 Valve appears to be functioning normally with a mean gradient of 9 mmHg, peak velocity of 2.3 m/s, DI 0.38. Mild paravalvular leak.    Radiology: All imaging studies reviewed by me.  Chest X-Ray, 4/3 at 139 PM:  There is a new right pneumothorax which appears moderate. Probable small right pleural effusion. Minimal atelectasis at the left lung base. Normal heart size and pulmonary " vascularity. No shift of the midline. Old healed left eighth rib fracture. Transcatheter aortic valve replacement.  Chest X-Ray, 4/3 at 5:09 PM:  A right pneumothorax has not changed with 20 mL of apical pleural separation. A right basilar opacity is increased, representing increased pleural fluid, atelectasis, and likely underlying airspace disease. The heart is normal in size. A TAVR stent is present. There is an old healed left rib fracture. No mediastinal shift.

## 2024-04-03 NOTE — ED TRIAGE NOTES
Patient presents to ER with c/o feeling shortness of breath.  Worse with exertion.  Walks with a walker.  Was seen at the heart clinic on 3/27/24 and he states they didn't do an xray that day but had med changes.  States today he just doesn't feel much better than his appointment on the 27th.  Denies cough.

## 2024-04-03 NOTE — H&P
[facility]    History and Physical - Hospitalist Service       Date of Admission:  4/3/2024    Assessment & Plan   Per Beasley is 84 year old male with history of COPD, CAD, type 2 diabetes, hypertension, morbid obesity, severe aortic stenosis s/p TAVR, and atrial fibrillation admitted on 4/3/2024 with shortness of breath secondary to acute exacerbation of HFpEF, hypertensive urgency and pneumothorax.    Patient declined chest tube placement offered by pulmonologist.  He was placed on diuretic therapy.    Right-sided pneumothorax  Chest radiograph revealed new, moderate right pneumothorax with probable small right pleural effusion and old healed left eighth rib fracture.  ER attending discussed with pulmonologist, Dr. Ochoa who recommended chest tube placement vs high flow oxygen with plan to repeat chest x-ray and consider intervention if there is no improvement.  Patient declined chest tube placement and opted for high flow oxygen with plan to wait and repeat chest x-ray.     Continue high flow oxygen for now  Serial chest radiograph  Consider chest replacement if pneumothorax is worsening   Continuous oximetry  Pulmonology [id number] assistance    Suspected acute exacerbation of HFpEF  NT proBNP is elevated.  Echocardiogram performed 1/11/2024 revealed normal LV systolic function with LVEF of 55 to 60%, and normal regional wall motion. He was taking torsemide 40 mg in the morning and 20 mg in the evening    Hold PTA torsemide for now  Start IV furosemide 40 mg every 8 hours  Monitor BMP  Monitor electrolytes and replace as needed  Monitor on telemetry  Follow up cardiology consult    Hypertensive urgency  Resume PTA amlodipine, and metoprolol titrate  IV hydralazine as needed    Atrial fibrillation  Heart rate is controlled  Resume PTA metoprolol  Warfarin as per pharmacist-- Held for possible chest tube placement. Can be resumed tomorrow if chest tube placement is no longer  "indicated. A repeat CXR will be obtained tomorrow morning- 4/4/24- Discussed with pulmonologist, Dr. Ochoa     Type 2 diabetes  Hold PTA metformin for now  Start insulin sliding scale  Monitor for hypoglycemia correct as per protocol    History of CAD  Resume PTA atorvastatin, Plavix, and aspirin  Unclear why he is receiving dual antiplatelet therapy while on anticoagulation therapy -will defer to cardiologist.    Suspected JONNATHAN on stage III CKD  Creatinine 1.99 compared to baseline of 1.38 on 1/12/2024  Possibly secondary to cardiorenal process  IV furosemide as above  Monitor BMP  Avoid nephrotoxin  Consider nephrology evaluation if creatinine is worsening    Diet: Combination Diet 2 gm NA Diet; No Caffeine Diet (and additional linked orders)  Fluid restriction 1800 ML FLUID (and additional linked orders)  DVT Prophylaxis: Warfarin  Helm Catheter: Not present  Lines: None     Cardiac Monitoring: ACTIVE order. Indication: Acute decompensated heart failure (48 hours)  Code Status: Full Code    Clinically Significant Risk Factors Present on Admission               # Drug Induced Coagulation Defect: home medication list includes an anticoagulant medication  # Drug Induced Platelet Defect: home medication list includes an antiplatelet medication  # Acute Kidney Injury, unspecified: based on a >150% or 0.3 mg/dL increase in last creatinine compared to past 90 day average, will monitor renal function  # Hypertension: Noted on problem list  # Acute heart failure with preserved ejection fraction: heart failure noted on problem list, last echo with EF >50%, and receiving IV diuretics    # DMII: A1C = 8.2 % (Ref range: <5.7 %) within past 6 months    # Overweight: Estimated body mass index is 25.8 kg/m  as calculated from the following:    Height as of this encounter: 1.727 m (5' 8\").    Weight as of this encounter: 77 kg (169 lb 11.2 oz).              Disposition Plan      Expected Discharge Date: 04/05/2024    "   Destination: home with family            Mamadousanchopoli Nielson MD  Hospitalist Service  [facility]  Securely message with [employer] (more info)  Text page via [employer] Paging/Directory     ______________________________________________________________________    Chief Complaint   Shortness of breath    History is obtained from the patient    History of Present Illness   Per Beasley is 84 year old male with history of COPD, CAD, type 2 diabetes, hypertension, morbid obesity, severe aortic stenosis s/p TAVR, and atrial fibrillation who presents to the ER with    He was in his usual state of health until few days ago when he developed progressive shortness of breath associated with bilateral lower extremity swelling.  Shortness of breath persisted despite using his home dose of furosemide.  He states swelling is worse in the left lower extremity compared to right lower extremity he denies chest pain, dizziness, or palpitation.    Chest radiograph revealed new, moderate right pneumothorax with probable small right pleural effusion and old healed left eighth rib fracture.  ER attending discussed with pulmonologist, Dr. Ochoa who recommended chest tube placement vs high flow oxygen with plan to repeat chest x-ray and consider intervention if there is no improvement.  Patient declined chest tube placement and opted for high flow oxygen with plan to wait and repeat chest x-ray.  Pulmonologist is following.    Initial blood pressure was 185/82, pulse 58, respiratory 22, temperature 96.8  F and oxygen saturation of 100% on 2 L of intranasal oxygen.  Notable laboratory findings include creatinine 1.99 compared to baseline of 1.38 on 1/12/2024, NT proBNP 4984, initial high-sensitivity troponin 27, hemoglobin 11.5 with platelet count of 154.      He wants to be full code.    Past Medical History    No past medical history on file.    Past Surgical History   No past surgical history on file.    Prior to  Admission Medications   Prior to Admission Medications   Prescriptions Last Dose Informant Patient Reported? Taking?   Alcohol Swabs PADS   No No   Sig: Use to swab the area of the injection or rosenda as directed Per insurance coverage   Sharps Container MISC   No No   Sig: Use as directed to dispose of needles, lancets and other sharps Per Insurance coverage   amLODIPine (NORVASC) 5 MG tablet 4/3/2024 at am  Yes Yes   Sig: Take 5 mg by mouth daily   aspirin (ASA) 81 MG chewable tablet 4/3/2024 at am  Yes Yes   Sig: Take 81 mg by mouth daily   atorvastatin (LIPITOR) 40 MG tablet 4/3/2024 at am  Yes Yes   Sig: Take 40 mg by mouth daily   blood glucose (NO BRAND SPECIFIED) lancets standard   No No   Sig: To use to test glucose level in the blood Use to test blood sugar  2  times daily as directed. To accompany glucose monitor brands per insurance coverage.   blood glucose (NO BRAND SPECIFIED) test strip   No No   Sig: To use to test glucose level in the blood Use to test blood sugar  2 times daily as directed. To accompany glucose monitor brands per insurance coverage.   blood glucose calibration (NO BRAND SPECIFIED) solution   No No   Sig: Used to calibrate the blood glucose monitor as needed and as directed.  To accompany  blood glucose brands per insurance coverage   blood glucose monitoring (NO BRAND SPECIFIED) meter device kit   No No   Sig: Use as directed , Per insurance coverage   clopidogrel (PLAVIX) 75 MG tablet 4/3/2024 at am  Yes Yes   Sig: Take 75 mg by mouth daily   glucose (BD GLUCOSE) 4 g chewable tablet 4/3/2024 at am  No Yes   Sig: Take 4 tablets by mouth every 15 minutes as needed for low blood sugar   insulin pen needle (32G X 4 MM) 32G X 4 MM miscellaneous   No No   Sig: Use as directed by provider Per insurance coverage   ipratropium - albuterol 0.5 mg/2.5 mg/3 mL (DUONEB) 0.5-2.5 (3) MG/3ML neb solution 4/2/2024 at pm  Yes Yes   Sig: Take 3 mLs by nebulization 4 times daily as needed for  shortness of breath   magnesium chloride 535 (64 Mg) MG TBEC CR tablet 4/3/2024 at am  Yes Yes   Sig: Take 1 tablet by mouth daily   metFORMIN (GLUCOPHAGE) 1000 MG tablet 4/3/2024 at am  Yes Yes   Sig: Take 1,000 mg by mouth 2 times daily (with meals)   metoprolol tartrate (LOPRESSOR) 25 MG tablet 4/3/2024 at am  Yes Yes   Sig: Take 12.5 mg by mouth 2 times daily   torsemide (DEMADEX) 20 MG tablet 4/3/2024  Yes Yes   Sig: Take by mouth See Admin Instructions Take 2 tablets (40 mg) by mouth in the morning and 1 tablet (20 mg) by mouth at 2 PM   warfarin ANTICOAGULANT (COUMADIN) 5 MG tablet 4/3/2024 at am- 5 mg  Yes Yes   Sig: Take by mouth See Admin Instructions Take by mouth 4/1: 7.5 mg; 4/2: 5 mg; 4/3: 5 mg in the evening OR as directed.      Facility-Administered Medications: None        Review of Systems    The 10 point Review of Systems is negative other than noted in the HPI or here.     Physical Exam   Vital Signs: Temp: 97  F (36.1  C) Temp src: Oral BP: (!) 178/76 Pulse: 63   Resp: 27 SpO2: 99 % O2 Device: Oxymask Oxygen Delivery: 15 LPM  Weight: 169 lbs 11.2 oz      General appearance: Awake, Alert, Cooperative, not in any obvious distress and appears stated age   HEENT: Normocephalic, atraumatic, conjunctiva clear without icterus and ears without discharge  Lungs: Diminished breath sounds on the right.  Cardiovascular: Regular Rate and Rythm, normal apical impulse, normal S1 and S2, 1+ lower extremity edema bilaterally  Abdomen: Soft, non-tender and Non-distended, active bowel sounds  Skin: Skin color, texture normal and bruising or bleeding. No rashes or lesions over face, neck, arms and legs, turgor normal.  Musculoskeletal: Left leg is more swollen, compared to right lower extremity  Neurologic: Alert & Oriented X 3, Facial symmetry preserved and upper & lower extremities moving well with symmetry  Psychiatric: Calm, normal eye contact and normal affect      Medical Decision Making       60 MINUTES  SPENT BY ME on the date of service doing chart review, history, exam, documentation & further activities per the note.      Data   Imaging results reviewed over the past 24 hrs:   Recent Results (from the past 24 hour(s))   XR Chest Port 1 View    Narrative    EXAM: Chest one view portable  LOCATION: [facility]  DATE: 4/3/2024    INDICATION: Dyspnea  COMPARISON: 03/27/2024.      Impression    IMPRESSION: There is a new right pneumothorax which appears moderate. Probable small right pleural effusion. Minimal atelectasis at the left lung base. Normal heart size and pulmonary vascularity. No shift of the midline. Old healed left eighth rib   fracture. Transcatheter aortic valve replacement.    NOTE: ABNORMAL REPORT    THE DICTATION ABOVE DESCRIBES AN ABNORMALITY FOR WHICH FOLLOW-UP IS NEEDED.  .    Critical  Result: Right pneumothorax]    Finding was identified on 4/3/2024 1:43 PM CDT.     Dr. Obi Larry was contacted by me on 4/3/2024 1:47 PM CDT and verbalized understanding of the critical result.   XR Chest Port 1 View    Narrative    EXAM: XR CHEST PORT 1 VIEW  LOCATION: [facility]  DATE: 4/3/2024    INDICATION: Follow up pneumothorax after 4 hrs with Oxymask use.  COMPARISON: 04/03/2024 at 1330 hours      Impression    IMPRESSION: A right pneumothorax has not changed with 20 mL of apical pleural separation. A right basilar opacity is increased, representing increased pleural fluid, atelectasis, and likely underlying airspace disease. The heart is normal in size. A TAVR   stent is present. There is an old healed left rib fracture. No mediastinal shift.   US Lower Extremity Venous Duplex Bilateral    Narrative    EXAM: US LOWER EXTREMITY VENOUS DUPLEX BILATERAL  LOCATION: [facility]  DATE: 4/3/2024    INDICATION: leg swelling ? DVT  COMPARISON: None.  TECHNIQUE: Venous Duplex ultrasound of bilateral lower extremities with and without  compression, augmentation and duplex. Color flow and spectral Doppler with waveform analysis performed.    FINDINGS: Exam includes the common femoral, femoral, popliteal veins as well as segmentally visualized deep calf veins and greater saphenous vein. Peroneal veins and left posterior tibial vein are not well visualized bilaterally.    RIGHT: No deep vein thrombosis. No superficial thrombophlebitis. No popliteal cyst.    LEFT: No deep vein thrombosis. No superficial thrombophlebitis. No popliteal cyst.      Impression    IMPRESSION:  1.  No deep venous thrombosis in the bilateral lower extremities.

## 2024-04-03 NOTE — CONSULTS
"Care Management Initial Consult    General Information  Assessment completed with: Patient, Per  Type of CM/SW Visit: Initial Assessment    Primary Care Provider verified and updated as needed: Yes   Readmission within the last 30 days: no previous admission in last 30 days      Reason for Consult: discharge planning  Advance Care Planning: Advance Care Planning Reviewed: no concerns identified          Communication Assessment  Patient's communication style: spoken language (English or Bilingual)             Cognitive  Cognitive/Neuro/Behavioral: WDL                      Living Environment:   People in home: spouse  Per and wife Tana  Current living Arrangements: apartment, condominium (\"We live in a condo with an elevator\")      Able to return to prior arrangements: yes       Family/Social Support:  Care provided by: self  Provides care for: no one  Marital Status:   Wife  Tana       Description of Support System: Supportive, Involved    Support Assessment: Adequate family and caregiver support, Adequate social supports, Patient communicates needs well met    Current Resources:   Patient receiving home care services: No     Community Resources: None  Equipment currently used at home: walker, rolling, glucometer (\"4WW\")  Supplies currently used at home: Diabetic Supplies, Other (\"dentures, glasses\")    Employment/Financial:  Employment Status: retired, , previous service     Employment/ Comments: \"I don't use my  benefits\"  Financial Concerns:     Referral to Financial Worker: No       Does the patient's insurance plan have a 3 day qualifying hospital stay waiver?  No      Functional Status:  Prior to admission patient needed assistance:   Dependent ADLs:: Ambulation-walker, Independent  Dependent IADLs:: Independent (\"I drive, but my wife doesn't drive.\")       Mental Health Status:          Chemical Dependency Status:                Values/Beliefs:  Spiritual, Cultural " "Beliefs, Latter-day Practices, Values that affect care:                 Additional Information:  Per lives in an apartment with wife. \"We live in an apartment/condo with an elevator\".    He is independent with ADLs and IADLs. \"I drive, but my wife doesn't drive.\" He uses a 4WW for mobility.    Unknown discharge needs at this time.     Friend to transport at discharge.    CM to follow for medical progression of care, discharge recommendations, and final discharge plan.    Julienne Franco RN    "

## 2024-04-04 ENCOUNTER — APPOINTMENT (OUTPATIENT)
Dept: OCCUPATIONAL THERAPY | Facility: HOSPITAL | Age: 84
DRG: 199 | End: 2024-04-04
Attending: INTERNAL MEDICINE
Payer: MEDICARE

## 2024-04-04 ENCOUNTER — APPOINTMENT (OUTPATIENT)
Dept: RADIOLOGY | Facility: HOSPITAL | Age: 84
DRG: 199 | End: 2024-04-04
Attending: INTERNAL MEDICINE
Payer: MEDICARE

## 2024-04-04 LAB
ATRIAL RATE - MUSE: 85 BPM
DIASTOLIC BLOOD PRESSURE - MUSE: 86 MMHG
GLUCOSE BLDC GLUCOMTR-MCNC: 146 MG/DL (ref 70–99)
GLUCOSE BLDC GLUCOMTR-MCNC: 148 MG/DL (ref 70–99)
GLUCOSE BLDC GLUCOMTR-MCNC: 169 MG/DL (ref 70–99)
GLUCOSE BLDC GLUCOMTR-MCNC: 179 MG/DL (ref 70–99)
GLUCOSE BLDC GLUCOMTR-MCNC: 191 MG/DL (ref 70–99)
INR PPP: 2.47 (ref 0.85–1.15)
INTERPRETATION ECG - MUSE: NORMAL
MAGNESIUM SERPL-MCNC: 2 MG/DL (ref 1.7–2.3)
P AXIS - MUSE: NORMAL DEGREES
POTASSIUM SERPL-SCNC: 4.3 MMOL/L (ref 3.4–5.3)
PR INTERVAL - MUSE: NORMAL MS
QRS DURATION - MUSE: 84 MS
QT - MUSE: 424 MS
QTC - MUSE: 454 MS
R AXIS - MUSE: 58 DEGREES
SYSTOLIC BLOOD PRESSURE - MUSE: 181 MMHG
T AXIS - MUSE: 106 DEGREES
VENTRICULAR RATE- MUSE: 69 BPM

## 2024-04-04 PROCEDURE — 82962 GLUCOSE BLOOD TEST: CPT

## 2024-04-04 PROCEDURE — 97165 OT EVAL LOW COMPLEX 30 MIN: CPT | Mod: GO

## 2024-04-04 PROCEDURE — 250N000012 HC RX MED GY IP 250 OP 636 PS 637: Performed by: INTERNAL MEDICINE

## 2024-04-04 PROCEDURE — 999N000157 HC STATISTIC RCP TIME EA 10 MIN

## 2024-04-04 PROCEDURE — 99233 SBSQ HOSP IP/OBS HIGH 50: CPT | Performed by: INTERNAL MEDICINE

## 2024-04-04 PROCEDURE — 84132 ASSAY OF SERUM POTASSIUM: CPT | Performed by: INTERNAL MEDICINE

## 2024-04-04 PROCEDURE — 97110 THERAPEUTIC EXERCISES: CPT | Mod: GO

## 2024-04-04 PROCEDURE — 250N000013 HC RX MED GY IP 250 OP 250 PS 637: Performed by: INTERNAL MEDICINE

## 2024-04-04 PROCEDURE — 85610 PROTHROMBIN TIME: CPT | Performed by: INTERNAL MEDICINE

## 2024-04-04 PROCEDURE — 120N000001 HC R&B MED SURG/OB

## 2024-04-04 PROCEDURE — 99223 1ST HOSP IP/OBS HIGH 75: CPT | Performed by: INTERNAL MEDICINE

## 2024-04-04 PROCEDURE — 36415 COLL VENOUS BLD VENIPUNCTURE: CPT | Performed by: INTERNAL MEDICINE

## 2024-04-04 PROCEDURE — 71045 X-RAY EXAM CHEST 1 VIEW: CPT

## 2024-04-04 PROCEDURE — 83735 ASSAY OF MAGNESIUM: CPT | Performed by: INTERNAL MEDICINE

## 2024-04-04 PROCEDURE — 250N000011 HC RX IP 250 OP 636: Performed by: INTERNAL MEDICINE

## 2024-04-04 RX ORDER — HYDRALAZINE HYDROCHLORIDE 25 MG/1
25 TABLET, FILM COATED ORAL 2 TIMES DAILY
Status: DISCONTINUED | OUTPATIENT
Start: 2024-04-04 | End: 2024-04-05 | Stop reason: HOSPADM

## 2024-04-04 RX ORDER — HEPARIN SODIUM 5000 [USP'U]/.5ML
5000 INJECTION, SOLUTION INTRAVENOUS; SUBCUTANEOUS EVERY 8 HOURS
Status: DISCONTINUED | OUTPATIENT
Start: 2024-04-04 | End: 2024-04-04 | Stop reason: ALTCHOICE

## 2024-04-04 RX ADMIN — FUROSEMIDE 40 MG: 10 INJECTION, SOLUTION INTRAVENOUS at 17:27

## 2024-04-04 RX ADMIN — FUROSEMIDE 40 MG: 10 INJECTION, SOLUTION INTRAVENOUS at 00:26

## 2024-04-04 RX ADMIN — INSULIN ASPART 1 UNITS: 100 INJECTION, SOLUTION INTRAVENOUS; SUBCUTANEOUS at 07:53

## 2024-04-04 RX ADMIN — METOPROLOL TARTRATE 12.5 MG: 25 TABLET, FILM COATED ORAL at 21:25

## 2024-04-04 RX ADMIN — INSULIN ASPART 1 UNITS: 100 INJECTION, SOLUTION INTRAVENOUS; SUBCUTANEOUS at 12:26

## 2024-04-04 RX ADMIN — ASPIRIN 81 MG CHEWABLE TABLET 81 MG: 81 TABLET CHEWABLE at 07:53

## 2024-04-04 RX ADMIN — AMLODIPINE BESYLATE 5 MG: 5 TABLET ORAL at 07:55

## 2024-04-04 RX ADMIN — FUROSEMIDE 40 MG: 10 INJECTION, SOLUTION INTRAVENOUS at 07:53

## 2024-04-04 RX ADMIN — HYDRALAZINE HYDROCHLORIDE 25 MG: 25 TABLET, FILM COATED ORAL at 21:24

## 2024-04-04 RX ADMIN — ATORVASTATIN CALCIUM 40 MG: 40 TABLET, FILM COATED ORAL at 07:53

## 2024-04-04 RX ADMIN — INSULIN ASPART 1 UNITS: 100 INJECTION, SOLUTION INTRAVENOUS; SUBCUTANEOUS at 17:28

## 2024-04-04 RX ADMIN — CLOPIDOGREL BISULFATE 75 MG: 75 TABLET ORAL at 07:55

## 2024-04-04 ASSESSMENT — ACTIVITIES OF DAILY LIVING (ADL)
ADLS_ACUITY_SCORE: 27
ADLS_ACUITY_SCORE: 37
ADLS_ACUITY_SCORE: 37
ADLS_ACUITY_SCORE: 20
ADLS_ACUITY_SCORE: 37
ADLS_ACUITY_SCORE: 27
ADLS_ACUITY_SCORE: 37
ADLS_ACUITY_SCORE: 27
ADLS_ACUITY_SCORE: 27
ADLS_ACUITY_SCORE: 37
ADLS_ACUITY_SCORE: 27
ADLS_ACUITY_SCORE: 27
ADLS_ACUITY_SCORE: 37
ADLS_ACUITY_SCORE: 27
ADLS_ACUITY_SCORE: 27
ADLS_ACUITY_SCORE: 37

## 2024-04-04 NOTE — PLAN OF CARE
BP slightly elevated, asymptomatic, below PRN parameters.  Denies pain or SOB at rest, on 15L O2 via Oxymask overnight, diminished lung sounds on right.  Diuresing well.    Goal Outcome Evaluation:  Problem: Gas Exchange Impaired  Goal: Optimal Gas Exchange  Outcome: Progressing  Intervention: Optimize Oxygenation and Ventilation  Recent Flowsheet Documentation  Taken 4/4/2024 0310 by Jodee Krueger RN  Head of Bed (HOB) Positioning: HOB at 20 degrees     Problem: Comorbidity Management  Goal: Blood Glucose Levels Within Targeted Range  Outcome: Progressing  Intervention: Monitor and Manage Glycemia  Recent Flowsheet Documentation  Taken 4/4/2024 0000 by Jodee Krueger RN  Medication Review/Management: medications reviewed  Goal: Maintenance of Heart Failure Symptom Control  Outcome: Progressing  Intervention: Maintain Heart Failure Management  Recent Flowsheet Documentation  Taken 4/4/2024 0000 by Jodee Krueger RN  Medication Review/Management: medications reviewed  Goal: Blood Pressure in Desired Range  Outcome: Progressing  Intervention: Maintain Blood Pressure Management  Recent Flowsheet Documentation  Taken 4/4/2024 0000 by Jodee Krueger RN  Medication Review/Management: medications reviewed

## 2024-04-04 NOTE — PROGRESS NOTES
PULMONARY / CRITICAL CARE PROGRESS NOTE    Date / Time of Admission:  4/3/2024 12:52 PM    Assessment:   Per Beasley is a 84 year old male with history of moderate COPD , HTN, HFpEF, coronary artery disease s/p stenting, severe aortic stenosis s/p TAVR, chronic atrial fibrillation, and prior hospitalization 01/2024 with small right pneumothorax (no interventions) with subsequent resolution on repeat CXR/CT images who represented to [facility] ED on 4/3/2024 with worsening dyspnea, subsequently found to have recurrent right sided pneumothorax.    Small recurrent right side pneumothorax  Decompensated cardiomyopathy   HTN, CAD, HFpEF  Chronic atrial fibrillation  COPD  Acute on CKD    Advance Directives: full code    Plan:   Titrate FiO2 , keep 3 to 4 LPM continuously   Bronchodilators as needed  Follow up CXR in AM  Titrate BP meds and diuresis   Monitor kidney function   DVT prophylaxis anticoagulation of hold    Please contact me if you have any questions.      Lazarus Muñoz  Pulmonary / Critical Care  04/04/2024  11:39 AM        Subjective:   HPI:  Per Beasley is a 84 year old male with history of moderate COPD , HTN, HFpEF, coronary artery disease s/p stenting, severe aortic stenosis s/p TAVR, chronic atrial fibrillation, and prior hospitalization 01/2024 with small right pneumothorax (no interventions) with subsequent resolution on repeat CXR/CT images who represented to [facility] ED on 4/3/2024 with worsening dyspnea, subsequently found to have recurrent right sided pneumothorax.    Events overnight  - Denies complaints     Allergies: Patient has no known allergies.     MEDS:  Current Facility-Administered Medications   Medication Dose Route Frequency Provider Last Rate Last Admin    - MEDICATION INSTRUCTIONS -   Does not apply DOES NOT GO TO Nabeel Garcia MD        acetaminophen (TYLENOL) tablet 650 mg  650 mg Oral Q4H PRN Nabeel Nielson MD        Or     acetaminophen (TYLENOL) Suppository 650 mg  650 mg Rectal Q4H PRN Nabele Nielson MD        aspirin (ASA) chewable tablet 81 mg  81 mg Oral Daily Nabeel Nielson MD   81 mg at 04/04/24 0753    atorvastatin (LIPITOR) tablet 40 mg  40 mg Oral Daily Nabeel Nielson MD   40 mg at 04/04/24 0753    calcium carbonate (TUMS) chewable tablet 1,000 mg  1,000 mg Oral 4x Daily PRN Nabeel Nielson MD        clopidogrel (PLAVIX) tablet 75 mg  75 mg Oral Daily Nabeel Nielson MD   75 mg at 04/04/24 0755    glucose gel 15-30 g  15-30 g Oral Q15 Min PRN Nabeel Nielson MD        Or    dextrose 50 % injection 25-50 mL  25-50 mL Intravenous Q15 Min PRN Nabeel Nielson MD        Or    glucagon injection 1 mg  1 mg Subcutaneous Q15 Min PRN Nabeel Nielson MD        [Held by provider] furosemide (LASIX) injection 40 mg  40 mg Intravenous Q8H Nabeel Nielson MD   40 mg at 04/04/24 1727    hydrALAZINE (APRESOLINE) tablet 10 mg  10 mg Oral Q4H PRN Nabeel Nielson MD        Or    hydrALAZINE (APRESOLINE) injection 10 mg  10 mg Intravenous Q4H PRN Nabeel Nielson MD        hydrALAZINE (APRESOLINE) tablet 25 mg  25 mg Oral BID Kelsie Mtz MD   25 mg at 04/04/24 2124    insulin aspart (NovoLOG) injection (RAPID ACTING)  1-3 Units Subcutaneous TID AC Nabeel Nielson MD   1 Units at 04/04/24 1728    insulin aspart (NovoLOG) injection (RAPID ACTING)  1-3 Units Subcutaneous At Bedtime Nabeel Nielson MD        ipratropium - albuterol 0.5 mg/2.5 mg/3 mL (DUONEB) neb solution 3 mL  3 mL Nebulization 4x Daily PRN Nabeel Nielson MD        lidocaine (LMX4) cream   Topical Q1H PRN Nabeel Nielson MD        lidocaine 1 % 0.1-1 mL  0.1-1 mL Other Q1H PRN Nabeel Nielson MD        metoprolol tartrate (LOPRESSOR) half-tab 12.5 mg  12.5 mg Oral BID Jono Shah MD   12.5 mg at 04/04/24 1984    Patient is already receiving  "anticoagulation with heparin, enoxaparin (LOVENOX), warfarin (COUMADIN)  or other anticoagulant medication   Does not apply Continuous PRN Nabeel Nielson MD        prochlorperazine (COMPAZINE) injection 5 mg  5 mg Intravenous Q6H PRN Nabeel Nielson MD        Or    prochlorperazine (COMPAZINE) tablet 5 mg  5 mg Oral Q6H PRN Nabeel Nielson MD        Or    prochlorperazine (COMPAZINE) suppository 12.5 mg  12.5 mg Rectal Q12H PRN Nabeel Nielson MD        senna-docusate (SENOKOT-S/PERICOLACE) 8.6-50 MG per tablet 1 tablet  1 tablet Oral BID PRN Nabeel Nielson MD        Or    senna-docusate (SENOKOT-S/PERICOLACE) 8.6-50 MG per tablet 2 tablet  2 tablet Oral BID PRN Nabeel Nielson MD        sodium chloride (PF) 0.9% PF flush 3 mL  3 mL Intracatheter Q8H Nabeel Nielson MD   3 mL at 04/04/24 1728    sodium chloride (PF) 0.9% PF flush 3 mL  3 mL Intracatheter q1 min prn Nabeel Nielson MD        [Held by provider] Warfarin Dose Required Daily - Pharmacist Managed  1 each Oral See Admin Instructions Nabeel Nielson MD             Objective:   VITALS:  BP (!) 157/70 (BP Location: Left arm)   Pulse 61   Temp 98.2  F (36.8  C) (Oral)   Resp 18   Ht 1.727 m (5' 8\")   Wt 74.4 kg (164 lb 2 oz)   SpO2 96%   BMI 24.96 kg/m    VENT:  Resp: 18    EXAM:   Gen: awake, alert, no distress  HEENT: pink conjunctiva, moist mucosa, Mallampati II/IV  Neck: no thyromegaly, masses or JVD  Lungs: clear  CV: regular, no murmurs or gallops appreciated  Abdomen: soft, NT, BS wnl  Ext: no edema  Neuro: CN II-XII intact, non focal       Data Review:  Recent Labs   Lab 04/04/24 2046 04/04/24  1631 04/04/24  1211 04/04/24  0736 04/04/24  0306 04/03/24  2209   * 191* 169* 148* 146* 153*     XR CHEST PORT 1 VIEW  LOCATION: [facility]  DATE: 4/4/2024     INDICATION: Follow up right sided spontaneous pneumothorax  COMPARISON: 04/03/2024         "   IMPRESSION: Unchanged right apical pneumothorax with 2 cm apical pleural separation. Right pleural fluid has not changed. Cannot exclude underlying airspace disease. Normal size of the heart.      By:  Lazarus Muñoz MD, 04/04/2024  11:39 AM    Primary Care Physician:  No Ref-Primary, Physician

## 2024-04-04 NOTE — PLAN OF CARE
"  Problem: Adult Inpatient Plan of Care  Goal: Plan of Care Review  Description: The Plan of Care Review/Shift note should be completed every shift.  The Outcome Evaluation is a brief statement about your assessment that the patient is improving, declining, or no change.  This information will be displayed automatically on your shift  note.  Outcome: Progressing  Goal: Patient-Specific Goal (Individualized)  Description: You can add care plan individualizations to a care plan. Examples of Individualization might be:  \"Parent requests to be called daily at 9am for status\", \"I have a hard time hearing out of my right ear\", or \"Do not touch me to wake me up as it startles  me\".  Outcome: Progressing  Goal: Absence of Hospital-Acquired Illness or Injury  Outcome: Progressing  Goal: Optimal Comfort and Wellbeing  Outcome: Progressing  Goal: Readiness for Transition of Care  Outcome: Progressing     Problem: Risk for Delirium  Goal: Optimal Coping  Outcome: Progressing  Goal: Improved Behavioral Control  Outcome: Progressing  Goal: Improved Attention and Thought Clarity  Outcome: Progressing  Goal: Improved Sleep  Outcome: Progressing     Problem: Gas Exchange Impaired  Goal: Optimal Gas Exchange  Outcome: Progressing     Problem: Comorbidity Management  Goal: Blood Glucose Levels Within Targeted Range  Outcome: Progressing  Goal: Maintenance of Heart Failure Symptom Control  Outcome: Progressing  Goal: Blood Pressure in Desired Range  Outcome: Progressing   Goal Outcome Evaluation:         Pt arrived to unit just after 1400. Pt calm and cooperative, alert and oriented x4. Pt on 3LPM o2 at 97%. Right lung sounds severely diminished, L lobes clear. Pt educated on call light and chair alarm. Pt able to ambulate to bathroom with walker from home and manage clothing on own, pt voided small amount and passing gas.               "

## 2024-04-04 NOTE — PROGRESS NOTES
Patient was taken off 15LPM oxymask and put on 2LPM nasal cannula. RN or RT will put patient back on 15LPM oxymask at midnight per MD communication order.

## 2024-04-04 NOTE — PROGRESS NOTES
OT Eval     04/04/24 1300   Appointment Info   Signing Clinician's Name / Credentials (OT) Ellen Antony, OTR/L   Living Environment   People in Home spouse   Current Living Arrangements condominium   Home Accessibility no concerns   Transportation Anticipated family or friend will provide   Living Environment Comments Pt lives in accessible condo w spouse, elevator. Pt has GBs in BR.   Self-Care   Current Activity Tolerance fair   Equipment Currently Used at Home walker, rolling;glucometer  (4WW)   Fall history within last six months no   Activity/Exercise/Self-Care Comment Pt uses 4WW at baseline, IND ADLs.   Instrumental Activities of Daily Living (IADL)   IADL Comments IND IADLs at BL, drives   General Information   Onset of Illness/Injury or Date of Surgery 04/03/24   Referring Physician Nabeel Nielson MD   Patient/Family Therapy Goal Statement (OT) to go home   Additional Occupational Profile Info/Pertinent History of Current Problem Per Beasley is 84 year old male with history of COPD, CAD, type 2 diabetes, hypertension, morbid obesity, severe aortic stenosis s/p TAVR, and atrial fibrillation admitted on 4/3/2024 with shortness of breath secondary to acute exacerbation of HFpEF, hypertensive urgency and pneumothorax.   Cognitive Status Examination   Orientation Status orientation to person, place and time   Affect/Mental Status (Cognitive) WFL   Pain Assessment   Patient Currently in Pain No   Range of Motion Comprehensive   General Range of Motion no range of motion deficits identified   Strength Comprehensive (MMT)   General Manual Muscle Testing (MMT) Assessment no strength deficits identified   Muscle Tone Assessment   Muscle Tone Quick Adds No deficits were identified   Coordination   Upper Extremity Coordination No deficits were identified   Bed Mobility   Bed Mobility supine-sit;sit-supine   Supine-Sit [place] (Bed Mobility) supervision   Sit-Supine [place] (Bed Mobility)  supervision   Transfers   Transfers sit-stand transfer   Sit-Stand Transfer   Sit-Stand [place] (Transfers) supervision   Assistive Device (Sit-Stand Transfers) walker, 4-wheeled   Balance   Balance Assessment no deficits identified   Activities of Daily Living   BADL Assessment/Intervention toileting;lower body dressing   Lower Body Dressing Assessment/Training   [place] Level (Lower Body Dressing) supervision;don;doff;shoes/slippers   Toileting   Comment, (Toileting) Per pt report and clinical reasoning, pt likely SBA for toileting   Clinical Impression   Criteria for Skilled Therapeutic Interventions Met (OT) Yes, treatment indicated   OT Diagnosis Decreased activity tolerance   Influenced by the following impairments Weakness/SOB   OT Problem List-Impairments impacting ADL problems related to;activity tolerance impaired;mobility   Assessment of Occupational Performance 1-3 Performance Deficits   Identified Performance Deficits toileting, functional ambulation   Planned Therapy Interventions (OT) strengthening;home program guidelines;progressive activity/exercise   Clinical Decision Making Complexity (OT) problem focused assessment/low complexity   Risk & Benefits of therapy have been explained evaluation/treatment results reviewed;care plan/treatment goals reviewed   OT Total Evaluation Time   OT Eval, Low Complexity Minutes (53928) 15   OT Goals   Therapy Frequency (OT) 6 times/week   OT Predicted Duration/Target Date for Goal Attainment 04/11/24   OT Goals Aerobic Activity;OT Goal 2   OT: Perform aerobic activity with stable cardiovascular response intermittent activity;10 minutes;ambulation   OT: Goal 2 Pt will verbalize and demonstrate understanding towards therapist recommendations and home program guidelines.   Interventions   Interventions Quick Adds Therapeutic Procedures/Exercise   Therapeutic Procedures/Exercise   Therapeutic Procedure: strength, endurance, ROM, flexibillity minutes (22566)  10   Symptoms Noted During/After Treatment none   Treatment Detail/Skilled Intervention Eval complete, treatment indicated. Pt on ~2-3 L O2 throughout eval, with O2 stats ; Pt reporting no SOB/symptoms. Pt SBA bed mobility, pt ambulated 2x ~50' each time in hallway, on room air, w/ seated break in the middle, vitals monitored (pt reporting no symptoms). OT provided education on progressing activity after DC, checked in about CHF management; pt reports he feels like he is managing it well at home. OT therapeutically listened to pt's concerns about DC timeline, rec'd pt ask doctor questions. Session ended w pt retuned to supine in bed, returned to 2/3L O2 NC, call button in reach.   OT Discharge Planning   OT Plan Progress functional ambulation - monitor vitals, check-in CHF education /management at home, home exercise guidelines /walking program   OT Discharge Recommendation (DC Rec) home with assist   OT Rationale for DC Rec Pt is SBA ADLs, ambulated ~100' today SBA 4WW, pt reports being close to baseline aside from being on O2, if pt no longer needs O2 then pt would be safe to return home w/ additional assist as needed. Otherwise, if pt still needing O2, may benefit from time in TCU setting.   OT Brief overview of current status SBA LB dressing, ambulated 100' 4WW   Total Session Time   Timed Code Treatment Minutes 10   Total Session Time (sum of timed and untimed services) 25

## 2024-04-04 NOTE — PROGRESS NOTES
LEVON put call out to pulmonary around 0845 to relay message that chest x ray was done.   0930-Text page sent through [facility] to Dr. Pro to ask if pt. Needs to continue on 15L of oxygen by oxymask.

## 2024-04-04 NOTE — PROGRESS NOTES
Message sent to Dr. Pro by [employer] asking if pt. Needs to continue to be on oxygen.   Pt. Can be on 3-4L of oxygen per Dr. Pro. MD aware that chest x ray is done.

## 2024-04-04 NOTE — CONSULTS
REASON FOR ASSESSMENT:  CHF Consult for 2 gm NA Diet Education    NUTRITION HISTORY:  Information obtained from:  pt    Previous diet instructions:  None    Living situation:   Home w/ spouse    Grocery shopping:  Pt and spouse    Meal preparation:  Pt and spouse    Breakfast:  Scrambled eggs and toast    Lunch/dinner:  Grilled cheese and a bowl of soup      CURRENT DIET:  2 gm Na+ diet, No caffeine, and 1800 mL fluid restriction    NUTRITION DIAGNOSIS:  Food- and nutrition-related knowledge deficit R/t no previous low sodium diet education in the past AEB pt report     INTERVENTIONS:  Nutrition Prescription:  Follow a low sodium diet upon discharge     Implementation:  Assessed learning needs, learning preferences, and willingness to learn  Nutrition Education (Content):  Provided handouts:  Tips for Low Na Diet  Label Reading  Low Na nutrition therapy (NCM)  Discussed rational for limiting Na for CHF and stressed importance of following 2 gm Na guidelines   Encouraged patient to keep a daily food record  Nutrition Education (Application):  Discussed current eating habits and recommended alternative food choices  Anticipated good compliance  Diet Education - refer to Education Flowsheet    Goals:  Pt verbalized understanding of diet by asking clarifying questions, restating the importance of checking the serving size on food labels, & describing changes they can make to grocery shopping, and saying they'll keep track of their sodium intake for a few days.  All of the above goals met during the education session    Follow Up:  Provided RD contact information for future questions.  Recommend Out-Patient Nutrition Referral, if further diet instructions are needed.    Frandy Olvera RD, LD

## 2024-04-04 NOTE — PLAN OF CARE
Goal Outcome Evaluation:      Plan of Care Reviewed With: patient        Problem: Gas Exchange Impaired  Goal: Optimal Gas Exchange  Outcome: Progressing   Pt. Stated that he had the same shortness of breath this morning as he did when he came in. Pulmonology informed that chest x ray was done. Pt. Able to ambulate to bathroom on room air without any increased shortness of breath.  Pt. Denied any trouble breathing after changing oxygen to nasal cannula .     Report given to Columba on P4. Pts. Belongings and medications sent along with pt. Pt. Left floor by wheelchair on oxygen with aide.     Annabelle Hightower RN

## 2024-04-04 NOTE — PROGRESS NOTES
Care Management Follow Up    Length of Stay (days): 1    Expected Discharge Date: 04/06/2024    Concerns to be Addressed:   Alteration in cardiopulmonary status requiring IV Lasix every 8 hours and supplemental oxygen (15 liters).   Patient plan of care discussed at interdisciplinary rounds: Yes    Anticipated Discharge Disposition:  Discharge goal is home pending response to treatment.      Anticipated Discharge Services:  To be determined.   Anticipated Discharge DME:  To be determined.     Patient/family educated on Medicare website which has current facility and service quality ratings:   NA  Education Provided on the Discharge Plan:   Per team  Patient/Family in Agreement with the Plan:   Yes    Referrals Placed by CM/SW:  None  Private pay costs discussed: Not applicable     Additional Information:  Patient is  and independent with activities of daily living, including driving, at baseline.  He and his wife Tana live in an apartment with elevator access. Of note, Taan does not drive.   CM will continue to monitor progression of care, review team recommendations and provide discharge planning assist as needed.      Bridgett Ambrose RN

## 2024-04-04 NOTE — CONSULTS
HEART CARE NOTE        Thank you, Dr. Verdugo, for asking the [facility] team to see Per Beasley to evaluate ADHF.      Assessment/Recommendations     1. HFpEF c/b severe ADHF   Assessment / Plan  Hypervolemic on physical exam - continue IV diuretic regimen and continue to monitor UOP and renal function closely  Patient is high risk for adverse cardiac events 2/2 advanced age, frailty, HTN, DM2, CAD, elevated NTproBNP  GDMT as detailed below; mainstay of treatment for HFpEF includes diuretics and adequate BP control (class I) and SGLT2-I (class 2a); additional medical therapy (ARNI, MRA, ARB) demonstrated less robust evidence for indication but may be considered per guideline recommendations (2b); no indication for BBlockers   Hold amlodipine given propensity for fluid retention; start hydralazine and titrate as tolerated    Current Pharmacotherapy AHA Guideline-Directed Medical Therapy   Losartan  - on hold given renal dysfunction ARNI/ARB   Spironolactone not started  MRA   SGLT2 inhibitor: not started SGLT2-I    Furosemide IV Loop diuretic      2. Atrial fibrillation  Assessment / Plan  Rate controlled - currently on metoprolol  Warfarin per pharmacy management     3. Hypertensive Urgency  Assessment / Plan  Likely large contributing factor to cardiogenic pulmonary edema - hold amlodipine as above; start hydralazine and titrate as tolerated    4. CAD  Assessment / Plan  Denies chest pain or anginal equivalents  Continue ASA, atorvastatin, metoprolol and clopidogrel    5. DM2  Assessment / Plan  Management per primary team  Currently on ISS    6. JONNATHAN on CKD  Assessment / Plan  Likely CRS; diuresis as above; Continue to monitor UOP and renal function closely    Clinically Significant Risk Factors Present on Admission               # Drug Induced Coagulation Defect: home medication list includes an anticoagulant medication  # Drug Induced Platelet Defect: home medication list includes an  "antiplatelet medication  # Acute Kidney Injury, unspecified: based on a >150% or 0.3 mg/dL increase in last creatinine compared to past 90 day average, will monitor renal function  # Hypertension: Noted on problem list  # Acute heart failure with preserved ejection fraction: heart failure noted on problem list, last echo with EF >50%, and receiving IV diuretics    # DMII: A1C = 8.2 % (Ref range: <5.7 %) within past 6 months    # Overweight: Estimated body mass index is 25.8 kg/m  as calculated from the following:    Height as of this encounter: 1.727 m (5' 8\").    Weight as of this encounter: 77 kg (169 lb 11.2 oz).           Cardiac Arrhythmia: Atrial fibrillation: Unspecified  Cardiomyopathy  Diastolic acute    Fluid overload, unspecified, Other fluid overload, and Other disorders of electrolyte and fluid balance, not elsewhere classified    Acute kidney failure, unspecified  CKD POA List: Stage 4 (GFR 15-29)    85 minutes spent reviewing prior records (including documentation, laboratory studies, cardiac testing/imaging), history and physical exam, planning, and subsequent documentation.    History of Present Illness/Subjective    Mr. Per Beasley is a 84 year old male with a PMHx significant for (per Epic notation)  COPD, CAD, type 2 diabetes, hypertension, morbid obesity, severe aortic stenosis s/p TAVR, and atrial fibrillation admitted on 4/3/2024 with shortness of breath secondary to acute exacerbation of HFpEF, hypertensive urgency and pneumothorax.     Today, Mr. Beasley reports progressive dyspnea, LE edema which prompted admission; Management plan as detailed above    ECG: Personally reviewed. Afib, rate controlled.    ECHO (personnaly Reviewed on 4/4/24):   The left ventricle is normal in size.  Left ventricular function is normal.The ejection fraction is 55-60%.  Normal right ventricle size and systolic function.  The left atrium is moderately dilated.  There is a documented 26-mm Mooney Catarino 3 " "Ultra aortic bioprosthetic valve.  Valve appears to be functioning normally with a mean gradient of 9 mmHg, peak  velocity of 2.3 m/s, DI 0.38. Mild paravalvular leak.    Telemetry: personally reviewed April 4, 2024; notable for atrial fibrillation     Lab results: personally reviewed April 4, 2024; notable for elevated NTproBNP, JONNATHAN on CKD    Medical history and pertinent documents reviewed in Care Everywhere please where applicable see details above          Physical Examination Review of Systems   BP (!) 162/78 (BP Location: Left arm, Patient Position: Supine, Cuff Size: Adult Regular)   Pulse 60   Temp 97.9  F (36.6  C) (Oral)   Resp 22   Ht 1.727 m (5' 8\")   Wt 77 kg (169 lb 11.2 oz)   SpO2 100%   BMI 25.80 kg/m    Body mass index is 25.8 kg/m .  Wt Readings from Last 3 Encounters:   04/03/24 77 kg (169 lb 11.2 oz)   01/12/24 76.8 kg (169 lb 6.4 oz)   09/16/22 94.8 kg (209 lb)     General Appearance:   no distress, normal body habitus   ENT/Mouth: membranes moist, no oral lesions or bleeding gums.      EYES:  no scleral icterus, normal conjunctivae   Neck: no carotid bruits or thyromegaly   Chest/Lungs:   lungs are clear to auscultation, no rales or wheezing, equal chest wall expansion    Cardiovascular:   Irregular. Normal first and second heart sounds with no murmurs, rubs, or gallops; the carotid, radial and posterior tibial pulses are intact, + JVD and LEnedema bilaterally    Abdomen:  no organomegaly, masses, bruits, or tenderness; bowel sounds are present   Extremities: no cyanosis or clubbing   Skin: no xanthelasma, warm.    Neurologic: NAD     Psychiatric: alert and oriented x3, calm     A complete 10 systems ROS was reviewed  And is negative except what is listed in the HPI.          Medical History  Surgical History Family History Social History   No past medical history on file. No past surgical history on file. no family history of premature coronary artery disease Social History "     Socioeconomic History    Marital status:      Spouse name: Not on file    Number of children: Not on file    Years of education: Not on file    Highest education level: Not on file   Occupational History    Not on file   Tobacco Use    Smoking status: Not on file    Smokeless tobacco: Not on file   Substance and Sexual Activity    Alcohol use: Not on file    Drug use: Not on file    Sexual activity: Not on file   Other Topics Concern    Not on file   Social History Narrative    Not on file     Social Determinants of Health     Financial Resource Strain: Not on file   Food Insecurity: Not on file   Transportation Needs: Not on file   Physical Activity: Not on file   Stress: Not on file   Social Connections: Not on file   Interpersonal Safety: Not on file   Housing Stability: Not on file           Lab Results    Chemistry/lipid CBC Cardiac Enzymes/BNP/TSH/INR   Lab Results   Component Value Date    BUN 35.0 (H) 04/03/2024     04/03/2024    CO2 25 04/03/2024    Lab Results   Component Value Date    WBC 7.1 04/03/2024    HGB 11.5 (L) 04/03/2024    HCT 36.8 (L) 04/03/2024    MCV 89 04/03/2024     04/03/2024    Lab Results   Component Value Date    TROPONINI 0.04 08/12/2022     (H) 08/12/2022    TSH 1.57 01/11/2024    INR 2.29 (H) 04/03/2024     Lab Results   Component Value Date    TROPONINI 0.04 08/12/2022          Weight:    Wt Readings from Last 3 Encounters:   04/03/24 77 kg (169 lb 11.2 oz)   01/12/24 76.8 kg (169 lb 6.4 oz)   09/16/22 94.8 kg (209 lb)       Allergies  No Known Allergies      Surgical History  No past surgical history on file.    Social History  Tobacco:   History   Smoking Status    Not on file   Smokeless Tobacco    Not on file    Alcohol:   Social History    Substance and Sexual Activity      Alcohol use: Not on file   Illicit Drugs:   History   Drug Use Not on file       Family History  No family history on file.       Kelsie Mtz MD on 4/4/2024      cc:  No Ref-Primary, Physician,

## 2024-04-05 ENCOUNTER — APPOINTMENT (OUTPATIENT)
Dept: RADIOLOGY | Facility: HOSPITAL | Age: 84
DRG: 199 | End: 2024-04-05
Attending: INTERNAL MEDICINE
Payer: MEDICARE

## 2024-04-05 VITALS
RESPIRATION RATE: 16 BRPM | WEIGHT: 164.13 LBS | DIASTOLIC BLOOD PRESSURE: 60 MMHG | HEART RATE: 49 BPM | HEIGHT: 68 IN | OXYGEN SATURATION: 96 % | BODY MASS INDEX: 24.88 KG/M2 | TEMPERATURE: 98.2 F | SYSTOLIC BLOOD PRESSURE: 138 MMHG

## 2024-04-05 LAB
ALBUMIN UR-MCNC: 30 MG/DL
ANION GAP SERPL CALCULATED.3IONS-SCNC: 12 MMOL/L (ref 7–15)
APPEARANCE UR: ABNORMAL
BACTERIA #/AREA URNS HPF: ABNORMAL /HPF
BASOPHILS # BLD AUTO: 0 10E3/UL (ref 0–0.2)
BASOPHILS NFR BLD AUTO: 0 %
BILIRUB UR QL STRIP: NEGATIVE
BUN SERPL-MCNC: 40.2 MG/DL (ref 8–23)
CALCIUM SERPL-MCNC: 9.7 MG/DL (ref 8.8–10.2)
CHLORIDE SERPL-SCNC: 95 MMOL/L (ref 98–107)
COLOR UR AUTO: ABNORMAL
CREAT SERPL-MCNC: 2.1 MG/DL (ref 0.67–1.17)
CRP SERPL-MCNC: 11.7 MG/L
DEPRECATED HCO3 PLAS-SCNC: 31 MMOL/L (ref 22–29)
EGFRCR SERPLBLD CKD-EPI 2021: 30 ML/MIN/1.73M2
EOSINOPHIL # BLD AUTO: 0.2 10E3/UL (ref 0–0.7)
EOSINOPHIL NFR BLD AUTO: 3 %
ERYTHROCYTE [DISTWIDTH] IN BLOOD BY AUTOMATED COUNT: 14.3 % (ref 10–15)
GLUCOSE BLDC GLUCOMTR-MCNC: 157 MG/DL (ref 70–99)
GLUCOSE BLDC GLUCOMTR-MCNC: 176 MG/DL (ref 70–99)
GLUCOSE SERPL-MCNC: 182 MG/DL (ref 70–99)
GLUCOSE UR STRIP-MCNC: NEGATIVE MG/DL
HCT VFR BLD AUTO: 41.1 % (ref 40–53)
HGB BLD-MCNC: 12.4 G/DL (ref 13.3–17.7)
HGB UR QL STRIP: NEGATIVE
HYALINE CASTS: 15 /LPF
IMM GRANULOCYTES # BLD: 0 10E3/UL
IMM GRANULOCYTES NFR BLD: 0 %
INR PPP: 2.29 (ref 0.85–1.15)
KETONES UR STRIP-MCNC: NEGATIVE MG/DL
LEUKOCYTE ESTERASE UR QL STRIP: ABNORMAL
LYMPHOCYTES # BLD AUTO: 3.2 10E3/UL (ref 0.8–5.3)
LYMPHOCYTES NFR BLD AUTO: 40 %
MAGNESIUM SERPL-MCNC: 2.3 MG/DL (ref 1.7–2.3)
MCH RBC QN AUTO: 26.2 PG (ref 26.5–33)
MCHC RBC AUTO-ENTMCNC: 30.2 G/DL (ref 31.5–36.5)
MCV RBC AUTO: 87 FL (ref 78–100)
MONOCYTES # BLD AUTO: 0.7 10E3/UL (ref 0–1.3)
MONOCYTES NFR BLD AUTO: 9 %
MUCOUS THREADS #/AREA URNS LPF: PRESENT /LPF
NEUTROPHILS # BLD AUTO: 3.9 10E3/UL (ref 1.6–8.3)
NEUTROPHILS NFR BLD AUTO: 48 %
NITRATE UR QL: NEGATIVE
NRBC # BLD AUTO: 0 10E3/UL
NRBC BLD AUTO-RTO: 0 /100
PH UR STRIP: 5.5 [PH] (ref 5–7)
PLATELET # BLD AUTO: 185 10E3/UL (ref 150–450)
POTASSIUM SERPL-SCNC: 4.2 MMOL/L (ref 3.4–5.3)
RBC # BLD AUTO: 4.74 10E6/UL (ref 4.4–5.9)
RBC URINE: 1 /HPF
SODIUM SERPL-SCNC: 138 MMOL/L (ref 135–145)
SP GR UR STRIP: 1.01 (ref 1–1.03)
SQUAMOUS EPITHELIAL: 1 /HPF
UROBILINOGEN UR STRIP-MCNC: <2 MG/DL
WBC # BLD AUTO: 8.1 10E3/UL (ref 4–11)
WBC CLUMPS #/AREA URNS HPF: PRESENT /HPF
WBC URINE: 9 /HPF

## 2024-04-05 PROCEDURE — 83735 ASSAY OF MAGNESIUM: CPT | Performed by: INTERNAL MEDICINE

## 2024-04-05 PROCEDURE — 250N000013 HC RX MED GY IP 250 OP 250 PS 637: Performed by: HOSPITALIST

## 2024-04-05 PROCEDURE — 250N000013 HC RX MED GY IP 250 OP 250 PS 637: Performed by: INTERNAL MEDICINE

## 2024-04-05 PROCEDURE — 36415 COLL VENOUS BLD VENIPUNCTURE: CPT | Performed by: INTERNAL MEDICINE

## 2024-04-05 PROCEDURE — 99232 SBSQ HOSP IP/OBS MODERATE 35: CPT | Performed by: INTERNAL MEDICINE

## 2024-04-05 PROCEDURE — 99233 SBSQ HOSP IP/OBS HIGH 50: CPT | Performed by: INTERNAL MEDICINE

## 2024-04-05 PROCEDURE — 250N000011 HC RX IP 250 OP 636

## 2024-04-05 PROCEDURE — 71045 X-RAY EXAM CHEST 1 VIEW: CPT

## 2024-04-05 PROCEDURE — P9047 ALBUMIN (HUMAN), 25%, 50ML: HCPCS | Performed by: INTERNAL MEDICINE

## 2024-04-05 PROCEDURE — 85025 COMPLETE CBC W/AUTO DIFF WBC: CPT | Performed by: INTERNAL MEDICINE

## 2024-04-05 PROCEDURE — 81001 URINALYSIS AUTO W/SCOPE: CPT | Performed by: HOSPITALIST

## 2024-04-05 PROCEDURE — 250N000011 HC RX IP 250 OP 636: Performed by: INTERNAL MEDICINE

## 2024-04-05 PROCEDURE — 86140 C-REACTIVE PROTEIN: CPT | Performed by: INTERNAL MEDICINE

## 2024-04-05 PROCEDURE — 80048 BASIC METABOLIC PNL TOTAL CA: CPT | Performed by: INTERNAL MEDICINE

## 2024-04-05 PROCEDURE — 85610 PROTHROMBIN TIME: CPT | Performed by: INTERNAL MEDICINE

## 2024-04-05 RX ORDER — GLIPIZIDE 2.5 MG/1
2.5 TABLET ORAL
Qty: 30 TABLET | Refills: 1 | Status: SHIPPED | OUTPATIENT
Start: 2024-04-05 | End: 2024-04-05

## 2024-04-05 RX ORDER — WARFARIN SODIUM 5 MG/1
5 TABLET ORAL DAILY
COMMUNITY
Start: 2024-04-06 | End: 2024-04-05

## 2024-04-05 RX ORDER — ALBUMIN (HUMAN) 12.5 G/50ML
50 SOLUTION INTRAVENOUS ONCE
Status: COMPLETED | OUTPATIENT
Start: 2024-04-05 | End: 2024-04-05

## 2024-04-05 RX ORDER — TORSEMIDE 20 MG/1
40 TABLET ORAL EVERY MORNING
Status: ON HOLD | COMMUNITY
Start: 2024-04-08 | End: 2024-07-04

## 2024-04-05 RX ORDER — WARFARIN SODIUM 5 MG/1
5 TABLET ORAL DAILY
Status: ON HOLD | COMMUNITY
Start: 2024-04-06 | End: 2024-07-01

## 2024-04-05 RX ORDER — GLIPIZIDE 2.5 MG/1
2.5 TABLET ORAL EVERY MORNING
Qty: 30 TABLET | Refills: 1 | Status: ON HOLD | OUTPATIENT
Start: 2024-04-05 | End: 2024-07-01

## 2024-04-05 RX ORDER — BUMETANIDE 2 MG/1
2 TABLET ORAL
Status: DISCONTINUED | OUTPATIENT
Start: 2024-04-05 | End: 2024-04-05 | Stop reason: HOSPADM

## 2024-04-05 RX ORDER — OLANZAPINE 10 MG/2ML
2.5 INJECTION, POWDER, FOR SOLUTION INTRAMUSCULAR
Status: DISCONTINUED | OUTPATIENT
Start: 2024-04-05 | End: 2024-04-05 | Stop reason: HOSPADM

## 2024-04-05 RX ORDER — LANOLIN ALCOHOL/MO/W.PET/CERES
3 CREAM (GRAM) TOPICAL
Status: DISCONTINUED | OUTPATIENT
Start: 2024-04-05 | End: 2024-04-05 | Stop reason: HOSPADM

## 2024-04-05 RX ORDER — OLANZAPINE 10 MG/2ML
5 INJECTION, POWDER, FOR SOLUTION INTRAMUSCULAR DAILY PRN
Status: DISCONTINUED | OUTPATIENT
Start: 2024-04-05 | End: 2024-04-05

## 2024-04-05 RX ORDER — CEFUROXIME AXETIL 250 MG/1
250 TABLET ORAL EVERY 12 HOURS
Qty: 14 TABLET | Refills: 0 | Status: SHIPPED | OUTPATIENT
Start: 2024-04-05 | End: 2024-04-12

## 2024-04-05 RX ORDER — CEFUROXIME AXETIL 250 MG/1
250 TABLET ORAL EVERY 12 HOURS SCHEDULED
Status: DISCONTINUED | OUTPATIENT
Start: 2024-04-05 | End: 2024-04-05 | Stop reason: HOSPADM

## 2024-04-05 RX ADMIN — Medication 3 MG: at 01:41

## 2024-04-05 RX ADMIN — OLANZAPINE 5 MG: 10 INJECTION, POWDER, FOR SOLUTION INTRAMUSCULAR at 06:05

## 2024-04-05 RX ADMIN — ALBUMIN HUMAN 50 G: 0.25 SOLUTION INTRAVENOUS at 11:14

## 2024-04-05 RX ADMIN — ATORVASTATIN CALCIUM 40 MG: 40 TABLET, FILM COATED ORAL at 11:04

## 2024-04-05 RX ADMIN — BUMETANIDE 2 MG: 2 TABLET ORAL at 11:04

## 2024-04-05 RX ADMIN — CLOPIDOGREL BISULFATE 75 MG: 75 TABLET ORAL at 11:05

## 2024-04-05 RX ADMIN — ASPIRIN 81 MG CHEWABLE TABLET 81 MG: 81 TABLET CHEWABLE at 11:04

## 2024-04-05 ASSESSMENT — ACTIVITIES OF DAILY LIVING (ADL)
ADLS_ACUITY_SCORE: 27
ADLS_ACUITY_SCORE: 27
ADLS_ACUITY_SCORE: 28
ADLS_ACUITY_SCORE: 27
ADLS_ACUITY_SCORE: 28
ADLS_ACUITY_SCORE: 28
ADLS_ACUITY_SCORE: 27

## 2024-04-05 NOTE — PROGRESS NOTES
[facility] was introduced to the patient today including our role in the home management of their heart failure. Pt's son & wife were present. Pt has been following w/ [facility], they prefer to continue following w/ them rather than to transition to a new system. Encouraged HF follow up as soon as possible after discharge, ideally within 1-2 weeks. Family verbalized understanding. Provided HF education regarding daily weights, sodium and fluid restrictions. No further questions for me.     MEREDITH Wolf.[email or web address] RN     [facility]   [phone] Nurse [facility]   Heart [facility]   [address] [place] [address]

## 2024-04-05 NOTE — PLAN OF CARE
Problem: Adult Inpatient Plan of Care  Goal: Absence of Hospital-Acquired Illness or Injury  Intervention: Identify and Manage Fall Risk  Recent Flowsheet Documentation  Taken 4/4/2024 1700 by Jenn Wilson RN  Safety Promotion/Fall Prevention:   assistive device/personal items within reach   nonskid shoes/slippers when out of bed   patient and family education   safety round/check completed  Intervention: Prevent and Manage VTE (Venous Thromboembolism) Risk  Recent Flowsheet Documentation  Taken 4/4/2024 1700 by Jenn Wilson RN  VTE Prevention/Management: SCDs (sequential compression devices) off  Intervention: Prevent Infection  Recent Flowsheet Documentation  Taken 4/4/2024 1700 by Jenn Wilson RN  Infection Prevention: hand hygiene promoted     Problem: Risk for Delirium  Goal: Improved Behavioral Control  Intervention: Minimize Safety Risk  Recent Flowsheet Documentation  Taken 4/4/2024 1700 by Jenn Wilson RN  Enhanced Safety Measures:   monitor patients coagulation values   review medications for side effects with activity     Problem: Comorbidity Management  Goal: Blood Glucose Levels Within Targeted Range  Intervention: Monitor and Manage Glycemia  Recent Flowsheet Documentation  Taken 4/4/2024 1700 by Jenn Wilson RN  Medication Review/Management: medications reviewed  Goal: Maintenance of Heart Failure Symptom Control  Intervention: Maintain Heart Failure Management  Recent Flowsheet Documentation  Taken 4/4/2024 1700 by Jenn Wilson RN  Medication Review/Management: medications reviewed  Goal: Blood Pressure in Desired Range  Intervention: Maintain Blood Pressure Management  Recent Flowsheet Documentation  Taken 4/4/2024 1700 by Jenn Wilson RN  Medication Review/Management: medications reviewed   Goal Outcome Evaluation:  Pt reports no increase in dyspnea.  O2 sats are 97% with O2 per NC decreased to 2 LPM.  Pt is up to BR with SBA.  IV lasix given.  450 ml intake so far this shift and 400 ml out  since Lasix given.  Scant edema in both ankles and diminished but clear breath sounds.  Metoprolol and Hydralazine for BP management.  HR david.

## 2024-04-05 NOTE — PLAN OF CARE
"  Problem: Comorbidity Management  Goal: Blood Glucose Levels Within Targeted Range  Outcome: Progressing  Goal: Maintenance of Heart Failure Symptom Control  Outcome: Progressing  Goal: Blood Pressure in Desired Range  Outcome: Progressing     Problem: Adult Inpatient Plan of Care  Goal: Plan of Care Review  Description: The Plan of Care Review/Shift note should be completed every shift.  The Outcome Evaluation is a brief statement about your assessment that the patient is improving, declining, or no change.  This information will be displayed automatically on your shift  note.  Outcome: Progressing  Goal: Patient-Specific Goal (Individualized)  Description: You can add care plan individualizations to a care plan. Examples of Individualization might be:  \"Parent requests to be called daily at 9am for status\", \"I have a hard time hearing out of my right ear\", or \"Do not touch me to wake me up as it startles  me\".  Outcome: Progressing  Goal: Absence of Hospital-Acquired Illness or Injury  Outcome: Progressing  Intervention: Prevent Skin Injury  Recent Flowsheet Documentation  Taken 4/5/2024 0100 by Mary Hammond RN  Body Position: position changed independently  Goal: Optimal Comfort and Wellbeing  Outcome: Progressing  Goal: Readiness for Transition of Care  Outcome: Progressing   Goal Outcome Evaluation:      Pt anxious at start of shift. States he was told by the doctor that he could leave and he wants to go home. Writer talked with pt about the plans indicated by the doctors and that they will not discharge him at night. Pt seems appropriate otherwise, but as shift progresses pt slowly becoming more disoriented. Asking who's house he is at? And if someone shot all the birds? MD notified and UA/UC ordered. At approximately 0550 pt insisting that he has to leave and \"something is not right here.\" Pt pulled off oxygen and tele. Pulled pocket knife out of jeans and attempted to cut pulse ox cord. Staff took " knife from pt and was given to security eventually. Code DEBBIE called when pt attempting to push staff out of the way to leave the unit and refusing to wear oxygen. Pt visibly sob. Pt assisted into wc and back to room. 5mg zyprexa given. Offered multiple times to call pts wife for him to speak with her. Pt refused. Able to get oxygen back on pt, but still unable to get telemetry back on.

## 2024-04-05 NOTE — SIGNIFICANT EVENT
Significant Event Note    Time of event: 6:09 AM April 5, 2024    Description of event:  Behavioral code called on patient.    Briefly, patient is an 85 y/o male with a pmh of COPD, CAD, T2DM, severe aortic stenosis s/p TAVR and atrial fibrillation. He is admitted with shortness of breath from decompensated HFpEF, hypertensive urgency and pneumothorax. As of 4/4, still requiring 3L, not deemed medically ready for discharge.    Behavioral code was called as patient was refusing oxygen, walking the staton and refusing to go back to his room. The team escorted patient back to his room, but patient was still declining pulse ox or oxygen; bedside nurse reported that he would drop to low 80's without his oxygen. Attempted to explain why the patient required oxygen and redirect patient to bed. Patient attempted to get up and walk out of the room, and he became physically aggressive towards staff.     At this time, he was guided to bed and was given 5mg of Zyprexa. His oxygen was placed back on, SpO2 reading 93%. Will attempt to place back on telemetry. Patient more calm and laying in bed. Vitals wnl.    Plan:  S/p 5mg Zyprexa  1:1, redirection   Consider delirium; morning labs now to assess for etiology of encephalopathy  UA ordered by hospitalist    Janet Merchant MD

## 2024-04-05 NOTE — PROGRESS NOTES
Pt wife and son arrived. Pt's wife upset that she was not called overnight when pt was confused and agitated.

## 2024-04-05 NOTE — PLAN OF CARE
Occupational Therapy Discharge Summary    Reason for therapy discharge:    Discharged to home.    Progress towards therapy goal(s). See goals on Care Plan in [facility] electronic health record for goal details.  Goals partially met.  Barriers to achieving goals:   discharge from facility.    Therapy recommendation(s):    No further therapy is recommended. Patient may benefit from OP cardiac rehab in the future, OT recommends pt have additional assist as needed after discharge home.    Eleln Antony, OTR/L. 4/5/2024, 1:47 PM

## 2024-04-05 NOTE — PROGRESS NOTES
"Pt was alert and became more oriented as morning progressed. Pt family arrived at approx 0915. Pt and family then stated they wanted to take the patient home.   Writer notified MD.   Pt became agreeable to take medications, allow assessment and vitals etc. Pt BP was soft 93/53. Writer held scheduled metoprolol and hydralazine. Tele showing A fib with bradycardia, heart rate sitting in the 50s-60s mostly, lowest noted 49. Pt was able to eat breakfast and drink fluids as charted.   Writer and MD provided education regarding patient's medical status.  Pt's wife Tana stated she was very upset about the security being involved without her knowledge this morning and stated \"You all should be thrown out the window\".  Pt and family agreeable to allow writer to infuse remainder of ordered medication and recheck pt's vitals.   Writer provided discharge education regarding follow up appointments, medications, activity level, fluid restriction, and diet to patient, wife and son.   Pt transferred by W/C to son's vehicle with all personal belongings at approx 1340.    "

## 2024-04-05 NOTE — PROGRESS NOTES
HEART CARE NOTE          Assessment/Recommendations   1. HFpEF c/b severe ADHF   Assessment / Plan  Transition to oral diuretic regimen - continue to monitor UOP and renal function closely  Patient is high risk for adverse cardiac events 2/2 advanced age, frailty, HTN, DM2, CAD, elevated NTproBNP  GDMT as detailed below; mainstay of treatment for HFpEF includes diuretics and adequate BP control (class I) and SGLT2-I (class 2a); additional medical therapy (ARNI, MRA, ARB) demonstrated less robust evidence for indication but may be considered per guideline recommendations (2b); no indication for BBlockers   Hold amlodipine given propensity for fluid retention; start hydralazine and titrate as tolerated     Current Pharmacotherapy AHA Guideline-Directed Medical Therapy   Losartan  - on hold given renal dysfunction ARNI/ARB   Spironolactone not started  MRA   SGLT2 inhibitor: not started SGLT2-I    Bumex 2 mg BID Loop diuretic       2. Atrial fibrillation  Assessment / Plan  Rate controlled - currently on metoprolol  Warfarin per pharmacy management      3. Hypertensive Urgency  Assessment / Plan  Likely large contributing factor to cardiogenic pulmonary edema - held amlodipine as above; titrate hydralazine as tolerated     4. CAD  Assessment / Plan  Denies chest pain or anginal equivalents  Continue ASA, atorvastatin, metoprolol and clopidogrel     5. DM2  Assessment / Plan  Management per primary team  Currently on ISS     6. JONNATHAN on CKD  Assessment / Plan  Likely CRS; diuresis as above; Continue to monitor UOP and renal function closely       Plan of care discussed on April 5, 2024 with patient and family at bedside, and primary team overseeing patient's care    Cardiology team will sign-off for now. Please do not hesitate to consult us again if new questions or concerns arise. Follow-up appointment will be arranged by [facility].       History of Present Illness/Subjective    Mr. Per Beasley is a 84 year  "old male with a PMHx significant for (per Epic notation)  COPD, CAD, type 2 diabetes, hypertension, morbid obesity, severe aortic stenosis s/p TAVR, and atrial fibrillation admitted on 4/3/2024 with shortness of breath secondary to acute exacerbation of HFpEF, hypertensive urgency and pneumothorax.      Today, Mr. Beasley denies acute cardiac events or complaints; Management plan as detailed above     ECG: Personally reviewed. Afib, rate controlled.     ECHO (personnaly Reviewed on 4/4/24):   The left ventricle is normal in size.  Left ventricular function is normal.The ejection fraction is 55-60%.  Normal right ventricle size and systolic function.  The left atrium is moderately dilated.  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  Valve appears to be functioning normally with a mean gradient of 9 mmHg, peak  velocity of 2.3 m/s, DI 0.38. Mild paravalvular leak.    Telemetry: personally reviewed April 5, 2024; notable for atrial fibrillation     Lab results: personally reviewed April 5, 2024; notable for JONNATHAN on CKD    Medical history and pertinent documents reviewed in Care Everywhere please where applicable see details above        Physical Examination Review of Systems   /63 (BP Location: Left arm)   Pulse 71   Temp 98.5  F (36.9  C) (Oral)   Resp 18   Ht 1.727 m (5' 8\")   Wt 74.4 kg (164 lb 2 oz)   SpO2 96%   BMI 24.96 kg/m    Body mass index is 24.96 kg/m .  Wt Readings from Last 3 Encounters:   04/04/24 74.4 kg (164 lb 2 oz)   01/12/24 76.8 kg (169 lb 6.4 oz)   09/16/22 94.8 kg (209 lb)     General Appearance:   no distress   ENT/Mouth: membranes moist, no oral lesions or bleeding gums.      EYES:  no scleral icterus, normal conjunctivae   Neck: no carotid bruits or thyromegaly   Chest/Lungs:   lungs are clear to auscultation, no rales or wheezing, equal chest wall expansion    Cardiovascular:   Irregular. Normal first and second heart sounds with no murmurs, " rubs, or gallops; the carotid, radial and posterior tibial pulses are intact, no JVD or LE edema bilaterally    Abdomen:  no organomegaly, masses, bruits, or tenderness; bowel sounds are present   Extremities: no cyanosis or clubbing   Skin: no xanthelasma, warm.    Neurologic: NAD     Psychiatric: alert and calm     A complete 10 systems ROS was reviewed  And is negative except what is listed in the HPI.          Medical History  Surgical History Family History Social History   No past medical history on file. No past surgical history on file. no family history of premature coronary artery disease Social History     Socioeconomic History    Marital status:      Spouse name: Not on file    Number of children: Not on file    Years of education: Not on file    Highest education level: Not on file   Occupational History    Not on file   Tobacco Use    Smoking status: Not on file    Smokeless tobacco: Not on file   Substance and Sexual Activity    Alcohol use: Not on file    Drug use: Not on file    Sexual activity: Not on file   Other Topics Concern    Not on file   Social History Narrative    Not on file     Social Determinants of Health     Financial Resource Strain: Not on file   Food Insecurity: Not on file   Transportation Needs: Not on file   Physical Activity: Not on file   Stress: Not on file   Social Connections: Not on file   Interpersonal Safety: Not on file   Housing Stability: Not on file           Lab Results    Chemistry/lipid CBC Cardiac Enzymes/BNP/TSH/INR   Lab Results   Component Value Date    BUN 40.2 (H) 04/05/2024     04/05/2024    CO2 31 (H) 04/05/2024    Lab Results   Component Value Date    WBC 8.1 04/05/2024    HGB 12.4 (L) 04/05/2024    HCT 41.1 04/05/2024    MCV 87 04/05/2024     04/05/2024    Lab Results   Component Value Date    TROPONINI 0.04 08/12/2022     (H) 08/12/2022    TSH 1.57 01/11/2024    INR 2.29 (H) 04/05/2024     Lab Results   Component Value Date     TROPONINI 0.04 08/12/2022          Weight:    Wt Readings from Last 3 Encounters:   04/04/24 74.4 kg (164 lb 2 oz)   01/12/24 76.8 kg (169 lb 6.4 oz)   09/16/22 94.8 kg (209 lb)       Allergies  No Known Allergies      Surgical History  No past surgical history on file.    Social History  Tobacco:   History   Smoking Status    Not on file   Smokeless Tobacco    Not on file    Alcohol:   Social History    Substance and Sexual Activity      Alcohol use: Not on file   Illicit Drugs:   History   Drug Use Not on file       Family History  No family history on file.       Kelsie Mtz MD on 4/5/2024      cc: No Ref-Primary, Physician

## 2024-04-05 NOTE — PROGRESS NOTES
PULMONARY / CRITICAL CARE PROGRESS NOTE    Date / Time of Admission:  4/3/2024 12:52 PM    Assessment:   Per Beasley is a 84 year old male with history of moderate COPD , HTN, HFpEF, coronary artery disease s/p stenting, severe aortic stenosis s/p TAVR, chronic atrial fibrillation, and prior hospitalization 01/2024 with small right pneumothorax (no interventions) with subsequent resolution on repeat CXR/CT images who represented to [facility] ED on 4/3/2024 with worsening dyspnea, subsequently found to have recurrent right sided pneumothorax.    Small recurrent right side pneumothorax  Hx very small right apical pneumothorax 1/11/2024. Conservative treatment at that time.    Admitted on 4/3/24 with dyspnea, patient was clinically volume up , in addition, small right apical pneumothorax. Stable in follow up CXR.   Previous Chest CT scan 2022 showed mild basal pulmonary fibrosis.    Start Abx to cover acute bronchitis.   If patient decided to go home, recommend follow up in the pulmonary clinic with CXR in one week and referral to thoracic surgery with Dr. Zimmerman.   Decompensated cardiomyopathy   HTN, CAD, HFpEF  Chronic atrial fibrillation  COPD  Acute on CKD  (+) U/A  Start Abx cefuroxime     Advance Directives: full code    Plan:   Titrate FiO2 to keep SpO2 > 90% currently on RA  Home O2 evaluation prior to discharge  Bronchodilators as needed  Start Abx cefuroxime   Titrate BP meds and diuresis   Monitor kidney function   DVT prophylaxis, resume coumadin  PT , OT evaluation   If patient decided to go home, follow up in the pulmonary clinic with CXR in one week and referral to thoracic surgery with Dr. Zimmerman.    Please contact me if you have any questions.    Lazarus Muñoz  Pulmonary / Critical Care  04/05/2024  9:03 AM        Subjective:   HPI:  Per Beasley is a 84 year old male with history of moderate COPD , HTN, HFpEF, coronary artery disease s/p stenting, severe aortic stenosis  s/p TAVR, chronic atrial fibrillation, and prior hospitalization 01/2024 with small right pneumothorax (no interventions) with subsequent resolution on repeat CXR/CT images who represented to [facility] ED on 4/3/2024 with worsening dyspnea, subsequently found to have recurrent right sided pneumothorax.    Events overnight  - Stable pneumothorax in follow up CXR  - Restless / agitated overnight  - Patient wants to go home      Allergies: Patient has no known allergies.     MEDS:  Current Facility-Administered Medications   Medication Dose Route Frequency Provider Last Rate Last Admin    - MEDICATION INSTRUCTIONS -   Does not apply DOES NOT GO TO Nabeel Garcia MD        acetaminophen (TYLENOL) tablet 650 mg  650 mg Oral Q4H PRN Nabeel Nielson MD        Or    acetaminophen (TYLENOL) Suppository 650 mg  650 mg Rectal Q4H PRN Nabeel Nielson MD        albumin human 25 % injection 50 g  50 g Intravenous Once Kelsie Mtz MD        aspirin (ASA) chewable tablet 81 mg  81 mg Oral Daily Nabeel Nielson MD   81 mg at 04/04/24 0753    atorvastatin (LIPITOR) tablet 40 mg  40 mg Oral Daily Nabeel Nielson MD   40 mg at 04/04/24 0753    bumetanide (BUMEX) tablet 2 mg  2 mg Oral BID Kelsie Mtz MD        calcium carbonate (TUMS) chewable tablet 1,000 mg  1,000 mg Oral 4x Daily PRN Nabeel Nielson MD        clopidogrel (PLAVIX) tablet 75 mg  75 mg Oral Daily Nabeel Nielson MD   75 mg at 04/04/24 0755    glucose gel 15-30 g  15-30 g Oral Q15 Min PRN Nabeel Nielson MD        Or    dextrose 50 % injection 25-50 mL  25-50 mL Intravenous Q15 Min PRN Nabeel Nielson MD        Or    glucagon injection 1 mg  1 mg Subcutaneous Q15 Min PRN Nabeel Nielson MD        hydrALAZINE (APRESOLINE) tablet 10 mg  10 mg Oral Q4H PRN Nabeel Nielson MD        Or    hydrALAZINE (APRESOLINE) injection 10 mg  10 mg Intravenous Q4H PRN Naga  Nabeel DICKSON MD        hydrALAZINE (APRESOLINE) tablet 25 mg  25 mg Oral BID Kelsie Mtz MD   25 mg at 04/04/24 2124    insulin aspart (NovoLOG) injection (RAPID ACTING)  1-3 Units Subcutaneous TID AC Nabeel Nielson MD   1 Units at 04/04/24 1728    insulin aspart (NovoLOG) injection (RAPID ACTING)  1-3 Units Subcutaneous At Bedtime Nabeel Nielson MD        ipratropium - albuterol 0.5 mg/2.5 mg/3 mL (DUONEB) neb solution 3 mL  3 mL Nebulization 4x Daily PRN Nabeel Nielson MD        lidocaine (LMX4) cream   Topical Q1H PRN Nabeel Nielson MD        lidocaine 1 % 0.1-1 mL  0.1-1 mL Other Q1H PRN Nabeel Nielson MD        melatonin tablet 3 mg  3 mg Oral At Bedtime PRN Hero Mejia MD   3 mg at 04/05/24 0141    metoprolol tartrate (LOPRESSOR) half-tab 12.5 mg  12.5 mg Oral BID Jono Shah MD   12.5 mg at 04/04/24 2125    OLANZapine (zyPREXA) injection 2.5 mg  2.5 mg Intramuscular Once PRN Janet Merchant MD        Patient is already receiving anticoagulation with heparin, enoxaparin (LOVENOX), warfarin (COUMADIN)  or other anticoagulant medication   Does not apply Continuous PRN Nabeel Nielson MD        prochlorperazine (COMPAZINE) injection 5 mg  5 mg Intravenous Q6H PRN Nabeel Nielson MD        Or    prochlorperazine (COMPAZINE) tablet 5 mg  5 mg Oral Q6H PRN Nabeel Nielson MD        Or    prochlorperazine (COMPAZINE) suppository 12.5 mg  12.5 mg Rectal Q12H PRN Nabeel Nielson MD        senna-docusate (SENOKOT-S/PERICOLACE) 8.6-50 MG per tablet 1 tablet  1 tablet Oral BID PRN Nabeel Nielson MD        Or    senna-docusate (SENOKOT-S/PERICOLACE) 8.6-50 MG per tablet 2 tablet  2 tablet Oral BID PRN Nabeel Nielson MD        sodium chloride (PF) 0.9% PF flush 3 mL  3 mL Intracatheter Q8H Nabeel Nielson MD   3 mL at 04/05/24 0101    sodium chloride (PF) 0.9% PF flush 3 mL  3 mL Intracatheter q1 min prn  "Nabeel Nielson MD        [Held by provider] Warfarin Dose Required Daily - Pharmacist Managed  1 each Oral See Admin Instructions Nabeel Nielson MD             Objective:   VITALS:  /63 (BP Location: Left arm)   Pulse 71   Temp 98.5  F (36.9  C) (Oral)   Resp 18   Ht 1.727 m (5' 8\")   Wt 74.4 kg (164 lb 2 oz)   SpO2 96%   BMI 24.96 kg/m    VENT:  Resp: 18    EXAM:   Gen: awake, alert, no distress  HEENT: pink conjunctiva, moist mucosa, Mallampati II/IV  Neck: no thyromegaly, masses or JVD  Lungs: clear  CV: regular, no murmurs or gallops appreciated  Abdomen: soft, NT, BS wnl  Ext: no edema  Neuro: CN II-XII intact, non focal       Data Review:  Recent Labs   Lab 04/05/24  0533 04/05/24  0059 04/04/24  2046 04/04/24  1631 04/04/24  1211 04/04/24  0736   * 176* 179* 191* 169* 148*      04/05/24 05:33   Sodium 138   Potassium 4.2   Chloride 95 (L)   Carbon Dioxide (CO2) 31 (H)   Urea Nitrogen 40.2 (H)   Creatinine 2.10 (H)   GFR Estimate 30 (L)   Calcium 9.7   Anion Gap 12   Magnesium 2.3   CRP Inflammation 11.70 (H)   Glucose 182 (H)      04/05/24 05:33   WBC 8.1   Hemoglobin 12.4 (L)   Hematocrit 41.1   Platelet Count 185   RBC Count 4.74   MCV 87   MCH 26.2 (L)   MCHC 30.2 (L)   RDW 14.3   % Neutrophils 48   % Lymphocytes 40   % Monocytes 9   % Eosinophils 3   % Basophils 0      04/05/24 06:31   Color Urine Light Yellow   Appearance Urine Slightly Cloudy !   Glucose Urine Negative   Bilirubin Urine Negative   Ketones Urine Negative   Specific Gravity Urine 1.014   pH Urine 5.5   Protein Albumin Urine 30 !   Urobilinogen mg/dL <2.0   Nitrite Urine Negative   Blood Urine Negative   Leukocyte Esterase Urine 75 Dale/uL !   WBC Urine 9 (H)   RBC Urine 1   Bacteria Urine Few !   WBC Clumps Present !   Squamous Epithelial /HPF Urine 1   Mucus Urine Present !   Hyaline Casts 15 (H)     XR CHEST PORT 1 VIEW  LOCATION: [facility]  DATE: 4/4/2024     INDICATION: " Follow up right sided spontaneous pneumothorax  COMPARISON: 04/03/2024           IMPRESSION: Unchanged right apical pneumothorax with 2 cm apical pleural separation. Right pleural fluid has not changed. Cannot exclude underlying airspace disease. Normal size of the heart.      By:  Lazarus Muñoz MD, 04/05/2024  9:03 AM      Primary Care Physician:  No Ref-Primary, Physician

## 2024-04-05 NOTE — PROGRESS NOTES
[facility]    Medicine Progress Note - Hospitalist Service    Date of Admission:  4/3/2024    Assessment & Plan       Per Beasley is 84 year old male with history of COPD, CAD, type 2 diabetes, hypertension, morbid obesity, severe aortic stenosis s/p TAVR, and atrial fibrillation admitted on 4/3/2024 with shortness of breath secondary to acute exacerbation of HFpEF, hypertensive urgency and pneumothorax.     Patient declined chest tube placement offered by pulmonologist.  He was placed on diuretic therapy.        4/4 :       On 3 liters of oxygen  CXR : unchanged pneumothorax  On iv diuresis, creatinine trending up      Not medically ready for discharge at this time.      A/p :        Right-sided pneumothorax  Chest radiograph revealed new, moderate right pneumothorax with probable small right pleural effusion and old healed left eighth rib fracture.  ER attending discussed with pulmonologist, Dr. Ochoa who recommended chest tube placement vs high flow oxygen with plan to repeat chest x-ray and consider intervention if there is no improvement.  Patient declined chest tube placement and opted for high flow oxygen with plan to wait and repeat chest x-ray.      Continue high flow oxygen for now  Serial chest radiograph  Consider chest replacement if pneumothorax is worsening   Continuous oximetry  Pulmonology [id number] assistance     Suspected acute exacerbation of HFpEF  NT proBNP is elevated.  Echocardiogram performed 1/11/2024 revealed normal LV systolic function with LVEF of 55 to 60%, and normal regional wall motion. He was taking torsemide 40 mg in the morning and 20 mg in the evening     Hold PTA torsemide for now  Start IV furosemide 40 mg every 8 hours  Monitor BMP  Monitor electrolytes and replace as needed  Monitor on telemetry  Follow up cardiology consult     Hypertensive urgency  Resume PTA amlodipine, and metoprolol titrate  IV hydralazine as needed     Atrial  fibrillation  Heart rate is controlled  Resume PTA metoprolol  Warfarin as per pharmacist-- Held for possible chest tube placement. Can be resumed tomorrow if chest tube placement is no longer indicated. A repeat CXR will be obtained tomorrow morning- 4/4/24- Discussed with pulmonologist, Dr. Ochoa      Type 2 diabetes  Hold PTA metformin for now  Start insulin sliding scale  Monitor for hypoglycemia correct as per protocol     History of CAD  Resume PTA atorvastatin, Plavix, and aspirin  Unclear why he is receiving dual antiplatelet therapy while on anticoagulation therapy -will defer to cardiologist.     Suspected JONNATHAN on stage III CKD  Creatinine 1.99 compared to baseline of 1.38 on 1/12/2024  Possibly secondary to cardiorenal process  IV furosemide as above  Monitor BMP  Avoid nephrotoxin  Consider nephrology evaluation if creatinine is worsening              Diet: Combination Diet 2 gm NA Diet; No Caffeine Diet (and additional linked orders)  Fluid restriction 1800 ML FLUID (and additional linked orders)  Fluid restriction 1800 ML FLUID    DVT Prophylaxis: Pneumatic Compression Devices  Helm Catheter: Not present  Lines: None     Cardiac Monitoring: ACTIVE order. Indication: Acute decompensated heart failure (48 hours)  Code Status: Full Code      Clinically Significant Risk Factors               # Coagulation Defect: INR = 2.47 (Ref range: 0.85 - 1.15) and/or PTT = N/A, will monitor for bleeding   # Acute Kidney Injury, unspecified: based on a >150% or 0.3 mg/dL increase in last creatinine compared to past 90 day average, will monitor renal function  # Hypertension: Noted on problem list  # Acute heart failure with preserved ejection fraction: heart failure noted on problem list, last echo with EF >50%, and receiving IV diuretics      # DMII: A1C = 8.2 % (Ref range: <5.7 %) within past 6 months, PRESENT ON ADMISSION             Disposition Plan      Expected Discharge Date: 04/06/2024      Destination: home  with family              Kamlesh Verdugo MD  Hospitalist Service  [facility]  Securely message with [employer] (more info)  Text page via [employer] Paging/Directory   ______________________________________________________________________      Physical Exam   Vital Signs: Temp: 98.2  F (36.8  C) Temp src: Oral BP: (!) 157/70 Pulse: 61   Resp: 18 SpO2: 96 % O2 Device: Nasal cannula Oxygen Delivery: 3 LPM  Weight: 164 lbs 2 oz       GENERAL: The patient is not in any acute distressed. Awake and alert.  HEENT: Nonicteric sclerae, PERRLA, EOMI. Oropharynx clear. Moist mucous membranes. Conjunctivae appear well perfused.  HEART: Regular rate and rhythm without murmurs.  LUNGS: Clear to auscultation bilaterally. No wheezing or crackles.  ABDOMEN: Soft, positive bowel sounds, nontender.  SKIN: No rash, no excessive bruising, petechiae, or purpura.  EXTREMITIES : no rashes, no swelling in legs.  NEUROLOGIC: conscious and oriented, follows commands, no obvious focal deficits.  ROS: All other systems negative       Medical Decision Making       60 MINUTES SPENT BY ME on the date of service doing chart review, history, exam, documentation & further activities per the note.  MANAGEMENT DISCUSSED with the following over the past 24 hours: rn, patient       Data     I have personally reviewed the following data over the past 24 hrs:    N/A  \   N/A   / N/A     N/A N/A N/A /  179 (H)   4.3 N/A N/A \     INR:  2.47 (H) PTT:  N/A   D-dimer:  N/A Fibrinogen:  N/A

## 2024-04-08 ENCOUNTER — PATIENT OUTREACH (OUTPATIENT)
Dept: CARE COORDINATION | Facility: CLINIC | Age: 84
End: 2024-04-08
Payer: MEDICARE

## 2024-04-08 NOTE — PROGRESS NOTES
Clinic Care Coordination Contact  [facility]/Voicemail    Clinical Data: Care Coordinator Outreach    Outreach Documentation Number of Outreach Attempt   4/8/2024   9:22 AM 2       Left message on patient's voicemail with call back information and requested return call.    Care Coordinator will do no further outreaches at this time.    Steffanie Soria  [phone]  Care

## 2024-05-13 NOTE — DISCHARGE SUMMARY
[facility]  Hospitalist Discharge Summary      Date of Admission:  4/3/2024  Date of Discharge:  4/5/2024  1:46 PM  Discharging Provider: Kamlesh Verdugo MD  Discharge Service: Hospitalist Service    Discharge Diagnoses         Per Beasley is 84 year old male with history of COPD, CAD, type 2 diabetes, hypertension, morbid obesity, severe aortic stenosis s/p TAVR, and atrial fibrillation admitted on 4/3/2024 with shortness of breath secondary to acute exacerbation of HFpEF, hypertensive urgency and pneumothorax.     Patient declined chest tube placement offered by pulmonologist.  He was placed on diuretic therapy.           4/4 :         On 3 liters of oxygen  CXR : unchanged pneumothorax  On iv diuresis, creatinine trending up                A/p :         Right-sided pneumothorax  Chest radiograph revealed new, moderate right pneumothorax with probable small right pleural effusion and old healed left eighth rib fracture.  ER attending discussed with pulmonologist, Dr. Ochoa who recommended chest tube placement vs high flow oxygen with plan to repeat chest x-ray and consider intervention if there is no improvement.  Patient declined chest tube placement and opted for high flow oxygen with plan to wait and repeat chest x-ray.      Continue high flow oxygen for now  Serial chest radiograph  Consider chest replacement if pneumothorax is worsening   Continuous oximetry  Pulmonology qogsyx-lt-fjnbwyxkei assistance     Suspected acute exacerbation of HFpEF  NT proBNP is elevated.  Echocardiogram performed 1/11/2024 revealed normal LV systolic function with LVEF of 55 to 60%, and normal regional wall motion. He was taking torsemide 40 mg in the morning and 20 mg in the evening     Hold PTA torsemide for now  Start IV furosemide 40 mg every 8 hours  Monitor BMP  Monitor electrolytes and replace as needed  Monitor on telemetry  Follow up cardiology consult     Hypertensive urgency  Resume PTA  amlodipine, and metoprolol titrate  IV hydralazine as needed     Atrial fibrillation  Heart rate is controlled  Resume PTA metoprolol  Warfarin as per pharmacist-- Held for possible chest tube placement. Can be resumed tomorrow if chest tube placement is no longer indicated. A repeat CXR will be obtained tomorrow morning- 4/4/24- Discussed with pulmonologist, Dr. Ochoa      Type 2 diabetes  Hold PTA metformin for now  Start insulin sliding scale  Monitor for hypoglycemia correct as per protocol     History of CAD  Resume PTA atorvastatin, Plavix, and aspirin  Unclear why he is receiving dual antiplatelet therapy while on anticoagulation therapy -will defer to cardiologist.     Suspected JONNATHAN on stage III CKD  Creatinine 1.99 compared to baseline of 1.38 on 1/12/2024  Possibly secondary to cardiorenal process  IV furosemide as above  Monitor BMP  Avoid nephrotoxin  Consider nephrology evaluation if creatinine is worsening            Clinically Significant Risk Factors     # DMII: A1C = N/A within past 6 months       Follow-ups Needed After Discharge   Follow-up Appointments     Follow-up and recommended labs and tests       Follow up with primary care provider, Physician No Ref-Primary, within 7   days for hospital follow- up.  The following labs/tests are recommended:   bmp, INR.        Follow up with cardio       As advised in  2-3  weeks  Follow up in lung clinic with repeat CXR in 1 week        {Additional follow-up instructions/to-do's for PCP    :*    Unresulted Labs Ordered in the Past 30 Days of this Admission       No orders found from 3/4/2024 to 4/4/2024.        These results will be followed up by none    Discharge Disposition   Discharged to home  Condition at discharge: Stable        Consultations This Hospital Stay   CARE MANAGEMENT / SOCIAL WORK IP CONSULT  CARDIOLOGY IP CONSULT  CORE CLINIC EVALUATION IP CONSULT  OCCUPATIONAL THERAPY ADULT IP CONSULT  NUTRITION SERVICES ADULT IP CONSULT  CARE  MANAGEMENT / SOCIAL WORK IP CONSULT  PHARMACY TO DOSE WARFARIN  PULMONARY IP CONSULT    Code Status   Prior    Time Spent on this Encounter   I, Kamlesh Verdugo MD, personally saw the patient today and spent greater than 30 minutes discharging this patient.       Kalmesh Verdugo MD  [address] [address]  Phone: [phone]  Fax: [phone]  ______________________________________________________________________    Physical Exam   Vital Signs:                    Weight: 164 lbs 2 oz       GENERAL: The patient is not in any acute distressed. Awake and alert.  HEENT: Nonicteric sclerae, PERRLA, EOMI. Oropharynx clear. Moist mucous membranes. Conjunctivae appear well perfused.  HEART: Regular rate and rhythm without murmurs.  LUNGS: Clear to auscultation bilaterally. No wheezing or crackles.  ABDOMEN: Soft, positive bowel sounds, nontender.  SKIN: No rash, no excessive bruising, petechiae, or purpura.  EXTREMITIES : no rashes, no swelling in legs.  NEUROLOGIC: conscious and oriented, follows commands, no obvious focal deficits.  ROS: All other systems negative          Primary Care Physician   Physician No Ref-Primary    Discharge Orders      XR Chest 1 View     Basic metabolic panel     INR     Follow-Up with Cardiology [facility] Heart Failure Discharge      Reason for your hospital stay    sob     Activity    Your activity upon discharge: activity as tolerated     Follow-up and recommended labs and tests     Follow up with primary care provider, Physician No Ref-Primary, within 7 days for hospital follow- up.  The following labs/tests are recommended: bmp, INR.        Follow up with cardio       As advised in  2-3  weeks  Follow up in lung clinic with repeat CXR in 1 week     Diet    Follow this diet upon discharge: Orders Placed This Encounter      Fluid restriction 1800 ML FLUID      Fluid restriction 1800 ML FLUID      Combination Diet 2 gm NA Diet; No  Caffeine Diet       Significant Results and Procedures   Most Recent 3 CBC's:  Recent Labs   Lab Test 04/05/24  0533 04/03/24  1317 01/12/24  0529   WBC 8.1 7.1 7.2   HGB 12.4* 11.5* 12.7*   MCV 87 89 85    154 151     Most Recent 3 BMP's:  Recent Labs   Lab Test 04/05/24  0952 04/05/24  0533 04/05/24  0059 04/04/24  1211 04/04/24  0900 04/03/24  1748 04/03/24  1317 01/12/24  0758 01/12/24  0529   NA  --  138  --   --   --   --  139  --  137   POTASSIUM  --  4.2  --   --  4.3  --  4.4  --  3.4   CHLORIDE  --  95*  --   --   --   --  100  --  101   CO2  --  31*  --   --   --   --  25  --  27   BUN  --  40.2*  --   --   --   --  35.0*  --  18.9   CR  --  2.10*  --   --   --   --  1.99*  --  1.38*   ANIONGAP  --  12  --   --   --   --  14  --  9   SANDRA  --  9.7  --   --   --   --  9.5  --  9.2   * 182* 176*   < >  --    < > 135*   < > 177*    < > = values in this interval not displayed.     Most Recent 2 LFT's:  Recent Labs   Lab Test 01/12/24 0529 01/11/24  0921   AST 21 20   ALT 12 12   ALKPHOS 121 136   BILITOTAL 0.6 0.4     Most Recent 3 INR's:  Recent Labs   Lab Test 04/05/24  0533 04/04/24  0900 04/03/24  1815   INR 2.29* 2.47* 2.29*       Discharge Medications   Discharge Medication List as of 4/5/2024  1:28 PM        START taking these medications    Details   cefuroxime (CEFTIN) 250 MG tablet Take 1 tablet (250 mg) by mouth every 12 hours for 7 days, Disp-14 tablet, R-0, E-Prescribe           CONTINUE these medications which have CHANGED    Details   glipiZIDE 2.5 MG TABS Take 2.5 mg by mouth every morning, Disp-30 tablet, R-1, E-Prescribe      torsemide (DEMADEX) 20 MG tablet Take by mouth See Admin Instructions Take 2 tablets (40 mg) by mouth in the morning and 1 tablet (20 mg) by mouth at 2 PM. Resume on Monday April 8 if creatinine stable on blood work, Historical      warfarin ANTICOAGULANT (COUMADIN) 5 MG tablet Take 1 tablet (5 mg) by mouth daily Check INR on 4/8/24 and call PCP with  results, PCP should advise coumadin dose for h/o a fib, Historical           CONTINUE these medications which have NOT CHANGED    Details   amLODIPine (NORVASC) 5 MG tablet Take 5 mg by mouth daily, Historical      aspirin (ASA) 81 MG chewable tablet Take 81 mg by mouth daily, Historical      atorvastatin (LIPITOR) 40 MG tablet Take 40 mg by mouth daily, Historical      clopidogrel (PLAVIX) 75 MG tablet Take 75 mg by mouth daily, Historical      glucose (BD GLUCOSE) 4 g chewable tablet Take 4 tablets by mouth every 15 minutes as needed for low blood sugar, Disp-50 tablet, R-0, E-Prescribe      ipratropium - albuterol 0.5 mg/2.5 mg/3 mL (DUONEB) 0.5-2.5 (3) MG/3ML neb solution Take 3 mLs by nebulization 4 times daily as needed for shortness of breath, Historical      magnesium chloride 535 (64 Mg) MG TBEC CR tablet Take 1 tablet by mouth daily, Historical      metoprolol tartrate (LOPRESSOR) 25 MG tablet Take 12.5 mg by mouth 2 times daily, Historical      Alcohol Swabs PADS Use to swab the area of the injection or rosenda as directed Per insurance coverage, Disp-100 each, R-0, E-Prescribe      blood glucose (NO BRAND SPECIFIED) lancets standard To use to test glucose level in the blood Use to test blood sugar  2  times daily as directed. To accompany glucose monitor brands per insurance coverage.Disp-100 each, [id number]      blood glucose (NO BRAND SPECIFIED) test strip To use to test glucose level in the blood Use to test blood sugar  2 times daily as directed. To accompany glucose monitor brands per insurance coverage., Disp-100 strip, R-0, E-Prescribe      blood glucose calibration (NO BRAND SPECIFIED) solution Used to calibrate the blood glucose monitor as needed and as directed.  To accompany  blood glucose brands per insurance coverage, Disp-1 each, R-0, E-Prescribe      blood glucose monitoring (NO BRAND SPECIFIED) meter device kit Use as directed , Per insurance coverageDisp-1 kit, [id number]       insulin pen needle (32G X 4 MM) 32G X 4 MM miscellaneous Use as directed by provider Per insurance coverageDisp-100 each, [id number]      Sharps Container MISC Use as directed to dispose of needles, lancets and other sharps Per Insurance coverage, Disp-1 each, R-0, E-Prescribe           STOP taking these medications       metFORMIN (GLUCOPHAGE) 1000 MG tablet Comments:   Reason for Stopping:             Allergies   No Known Allergies

## 2024-07-01 ENCOUNTER — APPOINTMENT (OUTPATIENT)
Dept: CT IMAGING | Facility: CLINIC | Age: 84
DRG: 291 | End: 2024-07-01
Attending: EMERGENCY MEDICINE
Payer: MEDICARE

## 2024-07-01 ENCOUNTER — HOSPITAL ENCOUNTER (INPATIENT)
Facility: CLINIC | Age: 84
LOS: 3 days | Discharge: HOME | DRG: 291 | End: 2024-07-04
Attending: EMERGENCY MEDICINE | Admitting: STUDENT IN AN ORGANIZED HEALTH CARE EDUCATION/TRAINING PROGRAM
Payer: MEDICARE

## 2024-07-01 DIAGNOSIS — I50.9 ACUTE ON CHRONIC CONGESTIVE HEART FAILURE, UNSPECIFIED HEART FAILURE TYPE (H): ICD-10-CM

## 2024-07-01 DIAGNOSIS — R09.02 HYPOXIA: ICD-10-CM

## 2024-07-01 DIAGNOSIS — I10 ESSENTIAL HYPERTENSION: Primary | ICD-10-CM

## 2024-07-01 DIAGNOSIS — I48.19 PERSISTENT ATRIAL FIBRILLATION (H): ICD-10-CM

## 2024-07-01 LAB
ALBUMIN UR-MCNC: 10 MG/DL
ANION GAP SERPL CALCULATED.3IONS-SCNC: 10 MMOL/L (ref 7–15)
APPEARANCE UR: CLEAR
APTT PPP: 30 SECONDS (ref 22–38)
ATRIAL RATE - MUSE: 60 BPM
BASOPHILS # BLD AUTO: 0 10E3/UL (ref 0–0.2)
BASOPHILS NFR BLD AUTO: 0 %
BILIRUB UR QL STRIP: NEGATIVE
BUN SERPL-MCNC: 35.2 MG/DL (ref 8–23)
CALCIUM SERPL-MCNC: 8.9 MG/DL (ref 8.8–10.2)
CHLORIDE SERPL-SCNC: 103 MMOL/L (ref 98–107)
COLOR UR AUTO: COLORLESS
CREAT SERPL-MCNC: 1.96 MG/DL (ref 0.67–1.17)
DEPRECATED HCO3 PLAS-SCNC: 27 MMOL/L (ref 22–29)
DIASTOLIC BLOOD PRESSURE - MUSE: 81 MMHG
EGFRCR SERPLBLD CKD-EPI 2021: 33 ML/MIN/1.73M2
EOSINOPHIL # BLD AUTO: 0.2 10E3/UL (ref 0–0.7)
EOSINOPHIL NFR BLD AUTO: 3 %
ERYTHROCYTE [DISTWIDTH] IN BLOOD BY AUTOMATED COUNT: 13.5 % (ref 10–15)
GLUCOSE BLDC GLUCOMTR-MCNC: 153 MG/DL (ref 70–99)
GLUCOSE SERPL-MCNC: 192 MG/DL (ref 70–99)
GLUCOSE UR STRIP-MCNC: NEGATIVE MG/DL
HCT VFR BLD AUTO: 37.4 % (ref 40–53)
HGB BLD-MCNC: 12 G/DL (ref 13.3–17.7)
HGB UR QL STRIP: NEGATIVE
IMM GRANULOCYTES # BLD: 0 10E3/UL
IMM GRANULOCYTES NFR BLD: 0 %
INR PPP: 1.11 (ref 0.85–1.15)
INTERPRETATION ECG - MUSE: NORMAL
KETONES UR STRIP-MCNC: NEGATIVE MG/DL
LDH SERPL L TO P-CCNC: 245 U/L (ref 0–250)
LEUKOCYTE ESTERASE UR QL STRIP: NEGATIVE
LYMPHOCYTES # BLD AUTO: 2.8 10E3/UL (ref 0.8–5.3)
LYMPHOCYTES NFR BLD AUTO: 36 %
MAGNESIUM SERPL-MCNC: 2.3 MG/DL (ref 1.7–2.3)
MCH RBC QN AUTO: 28.1 PG (ref 26.5–33)
MCHC RBC AUTO-ENTMCNC: 32.1 G/DL (ref 31.5–36.5)
MCV RBC AUTO: 88 FL (ref 78–100)
MONOCYTES # BLD AUTO: 0.9 10E3/UL (ref 0–1.3)
MONOCYTES NFR BLD AUTO: 11 %
MUCOUS THREADS #/AREA URNS LPF: PRESENT /LPF
NEUTROPHILS # BLD AUTO: 3.9 10E3/UL (ref 1.6–8.3)
NEUTROPHILS NFR BLD AUTO: 50 %
NITRATE UR QL: NEGATIVE
NRBC # BLD AUTO: 0 10E3/UL
NRBC BLD AUTO-RTO: 0 /100
NT-PROBNP SERPL-MCNC: 3649 PG/ML (ref 0–1800)
P AXIS - MUSE: NORMAL DEGREES
PH UR STRIP: 6.5 [PH] (ref 5–7)
PLATELET # BLD AUTO: 166 10E3/UL (ref 150–450)
POTASSIUM SERPL-SCNC: 3.8 MMOL/L (ref 3.4–5.3)
PR INTERVAL - MUSE: NORMAL MS
PROT SERPL-MCNC: 8.1 G/DL (ref 6.4–8.3)
QRS DURATION - MUSE: 86 MS
QT - MUSE: 456 MS
QTC - MUSE: 424 MS
R AXIS - MUSE: 66 DEGREES
RBC # BLD AUTO: 4.27 10E6/UL (ref 4.4–5.9)
RBC URINE: 1 /HPF
SODIUM SERPL-SCNC: 140 MMOL/L (ref 135–145)
SP GR UR STRIP: 1.01 (ref 1–1.03)
SQUAMOUS EPITHELIAL: <1 /HPF
SYSTOLIC BLOOD PRESSURE - MUSE: 194 MMHG
T AXIS - MUSE: 64 DEGREES
TROPONIN T SERPL HS-MCNC: 21 NG/L
TROPONIN T SERPL HS-MCNC: 25 NG/L
UROBILINOGEN UR STRIP-MCNC: <2 MG/DL
VENTRICULAR RATE- MUSE: 52 BPM
WBC # BLD AUTO: 7.8 10E3/UL (ref 4–11)
WBC URINE: 1 /HPF

## 2024-07-01 PROCEDURE — 83615 LACTATE (LD) (LDH) ENZYME: CPT

## 2024-07-01 PROCEDURE — 81001 URINALYSIS AUTO W/SCOPE: CPT

## 2024-07-01 PROCEDURE — 250N000011 HC RX IP 250 OP 636: Performed by: EMERGENCY MEDICINE

## 2024-07-01 PROCEDURE — 36415 COLL VENOUS BLD VENIPUNCTURE: CPT | Performed by: EMERGENCY MEDICINE

## 2024-07-01 PROCEDURE — 120N000004 HC R&B MS OVERFLOW

## 2024-07-01 PROCEDURE — 93005 ELECTROCARDIOGRAM TRACING: CPT | Performed by: EMERGENCY MEDICINE

## 2024-07-01 PROCEDURE — 85610 PROTHROMBIN TIME: CPT | Performed by: EMERGENCY MEDICINE

## 2024-07-01 PROCEDURE — 250N000011 HC RX IP 250 OP 636

## 2024-07-01 PROCEDURE — 80048 BASIC METABOLIC PNL TOTAL CA: CPT | Performed by: EMERGENCY MEDICINE

## 2024-07-01 PROCEDURE — 99291 CRITICAL CARE FIRST HOUR: CPT | Mod: 25

## 2024-07-01 PROCEDURE — 71275 CT ANGIOGRAPHY CHEST: CPT | Mod: MF

## 2024-07-01 PROCEDURE — 84484 ASSAY OF TROPONIN QUANT: CPT

## 2024-07-01 PROCEDURE — 83880 ASSAY OF NATRIURETIC PEPTIDE: CPT | Performed by: EMERGENCY MEDICINE

## 2024-07-01 PROCEDURE — 36415 COLL VENOUS BLD VENIPUNCTURE: CPT

## 2024-07-01 PROCEDURE — 250N000012 HC RX MED GY IP 250 OP 636 PS 637

## 2024-07-01 PROCEDURE — 85025 COMPLETE CBC W/AUTO DIFF WBC: CPT | Performed by: EMERGENCY MEDICINE

## 2024-07-01 PROCEDURE — 83735 ASSAY OF MAGNESIUM: CPT | Performed by: STUDENT IN AN ORGANIZED HEALTH CARE EDUCATION/TRAINING PROGRAM

## 2024-07-01 PROCEDURE — 84484 ASSAY OF TROPONIN QUANT: CPT | Performed by: EMERGENCY MEDICINE

## 2024-07-01 PROCEDURE — 85730 THROMBOPLASTIN TIME PARTIAL: CPT | Performed by: EMERGENCY MEDICINE

## 2024-07-01 PROCEDURE — 84155 ASSAY OF PROTEIN SERUM: CPT

## 2024-07-01 PROCEDURE — 250N000013 HC RX MED GY IP 250 OP 250 PS 637

## 2024-07-01 RX ORDER — ACETAMINOPHEN 650 MG/1
650 SUPPOSITORY RECTAL EVERY 4 HOURS PRN
Status: CANCELLED | OUTPATIENT
Start: 2024-07-01

## 2024-07-01 RX ORDER — PROCHLORPERAZINE MALEATE 5 MG
5 TABLET ORAL EVERY 6 HOURS PRN
Status: DISCONTINUED | OUTPATIENT
Start: 2024-07-01 | End: 2024-07-04 | Stop reason: HOSPADM

## 2024-07-01 RX ORDER — AMOXICILLIN 250 MG
2 CAPSULE ORAL 2 TIMES DAILY PRN
Status: CANCELLED | OUTPATIENT
Start: 2024-07-01

## 2024-07-01 RX ORDER — IOPAMIDOL 755 MG/ML
75 INJECTION, SOLUTION INTRAVASCULAR ONCE
Status: COMPLETED | OUTPATIENT
Start: 2024-07-01 | End: 2024-07-01

## 2024-07-01 RX ORDER — DEXTROSE MONOHYDRATE 25 G/50ML
25-50 INJECTION, SOLUTION INTRAVENOUS
Status: DISCONTINUED | OUTPATIENT
Start: 2024-07-01 | End: 2024-07-04 | Stop reason: HOSPADM

## 2024-07-01 RX ORDER — AMOXICILLIN 250 MG
1 CAPSULE ORAL 2 TIMES DAILY PRN
Status: CANCELLED | OUTPATIENT
Start: 2024-07-01

## 2024-07-01 RX ORDER — FUROSEMIDE 10 MG/ML
40 INJECTION INTRAMUSCULAR; INTRAVENOUS ONCE
Status: COMPLETED | OUTPATIENT
Start: 2024-07-01 | End: 2024-07-01

## 2024-07-01 RX ORDER — ACETAMINOPHEN 325 MG/1
650 TABLET ORAL EVERY 4 HOURS PRN
Status: DISCONTINUED | OUTPATIENT
Start: 2024-07-01 | End: 2024-07-04 | Stop reason: HOSPADM

## 2024-07-01 RX ORDER — NICOTINE POLACRILEX 4 MG
15-30 LOZENGE BUCCAL
Status: DISCONTINUED | OUTPATIENT
Start: 2024-07-01 | End: 2024-07-04 | Stop reason: HOSPADM

## 2024-07-01 RX ORDER — GLIPIZIDE 5 MG/1
5 TABLET, FILM COATED, EXTENDED RELEASE ORAL EVERY MORNING
COMMUNITY
Start: 2024-06-05

## 2024-07-01 RX ORDER — POLYETHYLENE GLYCOL 3350 17 G/17G
17 POWDER, FOR SOLUTION ORAL 2 TIMES DAILY PRN
Status: DISCONTINUED | OUTPATIENT
Start: 2024-07-01 | End: 2024-07-04 | Stop reason: HOSPADM

## 2024-07-01 RX ORDER — AMLODIPINE BESYLATE 5 MG/1
5 TABLET ORAL DAILY
Status: DISCONTINUED | OUTPATIENT
Start: 2024-07-02 | End: 2024-07-03

## 2024-07-01 RX ORDER — LABETALOL HYDROCHLORIDE 5 MG/ML
20 INJECTION, SOLUTION INTRAVENOUS ONCE
Status: COMPLETED | OUTPATIENT
Start: 2024-07-01 | End: 2024-07-01

## 2024-07-01 RX ORDER — FUROSEMIDE 10 MG/ML
40 INJECTION INTRAMUSCULAR; INTRAVENOUS EVERY 12 HOURS
Status: DISCONTINUED | OUTPATIENT
Start: 2024-07-01 | End: 2024-07-01

## 2024-07-01 RX ORDER — ONDANSETRON 4 MG/1
4 TABLET, ORALLY DISINTEGRATING ORAL EVERY 6 HOURS PRN
Status: CANCELLED | OUTPATIENT
Start: 2024-07-01

## 2024-07-01 RX ORDER — ONDANSETRON 4 MG/1
4 TABLET, ORALLY DISINTEGRATING ORAL EVERY 6 HOURS PRN
Status: DISCONTINUED | OUTPATIENT
Start: 2024-07-01 | End: 2024-07-04 | Stop reason: HOSPADM

## 2024-07-01 RX ORDER — FLUTICASONE FUROATE AND VILANTEROL 100; 25 UG/1; UG/1
1 POWDER RESPIRATORY (INHALATION) DAILY
Status: DISCONTINUED | OUTPATIENT
Start: 2024-07-01 | End: 2024-07-04 | Stop reason: HOSPADM

## 2024-07-01 RX ORDER — PROCHLORPERAZINE 25 MG
12.5 SUPPOSITORY, RECTAL RECTAL EVERY 12 HOURS PRN
Status: CANCELLED | OUTPATIENT
Start: 2024-07-01

## 2024-07-01 RX ORDER — AMOXICILLIN 250 MG
2 CAPSULE ORAL 2 TIMES DAILY PRN
Status: DISCONTINUED | OUTPATIENT
Start: 2024-07-01 | End: 2024-07-04 | Stop reason: HOSPADM

## 2024-07-01 RX ORDER — ONDANSETRON 2 MG/ML
4 INJECTION INTRAMUSCULAR; INTRAVENOUS EVERY 6 HOURS PRN
Status: CANCELLED | OUTPATIENT
Start: 2024-07-01

## 2024-07-01 RX ORDER — POLYETHYLENE GLYCOL 3350 17 G/17G
17 POWDER, FOR SOLUTION ORAL 2 TIMES DAILY PRN
Status: CANCELLED | OUTPATIENT
Start: 2024-07-01

## 2024-07-01 RX ORDER — PROCHLORPERAZINE 25 MG
12.5 SUPPOSITORY, RECTAL RECTAL EVERY 12 HOURS PRN
Status: DISCONTINUED | OUTPATIENT
Start: 2024-07-01 | End: 2024-07-04 | Stop reason: HOSPADM

## 2024-07-01 RX ORDER — CALCIUM CARBONATE 500 MG/1
1000 TABLET, CHEWABLE ORAL 4 TIMES DAILY PRN
Status: CANCELLED | OUTPATIENT
Start: 2024-07-01

## 2024-07-01 RX ORDER — PROCHLORPERAZINE MALEATE 5 MG
5 TABLET ORAL EVERY 6 HOURS PRN
Status: CANCELLED | OUTPATIENT
Start: 2024-07-01

## 2024-07-01 RX ORDER — ONDANSETRON 2 MG/ML
4 INJECTION INTRAMUSCULAR; INTRAVENOUS EVERY 6 HOURS PRN
Status: DISCONTINUED | OUTPATIENT
Start: 2024-07-01 | End: 2024-07-04 | Stop reason: HOSPADM

## 2024-07-01 RX ORDER — FUROSEMIDE 10 MG/ML
40 INJECTION INTRAMUSCULAR; INTRAVENOUS EVERY 12 HOURS
Status: DISCONTINUED | OUTPATIENT
Start: 2024-07-02 | End: 2024-07-04

## 2024-07-01 RX ORDER — LIDOCAINE 40 MG/G
CREAM TOPICAL
Status: DISCONTINUED | OUTPATIENT
Start: 2024-07-01 | End: 2024-07-04 | Stop reason: HOSPADM

## 2024-07-01 RX ORDER — ACETAMINOPHEN 650 MG/1
650 SUPPOSITORY RECTAL EVERY 4 HOURS PRN
Status: DISCONTINUED | OUTPATIENT
Start: 2024-07-01 | End: 2024-07-04 | Stop reason: HOSPADM

## 2024-07-01 RX ORDER — BISACODYL 10 MG
10 SUPPOSITORY, RECTAL RECTAL DAILY PRN
Status: DISCONTINUED | OUTPATIENT
Start: 2024-07-01 | End: 2024-07-04 | Stop reason: HOSPADM

## 2024-07-01 RX ORDER — ATORVASTATIN CALCIUM 40 MG/1
40 TABLET, FILM COATED ORAL EVERY EVENING
Status: DISCONTINUED | OUTPATIENT
Start: 2024-07-02 | End: 2024-07-04 | Stop reason: HOSPADM

## 2024-07-01 RX ORDER — LABETALOL HYDROCHLORIDE 5 MG/ML
20 INJECTION, SOLUTION INTRAVENOUS EVERY 4 HOURS
Status: DISCONTINUED | OUTPATIENT
Start: 2024-07-01 | End: 2024-07-01

## 2024-07-01 RX ORDER — CALCIUM CARBONATE 500 MG/1
1000 TABLET, CHEWABLE ORAL 4 TIMES DAILY PRN
Status: DISCONTINUED | OUTPATIENT
Start: 2024-07-01 | End: 2024-07-04 | Stop reason: HOSPADM

## 2024-07-01 RX ORDER — IPRATROPIUM BROMIDE AND ALBUTEROL SULFATE 2.5; .5 MG/3ML; MG/3ML
3 SOLUTION RESPIRATORY (INHALATION) 4 TIMES DAILY PRN
Status: DISCONTINUED | OUTPATIENT
Start: 2024-07-01 | End: 2024-07-04 | Stop reason: HOSPADM

## 2024-07-01 RX ORDER — AMOXICILLIN 250 MG
1 CAPSULE ORAL 2 TIMES DAILY PRN
Status: DISCONTINUED | OUTPATIENT
Start: 2024-07-01 | End: 2024-07-04 | Stop reason: HOSPADM

## 2024-07-01 RX ORDER — ACETAMINOPHEN 325 MG/1
650 TABLET ORAL EVERY 4 HOURS PRN
Status: CANCELLED | OUTPATIENT
Start: 2024-07-01

## 2024-07-01 RX ORDER — ARFORMOTEROL TARTRATE 15 UG/2ML
15 SOLUTION RESPIRATORY (INHALATION) 2 TIMES DAILY PRN
COMMUNITY
Start: 2024-02-22 | End: 2024-08-18

## 2024-07-01 RX ORDER — LIDOCAINE 40 MG/G
CREAM TOPICAL
Status: CANCELLED | OUTPATIENT
Start: 2024-07-01

## 2024-07-01 RX ORDER — BISACODYL 10 MG
10 SUPPOSITORY, RECTAL RECTAL DAILY PRN
Status: CANCELLED | OUTPATIENT
Start: 2024-07-01

## 2024-07-01 RX ADMIN — INSULIN ASPART 1 UNITS: 100 INJECTION, SOLUTION INTRAVENOUS; SUBCUTANEOUS at 22:04

## 2024-07-01 RX ADMIN — FUROSEMIDE 40 MG: 10 INJECTION, SOLUTION INTRAMUSCULAR; INTRAVENOUS at 22:05

## 2024-07-01 RX ADMIN — LABETALOL HYDROCHLORIDE 20 MG: 5 INJECTION, SOLUTION INTRAVENOUS at 16:52

## 2024-07-01 RX ADMIN — IOPAMIDOL 75 ML: 755 INJECTION, SOLUTION INTRAVENOUS at 12:26

## 2024-07-01 RX ADMIN — METOPROLOL TARTRATE 12.5 MG: 25 TABLET, FILM COATED ORAL at 21:19

## 2024-07-01 RX ADMIN — UMECLIDINIUM 1 PUFF: 62.5 AEROSOL, POWDER ORAL at 17:53

## 2024-07-01 RX ADMIN — FLUTICASONE FUROATE AND VILANTEROL TRIFENATATE 1 PUFF: 100; 25 POWDER RESPIRATORY (INHALATION) at 17:53

## 2024-07-01 RX ADMIN — FUROSEMIDE 40 MG: 10 INJECTION, SOLUTION INTRAMUSCULAR; INTRAVENOUS at 14:11

## 2024-07-01 RX ADMIN — FUROSEMIDE 40 MG: 10 INJECTION, SOLUTION INTRAMUSCULAR; INTRAVENOUS at 16:52

## 2024-07-01 ASSESSMENT — ACTIVITIES OF DAILY LIVING (ADL)
ADLS_ACUITY_SCORE: 38
ADLS_ACUITY_SCORE: 21
ADLS_ACUITY_SCORE: 38
ADLS_ACUITY_SCORE: 21
ADLS_ACUITY_SCORE: 21
ADLS_ACUITY_SCORE: 38
ADLS_ACUITY_SCORE: 21
ADLS_ACUITY_SCORE: 38
ADLS_ACUITY_SCORE: 21
ADLS_ACUITY_SCORE: 21

## 2024-07-01 ASSESSMENT — COLUMBIA-SUICIDE SEVERITY RATING SCALE - C-SSRS
6. HAVE YOU EVER DONE ANYTHING, STARTED TO DO ANYTHING, OR PREPARED TO DO ANYTHING TO END YOUR LIFE?: NO
2. HAVE YOU ACTUALLY HAD ANY THOUGHTS OF KILLING YOURSELF IN THE PAST MONTH?: NO
1. IN THE PAST MONTH, HAVE YOU WISHED YOU WERE DEAD OR WISHED YOU COULD GO TO SLEEP AND NOT WAKE UP?: NO

## 2024-07-01 NOTE — MEDICATION SCRIBE - ADMISSION MEDICATION HISTORY
Medication Scribe Admission Medication History    Admission medication history is complete. The information provided in this note is only as accurate as the sources available at the time of the update.    Information Source(s): Patient, Clinic records, and CareEverywhere/SureScripts via in-person    Pertinent Information: Clinic note from  reports that the patient is not taking clopidogrel; however, patient reported today that he last took it yesterday morning. No longer taking warfarin due to not wanting to have frequent INR checks. Reports taking low-dose aspirin and clopidogrel.     Changes made to PTA medication list:  Added:   Arformoterol 15 mcg/2 mL  Trelegy 100 mcg/act - dispensed today  Deleted:   Magnesium 535 mg - pt reports not taking  Warfarin 5 mg - pt reports not taking  Changed:   Glipizide 2.5 mg --> 5 mg ER  Torsemide 20 m in the AM, one in the afternoon --> 2 tabs qam     Allergies reviewed with patient and updates made in EHR: yes    Medication History Completed By: Erick Bach 2024 3:47 PM    PTA Med List   Medication Sig Last Dose    amLODIPine (NORVASC) 5 MG tablet Take 5 mg by mouth daily 2024 at AM    arformoterol (BROVANA) 15 MCG/2ML NEBU neb solution Inhale 15 mcg into the lungs 2 times daily as needed Past Week at 3-4 days ago    aspirin (ASA) 81 MG chewable tablet Take 81 mg by mouth daily 2024 at AM    atorvastatin (LIPITOR) 40 MG tablet Take 40 mg by mouth daily 2024 at PM    clopidogrel (PLAVIX) 75 MG tablet Take 75 mg by mouth daily 2024 at AM    Fluticasone-Umeclidin-Vilant (TRELEGY ELLIPTA) 100-62.5-25 MCG/ACT oral inhaler Inhale 1 puff into the lungs daily Rinse mouth after use. has not picked up yet    glipiZIDE (GLUCOTROL XL) 5 MG 24 hr tablet Take 5 mg by mouth every morning 2024 at AM    glucose (BD GLUCOSE) 4 g chewable tablet Take 4 tablets by mouth every 15 minutes as needed for low blood sugar 2024 at AM; 1 tab    ipratropium -  albuterol 0.5 mg/2.5 mg/3 mL (DUONEB) 0.5-2.5 (3) MG/3ML neb solution Take 3 mLs by nebulization 4 times daily as needed for shortness of breath 6/30/2024 at AM    metoprolol tartrate (LOPRESSOR) 25 MG tablet Take 12.5 mg by mouth 2 times daily 6/30/2024 at PM    torsemide (DEMADEX) 20 MG tablet Take 40 mg by mouth every morning 6/30/2024 at AM

## 2024-07-01 NOTE — H&P
[facility]    History and Physical - Hospitalist Service       Date of Admission:  7/1/2024    Assessment & Plan      Per Beasley is a 84 year old male admitted on 7/1/2024. He has a history of severe AS s/p TAVR, HFpEF, paroxysmal AF not anticoagulated, CAD s/p PCI, HTN, HLD, T2DM, COPD, recent admission recurrent R PTX c/b hydropneumothorax 4/16/2024, and is admitted for acute hypoxic respiratory failure 2/2 bilateral pleural effusion in the context of suspected acute on chronic heart failure exacerbation.     Acute hypoxic respiratory failure  Acute on chronic heart failure exacerbation  Bilateral pleural effusions  Hx thoracic sarcoidosis  Most recent echo 3/5/2024 showing EF 65-70% with normal LV and RV function.    Presented to clinic today with SOB x1d, found to be saturating in the 70s and sent to the ED via EMS.    In the ED, BNP elevated 3600 and CT PE showing large right and moderate left pleural effusion with bilateral lower lobe consolidation likely due to compressive atelectasis, as well as mild pulmonary edema.    Possible that this is the result of flash pulmonary edema given below hypertensive emergency, or fluid overload developed through medication noncompliance.  Suspect that this is less likely exudative given lack of findings supportive of infection.  -IR to perform thoracentesis tomorrow 7/2, corresponding fluid studies ordered  -IV Lasix 40 mg twice daily  -Strict I's and O's  -Cardiac telemetry  -Echo complete  -Cardiology consulted for HF and hypertensive emergency, appreciate expertise      Hypertensive emergency  JONNATHAN on CKD  On admission /86, JONNATHAN with creatinine 1.96 with baseline approx.1.3, additional concern for flash pulmonary edema given abrupt onset of symptoms over 1 day. Plan to decrease BP by 25% over 24hrs.  - IV labetalol 20 mg given once, plan to give nicardipine 20mg should pressures remain over 180 SBP  - Restart PTA amlodipine  -  "Caution with nephrotoxic medications  - Q4h vitals  - Monitor for bradycardia, on tele      Coronary artery disease s/p PCI w/ NUBIA to mid LCx 2021  Paroxysmal AF not on anticoagulation  Rate controlled on metoprolol. Most recent cardiology evaluation during consult while admitted on 4//24, recommended continued DAPT and work towards attaining GDMT including starting SGLT2 inhibitor.  Stopped taking warfarin due to not wanting to have frequent INR checks.  Per chart review, stress test 3/4/2024 did not show any inducible ischemia.  -Test claim for DOAC      COPD  Hx of spontaneous PTX c/b hydropneumothorax, admission in 4/16/24  Former smoker  Former smoker with 20-pack-year history, PFTs showing moderate obstruction.  During his admission it was suspected that his history of COPD contributed to his PTX.      T2DM  Previous A1c 7.8 on 6/5/2024, only medication being glipizide.  - Medium SSI          Diet: Combination Diet Low Saturated Fat Na <2400mg Diet, No Caffeine Diet  DVT Prophylaxis: Pneumatic Compression Devices  Helm Catheter: Not present  Fluids: PO  Lines: None     Cardiac Monitoring: ACTIVE order. Indication: Acute decompensated heart failure (48 hours)  Code Status: No CPR- Do NOT Intubate    Clinically Significant Risk Factors Present on Admission                # Drug Induced Platelet Defect: home medication list includes an antiplatelet medication   # Hypertension: Noted on problem list  # Acute heart failure with preserved ejection fraction: heart failure noted on problem list, last echo with EF >50%, and receiving IV diuretics           # DMII: A1C = N/A within past 6 months    # Overweight: Estimated body mass index is 27.37 kg/m  as calculated from the following:    Height as of this encounter: 1.727 m (5' 8\").    Weight as of this encounter: 81.6 kg (180 lb).              Disposition Plan      Expected Discharge Date: 07/03/2024                The patient's care was discussed with the Attending " Physician, Dr. Obi Ndiaye .      Mc Chowdhury, DO  Hospitalist Service  [facility]  Securely message with [employer] (more info)  Text page via [facility] Paging/Directory   ______________________________________________________________________    Chief Complaint   SOB    History is obtained from the patient    History of Present Illness   Per Beasley is a 84 year old male admitted on 7/1/2024. He has a history of severe AAS s/p TAVR, HFpEF, paroxysmal AF, CAD s/p PCI, HTN, HLD, T2DM, COPD, recent admission recurrent R PTX c/b hydropneumothorax 4/16/2024, and is admitted for acute hypoxic respiratory failure 2/2 bilateral pleural effusion in the context of suspected acute on chronic heart failure exacerbation.     Patient reports 1 day prior to admission began feeling short of breath and increasing lower extremity edema.  He states he was able to sleep laying on his back.  He admits to being inconsistent with his chronic medications, and has missed his torsemide over the past couple days.  Denies any fevers, chills, cough, abdominal pain, N/V/D, dysuria, constipation, melanotic or bloody stools.  Tried using nebulizers but they did not help.    His shortness of breath persisted so he decided to present to his PCP office today where he was found to have SpO2 77% on room air, with O2 sats dropping with ambulation.  He was placed on 2L O2 and while ambulating his sats were 95%.  Additionally, EKG was performed by EMS which showed A-fib and the patient denied having history of A-fib, although this is well-documented in his chart.    He additionally reports that he has had short episodes of chest pain that are not associated with activity and quickly self resolve without any intervention.  He feels that it is a pain that spreads across his bilateral upper chest.  He is not currently experiencing this pain.  He does not feel this pain is associated with activity.    He ambulates with a walker at  baseline does not have oxygen at home.  He is a former smoker with 20-pack-year history.  He states he has 1 shot of whiskey every day.      Past Medical History    No past medical history on file.    Past Surgical History   No past surgical history on file.    Prior to Admission Medications   Prior to Admission Medications   Prescriptions Last Dose Informant Patient Reported? Taking?   Alcohol Swabs PADS   No No   Sig: Use to swab the area of the injection or rosenda as directed Per insurance coverage   Fluticasone-Umeclidin-Vilant (TRELEGY ELLIPTA) 100-62.5-25 MCG/ACT oral inhaler has not picked up yet  Yes Yes   Sig: Inhale 1 puff into the lungs daily Rinse mouth after use.   Sharps Container MISC   No No   Sig: Use as directed to dispose of needles, lancets and other sharps Per Insurance coverage   amLODIPine (NORVASC) 5 MG tablet 7/1/2024 at AM  Yes Yes   Sig: Take 5 mg by mouth daily   arformoterol (BROVANA) 15 MCG/2ML NEBU neb solution Past Week at 3-4 days ago  Yes Yes   Sig: Inhale 15 mcg into the lungs 2 times daily as needed   aspirin (ASA) 81 MG chewable tablet 7/1/2024 at AM  Yes Yes   Sig: Take 81 mg by mouth daily   atorvastatin (LIPITOR) 40 MG tablet 6/30/2024 at PM  Yes Yes   Sig: Take 40 mg by mouth daily   blood glucose (NO BRAND SPECIFIED) lancets standard   No No   Sig: To use to test glucose level in the blood Use to test blood sugar  2  times daily as directed. To accompany glucose monitor brands per insurance coverage.   blood glucose (NO BRAND SPECIFIED) test strip   No No   Sig: To use to test glucose level in the blood Use to test blood sugar  2 times daily as directed. To accompany glucose monitor brands per insurance coverage.   blood glucose calibration (NO BRAND SPECIFIED) solution   No No   Sig: Used to calibrate the blood glucose monitor as needed and as directed.  To accompany  blood glucose brands per insurance coverage   blood glucose monitoring (NO BRAND SPECIFIED) meter device kit    No No   Sig: Use as directed , Per insurance coverage   clopidogrel (PLAVIX) 75 MG tablet 6/30/2024 at AM  Yes Yes   Sig: Take 75 mg by mouth daily   glipiZIDE (GLUCOTROL XL) 5 MG 24 hr tablet 6/30/2024 at AM  Yes Yes   Sig: Take 5 mg by mouth every morning   glucose (BD GLUCOSE) 4 g chewable tablet 7/1/2024 at AM; 1 tab  No Yes   Sig: Take 4 tablets by mouth every 15 minutes as needed for low blood sugar   insulin pen needle (32G X 4 MM) 32G X 4 MM miscellaneous   No No   Sig: Use as directed by provider Per insurance coverage   ipratropium - albuterol 0.5 mg/2.5 mg/3 mL (DUONEB) 0.5-2.5 (3) MG/3ML neb solution 6/30/2024 at AM  Yes Yes   Sig: Take 3 mLs by nebulization 4 times daily as needed for shortness of breath   metoprolol tartrate (LOPRESSOR) 25 MG tablet 6/30/2024 at PM  Yes Yes   Sig: Take 12.5 mg by mouth 2 times daily   torsemide (DEMADEX) 20 MG tablet 6/30/2024 at AM  Yes Yes   Sig: Take 40 mg by mouth every morning      Facility-Administered Medications: None         Physical Exam   Vital Signs: Temp: 97.8  F (36.6  C) Temp src: Oral BP: (!) 216/86 Pulse: 56   Resp: 22 SpO2: 96 % O2 Device: Nasal cannula Oxygen Delivery: 2 LPM  Weight: 180 lbs 0 oz    Constitutional: Awake, alert, cooperative, no apparent distress, and appears stated age.  Eyes: Lids and lashes normal, clera clear, conjunctiva normal.  ENT: Normocephalic, atraumatic. Moist mucous membranes.  Hematologic / Lymphatic: No cervical lymphadenopathy.  No JVD.  Respiratory: Mild increased work of breathing.  Crackles auscultated in mid and lower lung fields bilaterally.  No accessory muscle use.  No rales or rhonchi.  Cardiovascular: Regular rate and rhythm, normal S1 and S2, no S3 or S4, and no murmur noted.  1+ peripheral edema bilaterally to the knees.  GI: Abdomen soft, non-tender, non-distended, no masses palpated. Bowel sounds present.  Skin: Scattered bruising through upper and lower extremities. Circular 1cm nodule on L forehead  without sign of infection.   Musculoskeletal: There is no redness or warmth of the joints.  Full range of motion noted. Ambulated from wheelchair to bed with walker.  Neurologic: Awake, alert, oriented to name, place and time.      Medical Decision Making   Please see A&P for additional details of medical decision making.      Data   ------------------------- PAST 24 HR DATA REVIEWED -----------------------------------------------    I have personally reviewed the following data over the past 24 hrs:    7.8  \   12.0 (L)   / 166     140 103 35.2 (H) /  192 (H)   3.8 27 1.96 (H) \     Trop: 21 BNP: 3,649 (H)     INR:  1.11 PTT:  30   D-dimer:  N/A Fibrinogen:  N/A       Imaging results reviewed over the past 24 hrs:   Recent Results (from the past 24 hour(s))   CT Chest Pulmonary Embolism w Contrast    Narrative    EXAM: CT CHEST PULMONARY EMBOLISM W CONTRAST  LOCATION: [facility]  DATE: 7/1/2024    INDICATION: hypoxia, a fib not on AC  COMPARISON: 4/16/2024  TECHNIQUE: CT chest pulmonary angiogram during arterial phase injection of IV contrast. Multiplanar reformats and MIP reconstructions were performed. Dose reduction techniques were used.   CONTRAST: Isovue 370 75mL    FINDINGS:  ANGIOGRAM CHEST: Pulmonary arteries are normal caliber and negative for pulmonary emboli. Thoracic aorta is not well opacified and is  indeterminate for dissection. No CT evidence of right heart strain.    LUNGS AND PLEURA: Large right pleural effusion with associated middle lobe and right lower lobe consolidation. No residual pneumothorax. Small-moderate left pleural effusion with left lower lobe consolidation. Bilateral groundglass opacities and smooth   interlobular septal thickening. Numerous tiny nodules bilaterally, similar to prior.    MEDIASTINUM/AXILLAE: No lymphadenopathy. Normal esophagus. No significant pericardial effusion. TAVR. Calcified mediastinal and hilar lymph nodes.    CORONARY ARTERY  CALCIFICATION: Moderate.    UPPER ABDOMEN: Normal.    MUSCULOSKELETAL: Multilevel degenerative changes in the spine. Healed and nonhealed left-sided rib fractures.      Impression    IMPRESSION:  1.  No acute pulmonary emboli.    2.  Large right pleural effusion and moderate left pleural effusion with associated bilateral lower lobe consolidation, likely compressive atelectasis, although superimposed pneumonia is also possible. Mild associated pulmonary edema suggesting   congestive heart failure.    3.  Sequelae of thoracic sarcoidosis.

## 2024-07-01 NOTE — ED PROVIDER NOTES
EMERGENCY DEPARTMENT ENCOUNTER     NAME: Per Beasley   AGE: 84 year old male   YOB: 1940   MRN: [id number]   EVALUATION DATE & TIME: 7/1/2024 11:17 AM   PCP: No Ref-Primary, Physician     Chief Complaint   Patient presents with    Shortness of Breath   :    FINAL IMPRESSION       1. Hypoxia    2. Acute on chronic congestive heart failure, unspecified heart failure type (H)           ED COURSE & MEDICAL DECISION MAKING    11:21 AM I met with the patient, obtained history, performed an initial exam, and discussed options and plan for diagnostics and treatment here in the ED.  1:17 PM Hospitalist was paged for admission.  1:27 PM Spoke with Residency team, regarding plan for admission.    Pertinent Labs & Imaging studies reviewed. (See chart for details)   84 year old male  presents to the Emergency Department for evaluation of shortness of breath, found to be hypoxic in the clinic.  On chart review, he was admitted in April with a CHF exacerbation, looks like he is supposed to be on torsemide as an outpatient, has a history of COPD but tells me he has not done any inhalers or nebulizers, and ultimately has pretty low understanding of this problems and cannot give much history. Initial Vitals Reviewed. Initial exam notable for patient who was hypoxic into the 70s in clinic and is needing 2 L nasal cannula.  He is doing some abdominal breathing and looks fluid overloaded with 2+ pitting edema of his lower extremities and has diminished breath sounds worse on the right.  I can see in the chart that cardiology wanted him to be on warfarin for atrial fibrillation but it sounds like he did not want to have to go in for INR checks so he does not want to take this and was not on Eliquis due to cost.  Clinically, I do suspect that he probably has both COPD and CHF exacerbation but considered something like pneumonia, PE as well as he is high risk for all of this.  A CT PE study shows a very large pleural  effusion on the right which fits with the diminished breath sounds that I am hearing, but also pulmonary edema and effusion on the left consistent with the fluid overload and is likely what is causing his hypoxia.  Because torsemide does not, IV, I have started with 40 mg of Lasix but he also has renal insufficiency and I can see that he had cardiorenal syndrome during his last admission.  Currently, fattening is 1.96 so this will need close monitoring for diuresis.  Case discussed with resident team for admission.         At the conclusion of the encounter I discussed the results of all of the tests and the disposition. The questions were answered. The patient or family acknowledged understanding and was agreeable with the care plan.     45 minutes critical care time, see procedure note below for details if relevant    Medical Decision Making    History:  Supplemental history from: Documented in chart and EMS  External Record(s) reviewed: Documented in chart and Outpatient Record: Cardiology visit to [facility] on 4/15/2024 and Anticoagulation Therapy Visit at [facility] on 4/18/2024    Work Up:  Chart documentation includes differential considered and any EKGs or imaging independently interpreted by provider, where specified.  In additional to work up documented, I considered the following work up: Documented in chart, if applicable.    External consultation:  Discussion of management with another provider: Documented in chart, if applicable    Complicating factors:  Care impacted by chronic illness: Other: COPD, CAD, TIIDM, HTN, morbid obesity, severe aortic stenosis s/p TAVR, and chronic atrial fibrillation  Care affected by social determinants of health: Access to Affordable Health Care    Disposition considerations: Admit.        MEDICATIONS GIVEN IN THE EMERGENCY:   Medications   furosemide (LASIX) injection 40 mg (has no administration in time  range)   iopamidol (ISOVUE-370) solution 75 mL (75 mLs Intravenous $Given 7/1/24 1226)      NEW PRESCRIPTIONS STARTED AT TODAY'S ER VISIT   New Prescriptions    No medications on file     ================================================================   HISTORY OF PRESENT ILLNESS       Patient information was obtained from: Patient, EMS, RN  Use of Intrepreter: N/A    Per Beasley is a 84 year old male with history of COPD, CAD, TIIDM, HTN, morbid obesity, severe aortic stenosis s/p TAVR, and chronic atrial fibrillation, and decompensated cardiomyopathy,who presents with shortness of breath.    Per chart review, the patient presented to [facility] on 4/15/2024. Patient stated he stopped taking coumadin per personal choice. Patient was strongly recommended that he resume coumadin. Discussed the option of Eliquis but he declined due to cost in the past. The also declined taking Xarelto in the past due to cost as well. There was a plan to continue diuretic regimen. Advised low sodium diet. A referral for ortho was placed for significant pain in the knees and limited walking. He was advised to f/up with pulmonary specialist, Dr. Cahvez in July for the COPD and pulmonary hypertension. F/up with cardiologist (Dr. Rey) in 3 months.    Per chart review: patient had an Anticoagulation Therapy Visit at [facility] on 4/18/2024. Patient reported he stopped taking warfarin ~2 weeks ago from this visit. Patient stated he knew he should be on warfarin but he didn't want to be on it. He didn't want to be going to the lab every 3 days for testing. He was advised that the 3 day testing is only until INR stabilizes x 2 and then he can have weekly checks and ultimately work toward 4-6 week checks. It was explained to him that so many factors can affect the warfarin including different foods, drinks, and medications which is why it is important to have the labs to ensure  the INR is staying in range. Patient was asked if he had been offered any other anticoagulant medication and he stated that warfarin is the cheapest one. Also offered [facility] to come to his home to do INR there but patient wasn't interested in that option. Patient declined to restart warfarin at that time. Patient is supposed to be on Torsemide for CHF.    Per EMS, patient presents from an [place] clinic and was transported here by EMS because of shortness of breath and lower O2 sats. Patient was found at O2 sats of 77% in room air at the clinic. EMS checked his BS and it was elevated at 232. EMS states when patient walks, his O2 sats drop and he becomes short of breath. He was placed on 2 L of oxygen and when ambulating patient's O2 sats was 95%. EMS done an EKG which showed A-fib. Patient denies having a history of A-fib when asked.    Patient reports he isn't on oxygen at home. Patient states he has been short of breath for a few years now. States shortness of breath got worse starting yesterday and symptoms remain continuous today. He reports he didn't use any nebulizer treatment or inhaler to treat his symptoms. He has some bilateral lower extremity swelling and notes he is on a water pill. No other reported complaints or concerns at this time.    ================================================================        PAST HISTORY     PAST MEDICAL HISTORY:   No past medical history on file.   PAST SURGICAL HISTORY:   No past surgical history on file.   CURRENT MEDICATIONS:   amLODIPine (NORVASC) 5 MG tablet  aspirin (ASA) 81 MG chewable tablet  atorvastatin (LIPITOR) 40 MG tablet  Alcohol Swabs PADS  blood glucose (NO BRAND SPECIFIED) lancets standard  blood glucose (NO BRAND SPECIFIED) test strip  blood glucose calibration (NO BRAND SPECIFIED) solution  blood glucose monitoring (NO BRAND SPECIFIED) meter device kit  clopidogrel (PLAVIX) 75 MG tablet  glipiZIDE 2.5 MG TABS  glucose (BD GLUCOSE) 4 g chewable  tablet  insulin pen needle (32G X 4 MM) 32G X 4 MM miscellaneous  ipratropium - albuterol 0.5 mg/2.5 mg/3 mL (DUONEB) 0.5-2.5 (3) MG/3ML neb solution  magnesium chloride 535 (64 Mg) MG TBEC CR tablet  metoprolol tartrate (LOPRESSOR) 25 MG tablet  Sharps Container MISC  torsemide (DEMADEX) 20 MG tablet  warfarin ANTICOAGULANT (COUMADIN) 5 MG tablet      ALLERGIES:   Allergies   Allergen Reactions    Blood-Group Specific Substance      Patient has a non specific antibody.  Blood product orders may be delayed.  Please draw on red top and 2 purple top tubes for all Type and Screen/ type and Cross match orders.    Hydrochlorothiazide Rash      FAMILY HISTORY:   No family history on file.   SOCIAL HISTORY:   Social History     Socioeconomic History    Marital status:      Social Determinants of Health     Financial Resource Strain: Low Risk  (4/22/2024)    Received from [facility]    Financial Resource Strain     Difficulty of Paying Living Expenses: 3   Food Insecurity: No Food Insecurity (4/22/2024)    Received from [facility]    Food Insecurity     Worried About Running Out of Food in the Last Year: 1   Transportation Needs: No Transportation Needs (4/22/2024)    [email or web address]    Transportation Needs     Lack of Transportation (Medical): 1   Social Connections: Socially Integrated (4/22/2024)    [facility]    Social Connections     Frequency of Communication with Friends and Family: 0   Housing Stability: Low Risk  (4/22/2024)    Received from [facility]    Housing Stability     Unable to Pay for Housing in the Last Year: 1        VITALS  Patient Vitals for the past 24 hrs:   BP Temp Temp src Pulse Resp SpO2 Weight   07/01/24 1300 (!) 175/77 -- -- 57 19 97 % --   07/01/24 1130 -- -- -- 51 28 95 % --   07/01/24 1124 (!)  194/81 -- -- 55 27 95 % --   07/01/24 1122 (!) 194/81 97.9  F (36.6  C) Temporal 76 20 94 % 84.8 kg (187 lb)        ================================================================    PHYSICAL EXAM     VITAL SIGNS: BP (!) 175/77   Pulse 57   Temp 97.9  F (36.6  C) (Temporal)   Resp 19   Wt 84.8 kg (187 lb)   SpO2 97%   BMI 28.43 kg/m     Constitutional:  Awake, no acute distress   HENT:  Atraumatic, oropharynx without exudate or erythema, membranes moist  Lymph:  No adenopathy  Eyes: EOM intact, PERRL, no injection  Neck: Supple  Respiratory:  Hypoxic. Tachypneic. Clear to auscultation bilaterally, no wheezes or crackles. Diminished breath sounds on the right side.  Cardiovascular:  Regular rate and rhythm, single S1 and S2   GI:  Soft, nontender, nondistended, no rebound or guarding   Musculoskeletal:  Moves all extremities, 2+ pitting edema to his bilateral lower extremities, no deformities    Skin:  Warm, dry  Neurologic:  Alert and oriented x3, no focal deficits noted       ================================================================  LAB       All pertinent labs reviewed and interpreted.   Labs Ordered and Resulted from Time of ED Arrival to Time of ED Departure   BASIC METABOLIC PANEL - Abnormal       Result Value    Sodium 140      Potassium 3.8      Chloride 103      Carbon Dioxide (CO2) 27      Anion Gap 10      Urea Nitrogen 35.2 (*)     Creatinine 1.96 (*)     GFR Estimate 33 (*)     Calcium 8.9      Glucose 192 (*)    TROPONIN T, HIGH SENSITIVITY - Abnormal    Troponin T, High Sensitivity 25 (*)    NT PROBNP INPATIENT - Abnormal    N terminal Pro BNP Inpatient 3,649 (*)    CBC WITH PLATELETS AND DIFFERENTIAL - Abnormal    WBC Count 7.8      RBC Count 4.27 (*)     Hemoglobin 12.0 (*)     Hematocrit 37.4 (*)     MCV 88      MCH 28.1      MCHC 32.1      RDW 13.5      Platelet Count 166      % Neutrophils 50      % Lymphocytes 36      % Monocytes 11      % Eosinophils 3      % Basophils 0      %  Immature Granulocytes 0      NRBCs per 100 WBC 0      Absolute Neutrophils 3.9      Absolute Lymphocytes 2.8      Absolute Monocytes 0.9      Absolute Eosinophils 0.2      Absolute Basophils 0.0      Absolute Immature Granulocytes 0.0      Absolute NRBCs 0.0     INR - Normal    INR 1.11     PARTIAL THROMBOPLASTIN TIME - Normal    aPTT 30          ===============================================================  RADIOLOGY       Reviewed all pertinent imaging. Please see official radiology report.   CT Chest Pulmonary Embolism w Contrast   Final Result   IMPRESSION:   1.  No acute pulmonary emboli.      2.  Large right pleural effusion and moderate left pleural effusion with associated bilateral lower lobe consolidation, likely compressive atelectasis, although superimposed pneumonia is also possible. Mild associated pulmonary edema suggesting    congestive heart failure.      3.  Sequelae of thoracic sarcoidosis.            ================================================================  EKG     7/1/2024. 11:20:06. Atrial fibrillation with slow ventricular response. Nonspecific ST abnormality. Rate: 52 BPM. QTc: 424 ms. Axis: 66. When compared with ECG of 4/3/2024 13:07, T wave inversion no longer evident in Inferior leads.    I have independently reviewed and interpreted the EKG(s) documented above.     ================================================================  PROCEDURES     Critical Care  Performed by: Reyna Beckham MD  Authorized by: Reyna Beckham MD  Total critical care time: 45 minutes  Critical care time was exclusive of separately billable procedures and treating other patients.  Critical care was necessary to treat or prevent imminent or life-threatening deterioration of the following conditions: Hypoxia and fluid overload  Critical care was time spent personally by me on the following activities: development of treatment plan with patient or surrogate, discussions with consultants, examination of  patient, evaluation of patient's response to treatment, obtaining history from patient or surrogate, ordering and performing treatments and interventions, ordering and review of laboratory studies, ordering and review of radiographic studies and re-evaluation of patient's condition, this excludes any separately billable procedures.        I, Berkley Cochran, am serving as a scribe to document services personally performed by Dr. Beckham based on my observation and the provider's statements to me. I, Reyna Beckham MD attest that Berkley Cochran is acting in a scribe capacity, has observed my performance of the services and has documented them in accordance with my direction.   Ryena Beckham M.D.   Emergency Medicine   [facility] EMERGENCY ROOM  [address] [facility] [id number]  [phone]  Dept: [phone]      Reyna Beckham MD  07/01/24 5275

## 2024-07-02 ENCOUNTER — APPOINTMENT (OUTPATIENT)
Dept: CARDIOLOGY | Facility: CLINIC | Age: 84
DRG: 291 | End: 2024-07-02
Payer: MEDICARE

## 2024-07-02 ENCOUNTER — APPOINTMENT (OUTPATIENT)
Dept: PHYSICAL THERAPY | Facility: CLINIC | Age: 84
DRG: 291 | End: 2024-07-02
Payer: MEDICARE

## 2024-07-02 ENCOUNTER — APPOINTMENT (OUTPATIENT)
Dept: OCCUPATIONAL THERAPY | Facility: CLINIC | Age: 84
DRG: 291 | End: 2024-07-02
Payer: MEDICARE

## 2024-07-02 ENCOUNTER — APPOINTMENT (OUTPATIENT)
Dept: ULTRASOUND IMAGING | Facility: CLINIC | Age: 84
DRG: 291 | End: 2024-07-02
Payer: MEDICARE

## 2024-07-02 PROBLEM — J44.9 CHRONIC OBSTRUCTIVE PULMONARY DISEASE (H): Status: ACTIVE | Noted: 2022-02-28

## 2024-07-02 PROBLEM — I25.119 CORONARY ARTERY DISEASE INVOLVING NATIVE CORONARY ARTERY OF NATIVE HEART WITH ANGINA PECTORIS (H): Status: ACTIVE | Noted: 2021-08-13

## 2024-07-02 PROBLEM — Z95.3 STATUS POST TRANSCATHETER AORTIC VALVE REPLACEMENT (TAVR) USING BIOPROSTHESIS: Status: ACTIVE | Noted: 2021-09-14

## 2024-07-02 PROBLEM — E11.59 TYPE 2 DIABETES MELLITUS WITH OTHER CIRCULATORY COMPLICATIONS (H): Status: ACTIVE | Noted: 2019-12-10

## 2024-07-02 PROBLEM — I48.19 PERSISTENT ATRIAL FIBRILLATION (H): Status: ACTIVE | Noted: 2021-07-09

## 2024-07-02 LAB
% LINING CELLS, BODY FLUID: 1 %
ANION GAP SERPL CALCULATED.3IONS-SCNC: 11 MMOL/L (ref 7–15)
APPEARANCE FLD: ABNORMAL
BUN SERPL-MCNC: 31.7 MG/DL (ref 8–23)
CALCIUM SERPL-MCNC: 9.4 MG/DL (ref 8.8–10.2)
CELL COUNT BODY FLUID SOURCE: ABNORMAL
CHLORIDE SERPL-SCNC: 100 MMOL/L (ref 98–107)
COLOR FLD: YELLOW
CREAT SERPL-MCNC: 1.92 MG/DL (ref 0.67–1.17)
DEPRECATED HCO3 PLAS-SCNC: 29 MMOL/L (ref 22–29)
EGFRCR SERPLBLD CKD-EPI 2021: 34 ML/MIN/1.73M2
EOSINOPHIL NFR FLD MANUAL: 1 %
ERYTHROCYTE [DISTWIDTH] IN BLOOD BY AUTOMATED COUNT: 13.3 % (ref 10–15)
GLUCOSE BLDC GLUCOMTR-MCNC: 180 MG/DL (ref 70–99)
GLUCOSE BLDC GLUCOMTR-MCNC: 207 MG/DL (ref 70–99)
GLUCOSE BLDC GLUCOMTR-MCNC: 215 MG/DL (ref 70–99)
GLUCOSE BODY FLUID SOURCE: NORMAL
GLUCOSE FLD-MCNC: 212 MG/DL
GLUCOSE SERPL-MCNC: 201 MG/DL (ref 70–99)
HCT VFR BLD AUTO: 38.2 % (ref 40–53)
HGB BLD-MCNC: 12.6 G/DL (ref 13.3–17.7)
LDH SERPL L TO P-CCNC: 221 U/L (ref 0–250)
LVEF ECHO: NORMAL
LYMPHOCYTES NFR FLD MANUAL: 93 %
MCH RBC QN AUTO: 28.1 PG (ref 26.5–33)
MCHC RBC AUTO-ENTMCNC: 33 G/DL (ref 31.5–36.5)
MCV RBC AUTO: 85 FL (ref 78–100)
MONOS+MACROS NFR FLD MANUAL: 5 %
NEUTS BAND NFR FLD MANUAL: 0 %
PLATELET # BLD AUTO: 172 10E3/UL (ref 150–450)
POTASSIUM SERPL-SCNC: 3.6 MMOL/L (ref 3.4–5.3)
RBC # BLD AUTO: 4.48 10E6/UL (ref 4.4–5.9)
SODIUM SERPL-SCNC: 140 MMOL/L (ref 135–145)
WBC # BLD AUTO: 7.9 10E3/UL (ref 4–11)
WBC # FLD AUTO: 935 /UL

## 2024-07-02 PROCEDURE — 89050 BODY FLUID CELL COUNT: CPT

## 2024-07-02 PROCEDURE — 97535 SELF CARE MNGMENT TRAINING: CPT | Mod: GO

## 2024-07-02 PROCEDURE — 36415 COLL VENOUS BLD VENIPUNCTURE: CPT

## 2024-07-02 PROCEDURE — 88189 FLOWCYTOMETRY/READ 16 & >: CPT | Mod: GC | Performed by: STUDENT IN AN ORGANIZED HEALTH CARE EDUCATION/TRAINING PROGRAM

## 2024-07-02 PROCEDURE — 99222 1ST HOSP IP/OBS MODERATE 55: CPT | Performed by: INTERNAL MEDICINE

## 2024-07-02 PROCEDURE — 250N000013 HC RX MED GY IP 250 OP 250 PS 637

## 2024-07-02 PROCEDURE — 83615 LACTATE (LD) (LDH) ENZYME: CPT

## 2024-07-02 PROCEDURE — 255N000002 HC RX 255 OP 636: Performed by: STUDENT IN AN ORGANIZED HEALTH CARE EDUCATION/TRAINING PROGRAM

## 2024-07-02 PROCEDURE — 84157 ASSAY OF PROTEIN OTHER: CPT

## 2024-07-02 PROCEDURE — 88185 FLOWCYTOMETRY/TC ADD-ON: CPT

## 2024-07-02 PROCEDURE — 999N000208 ECHOCARDIOGRAM COMPLETE

## 2024-07-02 PROCEDURE — 250N000011 HC RX IP 250 OP 636

## 2024-07-02 PROCEDURE — 99232 SBSQ HOSP IP/OBS MODERATE 35: CPT | Mod: GC

## 2024-07-02 PROCEDURE — 120N000004 HC R&B MS OVERFLOW

## 2024-07-02 PROCEDURE — 88305 TISSUE EXAM BY PATHOLOGIST: CPT | Mod: TC

## 2024-07-02 PROCEDURE — 87205 SMEAR GRAM STAIN: CPT

## 2024-07-02 PROCEDURE — 0W993ZZ DRAINAGE OF RIGHT PLEURAL CAVITY, PERCUTANEOUS APPROACH: ICD-10-PCS | Performed by: INTERNAL MEDICINE

## 2024-07-02 PROCEDURE — 272N000042 US THORACENTESIS

## 2024-07-02 PROCEDURE — 85027 COMPLETE CBC AUTOMATED: CPT

## 2024-07-02 PROCEDURE — 80048 BASIC METABOLIC PNL TOTAL CA: CPT

## 2024-07-02 PROCEDURE — 97165 OT EVAL LOW COMPLEX 30 MIN: CPT | Mod: GO

## 2024-07-02 PROCEDURE — C8929 TTE W OR WO FOL WCON,DOPPLER: HCPCS

## 2024-07-02 PROCEDURE — 97116 GAIT TRAINING THERAPY: CPT | Mod: GP

## 2024-07-02 PROCEDURE — 93306 TTE W/DOPPLER COMPLETE: CPT | Mod: 26 | Performed by: INTERNAL MEDICINE

## 2024-07-02 PROCEDURE — 97161 PT EVAL LOW COMPLEX 20 MIN: CPT | Mod: GP

## 2024-07-02 PROCEDURE — 82945 GLUCOSE OTHER FLUID: CPT

## 2024-07-02 RX ORDER — LABETALOL HYDROCHLORIDE 5 MG/ML
20 INJECTION, SOLUTION INTRAVENOUS ONCE
Status: COMPLETED | OUTPATIENT
Start: 2024-07-02 | End: 2024-07-02

## 2024-07-02 RX ORDER — NICARDIPINE HYDROCHLORIDE 20 MG/1
20 CAPSULE ORAL ONCE
Status: DISCONTINUED | OUTPATIENT
Start: 2024-07-02 | End: 2024-07-02

## 2024-07-02 RX ADMIN — FUROSEMIDE 40 MG: 10 INJECTION, SOLUTION INTRAMUSCULAR; INTRAVENOUS at 20:06

## 2024-07-02 RX ADMIN — LABETALOL HYDROCHLORIDE 20 MG: 5 INJECTION, SOLUTION INTRAVENOUS at 04:58

## 2024-07-02 RX ADMIN — FLUTICASONE FUROATE AND VILANTEROL TRIFENATATE 1 PUFF: 100; 25 POWDER RESPIRATORY (INHALATION) at 09:26

## 2024-07-02 RX ADMIN — ATORVASTATIN CALCIUM 40 MG: 40 TABLET, FILM COATED ORAL at 20:06

## 2024-07-02 RX ADMIN — UMECLIDINIUM 1 PUFF: 62.5 AEROSOL, POWDER ORAL at 07:46

## 2024-07-02 RX ADMIN — AMLODIPINE BESYLATE 5 MG: 5 TABLET ORAL at 09:26

## 2024-07-02 RX ADMIN — FUROSEMIDE 40 MG: 10 INJECTION, SOLUTION INTRAMUSCULAR; INTRAVENOUS at 07:46

## 2024-07-02 RX ADMIN — PERFLUTREN 3 ML: 6.52 INJECTION, SUSPENSION INTRAVENOUS at 12:20

## 2024-07-02 ASSESSMENT — ACTIVITIES OF DAILY LIVING (ADL)
ADLS_ACUITY_SCORE: 21
ADLS_ACUITY_SCORE: 23
ADLS_ACUITY_SCORE: 21
ADLS_ACUITY_SCORE: 21
ADLS_ACUITY_SCORE: 23
ADLS_ACUITY_SCORE: 21
ADLS_ACUITY_SCORE: 23
ADLS_ACUITY_SCORE: 23
ADLS_ACUITY_SCORE: 21
ADLS_ACUITY_SCORE: 23
ADLS_ACUITY_SCORE: 21
ADLS_ACUITY_SCORE: 21
ADLS_ACUITY_SCORE: 23
ADLS_ACUITY_SCORE: 23
DEPENDENT_IADLS:: INDEPENDENT
ADLS_ACUITY_SCORE: 23
ADLS_ACUITY_SCORE: 21

## 2024-07-02 NOTE — PROGRESS NOTES
[facility]    Progress Note - Hospitalist Service       Date of Admission:  7/1/2024    Assessment & Plan   Per Beasley is a 84 year old male with PMH of severe aortic stenosis s/p TAVR, HFpEF, paroxysmal afib not anticoagulated, CAD s/p PCI, HTN, HLD, T2DM, COPD, and recent admission for recurrent pneumothorax c/b hydropneumothorax 4/16/2024, who is admitted with acute hypoxic respiratory failure 2/2 b/l pleural effusions in the context of acute on chronic heart failure exacerbation.     Acute hypoxic respiratory failure  Acute on chronic heart failure exacerbation  B/l pleural effusions  Hx thoracic sarcoidosis   Most recent echo showing EF 65-70% with normal LV and RV function. Presented to clinic 7/1 with shortness of breath x1d, found to have oxygen saturation in the 70s and sent to the ED via EMS. In the ED, BNP elevated 3600 and CT PE showing large right and moderate left pleural effusion with bilateral lower lobe consolidation likely due to compressive atelectasis, as well as mild pulmonary edema. Patient has a history of medication noncompliance and has developed fluid overload. Thoracentesis to be done this afternoon.  - IV Lasix 40 mg BID  - Strict I/Os  - Daily standing weights  - Cardiac telemetry  - Cardiology following, recs appreciated   -Would get him back on his home medications, continue his intravenous diuretics through tomorrow morning and follow his renal function.    Hypertensive emergency, resolved  JONNATHAN on CKD  On admission /86, JONNATHAN with creatinine 1.96 with baseline approx.1.3.   - PTA amlodipine  - Caution with nephrotoxic medications    Coronary artery disease s/p PCI w/ NUBIA to mid LCx 2021  Paroxysmal AF not on anticoagulation  Rate controlled on metoprolol. Most recent cardiology evaluation during consult while admitted on 4//24, recommended continued DAPT and work towards attaining GDMT including starting SGLT2 inhibitor.  Stopped taking warfarin due  "to not wanting to have frequent INR checks. Per chart review, stress test 3/4/2024 did not show any inducible ischemia.    COPD  Hx of spontaneous PTX c/b hydropneumothorax, admission 4/16/2024  Former smoker  Former smoker with 20-pack-year history, PFTs showing moderate obstruction.     T2DM  Previous A1c 7.8 on 6/5/2024, only medication being glipizide. Well controlled.  - Medium SSI        Diet: Combination Diet Low Saturated Fat Na <2400mg Diet, No Caffeine Diet    DVT Prophylaxis: Pneumatic Compression Devices  Helm Catheter: Not present  Fluids: None  Lines: None     Cardiac Monitoring: ACTIVE order. Indication: Acute decompensated heart failure (48 hours)  Code Status: No CPR- Do NOT Intubate      Clinically Significant Risk Factors Present on Admission                # Drug Induced Platelet Defect: home medication list includes an antiplatelet medication   # Hypertension: Noted on problem list  # Acute heart failure with preserved ejection fraction: heart failure noted on problem list, last echo with EF >50%, and receiving IV diuretics           # DMII: A1C = N/A within past 6 months    # Overweight: Estimated body mass index is 25.85 kg/m  as calculated from the following:    Height as of this encounter: 1.727 m (5' 8\").    Weight as of this encounter: 77.1 kg (170 lb).              Disposition Plan      Expected Discharge Date: 07/03/2024                The patient's care was discussed with the Attending Physician, Dr. Ndiaye  and resident Ofelia Clement, PGY2.    KAVITA KEITH  Medical Student  Hospitalist Service  [facility]  Securely message with [employer] (more info)  Text page via [employer] Paging/Directory   ______________________________________________________________________    Interval History   Patient endorsing increased SOB overnight. Received x1 IV Lasix 40 mg and x1 Labetalol 20 mg and was put on supplemental oxygen to 4L NC. Now sats in the low 90s. No chest " pain.    Physical Exam   Vital Signs: Temp: 97.5  F (36.4  C) Temp src: Oral BP: 138/63 Pulse: (!) 48   Resp: 24 SpO2: 93 % O2 Device: Nasal cannula Oxygen Delivery: 4 LPM  Weight: 170 lbs 0 oz    Constitutional: awake, alert, cooperative, no apparent distress, and appears stated age  Respiratory: Faint crackles present on the LLL, decreased breath sounds in RLL. No increased work of breathing. No retractions.   Cardiovascular: Irregularly irregular rhythm, regular rate.   Musculoskeletal: 2+ pitting edema in b/l lower legs to knee  Neuropsychiatric: General: normal, calm, and normal eye contact  Affect: pleasant  Orientation: oriented to self, place, time and situation      Data     I have personally reviewed the following data over the past 24 hrs:    7.9  \   12.6 (L)   / 172     140 100 31.7 (H) /  207 (H)   3.6 29 1.92 (H) \     ALT: N/A AST: N/A AP: N/A TBILI: N/A   ALB: N/A TOT PROTEIN: 8.1 LIPASE: N/A     Trop: 21 BNP: N/A     Ferritin:  N/A % Retic:  N/A LDH:  245       Imaging results reviewed over the past 24 hrs:   Recent Results (from the past 24 hour(s))   Echocardiogram Complete   Result Value    LVEF  55-60%    Narrative    [id number]  [id number]  [id number]  [id number]^ANAND^[place] [address]     Name: MAT NOVA  MRN: [id number]  : 1940  Study Date: 2024 11:08 AM  Age: 84 yrs  Gender: Male  Patient Location: [facility]  Reason For Study: Heart Failure  Ordering Physician: INDIO MICHEL  Performed By: CONNIE     BSA: 1.9 m2  Height: 68 in  Weight: 170 lb  HR: 50  BP: 138/63 mmHg  ______________________________________________________________________________  Procedure  Complete Portable Echo Adult. Definity (NDC #19700-211) given intravenously.  ______________________________________________________________________________  Interpretation Summary     1. Left ventricular function is normal.The ejection fraction is 55-60%. The  left ventricle is  normal in size. No regional wall motion abnormalities noted.  2. Normal right ventricle size and systolic function.  3. Well seated 26-mm Mooney Catarino 3 Ultra aortic bioprosthetic valve with  normal gradients (PV: 2.8 m/sec, simpson gradient: 12 mm Hg). Acc Time: 70 ms.  DVI: 0.7.  4. IVC diameter and respiratory changes fall into an intermediate range  suggesting an RA pressure of 8 mmHg.  Compared to prior study, there is no significant change.  ______________________________________________________________________________  Left Ventricle  The left ventricle is normal in size. Left ventricular function is normal.The  ejection fraction is 55-60%. No regional wall motion abnormalities noted.     Right Ventricle  Normal right ventricle size and systolic function.     Atria  The left atrium is moderately dilated. Right atrium not well visualized.     Mitral Valve  There is mild mitral annular calcification. There is trace mitral  regurgitation. There is no mitral valve stenosis.     Tricuspid Valve  Tricuspid valve leaflets appear normal. There is no evidence of tricuspid  stenosis or clinically significant tricuspid regurgitation. Right ventricle  systolic pressure estimate normal. The right ventricular systolic pressure is  approximated at 32.7 mmHg plus the right atrial pressure. IVC diameter and  respiratory changes fall into an intermediate range suggesting an RA pressure  of 8 mmHg.     Aortic Valve  Well seated 26-mm Mooney Catarino 3 Ultra aortic bioprosthetic valve with  normal gradients (PV: 2.8 m/sec, simpson gradient: 12 mm Hg). Acc Time: 70 ms.  DVI: 0.7.     Pulmonic Valve  The pulmonic valve is not well seen, but is grossly normal. This degree of  valvular regurgitation is within normal limits. There is trace pulmonic  valvular regurgitation.     Vessels  The aorta root is normal. IVC diameter and respiratory changes fall into an  intermediate range suggesting an RA pressure of 8 mmHg.     Pericardium  There  is no pericardial effusion.     ______________________________________________________________________________  MMode/2D Measurements & Calculations  IVSd: 1.5 cm  LVIDd: 3.6 cm  LVIDs: 2.1 cm  LVPWd: 1.3 cm  FS: 40.2 %     LV mass(C)d: 185.8 grams  LV mass(C)dI: 97.4 grams/m2  LVOT diam: 1.8 cm  LVOT area: 2.7 cm2  LA Volume (BP): 90.6 ml  LA Volume Index (BP): 47.4 ml/m2     LA Volume Indexed (AL/bp): 48.5 ml/m2  RV Base: 4.0 cm  RWT: 0.74  TAPSE: 2.1 cm     Doppler Measurements & Calculations  MV E max kevin: 120.0 cm/sec  MV A max kevin: 23.7 cm/sec  MV E/A: 5.1  MV dec time: 0.32 sec  Ao V2 max: 267.1 cm/sec  Ao max P.0 mmHg  Ao V2 mean: 158.0 cm/sec  Ao mean P.0 mmHg  Ao V2 VTI: 58.4 cm  CORIE(I,D): 1.7 cm2  CORIE(V,D): 1.8 cm2  Ao acc time: 0.07 sec  LV V1 max P.1 mmHg  LV V1 max: 174.1 cm/sec  LV V1 VTI: 37.7 cm  SV(LVOT): 101.3 ml  SI(LVOT): 53.1 ml/m2  PA acc time: 0.07 sec  TR max kevin: 286.0 cm/sec  TR max P.7 mmHg  AV Kevin Ratio (DI): 0.65     CORIE Index (cm2/m2): 0.91  E/E': 14.3  E/E' av.0  Lateral E/e': 14.3  Medial E/e': 21.6  Peak E' Kevin: 8.4 cm/sec  RV S Kevin: 10.3 cm/sec     ______________________________________________________________________________  Report approved by: Amalia Xiao 2024 12:28 PM

## 2024-07-02 NOTE — PLAN OF CARE
Problem: Risk for Delirium  Goal: Optimal Coping  Outcome: Progressing     Problem: Risk for Delirium  Goal: Improved Behavioral Control  Outcome: Progressing  Intervention: Minimize Safety Risk  Recent Flowsheet Documentation  Taken 7/2/2024 1545 by Madina Smith, RN  Enhanced Safety Measures: room near unit station  Taken 7/2/2024 1209 by Madina Smith, RN  Enhanced Safety Measures: room near unit station  Taken 7/2/2024 0729 by Madina Smith RN  Enhanced Safety Measures: room near unit station   Pt disoriented to time, situation, and place. Pt very forgetful and can be difficult to reorient. Ambulating with assist of 1. IV lasix continued. Echo performed. Thoracentesis performed, 850mL taken off. Taking medication as prescribed. Still on 3-4L O2 during the day.

## 2024-07-02 NOTE — PROGRESS NOTES
07/02/24 0945   Appointment Info   Signing Clinician's Name / Credentials (PT) Keith Valdivia, PT   Quick Adds   Quick Adds Certification   Living Environment   People in Home spouse   Current Living Arrangements condominium   Home Accessibility no concerns   Self-Care   Usual Activity Tolerance moderate   Current Activity Tolerance moderate   Equipment Currently Used at Home walker, rolling;other (see comments)  (electric scooter  is main mode of transportation per patient)   Fall history within last six months no   Activity/Exercise/Self-Care Comment Indep with  ADL's and most I ADL's per patient. Continues to drive, has not been doing shopping  for past several months   General Information   Onset of Illness/Injury or Date of Surgery 07/01/24   Pertinent History of Current Problem (include personal factors and/or comorbidities that impact the POC) Acute on chronic congestive heart failure, unspecified heart failure type (H) 4/3/2024    Hypoxia   Existing Precautions/Restrictions no known precautions/restrictions   Cognition   Affect/Mental Status (Cognition) WNL   Orientation Status (Cognition) oriented to;person;place;situation;time   Follows Commands (Cognition) WFL   Range of Motion (ROM)   Range of Motion ROM is WFL   Strength (Manual Muscle Testing)   Strength (Manual Muscle Testing) No deficits observed during functional mobility   Transfers   Transfers sit-stand transfer   Sit-Stand Transfer   Sit-Stand [place] (Transfers) contact guard;verbal cues   Assistive Device (Sit-Stand Transfers) walker, front-wheeled   Gait/Stairs (Locomotion)   [place] Level (Gait) contact guard;verbal cues   Assistive Device (Gait) walker, front-wheeled   Distance in Feet (Gait) 20   Pattern (Gait) swing-through   Deviations/Abnormal Patterns (Gait) gait speed decreased   Balance   Balance no deficits were identified   Clinical Impression   Criteria for Skilled Therapeutic Intervention Yes, treatment indicated   PT  Diagnosis (PT) Impaired functional mobility   Influenced by the following impairments weakness, pain   Functional limitations due to impairments transfers, gait   Clinical Presentation (PT Evaluation Complexity) stable   Clinical Presentation Rationale Patient presents as medically diagnosed   Clinical Decision Making (Complexity) low complexity   Planned Therapy Interventions (PT) gait training;stair training;transfer training;patient/family education   Risk & Benefits of therapy have been explained patient   PT Total Evaluation Time   PT Eval, Low Complexity Minutes (86487) 10   Physical Therapy Goals   PT Frequency Daily   PT Predicted Duration/Target Date for Goal Attainment 07/05/24   PT Goals Transfers;Gait;PT Goal 1   PT: Transfers Modified independent;Sit to/from stand;Assistive device   PT: Gait Modified independent;Rolling walker;50 feet   PT: Goal 1 Indep with HEP   Interventions   Interventions Quick Adds Gait Training   Gait Training   Gait Training Minutes (22356) 12   Treatment Detail/Skilled Intervention slow gait but stable, on 4 liter seated and sats in low 90%. Patient ambulated initially on 6 liters and in upper 90's, 2nd ambulation on 4 liters and maintained oxygen in upper 90's.  VC for safety   Distance in Feet 65 x 4   [place] Level (Gait Training) stand-by assist   Physical Assistance Level (Gait Training) 1 person assist;verbal cues;supervision   Assistive Device (Gait Training) rolling walker   Pattern Analysis (Gait Training) swing-through gait   Gait Analysis Deviations decreased amor;decreased step length;decreased stride length   Impairments (Gait Analysis/Training) strength decreased;other (see comments)  (decreased activity tolerance)   PT Discharge Planning   PT Plan Progress with gait/transfers, standing HEP   PT Discharge Recommendation (DC Rec) home with assist   PT Rationale for DC Rec Paitent moving well overall, has good home setup and support at home   PT Brief  overview of current status Patient SBA for gait /transfers   PT Equipment Needed at Discharge walker, rolling   Total Session Time   Timed Code Treatment Minutes 12   Total Session Time (sum of timed and untimed services) 22

## 2024-07-02 NOTE — PLAN OF CARE
Goal Outcome Evaluation:      Plan of Care Reviewed With: patient    Overall Patient Progress: improvingOverall Patient Progress: improving    Outcome Evaluation: Calm and cooperative. A&O to time and self. denies pain. elevated BP. On 4L O2 via NC.Bradycard, but not symptomatic. Assist of one with walker and gait-belt.      Problem: Adult Inpatient Plan of Care  Goal: Absence of Hospital-Acquired Illness or Injury  Intervention: Identify and Manage Fall Risk    Problem: Risk for Delirium  Goal: Improved Behavioral Control  Intervention: Minimize Safety Risk  I Illness Severity: Stable    PPatient Summary:      Telemetry Orders: Yes    Interpretation of Cardiac Rhythm: Afib    Dyspnea improved and O2 sats >88% at RA or at prior home O2 therapy level:  No    Last Bowel Movement:     Acute Symptoms or Concerns: Elevated BP, needing 4L O2.    Is this a new or worsening symptom or concern? no  Is this symptom or concern being adequately managed? Yes    Shift Summary: Calm and cooperative. A&O to time and self. denies pain. elevated BP. On 4L O2 via NC.Bradycard, but not symptomatic. Assist of one with walker and gait-belt.     AActions:    Diagnostic testing and/or procedures completed, and results are available for discharge:  Met    SSituational Awareness:      Anticipated post discharge treatment plan formulated and the following needs have been identified:   None identified    SSynthesis:     Was bedside rounding completed on your shift? Yes     What team members present during bedside rounds?  RN

## 2024-07-02 NOTE — PROGRESS NOTES
"Brief Progress Note:    S: Went to assess patient. He feels a little more short of breath but denies any CP, abdominal pain, fevers, chills or new symptoms. His last dose of lasix was around 5pm. Currently on 4L NC with sats in low 90s.     O:  Vital signs:  Temp: (!) 96.7  F (35.9  C) Temp src: Temporal BP: (!) 176/77 Pulse: 62   Resp: 30 SpO2: 91 % O2 Device: Nasal cannula Oxygen Delivery: 4 LPM Height: 172.7 cm (5' 8\") Weight: 81.6 kg (180 lb)  Estimated body mass index is 27.37 kg/m  as calculated from the following:    Height as of this encounter: 1.727 m (5' 8\").    Weight as of this encounter: 81.6 kg (180 lb).      Exam:  Constitutional: alert and no distress  Neck: no JVD appreciated  Cardiovascular: regular rate, rhythm irregularly irregular no m/r/g. Radial pulses 2+. Cap refill < 2 sec.   Respiratory: mildly increased WOB, faint crackles at the bases  Gastrointestinal: Abdomen soft, non-tender. BS normal. No masses, organomegaly  Musculoskeletal: 2+ pitting edema to the knees bilaterally.  Skin: no suspicious lesions or rashes  Neurologic: non focal. Follows commands. Aox4.       A/p:  84 year old male with severe aortic stenosis s/p TAVR, HFpEF, paroxysmal Afib not anticoagulated, COPD, recurrent PTX admitted for AHRF 2/2 HFpEF exacerbation found to have bilateral pleural effusions and HTN emergency.     -IV lasix 40 mg x 1 now  -monitor UOP and clinical status  -Nicardipine 20 mg x1 if SBP > 180  -supplemental O2 prn  -low threshold to obtain repeat chest imaging     Discussed with attending Dr. Aguila Galvez MD PGY-2  "

## 2024-07-02 NOTE — CONSULTS
"[facility]  [phone]          Assessment/Recommendations   Patient with known history of valvular heart disease, status post transcutaneous aortic valve replacement in 2021 and coronary artery disease with percutaneous intervention in the circumflex system in 2021 prior to TAVR.  Patient has known recurrent pleural effusions, pneumothorax with previous treatments with thoracentesis and had admitted to the hospital with worsening shortness of breath.  Exacerbation of heart failure with preserved ejection fraction likely related to patient not taking his diuretics for \"a few days\".  Weight probably up around 10 pounds.  Troponins are not suggestive of myocardial infarction, and B nitric peptide is markedly elevated.    Patient is making progress with intravenous diuretics but continues to be on supplemental oxygen and has a thoracentesis scheduled for later today.    Would get him back on his home medications, continue his intravenous diuretics through tomorrow morning and follow his renal function.    Stressed to patient that he needs to continue to take his medications on a daily basis to avoid exacerbations and significant dyspnea.    Patient also has atrial fibrillation and is not on anticoagulants as he will not take Coumadin and the NOACs are too expensive for him.  Patient takes aspirin.    Will continue to follow.  Clinically Significant Risk Factors Present on Admission                # Drug Induced Platelet Defect: home medication list includes an antiplatelet medication   # Hypertension: Noted on problem list  # Acute heart failure with preserved ejection fraction: heart failure noted on problem list, last echo with EF >50%, and receiving IV diuretics           # DMII: A1C = N/A within past 6 months    # Overweight: Estimated body mass index is 25.85 kg/m  as calculated from the following:    Height as of this encounter: 1.727 m (5' 8\").    Weight as of this encounter: 77.1 kg " "(170 lb).             Cardiac Arrhythmia: Atrial fibrillation: Permanent                    Chronic Fatigue and Other Debilities: Limitation of activities due to disability        History of Present Illness/Subjective    Mr. Per Beasley is a 84 year old male with known complicated heart history as noted above.  Also has had a complicated history of pleural effusions, hemothorax in the past.  He prevents with 2 to 3-day history of worsening shortness of breath.  He feels like it came on fairly quickly.  He admits to not taking his medications and specifically his diuretics because he \"missed them for a few days\".  On further questioning he said he did not want to take it because he was going to be out and about and did not want to be urinating.    His breathing got considerably worse.  He was hypoxic on admission and is on supplemental O2 and has been getting intravenous diuretics with good urine output.  He denies any chest discomfort.  He denies paroxysmal nocturnal dyspnea but does have some orthopnea.  Peripheral edema has been more dramatic in the last several days.  No palpitations, syncope or near syncopal episodes.    ECG: Personally reviewed.  Atrial fibrillation with slow ventricular response and trivial ST changes.    EXAM: CT CHEST PULMONARY EMBOLISM W CONTRAST  LOCATION: [facility]  DATE: 7/1/2024     INDICATION: hypoxia, a fib not on AC  COMPARISON: 4/16/2024  TECHNIQUE: CT chest pulmonary angiogram during arterial phase injection of IV contrast. Multiplanar reformats and MIP reconstructions were performed. Dose reduction techniques were used.   CONTRAST: Isovue 370 75mL     FINDINGS:  ANGIOGRAM CHEST: Pulmonary arteries are normal caliber and negative for pulmonary emboli. Thoracic aorta is not well opacified and is  indeterminate for dissection. No CT evidence of right heart strain.     LUNGS AND PLEURA: Large right pleural effusion with associated middle lobe and right " lower lobe consolidation. No residual pneumothorax. Small-moderate left pleural effusion with left lower lobe consolidation. Bilateral groundglass opacities and smooth   interlobular septal thickening. Numerous tiny nodules bilaterally, similar to prior.     MEDIASTINUM/AXILLAE: No lymphadenopathy. Normal esophagus. No significant pericardial effusion. TAVR. Calcified mediastinal and hilar lymph nodes.     CORONARY ARTERY CALCIFICATION: Moderate.     UPPER ABDOMEN: Normal.     MUSCULOSKELETAL: Multilevel degenerative changes in the spine. Healed and nonhealed left-sided rib fractures.                                                                      IMPRESSION:  1.  No acute pulmonary emboli.     2.  Large right pleural effusion and moderate left pleural effusion with associated bilateral lower lobe consolidation, likely compressive atelectasis, although superimposed pneumonia is also possible. Mild associated pulmonary edema suggesting   congestive heart failure.     3.  Sequelae of thoracic sarcoidosis.  Name:  MAT NOVA Event Date: 8/13/2021 08:28   [facility] ID #: [id number]    Encounter #: [id number]   Accession #: [id number]   TONYA #: [id number] Diagnostic Physician: MILLIE NEELY   [facility]   Interventional Physician: MILLIE NEEYL   [facility]   Referring Physician:   Primary Care Physician: GILLES HYMAN YOB: 1940   Gender: Male   Age: 81     Summary/Conclusions   PRESENTATION / INDICATIONS   * NYHA FC II-III sxs of HALL   * Moderate-severe AS   VASCULAR ACCESS   * Using ultrasound guidance and a percutaneous technique, the right common femoral artery & vein as well as right radial artery were accessed. Ultrasound was used to confirm vessel patency, localizing needle into the lumen of the vessel. An image was saved for the medical record.   DIAGNOSTIC - CORONARY   * Co-dominant coronary artery system with moderate coronary  "calcification   * The left main artery has no significant obstruction.   * The LAD has mild-moderate disease, including a large high D1 and moderate mid D2.   * The circumflex artery has a 60-70% mid-vessel stenosis - large mid OM1 with an ostial 90% stenosis - the remainder of the LCx, distal MAXI, and OM1 has mild-moderate disease.   * The RCA has moderate disease proximally (50%) - the remainder of the RCA has mild disease, including the rPDA.   DIAGNOSTIC - VALVES   * Severe aortic valve stenosis.       * CORIE 0.8cm2       * MG 23mmHg       * SVi 31cc/m2   HEMODYNAMICS   * LVEDP 14-18mmHg   * PCWP 14-18mmHg   * PAP 36/18, mean 26mmHg   * RA 9mmHg   * CO 3.5L/min by TD and 5.1L/min by estimated CONNOR (PA sat 62% and Ao 94%)   INTERVENTION   * Successful angioplasty and stenting (drug eluting) of the circumflex/OM1 with a 2.37t23xx Resolute NUBIA post-dilated with a 4.0mm NC balloon at escalating ATMs - some plaque shift noted into mid LCx - kissing balloon angioplasty was performed with a 3.25mm & 3.0mm NC balloons within the mid LCx/OM1 and mid LCx, respectively   * Final angiography revealed 0% residual stenoses, TIMI3 flow, and no evidence of edge dissection/perforation/side branch occlusion   SPECIAL PROCEDURES   * Right femoral arteriotomy  was successfully closed utilizing a closure device          Physical Examination Review of Systems   /63 (BP Location: Right arm)   Pulse (!) 48   Temp 97.5  F (36.4  C) (Oral)   Resp 24   Ht 1.727 m (5' 8\")   Wt 77.1 kg (170 lb)   SpO2 93%   BMI 25.85 kg/m    Body mass index is 25.85 kg/m .  Wt Readings from Last 3 Encounters:   07/02/24 77.1 kg (170 lb)   04/04/24 74.4 kg (164 lb 2 oz)   01/12/24 76.8 kg (169 lb 6.4 oz)     General Appearance:   Alert, cooperative and in no acute distress.   ENT/Mouth: Pink/moist oral mucosa   EYES:  no scleral icterus, normal conjunctivae   Neck: JVP 10 cm.  Positive hepatojugular reflux. Thyroid not visualized.   Chest/Lungs:  " " Lungs ahave diminished breath sounds at the bases.  Equal chest wall expansion.   Cardiovascular:   S1, S2 with 1/6 systolic murmur , no clicks or rubs. Brachial, radial and posterior tibial pulses are intact and symetric. No carotid bruits noted   Abdomen:  Nontender.    Extremities: No cyanosis, clubbing and bilateral pretibial pitting edema   Skin: no xanthelasma, warm.    Neurologic: normal arm movement bilateral, no tremors     Psychiatric: Appropriate affect.      Enc Vitals  BP: 138/63  Pulse: (!) 48  Resp: 24  Temp: 97.5  F (36.4  C)  Temp src: Oral  SpO2: 93 %  Weight: 77.1 kg (170 lb)  Height: 172.7 cm (5' 8\")                                           Medical History  Surgical History Family History Social History   No past medical history on file. No past surgical history on file. No family history on file. Social History     Socioeconomic History    Marital status:      Spouse name: Not on file    Number of children: Not on file    Years of education: Not on file    Highest education level: Not on file   Occupational History    Not on file   Tobacco Use    Smoking status: Not on file    Smokeless tobacco: Not on file   Substance and Sexual Activity    Alcohol use: Not on file    Drug use: Not on file    Sexual activity: Not on file   Other Topics Concern    Not on file   Social History Narrative    Not on file     Social Determinants of Health     Financial Resource Strain: Low Risk  (4/22/2024)    Received from [facility] [facility]    Financial Resource Strain     Difficulty of Paying Living Expenses: 3     Difficulty of Paying Living Expenses: Not on file   Food Insecurity: No Food Insecurity (4/22/2024)    Received from [employer] [facility]    Food Insecurity     Worried About Running Out of Food in the Last Year: 1   Transportation Needs: No Transportation Needs (4/22/2024)    [facility]    " Transportation Needs     Lack of Transportation (Medical): 1   Physical Activity: Not on file   Stress: Not on file   Social Connections: Socially Integrated (4/22/2024)    Received from [facility]    Social Connections     Frequency of Communication with Friends and Family: 0   Interpersonal Safety: Not on file   Housing Stability: Low Risk  (4/22/2024)    Received from [facility]    Housing Stability     Unable to Pay for Housing in the Last Year: 1          Medications  Allergies   No current outpatient medications on file.    Allergies   Allergen Reactions    Blood-Group Specific Substance      Patient has a non specific antibody.  Blood product orders may be delayed.  Please draw on red top and 2 purple top tubes for all Type and Screen/ type and Cross match orders.    Hydrochlorothiazide Rash         Lab Results    Chemistry/lipid CBC Cardiac Enzymes/BNP/TSH/INR   Lab Results   Component Value Date    BUN 31.7 (H) 07/02/2024     07/02/2024    CO2 29 07/02/2024    Lab Results   Component Value Date    WBC 7.9 07/02/2024    HGB 12.6 (L) 07/02/2024    HCT 38.2 (L) 07/02/2024    MCV 85 07/02/2024     07/02/2024    Lab Results   Component Value Date    TROPONINI 0.04 08/12/2022     (H) 08/12/2022    TSH 1.57 01/11/2024    INR 1.11 07/01/2024

## 2024-07-02 NOTE — CONSULTS
7/2 Test claim for DOAC'S-     Eliquis $47 for 30 days supply  Xarelto $47 for 30 days supply    Thank you for allowing me to help with your patient  Adelia Castaneda [facility]  Pharmacy Discharge Liaison [facility]

## 2024-07-02 NOTE — PROGRESS NOTES
07/02/24 1545   Appointment Info   Signing Clinician's Name / Credentials (OT) LACY Borges   Living Environment   People in Home spouse   Current Living Arrangements condominium   Living Environment Comments Walk in shower & tub shower (pt uses tub shower), shower chair, standard height toilet, FWW   Self-Care   Usual Activity Tolerance moderate   Current Activity Tolerance moderate   Equipment Currently Used at Home walker, rolling   Fall history within last six months no   Activity/Exercise/Self-Care Comment Ind w/ ADLs, mostly ind w/ IADLs - wife also supports   General Information   Onset of Illness/Injury or Date of Surgery 07/01/24   Referring Physician Obi Ndiaye MD   Patient/Family Therapy Goal Statement (OT) None stated   Additional Occupational Profile Info/Pertinent History of Current Problem Acute on chronic congestive heart failure, unspecified heart failure type (H) 4/3/2024 Hypoxia   Existing Precautions/Restrictions fall   Cognitive Status Examination   Affect/Mental Status (Cognitive) confused   Follows Commands WNL   Memory Deficit minimal deficit;long-term memory;recall, recent events   Visual Perception   Visual Impairment/Limitations WFL   Sensory   Sensory Quick Adds sensation intact   Pain Assessment   Patient Currently in Pain No   Posture   Posture not impaired   Range of Motion Comprehensive   General Range of Motion no range of motion deficits identified   Strength Comprehensive (MMT)   General Manual Muscle Testing (MMT) Assessment no strength deficits identified   Bed Mobility   Bed Mobility supine-sit;sit-supine   Comment (Bed Mobility) Min A per clinical judgement   Transfers   Transfers sit-stand transfer;toilet transfer;shower transfer   Transfer Comments SBA   Balance   Balance Assessment no deficits identified   Activities of Daily Living   BADL Assessment/Intervention lower body dressing;toileting   Lower Body Dressing Assessment/Training   [place] Level  (Lower Body Dressing) lower body dressing skills;supervision   Toileting   [place] Level (Toileting) toileting skills;adjust/manage clothing;supervision   Clinical Impression   Criteria for Skilled Therapeutic Interventions Met (OT) Yes, treatment indicated   OT Diagnosis Decreased ADLs   Influenced by the following impairments Acute on chronic congestive heart failure, unspecified heart failure type (H) 4/3/2024 Hypoxia   OT Problem List-Impairments impacting ADL activity tolerance impaired;cognition;mobility   Assessment of Occupational Performance 1-3 Performance Deficits   Identified Performance Deficits fx mobility, LB dressing, bed mobility   Planned Therapy Interventions (OT) ADL retraining;transfer training;cognition;bed mobility training   Clinical Decision Making Complexity (OT) problem focused assessment/low complexity   Risk & Benefits of therapy have been explained evaluation/treatment results reviewed;patient   OT Total Evaluation Time   OT Eval, Low Complexity Minutes (92257) 10   OT Goals   Therapy Frequency (OT) 4 times/week   OT Predicted Duration/Target Date for Goal Attainment 07/08/24   OT Goals Lower Body Dressing;Transfers;Toilet Transfer/Toileting   OT: Lower Body Dressing Modified independent   OT: Transfer Supervision/stand-by assist  (Tub shower)   OT: Toilet Transfer/Toileting Modified independent;toilet transfer;cleaning and garment management   Interventions   Interventions Quick Adds Self-Care/Home Management   Self-Care/Home Management   Self-Care/Home Mgmt/ADL, Compensatory, Meal Prep Minutes (77470) 10   Symptoms Noted During/After Treatment (Meal Preparation/Planning Training) shortness of breath   Treatment Detail/Skilled Intervention Pt in chair upon arrival and agreeable to therapy. Pt on 4L O2 throughout session. Pt cued for STS from recliner and amb 10 ft to sink w/ FWW and CGA. Pt stood at sink ~ 1 mins to wash face w/ SBA. Pt cued for toilet transfer - completed w/ CGA.  Pt amb 10 ft back to recliner w/ CGA and FWW. Pt instructed to doff/don shoe and sock - completed w/ SBA. Pt reported that LB dressing of pants is more difficult for him. Pt reported that he got his socks from the college, but when asked from where he was unable to answer and was confused. Pt ended session in recliner w/ chair alarm on and all needs w/in reach.   OT Discharge Planning   OT Plan SLUMs, Bed mobility, LB dressing (pants), tub shower transfer, transfers   OT Discharge Recommendation (DC Rec) home with assist   OT Rationale for DC Rec Pt demo'd SBA/CGA for transfers, ADLs, and fx mobility. Pt reported feeling short of breath and was on O2 throughout session. Pt reports support at home through wife and should be able to d/c home w/ support from wife as ind w/ ADLs increases and condition improves.   OT Brief overview of current status CGA/SBA ADLs, transfers, fx mobility   Total Session Time   Timed Code Treatment Minutes 10   Total Session Time (sum of timed and untimed services) 20

## 2024-07-02 NOTE — CONSULTS
Care Management Initial Consult    General Information  Assessment completed with: Patient,    Type of CM/SW Visit: Initial Assessment    Primary Care Provider verified and updated as needed:     Readmission within the last 30 days:     Reason for Consult: discharge planning       Communication Assessment  Patient's communication style: spoken language (English or Bilingual)    Hearing Difficulty or Deaf: no   Wear Glasses or Blind: no    Cognitive  Cognitive/Neuro/Behavioral: .WDL except, level of consciousness, orientation  Level of Consciousness: intermittent confusion  Arousal Level: opens eyes spontaneously  Orientation: disoriented to, situation, time, place (can be reoriented)  Mood/Behavior: calm  Best Language: 0 - No aphasia  Speech: clear, logical    Living Environment:   People in home: spouse     Current living Arrangements: town home           Family/Social Support:  Care provided by: self  Provides care for: no one  Marital Status:   Support System:  Children, Wife          Description of Support System: Supportive, Involved         Current Resources:   Patient receiving home care services: No     Equipment currently used at home: walker, rolling  Supplies currently used at home: None      Does the patient's insurance plan have a 3 day qualifying hospital stay waiver?  No    Lifestyle & Psychosocial Needs:  Social Determinants of Health     Food Insecurity: No Food Insecurity (4/22/2024)    [facility]    Food Insecurity     Worried About Running Out of Food in the Last Year: 1   Depression: At risk (8/16/2023)    [email or web address], [email or web address] [facility]    PHQ-2     PHQ-2 TOTAL SCORE: 3   Housing Stability: Low Risk  (4/22/2024)    [facility]    Housing Stability     Unable to Pay for Housing in the Last Year: 1   Tobacco Use: Medium  Risk (6/5/2024)    Received from [facility]    Patient History     Smoking Tobacco Use: Former     Smokeless Tobacco Use: Never     Passive Exposure: Not on file   Financial Resource Strain: Low Risk  (4/22/2024)    Received from [facility] [employer] [facility]    Financial Resource Strain     Difficulty of Paying Living Expenses: 3     Difficulty of Paying Living Expenses: Not on file   Alcohol Use: Not on file   Transportation Needs: No Transportation Needs (4/22/2024)    Received from [facility]    Transportation Needs     Lack of Transportation (Medical): 1   Physical Activity: Not on file   Interpersonal Safety: Not on file   Stress: Not on file   Social Connections: Socially Integrated (4/22/2024)    Received from [facility]    Social Connections     Frequency of Communication with Friends and Family: 0   Health Literacy: Not on file       Functional Status:  Prior to admission patient needed assistance:   Dependent ADLs:: Ambulation-walker  Dependent IADLs:: Independent         Additional Information:  1:14 PM  Assessed.  Lives in a town home with wife.  Uses a walker.  O2 current (not at baseline).  Family to transport.    GABE CasperSW

## 2024-07-03 ENCOUNTER — APPOINTMENT (OUTPATIENT)
Dept: OCCUPATIONAL THERAPY | Facility: CLINIC | Age: 84
DRG: 291 | End: 2024-07-03
Payer: MEDICARE

## 2024-07-03 ENCOUNTER — APPOINTMENT (OUTPATIENT)
Dept: RADIOLOGY | Facility: CLINIC | Age: 84
DRG: 291 | End: 2024-07-03
Attending: INTERNAL MEDICINE
Payer: MEDICARE

## 2024-07-03 LAB
ANION GAP SERPL CALCULATED.3IONS-SCNC: 10 MMOL/L (ref 7–15)
BUN SERPL-MCNC: 37.1 MG/DL (ref 8–23)
CALCIUM SERPL-MCNC: 9.3 MG/DL (ref 8.8–10.2)
CHLORIDE SERPL-SCNC: 102 MMOL/L (ref 98–107)
CREAT SERPL-MCNC: 2.11 MG/DL (ref 0.67–1.17)
CRP SERPL-MCNC: 6.99 MG/L
DEPRECATED HCO3 PLAS-SCNC: 29 MMOL/L (ref 22–29)
EGFRCR SERPLBLD CKD-EPI 2021: 30 ML/MIN/1.73M2
GLUCOSE BLDC GLUCOMTR-MCNC: 162 MG/DL (ref 70–99)
GLUCOSE BLDC GLUCOMTR-MCNC: 175 MG/DL (ref 70–99)
GLUCOSE BLDC GLUCOMTR-MCNC: 200 MG/DL (ref 70–99)
GLUCOSE BLDC GLUCOMTR-MCNC: 219 MG/DL (ref 70–99)
GLUCOSE SERPL-MCNC: 187 MG/DL (ref 70–99)
HBV SURFACE AG SERPL QL IA: NONREACTIVE
HIV 1+2 AB+HIV1 P24 AG SERPL QL IA: NONREACTIVE
HIV 1+2 AB+HIV1P24 AG SERPLBLD IA.RAPID: NON REACTIVE
HIV 1+2 AB+HIV1P24 AG SERPLBLD IA.RAPID: NON REACTIVE
HIV INTERPRETATION: NORMAL
LD BODY BODY FLUID SOURCE: NORMAL
LDH FLD L TO P-CCNC: 150 U/L
POTASSIUM SERPL-SCNC: 3.5 MMOL/L (ref 3.4–5.3)
PROCALCITONIN SERPL IA-MCNC: 0.1 NG/ML
PROT FLD-MCNC: 4.5 G/DL
PROTEIN BODY FLUID SOURCE: NORMAL
SODIUM SERPL-SCNC: 141 MMOL/L (ref 135–145)

## 2024-07-03 PROCEDURE — 97535 SELF CARE MNGMENT TRAINING: CPT | Mod: GO

## 2024-07-03 PROCEDURE — 250N000011 HC RX IP 250 OP 636: Performed by: INTERNAL MEDICINE

## 2024-07-03 PROCEDURE — 250N000011 HC RX IP 250 OP 636

## 2024-07-03 PROCEDURE — 71046 X-RAY EXAM CHEST 2 VIEWS: CPT

## 2024-07-03 PROCEDURE — 999N000104 HC STATISTIC NO CHARGE: Performed by: INTERNAL MEDICINE

## 2024-07-03 PROCEDURE — 99233 SBSQ HOSP IP/OBS HIGH 50: CPT | Mod: GC

## 2024-07-03 PROCEDURE — 97129 THER IVNTJ 1ST 15 MIN: CPT | Mod: GO

## 2024-07-03 PROCEDURE — 80048 BASIC METABOLIC PNL TOTAL CA: CPT

## 2024-07-03 PROCEDURE — 120N000004 HC R&B MS OVERFLOW

## 2024-07-03 PROCEDURE — 99232 SBSQ HOSP IP/OBS MODERATE 35: CPT | Performed by: INTERNAL MEDICINE

## 2024-07-03 PROCEDURE — 84145 PROCALCITONIN (PCT): CPT | Performed by: INTERNAL MEDICINE

## 2024-07-03 PROCEDURE — 250N000013 HC RX MED GY IP 250 OP 250 PS 637

## 2024-07-03 PROCEDURE — 36415 COLL VENOUS BLD VENIPUNCTURE: CPT

## 2024-07-03 PROCEDURE — 86140 C-REACTIVE PROTEIN: CPT | Performed by: INTERNAL MEDICINE

## 2024-07-03 PROCEDURE — 99222 1ST HOSP IP/OBS MODERATE 55: CPT | Performed by: INTERNAL MEDICINE

## 2024-07-03 PROCEDURE — 250N000013 HC RX MED GY IP 250 OP 250 PS 637: Performed by: INTERNAL MEDICINE

## 2024-07-03 RX ORDER — ENOXAPARIN SODIUM 100 MG/ML
30 INJECTION SUBCUTANEOUS EVERY 24 HOURS
Status: DISCONTINUED | OUTPATIENT
Start: 2024-07-03 | End: 2024-07-04 | Stop reason: HOSPADM

## 2024-07-03 RX ORDER — AMLODIPINE BESYLATE 10 MG/1
10 TABLET ORAL DAILY
Status: DISCONTINUED | OUTPATIENT
Start: 2024-07-04 | End: 2024-07-03

## 2024-07-03 RX ORDER — AMLODIPINE BESYLATE 10 MG/1
10 TABLET ORAL DAILY
Status: DISCONTINUED | OUTPATIENT
Start: 2024-07-03 | End: 2024-07-04 | Stop reason: HOSPADM

## 2024-07-03 RX ADMIN — METOPROLOL TARTRATE 12.5 MG: 25 TABLET, FILM COATED ORAL at 09:20

## 2024-07-03 RX ADMIN — FUROSEMIDE 40 MG: 10 INJECTION, SOLUTION INTRAMUSCULAR; INTRAVENOUS at 20:49

## 2024-07-03 RX ADMIN — FUROSEMIDE 40 MG: 10 INJECTION, SOLUTION INTRAMUSCULAR; INTRAVENOUS at 09:19

## 2024-07-03 RX ADMIN — ATORVASTATIN CALCIUM 40 MG: 40 TABLET, FILM COATED ORAL at 20:49

## 2024-07-03 RX ADMIN — Medication 5 MG: at 21:39

## 2024-07-03 RX ADMIN — UMECLIDINIUM 1 PUFF: 62.5 AEROSOL, POWDER ORAL at 09:22

## 2024-07-03 RX ADMIN — ENOXAPARIN SODIUM 30 MG: 30 INJECTION SUBCUTANEOUS at 17:10

## 2024-07-03 RX ADMIN — AMLODIPINE BESYLATE 10 MG: 10 TABLET ORAL at 09:49

## 2024-07-03 RX ADMIN — FLUTICASONE FUROATE AND VILANTEROL TRIFENATATE 1 PUFF: 100; 25 POWDER RESPIRATORY (INHALATION) at 09:22

## 2024-07-03 RX ADMIN — INSULIN ASPART 1 UNITS: 100 INJECTION, SOLUTION INTRAVENOUS; SUBCUTANEOUS at 20:59

## 2024-07-03 ASSESSMENT — ACTIVITIES OF DAILY LIVING (ADL)
ADLS_ACUITY_SCORE: 26
ADLS_ACUITY_SCORE: 27
ADLS_ACUITY_SCORE: 26
ADLS_ACUITY_SCORE: 25
ADLS_ACUITY_SCORE: 26
ADLS_ACUITY_SCORE: 25
ADLS_ACUITY_SCORE: 26
ADLS_ACUITY_SCORE: 27
ADLS_ACUITY_SCORE: 26
ADLS_ACUITY_SCORE: 26
ADLS_ACUITY_SCORE: 25
ADLS_ACUITY_SCORE: 25
ADLS_ACUITY_SCORE: 26
ADLS_ACUITY_SCORE: 25
ADLS_ACUITY_SCORE: 27
ADLS_ACUITY_SCORE: 25
ADLS_ACUITY_SCORE: 26

## 2024-07-03 NOTE — CARE PLAN
HLM Daily7-Walk 25 feet or more    I Illness Severity: Watcher    PPatient Summary:      Telemetry Orders: Yes    Interpretation of Cardiac Rhythm: Afib    Dyspnea improved and O2 sats >88% at RA or at prior home O2 therapy level:  Yes    Last Bowel Movement: 7/2/24     Acute Symptoms or Concerns: No    Is this a new or worsening symptom or concern? No    Is this symptom or concern being adequately managed? No        AActions:    Diagnostic testing and/or procedures completed, and results are available for discharge:  Met    SSituational Awareness:      Anticipated post discharge treatment plan formulated and the following needs have been identified:   homecare    SSynthesis:     Was bedside rounding completed on your shift? Yes     What team members present during bedside rounds?  RN and MD

## 2024-07-03 NOTE — CONSULTS
PULMONARY / CRITICAL CARE CONSULT NOTE    Date / Time of Admission:  7/1/2024 11:17 AM    Assessment:      Per Beasley is a 84 year old male with history of HTN, HFpEF, CAD, aortic stenosis s/p TAVR, paroxysmal atrial fibrillation, COPD (FEV1 60%), small right side recurrent pneumothorax 1/2024 and 4/2024, managed conservatively, DM, CKD stage 3.   Patient was brought to ED on 7/1/2024 for evaluation of shortness of breath, patient was hypoxic , SpO2 77% on RA.    Upon ED evaluation, patient was in mild respiratory distress, hypertensive, afebrile, swelling of bilateral lower extremities.  Labs showed normal WBC, elevated BNP, elevated BUN/Cr.   CT PE protocol showed bilateral pleural effusion R>L. Admitted with diagnosis of decompensated cardiomyopathy.   US guided thoracentesis 0.85 L of clear yellow fluid, LDH ration 0.67, protein ratio 0.55, predominant lymphocytic.   Pulmonary service consulted.     Resolved acute respiratory failure  Decompensated cardiomyopathy  Pulmonary edema, bilateral pleural effusions  Exudative pleural effusion , predominant lymphocytic   US guided thoracentesis 0.85 L of clear yellow fluid  LDH ration 0.67, protein ratio 0.55, predominant lymphocytic.   Likely pseudoexudate, secondary to treatment of heart failure. Volume contraction leading to fluid concentration.   No associated symptoms to suggest infection, follow up CXR, check CRP and procalcitonin.   Follow up pleural fluid cytology  HTN, HFpEF, CAD, aortic stenosis s/p TAVR  Paroxysmal atrial fibrillation,   COPD   Hx recurrent small pneumothorax, treated conservatively  DM  CKD stage 3    Advance Directives:  DNR    Plan:   Titrate FiO2 to keep SpO2 > 90%, currently on RA  Bronchodilators as needed  Continue ICS/LABA/LAMA  Continue to titrate BP meds  Continue IV diuresis  Follow up CRP , procalcitonin  Follow up CXR  Swallow screen per RN  Supervise feedings   DVT prophylaxis add lovenox subcutaneous  Waiting for insurance  regarding NOACs coverage    Please contact me if you have any questions.  Total critical care time, not including separately billable procedure time: 45 minutes  This patient had a high probability of imminent or life threatening deterioration due to acute respiratory failure which required my direct attention, intervention and personal management.     Lazarus Muñoz  Pulmonary / Critical Care  07/03/2024  12:25 PM        Reason for consult : pleural effusion    HPI:  Per Beasley is a 84 year old male with history of HTN, HFpEF, CAD, aortic stenosis s/p TAVR, paroxysmal atrial fibrillation, COPD (FEV1 60%), small right side recurrent pneumothorax 1/2024 and 4/2024, managed conservatively, DM, CKD stage 3.   Patient was brought to ED on 7/1/2024 for evaluation of shortness of breath, patient was hypoxic , SpO2 77% on RA.    Upon ED evaluation, patient was in mild respiratory distress, hypertensive, afebrile, swelling of bilateral lower extremities.  Labs showed normal WBC, elevated BNP, elevated BUN/Cr.   CT PE protocol showed bilateral pleural effusion R>L. Admitted with diagnosis of decompensated cardiomyopathy.   US guided thoracentesis 0.85 L of clear yellow fluid, LDH ration 0.67, protein ratio 0.55, predominant lymphocytic.   Pulmonary service consulted.     PMH  HTN, HFpEF, CAD, aortic stenosis s/p TAVR, paroxysmal atrial fibrillation, COPD, right side pneumothorax 4/2024, managed conservatively, DM, CKD stage 3.     Allergies: Blood-group specific substance and Hydrochlorothiazide     MEDS:  Current Facility-Administered Medications   Medication Dose Route Frequency Provider Last Rate Last Admin    acetaminophen (TYLENOL) tablet 650 mg  650 mg Oral Q4H PRN cM Chowdhury DO        Or    acetaminophen (TYLENOL) Suppository 650 mg  650 mg Rectal Q4H PRN Mc Chowdhury DO        amLODIPine (NORVASC) tablet 10 mg  10 mg Oral Daily Fidel Grijalva MD   10 mg at 07/03/24 0949    atorvastatin  (LIPITOR) tablet 40 mg  40 mg Oral QPM Mc Chowdhury DO   40 mg at 07/02/24 2006    bisacodyl (DULCOLAX) suppository 10 mg  10 mg Rectal Daily PRN Mc Chowdhury DO        calcium carbonate (TUMS) chewable tablet 1,000 mg  1,000 mg Oral 4x Daily PRN Mc Chowdhury DO        glucose gel 15-30 g  15-30 g Oral Q15 Min PRN Mc Chowdhury DO        Or    dextrose 50 % injection 25-50 mL  25-50 mL Intravenous Q15 Min PRN Mc Chowdhury DO        Or    glucagon injection 1 mg  1 mg Subcutaneous Q15 Min PRN Mc Chowdhury DO        fluticasone-vilanterol (BREO ELLIPTA) 100-25 MCG/ACT inhaler 1 puff  1 puff Inhalation Daily Mc Chowdhury DO   1 puff at 07/03/24 0922    And    umeclidinium (INCRUSE ELLIPTA) 62.5 MCG/ACT inhaler 1 puff  1 puff Inhalation Daily Mc Chowdhury DO   1 puff at 07/03/24 0922    furosemide (LASIX) injection 40 mg  40 mg Intravenous Q12H Mc Chowdhury DO   40 mg at 07/03/24 0919    insulin aspart (NovoLOG) injection (RAPID ACTING)  1-7 Units Subcutaneous TID AC Mc Chowdhury DO   1 Units at 07/02/24 1732    insulin aspart (NovoLOG) injection (RAPID ACTING)  1-5 Units Subcutaneous At Bedtime Mc Chowdhury DO   1 Units at 07/01/24 2204    ipratropium - albuterol 0.5 mg/2.5 mg/3 mL (DUONEB) neb solution 3 mL  3 mL Nebulization 4x Daily PRN Mc Chowdhury DO        lidocaine (LMX4) cream   Topical Q1H PRN Mc Chowdhury DO        lidocaine 1 % 0.1-1 mL  0.1-1 mL Other Q1H PRN Mc Chowdhury DO        metoprolol tartrate (LOPRESSOR) half-tab 12.5 mg  12.5 mg Oral BID Mc Chowdhury DO   12.5 mg at 07/03/24 0920    ondansetron (ZOFRAN ODT) ODT tab 4 mg  4 mg Oral Q6H PRN Mc Chowdhury DO        Or    ondansetron (ZOFRAN) injection 4 mg  4 mg Intravenous Q6H PRN Mc Chowdhury DO        polyethylene glycol (MIRALAX) Packet 17 g  17 g Oral BID PRN Mc Chowdhury DO        prochlorperazine (COMPAZINE) injection 5 mg  5 mg Intravenous Q6H PRN Mc Chowdhury DO        Or    prochlorperazine (COMPAZINE)  tablet 5 mg  5 mg Oral Q6H PRN Mc Chowdhury DO        Or    prochlorperazine (COMPAZINE) suppository 12.5 mg  12.5 mg Rectal Q12H PRN Mc Chowdhury DO        senna-docusate (SENOKOT-S/PERICOLACE) 8.6-50 MG per tablet 1 tablet  1 tablet Oral BID PRN Mc Chowdhury DO        Or    senna-docusate (SENOKOT-S/PERICOLACE) 8.6-50 MG per tablet 2 tablet  2 tablet Oral BID PRN Mc Chowdhury DO        sodium chloride (PF) 0.9% PF flush 3 mL  3 mL Intracatheter Q8H Mc Chowdhury DO   3 mL at 07/03/24 0922    sodium chloride (PF) 0.9% PF flush 3 mL  3 mL Intracatheter q1 min prn Mc Chowdhury DO         Social History     Socioeconomic History    Marital status:      Spouse name: Not on file    Number of children: Not on file    Years of education: Not on file    Highest education level: Not on file   Occupational History    Not on file   Tobacco Use    Smoking status: Not on file    Smokeless tobacco: Not on file   Substance and Sexual Activity    Alcohol use: Not on file    Drug use: Not on file    Sexual activity: Not on file   Other Topics Concern    Not on file   Social History Narrative    Not on file     Social Determinants of Health     Financial Resource Strain: Low Risk  (4/22/2024)    [facility]    Financial Resource Strain     Difficulty of Paying Living Expenses: 3     Difficulty of Paying Living Expenses: Not on file   Food Insecurity: No Food Insecurity (4/22/2024)    [facility]    Food Insecurity     Worried About Running Out of Food in the Last Year: 1   Transportation Needs: No Transportation Needs (4/22/2024)    [facility]    Transportation Needs     Lack of Transportation (Medical): 1   Physical Activity: Not on file   Stress: Not on file   Social Connections: Socially Integrated (4/22/2024)    Received from [facility]  "   Social Connections     Frequency of Communication with Friends and Family: 0   Interpersonal Safety: Not on file   Housing Stability: Low Risk  (4/22/2024)    Received from [facility]    Housing Stability     Unable to Pay for Housing in the Last Year: 1     No family history on file.    ROS  - Twelve point review of systems were discussed with patient, positive finding in HPI      Objective:   VITALS:  /49 (BP Location: Right arm)   Pulse 50   Temp 97.3  F (36.3  C) (Oral)   Resp 20   Ht 1.727 m (5' 8\")   Wt 76.5 kg (168 lb 9.6 oz)   SpO2 95%   BMI 25.64 kg/m    VENT:  Resp: 20    EXAM:   Gen: awake, alert, no distress  HEENT: pink conjunctiva, moist mucosa, Mallampati II/IV  Neck: no thyromegaly, masses or JVD  Lungs: mild decrease breath sounds at the bases otherwise clear  CV: regular, no murmurs or gallops appreciated  Abdomen: soft, NT, BS wnl  Ext: edema both legs L>R  Neuro: CN II-XII intact, non focal       Data Review:  Recent Labs   Lab 07/03/24  0446 07/03/24  0206 07/02/24  1731 07/02/24  0734 07/02/24  0454 07/01/24  2117   * 162* 180* 207* 201* 215*        07/03/24 04:46   Sodium 141   Potassium 3.5   Chloride 102   Carbon Dioxide (CO2) 29   Urea Nitrogen 37.1 (H)   Creatinine 2.11 (H)   GFR Estimate 30 (L)   Calcium 9.3   Anion Gap 10      07/01/24 11:31 07/02/24 04:54   WBC 7.8 7.9   Hemoglobin 12.0 (L) 12.6 (L)   Hematocrit 37.4 (L) 38.2 (L)   Platelet Count 166 172   RBC Count 4.27 (L) 4.48   MCV 88 85   MCH 28.1 28.1   MCHC 32.1 33.0   RDW 13.5 13.3   % Neutrophils 50    % Lymphocytes 36    % Monocytes 11    % Eosinophils 3    % Basophils 0       07/02/24 14:24   Cell Count Fluid Source Pleural Cavity   Total Nucleated Cells 935   % Neutrophils Fluid 0   % Lymphocytes Fluid 93   % Mono/Macro Fluid 5   % Eosinophils Fluid 1   % Lining Cells 1   Color Fluid Yellow   Appearance Fluid Hazy !   Glucose Fluid Source Pleural Cavity   Glucose Fluid " 212   LD Fluid Source Pleural Cavity   Lactate Dehydrogenase Fluid 150   Protein Fluid Source Pleural Cavity   Protein Total Fluid 4.5       CT CHEST PULMONARY EMBOLISM W CONTRAST  LOCATION: [facility]  DATE: 7/1/2024  INDICATION: hypoxia, a fib not on AC  COMPARISON: 4/16/2024  FINDINGS:  ANGIOGRAM CHEST: Pulmonary arteries are normal caliber and negative for pulmonary emboli. Thoracic aorta is not well opacified and is  indeterminate for dissection. No CT evidence of right heart strain.  LUNGS AND PLEURA: Large right pleural effusion with associated middle lobe and right lower lobe consolidation. No residual pneumothorax. Small-moderate left pleural effusion with left lower lobe consolidation. Bilateral groundglass opacities and smooth   interlobular septal thickening. Numerous tiny nodules bilaterally, similar to prior.  MEDIASTINUM/AXILLAE: No lymphadenopathy. Normal esophagus. No significant pericardial effusion. TAVR. Calcified mediastinal and hilar lymph nodes.  CORONARY ARTERY CALCIFICATION: Moderate.  UPPER ABDOMEN: Normal.  MUSCULOSKELETAL: Multilevel degenerative changes in the spine. Healed and nonhealed left-sided rib fractures.               IMPRESSION:  1.  No acute pulmonary emboli.  2.  Large right pleural effusion and moderate left pleural effusion with associated bilateral lower lobe consolidation, likely compressive atelectasis, although superimposed pneumonia is also possible. Mild associated pulmonary edema suggesting congestive heart failure.   3.  Sequelae of thoracic sarcoidosis.    RIGHT THORACENTESIS  2. ULTRASOUND GUIDANCE  LOCATION: [facility]  DATE: 7/2/2024  INDICATION: Pleural effusion.  PROCEDURE: Informed consent obtained. Time out performed. The chest was prepped and draped in sterile fashion. 6 mL of 1 % lidocaine was infused into the local soft tissues. Under direct ultrasound guidance, a 5 [place] catheter system was placed into  the pleural effusion.   0.85 liters of clear yellow fluid were removed and sent to lab, if requested.  Patient tolerated procedure well.  Ultrasound imaging was obtained and placed in the patient's permanent medical record.                                            IMPRESSION:  Status post right ultrasound-guided thoracentesis    By:  Lazarus Muñoz MD, 07/03/2024  12:25 PM    Primary Care Physician:  No Ref-Primary, Physician

## 2024-07-03 NOTE — PLAN OF CARE
Alert to self. VSS on RA, weaned from 3L O2 via NC and HTN. SP <180. Tele: AFib (Vini at times). Up A1 GB and walker. Cardiac diet with carb counting. Denies pain. LS diminished/clear. HALL. Denies SOB/CP. BS active. Denies nausea. BLE edema +2-3. Continent, voiding BSC. IV-SL. Discharge pending. Continue to monitor         Goal Outcome Evaluation:  Problem: Fluid Volume Excess  Goal: Fluid Balance  Outcome: Progressing  Intervention: Monitor and Manage Hypervolemia  Recent Flowsheet Documentation  Taken 7/3/2024 0010 by Zoë Fernandes, RN  Skin Protection:   adhesive use limited   incontinence pads utilized  Taken 7/2/2024 2000 by Zoë Fernandes RN  Skin Protection:   adhesive use limited   incontinence pads utilized     Problem: Comorbidity Management  Goal: Blood Pressure in Desired Range  Outcome: Progressing  Intervention: Maintain Blood Pressure Management  Recent Flowsheet Documentation  Taken 7/3/2024 0010 by Zoë Fernandes, RN  Medication Review/Management: medications reviewed  Taken 7/2/2024 2000 by Zoë Fernandes, RN  Medication Review/Management: medications reviewed     Problem: Comorbidity Management  Goal: Blood Glucose Levels Within Targeted Range  Outcome: Progressing  Intervention: Monitor and Manage Glycemia  Recent Flowsheet Documentation  Taken 7/3/2024 0010 by Zoë Fernandes, RN  Medication Review/Management: medications reviewed  Taken 7/2/2024 2000 by Zoë Fernandes, RN  Medication Review/Management: medications reviewed

## 2024-07-03 NOTE — PLAN OF CARE
Problem: Risk for Delirium  Goal: Improved Attention and Thought Clarity  Outcome: Progressing     Problem: Risk for Delirium  Goal: Improved Sleep  Outcome: Progressing   Pt disoriented to time. Denying pain. Tolerating room air. Blood pressures improved after increased amlodipine dose. Chest x-ray performed. Voiding spontaneously, IV lasix continued. Ambulating with standby assist.

## 2024-07-03 NOTE — PROGRESS NOTES
[facility]    Progress Note - Hospitalist Service       Date of Admission:  7/1/2024    Assessment & Plan   Per Beasley is a 84 year old male with PMH of severe aortic stenosis s/p TAVR, HFpEF, paroxysmal afib not anticoagulated, CAD s/p PCI, HTN, HLD, T2DM, COPD, and recent admission for recurrent pneumothorax c/b hydropneumothorax 4/16/2024, who is admitted with acute hypoxic respiratory failure 2/2 b/l pleural effusions in the context of acute on chronic heart failure exacerbation. Now s/p 0.85L thoracentesis with fluid studies consistent with exudative however most likely transudative due to fluid contraction per pulm. Pulm and cardiology following, hopeful to discharge tomorrow 7/4.    Acute hypoxic respiratory failure  Acute on chronic heart failure exacerbation  B/l pleural effusions  Hx thoracic sarcoidosis   Most recent echo showing EF 65-70% with normal LV and RV function. Hx of medication noncompliance.   Presented to clinic 7/1 with shortness of breath x1d, found to have oxygen saturation in the 70s and sent to the ED via EMS.   In the ED, BNP elevated 3600 and CT PE showing large right and moderate left pleural effusion with bilateral lower lobe consolidation likely due to compressive atelectasis, as well as mild pulmonary edema. Thoracentesis performed 7/2 with 0.85L fluid removed. Pulmonology consulted due to effusion being consistent with exudative nature, suspects this is pseudoexudative 2/2 treatment of heart failure.  - Pulmonology consulted, appreciate expertise   - Repeat CXR   - Recheck CRP and procal   - Follow pleural fluid cytology  - Cardiology consulted, appreciate expertise   - Increase amlodipine to 10mg every day  - Continue IV lasix 40mg BID, hopeful to transition to PO tomorrow in anticipation for discharge.  - Strict I/Os  - Daily standing weights  - Cardiac telemetry, can discontinue after 48hrs    Hypertensive emergency, resolved  JONNATHAN on CKD  On  "admission /86, JONNATHAN with creatinine 1.96 with baseline approx.1.3.   -  Increased amlodipine as above, now 10mg QD  - Caution with nephrotoxic medications    Coronary artery disease s/p PCI w/ NUBIA to mid LCx 2021  Paroxysmal AF not on anticoagulation  Rate controlled on metoprolol. Most recent cardiology evaluation during consult while admitted on 4//24, recommended continued DAPT and work towards attaining GDMT including starting SGLT2 inhibitor.  Stopped taking warfarin due to not wanting to have frequent INR checks. Per chart review, stress test 3/4/2024 did not show any inducible ischemia.    COPD  Hx of spontaneous PTX c/b hydropneumothorax, admission 4/16/2024  Former smoker  Former smoker with 20-pack-year history, PFTs showing moderate obstruction.     T2DM  Previous A1c 7.8 on 6/5/2024, only medication being glipizide. Well controlled.  - Medium SSI        Diet: Combination Diet Low Saturated Fat Na <2400mg Diet, No Caffeine Diet    DVT Prophylaxis: Pneumatic Compression Devices  Helm Catheter: Not present  Fluids: None  Lines: None     Cardiac Monitoring: ACTIVE order. Indication: Acute decompensated heart failure (48 hours)  Code Status: No CPR- Do NOT Intubate      Clinically Significant Risk Factors                  # Hypertension: Noted on problem list  # Acute heart failure with preserved ejection fraction: heart failure noted on problem list, last echo with EF >50%, and receiving IV diuretics              # DMII: A1C = N/A within past 6 months, PRESENT ON ADMISSION  # Overweight: Estimated body mass index is 25.64 kg/m  as calculated from the following:    Height as of this encounter: 1.727 m (5' 8\").    Weight as of this encounter: 76.5 kg (168 lb 9.6 oz)., PRESENT ON ADMISSION            Disposition Plan      Expected Discharge Date: 07/04/2024      Destination: home with family          The patient's care was discussed with the Attending Physician, Dr. Ndiaye .    Mc Chowdhury,  " PGY3  Hospitalist Service  [facility]  Securely message with [employer] (more info)  Text page via [employer] Paging/Directory   ______________________________________________________________________    Interval History   No acute events overnight. Feeling well this morning, feels that his peripheral edema has improved and he is no longer experiencing shortness of breath. No urinary complaints. He would like to go home tomorrow as it is his wife's birthday.    Physical Exam   Vital Signs: Temp: 97.6  F (36.4  C) Temp src: Oral BP: (!) 149/63 Pulse: 52   Resp: 20 SpO2: 92 % O2 Device: None (Room air) Oxygen Delivery: 1 LPM  Weight: 168 lbs 9.6 oz    Constitutional: awake, alert, cooperative, no apparent distress, and appears stated age  Respiratory:  CTAB. Normal work of breathing.  Cardiovascular: Irregularly irregular rhythm, regular rate.   Musculoskeletal: 1+ pitting edema in b/l lower legs to below the thania  Neuropsychiatric: General: normal, calm, and normal eye contact  Affect: pleasant  Orientation: oriented to self, place, time and situation      Data     I have personally reviewed the following data over the past 24 hrs:    N/A  \   N/A   / N/A     141 102 37.1 (H) /  219 (H)   3.5 29 2.11 (H) \     Procal: 0.10 CRP: 6.99 (H) Lactic Acid: N/A       Ferritin:  N/A % Retic:  N/A LDH:  N/A       Imaging results reviewed over the past 24 hrs:   Recent Results (from the past 24 hour(s))   XR Chest 2 Views    Narrative    EXAM: XR CHEST 2 VIEWS  LOCATION: [facility]  DATE: 7/3/2024    INDICATION: S p thoracentesis. dyspnea.  COMPARISON: CT 7/1/2024      Impression    IMPRESSION: Interval improved small right pleural effusion with adjacent atelectasis. Stable small left-sided pleural effusion with adjacent atelectasis. No pneumothorax. TAVR. Normal heart size. No vascular congestion or overt pulmonary edema. Old   healed lower left rib fracture.

## 2024-07-03 NOTE — PROGRESS NOTES
"[facility] [place]  [phone]          Assessment/Recommendations   Patient with known heart failure with preserved ejection fraction, valvular heart disease, permanent atrial fibrillation not anticoagulated.  Patient will not take warfarin and novel oral anticoagulants are too expensive for him.    Heart failure symptoms are improved and breathing is \"a little better\" but not back to baseline.  His I's and O's are incomplete but his weight is down so we will continue his current IV diuretics at least for today and hopefully can transition to oral diuretics tomorrow.    Blood pressures have not been well-controlled despite relative bedrest so we will increase his amlodipine to 10 mg a day.    Increase activity today.    Will continue to follow       Subjective   Patient reports that breathing is a little bit better but not yet back to baseline.  He denies any chest pain or abdominal pain.    ECG/Tele: Personally reviewed.  Monitor shows atrial fibrillation with a ventricular sponsor of 64 bpm       Physical Examination Review of Systems   BP (!) 157/67 (BP Location: Right arm, Patient Position: Semi-Gates's, Cuff Size: Adult Regular)   Pulse 57   Temp 97.6  F (36.4  C) (Axillary)   Resp 18   Ht 1.727 m (5' 8\")   Wt 76.5 kg (168 lb 9.6 oz)   SpO2 97%   BMI 25.64 kg/m    Body mass index is 25.64 kg/m .  Wt Readings from Last 3 Encounters:   07/03/24 76.5 kg (168 lb 9.6 oz)   04/04/24 74.4 kg (164 lb 2 oz)   01/12/24 76.8 kg (169 lb 6.4 oz)     General Appearance:   Alert, cooperative and in no acute distress.   ENT/Mouth: Pink/dry    EYES:  no scleral icterus, normal conjunctivae   Neck: JVP normal.  Positive hepatojugular reflux. Thyroid not visualized.   Chest/Lungs:   Lungs have diminished breath sounds at the right base.   Cardiovascular:   S1, S2 with 2/6 systolic murmur , no clicks or rubs.    Abdomen:  Nontender.    Extremities: No cyanosis, clubbing and mild left pretibial pitting " "edema   Skin: no xanthelasma, warm.    Neurologic: normal arm movement bilateral, no tremors     Psychiatric: Appropriate affect.      Enc Vitals  BP: (!) 157/67  Pulse: 57  Resp: 18  Temp: 97.6  F (36.4  C)  Temp src: Axillary  SpO2: 97 %  Weight: 76.5 kg (168 lb 9.6 oz)  Height: 172.7 cm (5' 8\")                                           Medical History  Surgical History Family History Social History   No past medical history on file. No past surgical history on file. No family history on file. Social History     Socioeconomic History    Marital status:      Spouse name: Not on file    Number of children: Not on file    Years of education: Not on file    Highest education level: Not on file   Occupational History    Not on file   Tobacco Use    Smoking status: Not on file    Smokeless tobacco: Not on file   Substance and Sexual Activity    Alcohol use: Not on file    Drug use: Not on file    Sexual activity: Not on file   Other Topics Concern    Not on file   Social History Narrative    Not on file     Social Determinants of Health     Financial Resource Strain: Low Risk  (4/22/2024)    [facility]    Financial Resource Strain     Difficulty of Paying Living Expenses: 3     Difficulty of Paying Living Expenses: Not on file   Food Insecurity: No Food Insecurity (4/22/2024)    [facility]    Food Insecurity     Worried About Running Out of Food in the Last Year: 1   Transportation Needs: No Transportation Needs (4/22/2024)    [facility] from [facility]    Transportation Needs     Lack of Transportation (Medical): 1   Physical Activity: Not on file   Stress: Not on file   Social Connections: Socially Integrated (4/22/2024)    [facility] from [facility]    Social Connections     Frequency of Communication with Friends and Family: 0   Interpersonal Safety: " Not on file   Housing Stability: Low Risk  (4/22/2024)    Received from [employer] [facility]    Housing Stability     Unable to Pay for Housing in the Last Year: 1          Medications  Allergies   No current outpatient medications on file.    Allergies   Allergen Reactions    Blood-Group Specific Substance      Patient has a non specific antibody.  Blood product orders may be delayed.  Please draw on red top and 2 purple top tubes for all Type and Screen/ type and Cross match orders.    Hydrochlorothiazide Rash         Lab Results    Chemistry/lipid CBC Cardiac Enzymes/BNP/TSH/INR   Lab Results   Component Value Date    BUN 37.1 (H) 07/03/2024     07/03/2024    CO2 29 07/03/2024    Lab Results   Component Value Date    WBC 7.9 07/02/2024    HGB 12.6 (L) 07/02/2024    HCT 38.2 (L) 07/02/2024    MCV 85 07/02/2024     07/02/2024    Lab Results   Component Value Date    TROPONINI 0.04 08/12/2022     (H) 08/12/2022    TSH 1.57 01/11/2024    INR 1.11 07/01/2024

## 2024-07-04 VITALS
SYSTOLIC BLOOD PRESSURE: 143 MMHG | BODY MASS INDEX: 25.64 KG/M2 | RESPIRATION RATE: 18 BRPM | WEIGHT: 169.2 LBS | DIASTOLIC BLOOD PRESSURE: 65 MMHG | HEIGHT: 68 IN | TEMPERATURE: 98.2 F | HEART RATE: 54 BPM | OXYGEN SATURATION: 92 %

## 2024-07-04 LAB
ANION GAP SERPL CALCULATED.3IONS-SCNC: 10 MMOL/L (ref 7–15)
BUN SERPL-MCNC: 45.3 MG/DL (ref 8–23)
CALCIUM SERPL-MCNC: 9.4 MG/DL (ref 8.8–10.2)
CHLORIDE SERPL-SCNC: 99 MMOL/L (ref 98–107)
CREAT SERPL-MCNC: 2.19 MG/DL (ref 0.67–1.17)
DEPRECATED HCO3 PLAS-SCNC: 28 MMOL/L (ref 22–29)
EGFRCR SERPLBLD CKD-EPI 2021: 29 ML/MIN/1.73M2
GLUCOSE BLDC GLUCOMTR-MCNC: 213 MG/DL (ref 70–99)
GLUCOSE SERPL-MCNC: 214 MG/DL (ref 70–99)
HCV RNA SERPL NAA+PROBE-ACNC: NOT DETECTED IU/ML
POTASSIUM SERPL-SCNC: 3.9 MMOL/L (ref 3.4–5.3)
SODIUM SERPL-SCNC: 137 MMOL/L (ref 135–145)

## 2024-07-04 PROCEDURE — 80048 BASIC METABOLIC PNL TOTAL CA: CPT | Performed by: INTERNAL MEDICINE

## 2024-07-04 PROCEDURE — 250N000013 HC RX MED GY IP 250 OP 250 PS 637: Performed by: INTERNAL MEDICINE

## 2024-07-04 PROCEDURE — 99232 SBSQ HOSP IP/OBS MODERATE 35: CPT | Performed by: INTERNAL MEDICINE

## 2024-07-04 PROCEDURE — 99239 HOSP IP/OBS DSCHRG MGMT >30: CPT | Mod: GC

## 2024-07-04 PROCEDURE — 36415 COLL VENOUS BLD VENIPUNCTURE: CPT | Performed by: INTERNAL MEDICINE

## 2024-07-04 RX ORDER — AMLODIPINE BESYLATE 10 MG/1
10 TABLET ORAL DAILY
Qty: 30 TABLET | Refills: 0 | Status: SHIPPED | OUTPATIENT
Start: 2024-07-05 | End: 2024-07-04

## 2024-07-04 RX ORDER — TORSEMIDE 20 MG/1
80 TABLET ORAL DAILY
Status: DISCONTINUED | OUTPATIENT
Start: 2024-07-04 | End: 2024-07-04 | Stop reason: HOSPADM

## 2024-07-04 RX ORDER — AMLODIPINE BESYLATE 10 MG/1
10 TABLET ORAL DAILY
Qty: 30 TABLET | Refills: 0 | Status: SHIPPED | OUTPATIENT
Start: 2024-07-05

## 2024-07-04 RX ADMIN — AMLODIPINE BESYLATE 10 MG: 10 TABLET ORAL at 08:32

## 2024-07-04 RX ADMIN — UMECLIDINIUM 1 PUFF: 62.5 AEROSOL, POWDER ORAL at 08:33

## 2024-07-04 RX ADMIN — FLUTICASONE FUROATE AND VILANTEROL TRIFENATATE 1 PUFF: 100; 25 POWDER RESPIRATORY (INHALATION) at 08:33

## 2024-07-04 RX ADMIN — TORSEMIDE 80 MG: 20 TABLET ORAL at 08:59

## 2024-07-04 ASSESSMENT — ACTIVITIES OF DAILY LIVING (ADL)
ADLS_ACUITY_SCORE: 23

## 2024-07-04 NOTE — PROGRESS NOTES
Physical Therapy Discharge Summary    Reason for therapy discharge:    Discharged to home.    Progress towards therapy goal(s). See goals on Care Plan in [facility] electronic health record for goal details.  Goals partially met.  Barriers to achieving goals:   discharge from facility.    Therapy recommendation(s):    No further therapy is recommended.

## 2024-07-04 NOTE — DISCHARGE SUMMARY
"[facility]  Discharge Summary - Medicine & Pediatrics       Date of Admission:  7/1/2024  Date of Discharge:  7/4/2024 12:19 PM  Discharging Provider: Mc Chowdhury DO           Attending Physician: Obi Ndiaye MD  Discharge Service: Hospitalist Service    Discharge Diagnoses   Acute on Chronic Heart Failure Exacerbation  Bilateral pleural effusions s/p thoracentesis - pseudoexudative effusion    Clinically Significant Risk Factors     # DMII: A1C = N/A within past 6 months  # Overweight: Estimated body mass index is 25.73 kg/m  as calculated from the following:    Height as of this encounter: 1.727 m (5' 8\").    Weight as of this encounter: 76.7 kg (169 lb 3.2 oz).       Follow-ups Needed After Discharge   Follow-up Appointments     Follow-up and recommended labs and tests       Follow up with primary care provider within 7 days to evaluate medication   change and for hospital follow- up.  The following labs/tests are   recommended: BMP.            Unresulted Labs Ordered in the Past 30 Days of this Admission       Date and Time Order Name Status Description    7/3/2024  3:31 PM Hepatitis C RNA, Quantitative by PCR In process     7/1/2024  4:40 PM Flow Cytometry In process     7/1/2024  4:40 PM Pleural fluid Aerobic Bacterial Culture Routine With Gram Stain Preliminary         These results will be followed up by primary care provider    Discharge Disposition   Discharged to home  Condition at discharge: Stable    Hospital Course   Per Beasley is a 84 year old male with PMH of severe aortic stenosis s/p TAVR, HFpEF, paroxysmal afib not anticoagulated, CAD s/p PCI, HTN, HLD, T2DM, COPD, and recent admission for recurrent pneumothorax c/b hydropneumothorax 4/16/2024, who is admitted with acute hypoxic respiratory failure 2/2 b/l pleural effusions in the context of acute on chronic heart failure exacerbation. S/p 0.85L thoracentesis with fluid studies initially consistent with exudative " however most likely transudative due to fluid contraction per pulm. Cardiology signed off - increased amlodipine to 10mg daily, increased torsemide to 80mg daily, discontinued metoprolol for persistent bradycardia, discontinued plavix and started on eliquis given renal impairment.     Acute hypoxic respiratory failure  Acute on chronic heart failure exacerbation  B/l pleural effusions  Hx thoracic sarcoidosis   Most recent echo showing EF 65-70% with normal LV and RV function. Hx of medication noncompliance.   Presented to clinic 7/1 with shortness of breath x1d, found to have oxygen saturation in the 70s and sent to the ED via EMS.   In the ED, BNP elevated 3600 and CT PE showing large right and moderate left pleural effusion with bilateral lower lobe consolidation likely due to compressive atelectasis, as well as mild pulmonary edema. Thoracentesis performed 7/2 with 0.85L fluid removed. Pulmonology consulted due to effusion being consistent with exudative nature, suspects this is pseudoexudative 2/2 treatment of heart failure.  - Torsemide 80mg every day   - Discontinue metoprolol     Hypertensive emergency, resolved  JONNATHAN on CKD  On admission /86, JONNATHAN with creatinine 1.96 with baseline approx.1.3. Increased amlodipine to 10mg every day. Caution with nephrotoxic medications  - Amlodipine 10mg every day      Coronary artery disease s/p PCI w/ NUBIA to mid LCx 2021  Paroxysmal AF not on anticoagulation  Cardiology consulted given persistent bradycardia and assisted with management - increased amlodipine to 10mg daily, increased torsemide to 80mg daily, discontinued metoprolol for persistent bradycardia, discontinued plavix and started on eliquis given renal impairment.  - Started eliquis 2.5mg every day  - Continue ASA 81mg every day  - Discontinue plavix      COPD  Hx of spontaneous PTX c/b hydropneumothorax, admission 4/16/2024  Former smoker  Former smoker with 20-pack-year history, PFTs showing moderate  obstruction.      T2DM  Previous A1c 7.8 on 6/5/2024, only medication being glipizide. Well controlled. Restarted glipizide on discharge.  - Medium SSI    Consultations This Hospital Stay   PHYSICAL THERAPY ADULT IP CONSULT  OCCUPATIONAL THERAPY ADULT IP CONSULT  PHARMACY TEST CLAIM IP CONSULT  CARDIOLOGY IP CONSULT  CARE MANAGEMENT / SOCIAL WORK IP CONSULT  PULMONARY IP CONSULT  PHYSICAL THERAPY ADULT IP CONSULT  OCCUPATIONAL THERAPY ADULT IP CONSULT    Code Status   No CPR- Do NOT Intubate       The patient was discussed with Dr. Obi Chowdhury [facility] Residency Service  [facility]  [address] [place] [address]  Phone: [phone]  Fax: [phone]  ______________________________________________________________________    Physical Exam   Vital Signs: Temp: 98.2  F (36.8  C) Temp src: Oral BP: 119/58 Pulse: 54   Resp: 18 SpO2: 92 % O2 Device: None (Room air)    Weight: 169 lbs 3.2 oz  Constitutional: Awake, alert, cooperative, no apparent distress  Eyes: Lids and lashes normal, sclera clear, conjunctiva normal.  ENT: Normocephalic, atraumatic. Moist mucous membranes.  Respiratory: Very mild basilar crackles. Normal work of breathing, no accessory muscle use, no rales/rhonchi/wheezes.  Cardiovascular: Regular rate and rhythm, normal S1 and S2, no S3 or S4, and no murmur noted  GI: Abdomen soft, non-tender, non-distended, no masses palpated.  Skin: No bruising or bleeding and normal skin color, texture, turgor.  Musculoskeletal: There is no redness, warmth, or swelling of the joints.  Neurologic: Awake, alert, oriented to name, place and time.        Primary Care Physician   Physician No Ref-Primary    Discharge Orders      Reason for your hospital stay    Heart Failure Exacerbation  Pleural Effusions     Follow-up and recommended labs and tests     Follow up with primary care provider within 7 days to evaluate medication  change and for hospital follow- up.  The following labs/tests are recommended: BMP.     Activity    Your activity upon discharge: activity as tolerated     Diet    Follow this diet upon discharge: Orders Placed This Encounter      Combination Diet Low Saturated Fat Na <2400mg Diet, No Caffeine Diet       Significant Results and Procedures   Most Recent 3 CBC's:  Recent Labs   Lab Test 07/02/24  0454 07/01/24  1131 04/05/24  0533   WBC 7.9 7.8 8.1   HGB 12.6* 12.0* 12.4*   MCV 85 88 87    166 185     Most Recent 3 BMP's:  Recent Labs   Lab Test 07/04/24  0820 07/04/24  0535 07/03/24  2058 07/03/24  1311 07/03/24  0446 07/02/24  0734 07/02/24  0454   NA  --  137  --   --  141  --  140   POTASSIUM  --  3.9  --   --  3.5  --  3.6   CHLORIDE  --  99  --   --  102  --  100   CO2  --  28  --   --  29  --  29   BUN  --  45.3*  --   --  37.1*  --  31.7*   CR  --  2.19*  --   --  2.11*  --  1.92*   ANIONGAP  --  10  --   --  10  --  11   SANDRA  --  9.4  --   --  9.3  --  9.4   * 214* 200*   < > 187*   < > 201*    < > = values in this interval not displayed.       Discharge Medications   Current Discharge Medication List        CONTINUE these medications which have CHANGED    Details   amLODIPine (NORVASC) 10 MG tablet Take 1 tablet (10 mg) by mouth daily  Qty: 30 tablet, Refills: 0    Associated Diagnoses: Essential hypertension      torsemide 40 MG TABS Take 80 mg by mouth daily  Qty: 60 tablet, Refills: 0    Associated Diagnoses: Acute on chronic congestive heart failure, unspecified heart failure type (H)           CONTINUE these medications which have NOT CHANGED    Details   arformoterol (BROVANA) 15 MCG/2ML NEBU neb solution Inhale 15 mcg into the lungs 2 times daily as needed      aspirin (ASA) 81 MG chewable tablet Take 81 mg by mouth daily      atorvastatin (LIPITOR) 40 MG tablet Take 40 mg by mouth daily      clopidogrel (PLAVIX) 75 MG tablet Take 75 mg by mouth daily      Fluticasone-Umeclidin-Vilant  (TRELEGY ELLIPTA) 100-62.5-25 MCG/ACT oral inhaler Inhale 1 puff into the lungs daily Rinse mouth after use.      glipiZIDE (GLUCOTROL XL) 5 MG 24 hr tablet Take 5 mg by mouth every morning      glucose (BD GLUCOSE) 4 g chewable tablet Take 4 tablets by mouth every 15 minutes as needed for low blood sugar  Qty: 50 tablet, Refills: 0    Associated Diagnoses: Type 2 diabetes mellitus without complication, without long-term current use of insulin (H)      ipratropium - albuterol 0.5 mg/2.5 mg/3 mL (DUONEB) 0.5-2.5 (3) MG/3ML neb solution Take 3 mLs by nebulization 4 times daily as needed for shortness of breath      Alcohol Swabs PADS Use to swab the area of the injection or rosenda as directed Per insurance coverage  Qty: 100 each, Refills: 0    Associated Diagnoses: Type 2 diabetes mellitus without complication, without long-term current use of insulin (H)      blood glucose (NO BRAND SPECIFIED) lancets standard To use to test glucose level in the blood Use to test blood sugar  2  times daily as directed. To accompany glucose monitor brands per insurance coverage.  Qty: 100 each, Refills: 0    Associated Diagnoses: Type 2 diabetes mellitus without complication, without long-term current use of insulin (H)      blood glucose (NO BRAND SPECIFIED) test strip To use to test glucose level in the blood Use to test blood sugar  2 times daily as directed. To accompany glucose monitor brands per insurance coverage.  Qty: 100 strip, Refills: 0    Associated Diagnoses: Type 2 diabetes mellitus without complication, without long-term current use of insulin (H)      blood glucose calibration (NO BRAND SPECIFIED) solution Used to calibrate the blood glucose monitor as needed and as directed.  To accompany  blood glucose brands per insurance coverage  Qty: 1 each, Refills: 0    Associated Diagnoses: Type 2 diabetes mellitus without complication, without long-term current use of insulin (H)      blood glucose monitoring (NO BRAND  SPECIFIED) meter device kit Use as directed , Per insurance coverage  Qty: 1 kit, Refills: 0    Associated Diagnoses: Type 2 diabetes mellitus without complication, without long-term current use of insulin (H)      insulin pen needle (32G X 4 MM) 32G X 4 MM miscellaneous Use as directed by provider Per insurance coverage  Qty: 100 each, Refills: 0    Associated Diagnoses: Type 2 diabetes mellitus without complication, without long-term current use of insulin (H)      Sharps Container MISC Use as directed to dispose of needles, lancets and other sharps Per Insurance coverage  Qty: 1 each, Refills: 0    Associated Diagnoses: Type 2 diabetes mellitus without complication, without long-term current use of insulin (H)           STOP taking these medications       metoprolol tartrate (LOPRESSOR) 25 MG tablet Comments:   Reason for Stopping:             Allergies   Allergies   Allergen Reactions    Blood-Group Specific Substance Other (See Comments)     Patient has a non specific antibody.  Blood product orders may be delayed.  Please draw on red top and 2 purple top tubes for all Type and Screen/ type and Cross match orders.    Hydrochlorothiazide Rash

## 2024-07-04 NOTE — PROGRESS NOTES
Care Management Discharge Note    Discharge Date: 07/04/2024       Discharge Disposition: Home    Discharge Services: None    Discharge DME: None    Discharge Transportation: family or friend will provide    Private pay costs discussed: Not applicable    Education Provided on the Discharge Plan: Yes AVS per bedside nurse     Persons Notified of Discharge Plans:  patient    Patient/Family in Agreement with the Plan: yes    Handoff Referral Completed: Yes    Additional Information:    Pt discharging to home, family transport.  Outpatient follow-up.    Alejandro Santos RN

## 2024-07-04 NOTE — PROGRESS NOTES
"[facility]  [phone]          Assessment/Recommendations   Patient with known heart failure with preserved ejection fraction, recurrent pleural effusions, admission for shortness of breath with evidence of pulmonary vascular congestion, elevated jugular venous pressure and worsening pleural effusions.  He did have thoracentesis and diuresis and his breathing is better.  He is walked in the room but not outside of the room.  He is would like to go home today.    He is followed intermittently with a cardiologist at [facility] and I offered our core clinic as the patient lives in [place] and could come to [place].  They certainly would like to try that.  I will arrangements for him to be followed up in the core clinic at our [place] office and hopefully we can get him in next week for a follow-up.    He is relatively bradycardic and on a tiny dose of metoprolol so we will discontinue it.    He has atrial fibrillation but will not take warfarin and cannot afford novel oral anticoagulants.    As an outpatient I would consider Jardiance if his renal function is stable and he can afford the medication.    Could be discharged from a cardiology standpoint today.       Subjective   Patient reports that his breathing is comfortable.  He is off of oxygen.  No chest pains.  Has walked in the room but not outside in the hallways.    ECG/Tele: Personally reviewed.  Atrial fibrillation with slightly slow ventricular response.       Physical Examination Review of Systems   /58   Pulse 54   Temp 98.2  F (36.8  C) (Oral)   Resp 18   Ht 1.727 m (5' 8\")   Wt 76.7 kg (169 lb 3.2 oz)   SpO2 92%   BMI 25.73 kg/m    Body mass index is 25.73 kg/m .  Wt Readings from Last 3 Encounters:   07/04/24 76.7 kg (169 lb 3.2 oz)   04/04/24 74.4 kg (164 lb 2 oz)   01/12/24 76.8 kg (169 lb 6.4 oz)     General Appearance:   Alert, cooperative and in no acute distress.   ENT/Mouth: Pink/moist     EYES:  no " "scleral icterus, normal conjunctivae   Neck: JVP normal. No Hepatojugular reflux. Thyroid not visualized.   Chest/Lungs:   Lungs are clear to auscultation but diminished breath sounds at the right base., equal chest wall expansion.   Cardiovascular:   S1, S2 with 2/6 systolic murmur , no clicks or rubs.    Abdomen:  Nontender.    Extremities: No cyanosis, clubbing and minimal left pretibial edema   Skin: no xanthelasma, warm.    Neurologic: normal arm movement bilateral, no tremors     Psychiatric: Appropriate affect.      Enc Vitals  BP: 119/58  Pulse: 54  Resp: 18  Temp: 98.2  F (36.8  C)  Temp src: Oral  SpO2: 92 %  Weight: 76.7 kg (169 lb 3.2 oz)  Height: 172.7 cm (5' 8\")                                           Medical History  Surgical History Family History Social History   No past medical history on file. No past surgical history on file. No family history on file. Social History     Socioeconomic History    Marital status:      Spouse name: Not on file    Number of children: Not on file    Years of education: Not on file    Highest education level: Not on file   Occupational History    Not on file   Tobacco Use    Smoking status: Not on file    Smokeless tobacco: Not on file   Substance and Sexual Activity    Alcohol use: Not on file    Drug use: Not on file    Sexual activity: Not on file   Other Topics Concern    Not on file   Social History Narrative    Not on file     Social Determinants of Health     Financial Resource Strain: Low Risk  (4/22/2024)    [facility]    Financial Resource Strain     Difficulty of Paying Living Expenses: 3     Difficulty of Paying Living Expenses: Not on file   Food Insecurity: No Food Insecurity (4/22/2024)    Received from [employer] [facility]    Food Insecurity     Worried About Running Out of Food in the Last Year: 1   Transportation Needs: No Transportation Needs (4/22/2024)    Received from " [facility] [employer] [facility]    Transportation Needs     Lack of Transportation (Medical): 1   Physical Activity: Not on file   Stress: Not on file   Social Connections: Socially Integrated (4/22/2024)    [facility]    Social Connections     Frequency of Communication with Friends and Family: 0   Interpersonal Safety: Not on file   Housing Stability: Low Risk  (4/22/2024)    [facility] [employer] [facility]    Housing Stability     Unable to Pay for Housing in the Last Year: 1          Medications  Allergies   No current outpatient medications on file.    Allergies   Allergen Reactions    Blood-Group Specific Substance      Patient has a non specific antibody.  Blood product orders may be delayed.  Please draw on red top and 2 purple top tubes for all Type and Screen/ type and Cross match orders.    Hydrochlorothiazide Rash         Lab Results    Chemistry/lipid CBC Cardiac Enzymes/BNP/TSH/INR   Lab Results   Component Value Date    BUN 45.3 (H) 07/04/2024     07/04/2024    CO2 28 07/04/2024    Lab Results   Component Value Date    WBC 7.9 07/02/2024    HGB 12.6 (L) 07/02/2024    HCT 38.2 (L) 07/02/2024    MCV 85 07/02/2024     07/02/2024    Lab Results   Component Value Date    TROPONINI 0.04 08/12/2022     (H) 08/12/2022    TSH 1.57 01/11/2024    INR 1.11 07/01/2024

## 2024-07-04 NOTE — PLAN OF CARE
Reviewed AVS. Reiterated medication changes and new meds as well as follow-ups with Cards and primary. CHF teaching including weight log and low-sodium diet written info reviewed. Wife, Tana in room and also reviewed. IV removed and belongings gathered. Wheeled via wheelchair to son's car.

## 2024-07-04 NOTE — PLAN OF CARE
HLM Admission:   HLM Daily7-Walk 25 feet or more    I Illness Severity: Stable    PPatient Summary:      Telemetry Orders: Yes    Interpretation of Cardiac Rhythm: Slow Afib    Dyspnea improved and O2 sats >88% at RA or at prior home O2 therapy level:      Last Bowel Movement: 7/3    Acute Symptoms or Concerns: No      Shift Summary: Pt Aox4. Intermittently confused, especially when awaking from sleep. Redirectable. Up A1 w/ gbw to bathroom. Voiding spontaneously. SBP 150s. PRN melatonin utilized to promote sleep. Alarms utilized.     AActions:    Diagnostic testing and/or procedures completed, and results are available for discharge:  Met    SSituational Awareness:      Anticipated post discharge treatment plan formulated and the following needs have been identified:   Pending clinical progression. Plan is for pulmonology consult 7/4.     SSynthesis:     Was bedside rounding completed on your shift? No     What team members present during bedside rounds?

## 2024-07-06 ENCOUNTER — PATIENT OUTREACH (OUTPATIENT)
Dept: CARE COORDINATION | Facility: CLINIC | Age: 84
End: 2024-07-06
Payer: MEDICARE

## 2024-07-06 NOTE — PROGRESS NOTES
Connected Care Resource Center:   [facility] Contact  [facility]/Voicemail     Clinical Data: Post-Discharge Outreach     Outreach attempted x 2.  Left message on patient's voicemail, providing [facility]'s central phone number of [phone] ([phone]) for questions/concerns and/or to schedule an appt with an [facility] provider, if they do not have a PCP.      Plan:  [facility] will do no further outreaches at this time.       Pallavi Casillas MA  Connected Care Resource Center, [facility]    *Connected Care Resource Team does NOT follow patient ongoing. Referrals are identified based on internal discharge reports and the outreach is to ensure patient has an understanding of their discharge instructions.

## 2024-07-07 LAB
BACTERIA PLR CULT: NO GROWTH
GRAM STAIN RESULT: NORMAL
GRAM STAIN RESULT: NORMAL

## 2024-07-08 LAB
PATH REPORT.COMMENTS IMP SPEC: ABNORMAL
PATH REPORT.COMMENTS IMP SPEC: NORMAL
PATH REPORT.COMMENTS IMP SPEC: YES
PATH REPORT.FINAL DX SPEC: ABNORMAL
PATH REPORT.FINAL DX SPEC: NORMAL
PATH REPORT.GROSS SPEC: NORMAL
PATH REPORT.MICROSCOPIC SPEC OTHER STN: ABNORMAL
PATH REPORT.MICROSCOPIC SPEC OTHER STN: NORMAL
PATH REPORT.RELEVANT HX SPEC: ABNORMAL

## 2024-07-08 PROCEDURE — 88305 TISSUE EXAM BY PATHOLOGIST: CPT | Mod: 26 | Performed by: PATHOLOGY

## 2024-07-08 PROCEDURE — 88112 CYTOPATH CELL ENHANCE TECH: CPT | Mod: 26 | Performed by: PATHOLOGY

## 2024-07-09 ENCOUNTER — TELEPHONE (OUTPATIENT)
Dept: CARDIOLOGY | Facility: CLINIC | Age: 84
End: 2024-07-09
Payer: MEDICARE

## 2024-07-10 ENCOUNTER — TELEPHONE (OUTPATIENT)
Dept: CARDIOLOGY | Facility: CLINIC | Age: 84
End: 2024-07-10

## 2024-07-10 ENCOUNTER — OFFICE VISIT (OUTPATIENT)
Dept: CARDIOLOGY | Facility: CLINIC | Age: 84
End: 2024-07-10
Payer: MEDICARE

## 2024-07-10 VITALS
DIASTOLIC BLOOD PRESSURE: 56 MMHG | SYSTOLIC BLOOD PRESSURE: 146 MMHG | BODY MASS INDEX: 27.13 KG/M2 | WEIGHT: 179 LBS | HEART RATE: 64 BPM | RESPIRATION RATE: 16 BRPM | HEIGHT: 68 IN

## 2024-07-10 DIAGNOSIS — N17.9 AKI (ACUTE KIDNEY INJURY) (H): ICD-10-CM

## 2024-07-10 DIAGNOSIS — I50.30 HEART FAILURE WITH PRESERVED EJECTION FRACTION, NYHA CLASS IV (H): Primary | ICD-10-CM

## 2024-07-10 PROCEDURE — 99215 OFFICE O/P EST HI 40 MIN: CPT | Mod: 25 | Performed by: INTERNAL MEDICINE

## 2024-07-10 PROCEDURE — 96374 THER/PROPH/DIAG INJ IV PUSH: CPT | Performed by: INTERNAL MEDICINE

## 2024-07-10 RX ORDER — BUMETANIDE 0.25 MG/ML
6 INJECTION INTRAMUSCULAR; INTRAVENOUS ONCE
Status: COMPLETED | OUTPATIENT
Start: 2024-07-10 | End: 2024-07-10

## 2024-07-10 RX ADMIN — BUMETANIDE 6 MG: 0.25 INJECTION, SOLUTION INTRAMUSCULAR; INTRAVENOUS at 15:20

## 2024-07-10 NOTE — PROGRESS NOTES
HEART CARE NOTE          Assessment/Recommendations     1. HFpEF c/b severe ADHF   Assessment / Plan  Patient will multiple admissions for ADHF over the last 3 months which makes prognosis concerning - reviewed HF diet and fluid restrictions once again; patient admits to non-compliance; weight up ~ 7-8 lbs since discharge on 7/4/24 - will give IV diuretic in clinic today; CORE clinic will call tomorrow to follow-up  Patient is high risk for adverse cardiac events 2/2 advanced age, frailty, HTN, DM2, CAD, elevated NTproBNP, multiple HFHs - discussed referral to [facility] given declining health and poor longterm prognosis  GDMT as detailed below; mainstay of treatment for HFpEF includes diuretics and adequate BP control (class I) and SGLT2-I (class 2a); additional medical therapy (ARNI, MRA, ARB) demonstrated less robust evidence for indication but may be considered per guideline recommendations (2b); no indication for BBlockers      Current Pharmacotherapy AHA Guideline-Directed Medical Therapy   Losartan  - on hold given renal dysfunction ARNI/ARB   Spironolactone not started  MRA   SGLT2 inhibitor: not started SGLT2-I    Torsemide 80 mg daily Loop diuretic       2. Atrial fibrillation  Assessment / Plan  Rate controlled - currently on metoprolol  Warfarin per pharmacy management      3. Hypertension  Assessment / Plan  Further titrations per PMD     4. CAD  Assessment / Plan  Denies chest pain or anginal equivalents  Continue ASA, atorvastatin, metoprolol and clopidogrel     5. DM2  Assessment / Plan  Management per primary team  Currently on ISS     6. JONNATHAN on CKD  Assessment / Plan  Likely CRS; diuresis as above; Continue to monitor UOP and renal function closely    45 minutes spent reviewing prior records (including documentation, laboratory studies, cardiac testing/imaging), history and physical exam, planning, and subsequent documentation.      The longitudinal plan of care for HFpEF was addressed during this  "visit. Due to the added complexity in care, I will continue to support Mr. Per Beasley in the subsequent management of this condition(s) and with the ongoing continuity of care of this condition(s).      History of Present Illness/Subjective    Mr. Per Beasley is a 84 year old male with a PMHx significant for (per Epic notation)  COPD, CAD, type 2 diabetes, hypertension, morbid obesity, severe aortic stenosis s/p TAVR, and atrial fibrillation admitted on 4/3/2024 with shortness of breath secondary to acute exacerbation of HFpEF, hypertensive urgency and pneumothorax.      Today, Mr. Beasley endorses HF symptoms of LE edema, weight, fluid retention; Management plan as detailed above     ECG: Personally reviewed. Afib, rate controlled.     ECHO (personnaly Reviewed on 4/4/24):   The left ventricle is normal in size.  Left ventricular function is normal.The ejection fraction is 55-60%.  Normal right ventricle size and systolic function.  The left atrium is moderately dilated.  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  Valve appears to be functioning normally with a mean gradient of 9 mmHg, peak  velocity of 2.3 m/s, DI 0.38. Mild paravalvular leak.     Lab results: personally reviewed July 10, 2024; notable for CKD/stable renal function    Medical history and pertinent documents reviewed in Care Everywhere please where applicable see details above        Physical Examination Review of Systems   BP (!) 146/56 (BP Location: Right arm, Patient Position: Sitting, Cuff Size: Adult Regular)   Pulse 64   Resp 16   Ht 1.727 m (5' 8\")   Wt 81.2 kg (179 lb)   BMI 27.22 kg/m    Body mass index is 27.22 kg/m .  Wt Readings from Last 3 Encounters:   07/10/24 81.2 kg (179 lb)   07/04/24 76.7 kg (169 lb 3.2 oz)   04/04/24 74.4 kg (164 lb 2 oz)     General Appearance:   no distress, normal body habitus   ENT/Mouth: membranes moist, no oral lesions or bleeding gums.      EYES:  no " scleral icterus, normal conjunctivae   Neck: no carotid bruits or thyromegaly   Chest/Lungs:   lungs are clear to auscultation, no rales or wheezing, equal chest wall expansion    Cardiovascular:   Regular. Normal first and second heart sounds with no murmurs, rubs, or gallops; the carotid, radial and posterior tibial pulses are intact, + JVD and LE edema bilaterally    Abdomen:  no organomegaly, masses, bruits, or tenderness; bowel sounds are present   Extremities: no cyanosis or clubbing   Skin: no xanthelasma, warm.    Neurologic: NAD     Psychiatric: alert and oriented x3, calm     A complete 10 systems ROS was reviewed  And is negative except what is listed in the HPI.          Medical History  Surgical History Family History Social History   No past medical history on file. No past surgical history on file. no family history of premature coronary artery disease Social History     Socioeconomic History    Marital status:      Spouse name: Not on file    Number of children: Not on file    Years of education: Not on file    Highest education level: Not on file   Occupational History    Not on file   Tobacco Use    Smoking status: Not on file    Smokeless tobacco: Not on file   Substance and Sexual Activity    Alcohol use: Not on file    Drug use: Not on file    Sexual activity: Not on file   Other Topics Concern    Not on file   Social History Narrative    Not on file     Social Determinants of Health     Financial Resource Strain: Low Risk  (4/22/2024)    Received from [employer] [facility]    Financial Resource Strain     Difficulty of Paying Living Expenses: 3     Difficulty of Paying Living Expenses: Not on file   Food Insecurity: No Food Insecurity (4/22/2024)    Received from [employer] [facility]    Food Insecurity     Worried About Running Out of Food in the Last Year: 1   Transportation Needs: No Transportation Needs (4/22/2024)    Received from [employer]  [facility]    Transportation Needs     Lack of Transportation (Medical): 1   Physical Activity: Not on file   Stress: Not on file   Social Connections: Socially Integrated (4/22/2024)    Received from [facility] [employer] [facility]    Social Connections     Frequency of Communication with Friends and Family: 0   Interpersonal Safety: Not on file   Housing Stability: Low Risk  (4/22/2024)    Received from [facility] [employer] [facility] [employer] [facility]    Housing Stability     Unable to Pay for Housing in the Last Year: 1           Lab Results    Chemistry/lipid CBC Cardiac Enzymes/BNP/TSH/INR   Lab Results   Component Value Date    BUN 45.3 (H) 07/04/2024     07/04/2024    CO2 28 07/04/2024    Lab Results   Component Value Date    WBC 7.9 07/02/2024    HGB 12.6 (L) 07/02/2024    HCT 38.2 (L) 07/02/2024    MCV 85 07/02/2024     07/02/2024    Lab Results   Component Value Date    TROPONINI 0.04 08/12/2022     (H) 08/12/2022    TSH 1.57 01/11/2024    INR 1.11 07/01/2024     Lab Results   Component Value Date    TROPONINI 0.04 08/12/2022          Weight:    Wt Readings from Last 3 Encounters:   07/04/24 76.7 kg (169 lb 3.2 oz)   04/04/24 74.4 kg (164 lb 2 oz)   01/12/24 76.8 kg (169 lb 6.4 oz)       Allergies  Allergies   Allergen Reactions    Blood-Group Specific Substance Other (See Comments)     Patient has a non specific antibody.  Blood product orders may be delayed.  Please draw on red top and 2 purple top tubes for all Type and Screen/ type and Cross match orders.    Hydrochlorothiazide Rash         Surgical History  No past surgical history on file.    Social History  Tobacco:   History   Smoking Status    Not on file   Smokeless Tobacco    Not on file    Alcohol:   Social History    Substance and Sexual Activity      Alcohol use: Not on file   Illicit Drugs:   History   Drug Use Not on file       Family History  No family history on file.        Kelsie Mtz MD on 7/10/2024      cc: No Ref-Primary, Physician

## 2024-07-10 NOTE — PROGRESS NOTES
Pre is administered Bumex 6mg IVP x1 today in clinic. He tolerated well. Advised of BMP labs needed tomorrow at outpt hospital lab. Also CORE RN will call to assess his response to the IV diuretic dose. [employer] Meal Plan Application is provided today, advised to complete and return to Cardiology Clinic. He is encouraged to take his diuretics as prescribed for improved outcomes. He is aware of the additional strain on the heart from the accumulating fluid and ultimately this is worsening his prognosis.  Karley OVALLES RN BSN, CHFN, PCCN-K

## 2024-07-10 NOTE — TELEPHONE ENCOUNTER
CORE RN to contact pt to assess response to his IV Bumex dose in clinic Wed. Labs to be drawn Thursday at out hospital labs.   Karley Rivas  CORE RN BSN, CHFN, PCCN-K

## 2024-07-10 NOTE — LETTER
7/10/2024    Physician No Ref-Primary  No address on file    RE: Per Beasley       Dear Colleague,     I had the pleasure of seeing Per Beasley in the [facility].    HEART CARE NOTE          Assessment/Recommendations     1. HFpEF c/b severe ADHF   Assessment / Plan  Patient will multiple admissions for ADHF over the last 3 months which makes prognosis concerning - reviewed HF diet and fluid restrictions once again; patient admits to non-compliance; weight up ~ 7-8 lbs since discharge on 7/4/24 - will give IV diuretic in clinic today; [facility] clinic will call tomorrow to follow-up  Patient is high risk for adverse cardiac events 2/2 advanced age, frailty, HTN, DM2, CAD, elevated NTproBNP, multiple HFHs - discussed referral to [facility] given declining health and poor longterm prognosis  GDMT as detailed below; mainstay of treatment for HFpEF includes diuretics and adequate BP control (class I) and SGLT2-I (class 2a); additional medical therapy (ARNI, MRA, ARB) demonstrated less robust evidence for indication but may be considered per guideline recommendations (2b); no indication for BBlockers      Current Pharmacotherapy AHA Guideline-Directed Medical Therapy   Losartan  - on hold given renal dysfunction ARNI/ARB   Spironolactone not started  MRA   SGLT2 inhibitor: not started SGLT2-I    Torsemide 80 mg daily Loop diuretic       2. Atrial fibrillation  Assessment / Plan  Rate controlled - currently on metoprolol  Warfarin per pharmacy management      3. Hypertension  Assessment / Plan  Further titrations per PMD     4. CAD  Assessment / Plan  Denies chest pain or anginal equivalents  Continue ASA, atorvastatin, metoprolol and clopidogrel     5. DM2  Assessment / Plan  Management per primary team  Currently on ISS     6. JONNATHAN on CKD  Assessment / Plan  Likely CRS; diuresis as above; Continue to monitor UOP and renal function closely    45 minutes spent reviewing prior records (including  "documentation, laboratory studies, cardiac testing/imaging), history and physical exam, planning, and subsequent documentation.      The longitudinal plan of care for HFpEF was addressed during this visit. Due to the added complexity in care, I will continue to support Mr. Per Beasley in the subsequent management of this condition(s) and with the ongoing continuity of care of this condition(s).      History of Present Illness/Subjective    Mr. Per Beasley is a 84 year old male with a PMHx significant for (per Epic notation)  COPD, CAD, type 2 diabetes, hypertension, morbid obesity, severe aortic stenosis s/p TAVR, and atrial fibrillation admitted on 4/3/2024 with shortness of breath secondary to acute exacerbation of HFpEF, hypertensive urgency and pneumothorax.      Today, Mr. Beasley endorses HF symptoms of LE edema, weight, fluid retention; Management plan as detailed above     ECG: Personally reviewed. Afib, rate controlled.     ECHO (personnaly Reviewed on 4/4/24):   The left ventricle is normal in size.  Left ventricular function is normal.The ejection fraction is 55-60%.  Normal right ventricle size and systolic function.  The left atrium is moderately dilated.  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  Valve appears to be functioning normally with a mean gradient of 9 mmHg, peak  velocity of 2.3 m/s, DI 0.38. Mild paravalvular leak.     Lab results: personally reviewed July 10, 2024; notable for CKD/stable renal function    Medical history and pertinent documents reviewed in Care Everywhere please where applicable see details above        Physical Examination Review of Systems   BP (!) 146/56 (BP Location: Right arm, Patient Position: Sitting, Cuff Size: Adult Regular)   Pulse 64   Resp 16   Ht 1.727 m (5' 8\")   Wt 81.2 kg (179 lb)   BMI 27.22 kg/m    Body mass index is 27.22 kg/m .  Wt Readings from Last 3 Encounters:   07/10/24 81.2 kg (179 lb)   07/04/24 " 76.7 kg (169 lb 3.2 oz)   04/04/24 74.4 kg (164 lb 2 oz)     General Appearance:   no distress, normal body habitus   ENT/Mouth: membranes moist, no oral lesions or bleeding gums.      EYES:  no scleral icterus, normal conjunctivae   Neck: no carotid bruits or thyromegaly   Chest/Lungs:   lungs are clear to auscultation, no rales or wheezing, equal chest wall expansion    Cardiovascular:   Regular. Normal first and second heart sounds with no murmurs, rubs, or gallops; the carotid, radial and posterior tibial pulses are intact, + JVD and LE edema bilaterally    Abdomen:  no organomegaly, masses, bruits, or tenderness; bowel sounds are present   Extremities: no cyanosis or clubbing   Skin: no xanthelasma, warm.    Neurologic: NAD     Psychiatric: alert and oriented x3, calm     A complete 10 systems ROS was reviewed  And is negative except what is listed in the HPI.          Medical History  Surgical History Family History Social History   No past medical history on file. No past surgical history on file. no family history of premature coronary artery disease Social History     Socioeconomic History    Marital status:      Spouse name: Not on file    Number of children: Not on file    Years of education: Not on file    Highest education level: Not on file   Occupational History    Not on file   Tobacco Use    Smoking status: Not on file    Smokeless tobacco: Not on file   Substance and Sexual Activity    Alcohol use: Not on file    Drug use: Not on file    Sexual activity: Not on file   Other Topics Concern    Not on file   Social History Narrative    Not on file     Social Determinants of Health     Financial Resource Strain: Low Risk  (4/22/2024)    Received from [employer] [facility]    Financial Resource Strain     Difficulty of Paying Living Expenses: 3     Difficulty of Paying Living Expenses: Not on file   Food Insecurity: No Food Insecurity (4/22/2024)    Received from [place]  [facility]    Food Insecurity     Worried About Running Out of Food in the Last Year: 1   Transportation Needs: No Transportation Needs (4/22/2024)    Received from [facility]    Transportation Needs     Lack of Transportation (Medical): 1   Physical Activity: Not on file   Stress: Not on file   Social Connections: Socially Integrated (4/22/2024)    Received from [facility]    Social Connections     Frequency of Communication with Friends and Family: 0   Interpersonal Safety: Not on file   Housing Stability: Low Risk  (4/22/2024)    Received from [facility]    Housing Stability     Unable to Pay for Housing in the Last Year: 1           Lab Results    Chemistry/lipid CBC Cardiac Enzymes/BNP/TSH/INR   Lab Results   Component Value Date    BUN 45.3 (H) 07/04/2024     07/04/2024    CO2 28 07/04/2024    Lab Results   Component Value Date    WBC 7.9 07/02/2024    HGB 12.6 (L) 07/02/2024    HCT 38.2 (L) 07/02/2024    MCV 85 07/02/2024     07/02/2024    Lab Results   Component Value Date    TROPONINI 0.04 08/12/2022     (H) 08/12/2022    TSH 1.57 01/11/2024    INR 1.11 07/01/2024     Lab Results   Component Value Date    TROPONINI 0.04 08/12/2022          Weight:    Wt Readings from Last 3 Encounters:   07/04/24 76.7 kg (169 lb 3.2 oz)   04/04/24 74.4 kg (164 lb 2 oz)   01/12/24 76.8 kg (169 lb 6.4 oz)       Allergies  Allergies   Allergen Reactions    Blood-Group Specific Substance Other (See Comments)     Patient has a non specific antibody.  Blood product orders may be delayed.  Please draw on red top and 2 purple top tubes for all Type and Screen/ type and Cross match orders.    Hydrochlorothiazide Rash         Surgical History  No past surgical history on file.    Social History  Tobacco:   History   Smoking Status    Not on file   Smokeless Tobacco    Not on file     Alcohol:   Social History    Substance and Sexual Activity      Alcohol use: Not on file   Illicit Drugs:   History   Drug Use Not on file       Family History  No family history on file.       Kelsie Mtz MD on 7/10/2024      cc: No Ref-Primary, Physician      Per is administered Bumex 6mg IVP x1 today in clinic. He tolerated well. Advised of BMP labs needed tomorrow at outpt hospital lab. Also [facility] RN will call to assess his response to the IV diuretic dose. [employer] Meal Plan Application is provided today, advised to complete and return to Cardiology Clinic. He is encouraged to take his diuretics as prescribed for improved outcomes. He is aware of the additional strain on the heart from the accumulating fluid and ultimately this is worsening his prognosis.  Karley OVALLES RN BSN, CHFN, PCCN-K      Thank you for allowing me to participate in the care of your patient.      Sincerely,     Kelsie Mtz MD     [facility]  cc:   Referred Self,

## 2024-07-11 ENCOUNTER — LAB (OUTPATIENT)
Dept: LAB | Facility: HOSPITAL | Age: 84
End: 2024-07-11
Payer: MEDICARE

## 2024-07-11 DIAGNOSIS — N17.9 AKI (ACUTE KIDNEY INJURY) (H): ICD-10-CM

## 2024-07-11 LAB
ANION GAP SERPL CALCULATED.3IONS-SCNC: 13 MMOL/L (ref 7–15)
BUN SERPL-MCNC: 30.2 MG/DL (ref 8–23)
CALCIUM SERPL-MCNC: 9.2 MG/DL (ref 8.8–10.2)
CHLORIDE SERPL-SCNC: 95 MMOL/L (ref 98–107)
CREAT SERPL-MCNC: 2.21 MG/DL (ref 0.67–1.17)
DEPRECATED HCO3 PLAS-SCNC: 30 MMOL/L (ref 22–29)
EGFRCR SERPLBLD CKD-EPI 2021: 28 ML/MIN/1.73M2
GLUCOSE SERPL-MCNC: 257 MG/DL (ref 70–99)
POTASSIUM SERPL-SCNC: 4.1 MMOL/L (ref 3.4–5.3)
SODIUM SERPL-SCNC: 138 MMOL/L (ref 135–145)

## 2024-07-11 PROCEDURE — 80048 BASIC METABOLIC PNL TOTAL CA: CPT

## 2024-07-11 PROCEDURE — 36415 COLL VENOUS BLD VENIPUNCTURE: CPT

## 2024-07-11 NOTE — TELEPHONE ENCOUNTER
LM for patient to return call to review response to IV Diuretics. Karley OVALLES RN BSN, CHFN, PCCN-K

## 2024-07-12 ENCOUNTER — TELEPHONE (OUTPATIENT)
Dept: CARDIOLOGY | Facility: CLINIC | Age: 84
End: 2024-07-12
Payer: MEDICARE

## 2024-07-12 NOTE — TELEPHONE ENCOUNTER
Pt contacted with labs and no new changes. He did not weigh himself, but notes some improvement in LE edema and abdominal bloating following the IV diuretic dose.   Karley OVALLES RN BSN, CHFN, PCCN-K

## 2024-07-12 NOTE — TELEPHONE ENCOUNTER
----- Message from Kelsie Mtz sent at 7/11/2024 12:23 PM CDT -----  Please contact Per and update him regarding the test results. I have no further recommendations at this time.    Thanks;  ~ Yue

## 2024-08-18 ENCOUNTER — APPOINTMENT (OUTPATIENT)
Dept: ULTRASOUND IMAGING | Facility: CLINIC | Age: 84
DRG: 445 | End: 2024-08-18
Attending: STUDENT IN AN ORGANIZED HEALTH CARE EDUCATION/TRAINING PROGRAM
Payer: MEDICARE

## 2024-08-18 ENCOUNTER — HOSPITAL ENCOUNTER (INPATIENT)
Facility: CLINIC | Age: 84
LOS: 2 days | Discharge: HOSPICE | DRG: 445 | End: 2024-08-20
Attending: EMERGENCY MEDICINE | Admitting: INTERNAL MEDICINE
Payer: MEDICARE

## 2024-08-18 ENCOUNTER — APPOINTMENT (OUTPATIENT)
Dept: MRI IMAGING | Facility: CLINIC | Age: 84
DRG: 445 | End: 2024-08-18
Attending: EMERGENCY MEDICINE
Payer: MEDICARE

## 2024-08-18 ENCOUNTER — TRANSFERRED RECORDS (OUTPATIENT)
Dept: HEALTH INFORMATION MANAGEMENT | Facility: CLINIC | Age: 84
End: 2024-08-18

## 2024-08-18 ENCOUNTER — APPOINTMENT (OUTPATIENT)
Dept: CT IMAGING | Facility: CLINIC | Age: 84
DRG: 445 | End: 2024-08-18
Attending: EMERGENCY MEDICINE
Payer: MEDICARE

## 2024-08-18 DIAGNOSIS — K83.8 DILATION OF BILIARY TRACT: ICD-10-CM

## 2024-08-18 DIAGNOSIS — E80.6 HYPERBILIRUBINEMIA: ICD-10-CM

## 2024-08-18 DIAGNOSIS — R17 JAUNDICE: Primary | ICD-10-CM

## 2024-08-18 DIAGNOSIS — J90 PLEURAL EFFUSION ON RIGHT: ICD-10-CM

## 2024-08-18 DIAGNOSIS — N18.9 ACUTE RENAL FAILURE SUPERIMPOSED ON CHRONIC KIDNEY DISEASE, UNSPECIFIED ACUTE RENAL FAILURE TYPE, UNSPECIFIED CKD STAGE (H): ICD-10-CM

## 2024-08-18 DIAGNOSIS — N17.9 ACUTE RENAL FAILURE SUPERIMPOSED ON CHRONIC KIDNEY DISEASE, UNSPECIFIED ACUTE RENAL FAILURE TYPE, UNSPECIFIED CKD STAGE (H): ICD-10-CM

## 2024-08-18 DIAGNOSIS — I50.9 CONGESTIVE HEART FAILURE, UNSPECIFIED HF CHRONICITY, UNSPECIFIED HEART FAILURE TYPE (H): ICD-10-CM

## 2024-08-18 LAB
ALBUMIN SERPL BCG-MCNC: 3.3 G/DL (ref 3.5–5.2)
ALBUMIN UR-MCNC: 70 MG/DL
ALP SERPL-CCNC: 798 U/L (ref 40–150)
ALT SERPL W P-5'-P-CCNC: 98 U/L (ref 0–70)
ANION GAP SERPL CALCULATED.3IONS-SCNC: 15 MMOL/L (ref 7–15)
APPEARANCE UR: ABNORMAL
APTT PPP: 38 SECONDS (ref 22–38)
AST SERPL W P-5'-P-CCNC: 159 U/L (ref 0–45)
ATRIAL RATE - MUSE: 69 BPM
BACTERIA #/AREA URNS HPF: ABNORMAL /HPF
BASE EXCESS BLDV CALC-SCNC: 6.6 MMOL/L (ref -3–3)
BASOPHILS # BLD AUTO: 0 10E3/UL (ref 0–0.2)
BASOPHILS NFR BLD AUTO: 0 %
BILIRUB DIRECT SERPL-MCNC: 6.96 MG/DL (ref 0–0.3)
BILIRUB SERPL-MCNC: 8.6 MG/DL
BILIRUB UR QL STRIP: ABNORMAL
BUN SERPL-MCNC: 33.9 MG/DL (ref 8–23)
CALCIUM SERPL-MCNC: 8.6 MG/DL (ref 8.8–10.4)
CHLORIDE SERPL-SCNC: 93 MMOL/L (ref 98–107)
COLOR UR AUTO: YELLOW
CREAT SERPL-MCNC: 2.83 MG/DL (ref 0.67–1.17)
CREAT UR-MCNC: 42.6 MG/DL
DIASTOLIC BLOOD PRESSURE - MUSE: NORMAL MMHG
EGFRCR SERPLBLD CKD-EPI 2021: 21 ML/MIN/1.73M2
EOSINOPHIL # BLD AUTO: 0.1 10E3/UL (ref 0–0.7)
EOSINOPHIL NFR BLD AUTO: 1 %
ERYTHROCYTE [DISTWIDTH] IN BLOOD BY AUTOMATED COUNT: 15.2 % (ref 10–15)
GLUCOSE BLDC GLUCOMTR-MCNC: 173 MG/DL (ref 70–99)
GLUCOSE BLDC GLUCOMTR-MCNC: 230 MG/DL (ref 70–99)
GLUCOSE SERPL-MCNC: 221 MG/DL (ref 70–99)
GLUCOSE UR STRIP-MCNC: NEGATIVE MG/DL
HBA1C MFR BLD: 8.5 %
HCO3 BLDV-SCNC: 31 MMOL/L (ref 21–28)
HCO3 SERPL-SCNC: 28 MMOL/L (ref 22–29)
HCT VFR BLD AUTO: 35.3 % (ref 40–53)
HGB BLD-MCNC: 12.1 G/DL (ref 13.3–17.7)
HGB UR QL STRIP: ABNORMAL
IMM GRANULOCYTES # BLD: 0 10E3/UL
IMM GRANULOCYTES NFR BLD: 0 %
INR PPP: 1.5 (ref 0.85–1.15)
INTERPRETATION ECG - MUSE: NORMAL
KETONES UR STRIP-MCNC: NEGATIVE MG/DL
LDH SERPL L TO P-CCNC: 238 U/L (ref 0–250)
LEUKOCYTE ESTERASE UR QL STRIP: NEGATIVE
LIPASE SERPL-CCNC: 33 U/L (ref 13–60)
LYMPHOCYTES # BLD AUTO: 2.2 10E3/UL (ref 0.8–5.3)
LYMPHOCYTES NFR BLD AUTO: 27 %
MAGNESIUM SERPL-MCNC: 2.3 MG/DL (ref 1.7–2.3)
MAGNESIUM SERPL-MCNC: 2.4 MG/DL (ref 1.7–2.3)
MCH RBC QN AUTO: 27.6 PG (ref 26.5–33)
MCHC RBC AUTO-ENTMCNC: 34.3 G/DL (ref 31.5–36.5)
MCV RBC AUTO: 81 FL (ref 78–100)
MONOCYTES # BLD AUTO: 0.8 10E3/UL (ref 0–1.3)
MONOCYTES NFR BLD AUTO: 10 %
MUCOUS THREADS #/AREA URNS LPF: PRESENT /LPF
NEUTROPHILS # BLD AUTO: 5.1 10E3/UL (ref 1.6–8.3)
NEUTROPHILS NFR BLD AUTO: 61 %
NITRATE UR QL: NEGATIVE
NRBC # BLD AUTO: 0 10E3/UL
NRBC BLD AUTO-RTO: 0 /100
NT-PROBNP SERPL-MCNC: 4473 PG/ML (ref 0–1800)
O2/TOTAL GAS SETTING VFR VENT: 21 %
OSMOLALITY SERPL: 294 MMOL/KG (ref 280–301)
OSMOLALITY UR: 287 MMOL/KG (ref 100–1200)
OXYHGB MFR BLDV: 19 % (ref 70–75)
P AXIS - MUSE: NORMAL DEGREES
PCO2 BLDV: 50 MM HG (ref 40–50)
PH BLDV: 7.41 [PH] (ref 7.32–7.43)
PH UR STRIP: 5.5 [PH] (ref 5–7)
PLATELET # BLD AUTO: 224 10E3/UL (ref 150–450)
PO2 BLDV: 17 MM HG (ref 25–47)
POTASSIUM SERPL-SCNC: 3 MMOL/L (ref 3.4–5.3)
POTASSIUM SERPL-SCNC: 3.5 MMOL/L (ref 3.4–5.3)
PR INTERVAL - MUSE: NORMAL MS
PROT SERPL-MCNC: 7.3 G/DL (ref 6.4–8.3)
PROT SERPL-MCNC: 7.8 G/DL (ref 6.4–8.3)
QRS DURATION - MUSE: 90 MS
QT - MUSE: 420 MS
QTC - MUSE: 443 MS
R AXIS - MUSE: 26 DEGREES
RBC # BLD AUTO: 4.38 10E6/UL (ref 4.4–5.9)
RBC URINE: 2 /HPF
SAO2 % BLDV: 19.4 % (ref 70–75)
SODIUM SERPL-SCNC: 136 MMOL/L (ref 135–145)
SP GR UR STRIP: 1.01 (ref 1–1.03)
SQUAMOUS EPITHELIAL: <1 /HPF
SYSTOLIC BLOOD PRESSURE - MUSE: NORMAL MMHG
T AXIS - MUSE: 45 DEGREES
TROPONIN T SERPL HS-MCNC: 36 NG/L
TROPONIN T SERPL HS-MCNC: 39 NG/L
UROBILINOGEN UR STRIP-MCNC: <2 MG/DL
UUN UR-MCNC: 242 MG/DL (ref 801–1666)
VENTRICULAR RATE- MUSE: 67 BPM
WBC # BLD AUTO: 8.3 10E3/UL (ref 4–11)
WBC URINE: 5 /HPF

## 2024-08-18 PROCEDURE — 85610 PROTHROMBIN TIME: CPT | Performed by: EMERGENCY MEDICINE

## 2024-08-18 PROCEDURE — 80053 COMPREHEN METABOLIC PANEL: CPT | Performed by: EMERGENCY MEDICINE

## 2024-08-18 PROCEDURE — 83690 ASSAY OF LIPASE: CPT | Performed by: EMERGENCY MEDICINE

## 2024-08-18 PROCEDURE — 36415 COLL VENOUS BLD VENIPUNCTURE: CPT | Performed by: STUDENT IN AN ORGANIZED HEALTH CARE EDUCATION/TRAINING PROGRAM

## 2024-08-18 PROCEDURE — 82570 ASSAY OF URINE CREATININE: CPT | Performed by: STUDENT IN AN ORGANIZED HEALTH CARE EDUCATION/TRAINING PROGRAM

## 2024-08-18 PROCEDURE — 81001 URINALYSIS AUTO W/SCOPE: CPT | Performed by: EMERGENCY MEDICINE

## 2024-08-18 PROCEDURE — 83935 ASSAY OF URINE OSMOLALITY: CPT | Performed by: STUDENT IN AN ORGANIZED HEALTH CARE EDUCATION/TRAINING PROGRAM

## 2024-08-18 PROCEDURE — 74181 MRI ABDOMEN W/O CONTRAST: CPT | Mod: MG

## 2024-08-18 PROCEDURE — 250N000013 HC RX MED GY IP 250 OP 250 PS 637: Performed by: STUDENT IN AN ORGANIZED HEALTH CARE EDUCATION/TRAINING PROGRAM

## 2024-08-18 PROCEDURE — 83615 LACTATE (LD) (LDH) ENZYME: CPT | Performed by: STUDENT IN AN ORGANIZED HEALTH CARE EDUCATION/TRAINING PROGRAM

## 2024-08-18 PROCEDURE — 84132 ASSAY OF SERUM POTASSIUM: CPT | Performed by: STUDENT IN AN ORGANIZED HEALTH CARE EDUCATION/TRAINING PROGRAM

## 2024-08-18 PROCEDURE — 36415 COLL VENOUS BLD VENIPUNCTURE: CPT | Performed by: EMERGENCY MEDICINE

## 2024-08-18 PROCEDURE — 99223 1ST HOSP IP/OBS HIGH 75: CPT | Mod: AI | Performed by: STUDENT IN AN ORGANIZED HEALTH CARE EDUCATION/TRAINING PROGRAM

## 2024-08-18 PROCEDURE — 84155 ASSAY OF PROTEIN SERUM: CPT | Performed by: STUDENT IN AN ORGANIZED HEALTH CARE EDUCATION/TRAINING PROGRAM

## 2024-08-18 PROCEDURE — 82805 BLOOD GASES W/O2 SATURATION: CPT | Performed by: STUDENT IN AN ORGANIZED HEALTH CARE EDUCATION/TRAINING PROGRAM

## 2024-08-18 PROCEDURE — 83735 ASSAY OF MAGNESIUM: CPT | Performed by: STUDENT IN AN ORGANIZED HEALTH CARE EDUCATION/TRAINING PROGRAM

## 2024-08-18 PROCEDURE — 71250 CT THORAX DX C-: CPT | Mod: MG

## 2024-08-18 PROCEDURE — 99285 EMERGENCY DEPT VISIT HI MDM: CPT | Mod: 25

## 2024-08-18 PROCEDURE — 85730 THROMBOPLASTIN TIME PARTIAL: CPT | Performed by: EMERGENCY MEDICINE

## 2024-08-18 PROCEDURE — 120N000001 HC R&B MED SURG/OB

## 2024-08-18 PROCEDURE — 93005 ELECTROCARDIOGRAM TRACING: CPT | Performed by: EMERGENCY MEDICINE

## 2024-08-18 PROCEDURE — 83930 ASSAY OF BLOOD OSMOLALITY: CPT | Performed by: STUDENT IN AN ORGANIZED HEALTH CARE EDUCATION/TRAINING PROGRAM

## 2024-08-18 PROCEDURE — 83735 ASSAY OF MAGNESIUM: CPT | Performed by: EMERGENCY MEDICINE

## 2024-08-18 PROCEDURE — 85025 COMPLETE CBC W/AUTO DIFF WBC: CPT | Performed by: EMERGENCY MEDICINE

## 2024-08-18 PROCEDURE — 83036 HEMOGLOBIN GLYCOSYLATED A1C: CPT | Performed by: STUDENT IN AN ORGANIZED HEALTH CARE EDUCATION/TRAINING PROGRAM

## 2024-08-18 PROCEDURE — 84540 ASSAY OF URINE/UREA-N: CPT | Performed by: STUDENT IN AN ORGANIZED HEALTH CARE EDUCATION/TRAINING PROGRAM

## 2024-08-18 PROCEDURE — 84484 ASSAY OF TROPONIN QUANT: CPT | Performed by: EMERGENCY MEDICINE

## 2024-08-18 PROCEDURE — 76770 US EXAM ABDO BACK WALL COMP: CPT

## 2024-08-18 PROCEDURE — 83880 ASSAY OF NATRIURETIC PEPTIDE: CPT | Performed by: EMERGENCY MEDICINE

## 2024-08-18 RX ORDER — HEPARIN SODIUM 10000 [USP'U]/100ML
0-5000 INJECTION, SOLUTION INTRAVENOUS CONTINUOUS
Status: DISCONTINUED | OUTPATIENT
Start: 2024-08-18 | End: 2024-08-18

## 2024-08-18 RX ORDER — AMLODIPINE BESYLATE 5 MG/1
10 TABLET ORAL DAILY
Status: DISCONTINUED | OUTPATIENT
Start: 2024-08-19 | End: 2024-08-20 | Stop reason: HOSPADM

## 2024-08-18 RX ORDER — AMOXICILLIN 250 MG
2 CAPSULE ORAL 2 TIMES DAILY PRN
Status: DISCONTINUED | OUTPATIENT
Start: 2024-08-18 | End: 2024-08-20 | Stop reason: HOSPADM

## 2024-08-18 RX ORDER — METOPROLOL TARTRATE 25 MG/1
25 TABLET, FILM COATED ORAL 2 TIMES DAILY
Status: ON HOLD | COMMUNITY
End: 2024-08-21

## 2024-08-18 RX ORDER — POTASSIUM CHLORIDE 1500 MG/1
40 TABLET, EXTENDED RELEASE ORAL ONCE
Status: COMPLETED | OUTPATIENT
Start: 2024-08-18 | End: 2024-08-18

## 2024-08-18 RX ORDER — METOPROLOL TARTRATE 25 MG/1
25 TABLET, FILM COATED ORAL 2 TIMES DAILY
Status: DISCONTINUED | OUTPATIENT
Start: 2024-08-18 | End: 2024-08-20 | Stop reason: HOSPADM

## 2024-08-18 RX ORDER — LIDOCAINE 40 MG/G
CREAM TOPICAL
Status: CANCELLED | OUTPATIENT
Start: 2024-08-18

## 2024-08-18 RX ORDER — LIDOCAINE 40 MG/G
CREAM TOPICAL
Status: DISCONTINUED | OUTPATIENT
Start: 2024-08-18 | End: 2024-08-20 | Stop reason: HOSPADM

## 2024-08-18 RX ORDER — DEXTROSE MONOHYDRATE 25 G/50ML
25-50 INJECTION, SOLUTION INTRAVENOUS
Status: DISCONTINUED | OUTPATIENT
Start: 2024-08-18 | End: 2024-08-20 | Stop reason: HOSPADM

## 2024-08-18 RX ORDER — AMOXICILLIN 250 MG
1 CAPSULE ORAL 2 TIMES DAILY PRN
Status: DISCONTINUED | OUTPATIENT
Start: 2024-08-18 | End: 2024-08-20 | Stop reason: HOSPADM

## 2024-08-18 RX ORDER — TRAZODONE HYDROCHLORIDE 50 MG/1
50 TABLET, FILM COATED ORAL AT BEDTIME
Status: DISCONTINUED | OUTPATIENT
Start: 2024-08-18 | End: 2024-08-20 | Stop reason: HOSPADM

## 2024-08-18 RX ORDER — TRAZODONE HYDROCHLORIDE 50 MG/1
50 TABLET, FILM COATED ORAL AT BEDTIME
COMMUNITY

## 2024-08-18 RX ORDER — ALBUTEROL SULFATE 90 UG/1
2 AEROSOL, METERED RESPIRATORY (INHALATION) EVERY 6 HOURS PRN
Status: DISCONTINUED | OUTPATIENT
Start: 2024-08-18 | End: 2024-08-20 | Stop reason: HOSPADM

## 2024-08-18 RX ORDER — TORSEMIDE 20 MG/1
80 TABLET ORAL DAILY
Status: DISCONTINUED | OUTPATIENT
Start: 2024-08-19 | End: 2024-08-20 | Stop reason: HOSPADM

## 2024-08-18 RX ORDER — NICOTINE POLACRILEX 4 MG
15-30 LOZENGE BUCCAL
Status: DISCONTINUED | OUTPATIENT
Start: 2024-08-18 | End: 2024-08-20 | Stop reason: HOSPADM

## 2024-08-18 RX ORDER — CALCIUM CARBONATE 500 MG/1
1000 TABLET, CHEWABLE ORAL 4 TIMES DAILY PRN
Status: DISCONTINUED | OUTPATIENT
Start: 2024-08-18 | End: 2024-08-20 | Stop reason: HOSPADM

## 2024-08-18 RX ADMIN — ALBUTEROL SULFATE 2 PUFF: 90 AEROSOL, METERED RESPIRATORY (INHALATION) at 21:33

## 2024-08-18 RX ADMIN — TRAZODONE HYDROCHLORIDE 50 MG: 50 TABLET ORAL at 21:18

## 2024-08-18 RX ADMIN — METOPROLOL TARTRATE 25 MG: 25 TABLET, FILM COATED ORAL at 21:18

## 2024-08-18 RX ADMIN — POTASSIUM CHLORIDE 40 MEQ: 1500 TABLET, EXTENDED RELEASE ORAL at 23:41

## 2024-08-18 ASSESSMENT — COLUMBIA-SUICIDE SEVERITY RATING SCALE - C-SSRS
1. IN THE PAST MONTH, HAVE YOU WISHED YOU WERE DEAD OR WISHED YOU COULD GO TO SLEEP AND NOT WAKE UP?: NO
6. HAVE YOU EVER DONE ANYTHING, STARTED TO DO ANYTHING, OR PREPARED TO DO ANYTHING TO END YOUR LIFE?: NO
2. HAVE YOU ACTUALLY HAD ANY THOUGHTS OF KILLING YOURSELF IN THE PAST MONTH?: NO

## 2024-08-18 ASSESSMENT — ACTIVITIES OF DAILY LIVING (ADL)
ADLS_ACUITY_SCORE: 38

## 2024-08-18 NOTE — ED PROVIDER NOTES
"  Emergency Department Encounter     Evaluation Date & Time:   8/18/2024  8:30 AM    CHIEF COMPLAINT:  Shortness of Breath and Generalized Weakness      Triage Note:Pt presents to the ED with c/o worsening SOB and weakness for \"a while now\". Denies fevers or cough or any pain. Pt c/o itching all over for the past week.               ED COURSE & MEDICAL DECISION MAKING:     Pt presenting with family for evaluation of worsening symptoms over the past couple weeks or so with generalized fatigue, dyspnea, weight loss of 9 lbs.  Pt reports increased itching of skin and some darker urine as well.  He denies dysuria, bowel changes, new cough, fevers, n/v/d or acute bleeding.  Pt has chronic Afib and on Eliquis.  He arrives jaundiced appearing, but no abdominal pain.  He and family unaware of color change, but strongly suspect new onset liver failure/obstruction.  He has CKD, but no chronic liver disease. Concern for new pancreas/liver mass. Will get labs, CT chest/abd/pelvis.  Pt likely will need hospitalization.  Review of EMR shows pt was recently hospitalized at [facility] on 7/1/24 for acute on chronic CHF with ultimately 0.85L thoracentesis of pleural effusion as well.  Echo on 7/1/24 shows preserved EF of 55-60%.      ED Course as of 08/18/24 1359   Sun Aug 18, 2024   0835 I met with and evealuated the patient.    0902 CBC overall unremarkable, reassuring.   0925 Cr slightly increased from baseline at 2.83.  LFTs and Bili up, new.  Bili currently 8.6, consistent with exam.    hsTrop 39, will get 2 hour delta.  BNP 4473.   0950 On last hospitalization in July, pt has pleural effusion with thoracentesis.  Cytology showed no malignancy and no infection on review of cells/cultures.  CT today showing recurrent effusion, similar to before.   1038 CT showing dilated intra and extra hepatic biliary dialtation, but no obvious mass. Read questions CBD obstruction.  Will speak with [facility] to get recs on further imaging.     1056 I " spoke with RONI PA.  Recommends MRCP and will decide from there if pt needs transfer vs hospitalization here.  Either way will plan for hospitalization.   1100 I rechecked and updated the patient and family. Discussed need for MRCP, admission.  May need transfer depending on MRI findings.     1236 I spoke again with RONI and plan is for likely EUS/ERCP on Tuesday.  Pt should be admitted here for now.     1249 I rechecked and updated the patient to discuss GI plan.   1319 I spoke to Dr. Pearson, Hospitalist, who agrees to take the patient.          Medical Decision Making    History:  Supplemental history from: N/A  External Record(s) reviewed: Documented in chart    Work Up:  Chart documentation includes differential considered and any EKGs or imaging independently interpreted by provider, where specified.  In additional to work up documented, I considered the following work up: Documented in chart, if applicable.    External consultation:  Discussion of management with another provider: Gastroenterology and Hospitalist    Complicating factors:  Care impacted by chronic illness: Chronic Kidney Disease, Chronic Lung Disease, Diabetes, Heart Disease, Hyperlipidemia, and Other: persistent atrial fibrillation, chronic heart failure  Care affected by social determinants of health: N/A    Disposition considerations: Admit.      At the conclusion of the encounter I discussed the results of all the tests and the disposition. The questions were answered. The patient or family acknowledged understanding and was agreeable with the care plan.      MEDICATIONS GIVEN IN THE EMERGENCY DEPARTMENT:  Medications - No data to display    NEW PRESCRIPTIONS STARTED AT TODAY'S ED VISIT:  New Prescriptions    No medications on file       HPI   The history is provided by the patient. No  was used.        Per Beasley is a 84 year old male with a pertinent history of chronic heart failure, CAD, COPD, granulomatous lung  disease, persistent atrial fibrillation, pneumothorax c/b hydropneumothorax 4/16/2024, CKD 3, DM2, and HLD who presents to this ED via walk-in for evaluation of shortness of breath and generalized weakness.    Patient reports worsening shortness of breath and leg weakness for the last couple weeks that he says worsens throughout the day as well. He endorses darker colored urine and decreased appetite with 10 pound weight loss over last couple months. Patient mentions some itchiness for past week or so. He has a history of atrial fibrillation with blood thinners and COPD. Otherwise, patient denies any chest pain, cough, fever, abdominal pain, changes to bowel or bladder habits, recent medication change, nausea, vomiting, or lighter stool. No other complaints at this time.     Per chart review: Patient was admitted to [facility] from 7/1/24 to 7/4/24 for heart failure. BNP was elevated 3600 and CT PE showed large right and moderate left pleural effusion with bilateral lower lobe consolidation likely due to compressive atelectasis, as well as mild pulmonary edema. Thoracentesis performed 7/2 with 0.85L fluid removed. Pulmonology consulted due to effusion being consistent with exudative nature, suspects this is pseudoexudative 2/2 treatment of heart failure. Patient discharged with torsemide 80 mg daily and discontinuing metoprolol, amlodipine 10 mg daily for CKD, Eliquis 2.5 mg daily and discontinuing Plavix for CAD.     REVIEW OF SYSTEMS:  See HPI      Medical History   History reviewed. No pertinent past medical history.    History reviewed. No pertinent surgical history.    History reviewed. No pertinent family history.    Social History     Tobacco Use    Smoking status: Former     Types: Cigarettes    Smokeless tobacco: Never       amLODIPine (NORVASC) 10 MG tablet  apixaban ANTICOAGULANT (ELIQUIS ANTICOAGULANT) 2.5 MG tablet  aspirin (ASA) 81 MG chewable tablet  atorvastatin (LIPITOR) 40 MG  "tablet  glipiZIDE (GLUCOTROL XL) 5 MG 24 hr tablet  glucose (BD GLUCOSE) 4 g chewable tablet  metoprolol tartrate (LOPRESSOR) 25 MG tablet  Torsemide 40 MG TABS  traZODone (DESYREL) 50 MG tablet  Alcohol Swabs PADS  blood glucose (NO BRAND SPECIFIED) lancets standard  blood glucose (NO BRAND SPECIFIED) test strip  blood glucose calibration (NO BRAND SPECIFIED) solution  blood glucose monitoring (NO BRAND SPECIFIED) meter device kit  insulin pen needle (32G X 4 MM) 32G X 4 MM miscellaneous  Sharps Container MISC        Physical Exam     Vitals:  BP (!) 156/70   Pulse 64   Temp 97.9  F (36.6  C) (Temporal)   Resp 16   Ht 1.727 m (5' 8\")   Wt 77.6 kg (171 lb)   SpO2 93%   BMI 26.00 kg/m      PHYSICAL EXAM:   Physical Exam  Vitals and nursing note reviewed.   Constitutional:       General: He is not in acute distress.     Appearance: Normal appearance.   HENT:      Head: Normocephalic and atraumatic.      Nose: Nose normal.      Mouth/Throat:      Mouth: Mucous membranes are moist.   Eyes:      General: Scleral icterus present.      Pupils: Pupils are equal, round, and reactive to light.   Neck:      Vascular: No JVD.   Cardiovascular:      Rate and Rhythm: Normal rate. Rhythm irregularly irregular.      Pulses: Normal pulses.           Radial pulses are 2+ on the right side and 2+ on the left side.        Dorsalis pedis pulses are 2+ on the right side and 2+ on the left side.   Pulmonary:      Effort: Pulmonary effort is normal. No respiratory distress.      Breath sounds: Normal breath sounds.      Comments: Pursed lip breathing.   Abdominal:      Palpations: Abdomen is soft.      Tenderness: There is no abdominal tenderness.   Musculoskeletal:      Cervical back: Full passive range of motion without pain and neck supple.      Comments: No calf tenderness or swelling b/l   Skin:     General: Skin is warm.      Coloration: Skin is jaundiced.      Findings: No rash.      Comments: Older, scatter bruising to " bilateral upper extremities.    Neurological:      General: No focal deficit present.      Mental Status: He is alert. Mental status is at baseline.      Comments: Fluent speech, no acute lateralizing deficits   Psychiatric:         Mood and Affect: Mood normal.         Behavior: Behavior normal.         Results     LAB:  All pertinent labs reviewed and interpreted  Labs Ordered and Resulted from Time of ED Arrival to Time of ED Departure   BASIC METABOLIC PANEL - Abnormal       Result Value    Sodium 136      Potassium 3.5      Chloride 93 (*)     Carbon Dioxide (CO2) 28      Anion Gap 15      Urea Nitrogen 33.9 (*)     Creatinine 2.83 (*)     GFR Estimate 21 (*)     Calcium 8.6 (*)     Glucose 221 (*)    HEPATIC FUNCTION PANEL - Abnormal    Protein Total 7.8      Albumin 3.3 (*)     Bilirubin Total 8.6 (*)     Alkaline Phosphatase 798 (*)      (*)     ALT 98 (*)     Bilirubin Direct 6.96 (*)    TROPONIN T, HIGH SENSITIVITY - Abnormal    Troponin T, High Sensitivity 39 (*)    MAGNESIUM - Abnormal    Magnesium 2.4 (*)    NT PROBNP INPATIENT - Abnormal    N terminal Pro BNP Inpatient 4,473 (*)    ROUTINE UA WITH MICROSCOPIC REFLEX TO CULTURE - Abnormal    Color Urine Yellow      Appearance Urine Turbid (*)     Glucose Urine Negative      Bilirubin Urine 0.5 mg/dL (*)     Ketones Urine Negative      Specific Gravity Urine 1.009      Blood Urine 0.06 mg/dL (*)     pH Urine 5.5      Protein Albumin Urine 70 (*)     Urobilinogen Urine <2.0      Nitrite Urine Negative      Leukocyte Esterase Urine Negative      Bacteria Urine Few (*)     Mucus Urine Present (*)     RBC Urine 2      WBC Urine 5      Squamous Epithelials Urine <1     INR - Abnormal    INR 1.50 (*)    CBC WITH PLATELETS AND DIFFERENTIAL - Abnormal    WBC Count 8.3      RBC Count 4.38 (*)     Hemoglobin 12.1 (*)     Hematocrit 35.3 (*)     MCV 81      MCH 27.6      MCHC 34.3      RDW 15.2 (*)     Platelet Count 224      % Neutrophils 61      %  Lymphocytes 27      % Monocytes 10      % Eosinophils 1      % Basophils 0      % Immature Granulocytes 0      NRBCs per 100 WBC 0      Absolute Neutrophils 5.1      Absolute Lymphocytes 2.2      Absolute Monocytes 0.8      Absolute Eosinophils 0.1      Absolute Basophils 0.0      Absolute Immature Granulocytes 0.0      Absolute NRBCs 0.0     TROPONIN T, HIGH SENSITIVITY - Abnormal    Troponin T, High Sensitivity 36 (*)    LIPASE - Normal    Lipase 33     PARTIAL THROMBOPLASTIN TIME - Normal    aPTT 38         RADIOLOGY:  MR Abdomen MRCP without Contrast   Final Result   IMPRESSION:   1.  The gallbladder is distended. There is intrahepatic and extrahepatic bile duct dilatation. No choledocholithiasis. Consider ERCP.   2.  Moderate right pleural effusion and trace left pleural effusion.   3.  Colonic diverticulosis.         CT Chest Abdomen Pelvis w/o Contrast   Final Result   IMPRESSION:   1.  Mildly decreased, moderate right pleural effusion. Significantly decreased left pleural effusion.   2.  Resolved groundglass changes throughout both lungs. Improved airspace changes within the left upper and right middle lobes.   3.  Mild intrauterine extra hepatic biliary dilatation. This suggests a distal common bile duct obstruction.   4.  Nonobstructing bilateral renal calculi.   5.  Left colonic diverticulosis. No evidence for acute diverticulitis.   6.  Extensive calcified atherosclerotic changes suggesting stenoses at the left common and bilateral common femoral arteries.                   ECG:  Afib, rate 67, no acute ischemia, similar to previous from 7/1/24    I have independently reviewed and interpreted the EKG(s) documented above     PROCEDURES:  Procedures:  none      FINAL IMPRESSION:    ICD-10-CM    1. Jaundice  R17       2. Acute renal failure superimposed on chronic kidney disease, unspecified acute renal failure type, unspecified CKD stage  (H24)  N17.9     N18.9       3. Hyperbilirubinemia  E80.6       4.  Pleural effusion on right  J90       5. Congestive heart failure, unspecified HF chronicity, unspecified heart failure type (H)  I50.9           0 minutes of critical care time      I, Madina Krueger, am serving as a scribe to document services personally performed by Dr. Mc Hall, based on my observations and the provider's statements to me. I, Mc Hall, DO attest that Madina Krueger is acting in a scribe capacity, has observed my performance of the services and has documented them in accordance with my direction.      Mc Hall, DO  Emergency Medicine  [facility] EMERGENCY ROOM  8/18/2024  8:34 AM          Mc Hall MD  08/18/24 [id number]

## 2024-08-18 NOTE — PHARMACY-ADMISSION MEDICATION HISTORY
Pharmacist Admission Medication History    Admission medication history is complete. The information provided in this note is only as accurate as the sources available at the time of the update.    Medication reconciliation/reorder completed by provider prior to medication history? No    Information Source(s): Patient and CareEverywhere/SureScripts via in-person    Pertinent Information:  none    Changes made to PTA medication list:  Added: trazodone, metoprolol  Deleted: arformoterol, Trelegy Ellipta, DuoNeb  Changed: None    Medication Affordability:       Allergies reviewed with patient and updates made in EHR: no    Medication History Completed By: Radha Seay, PharmD, BCPS, DPLA 8/18/2024 9:19 AM    Prior to Admission medications    Medication Sig Last Dose Taking? Auth Provider Long Term End Date   amLODIPine (NORVASC) 10 MG tablet Take 1 tablet (10 mg) by mouth daily 8/18/2024 at AM Yes Mc Chowdhury, DO Yes    apixaban ANTICOAGULANT (ELIQUIS ANTICOAGULANT) 2.5 MG tablet Take 1 tablet (2.5 mg) by mouth 2 times daily 8/18/2024 at AM Yes Mc Chowdhury, DO     aspirin (ASA) 81 MG chewable tablet Take 81 mg by mouth daily 8/18/2024 at AM Yes Unknown, Entered By History     atorvastatin (LIPITOR) 40 MG tablet Take 40 mg by mouth daily 8/18/2024 at AM Yes Unknown, Entered By History Yes    glipiZIDE (GLUCOTROL XL) 5 MG 24 hr tablet Take 5 mg by mouth every morning 8/18/2024 at AM Yes Reported, Patient Yes    glucose (BD GLUCOSE) 4 g chewable tablet Take 4 tablets by mouth every 15 minutes as needed for low blood sugar More than a month at none recently Yes Yaz Peña MD     metoprolol tartrate (LOPRESSOR) 25 MG tablet Take 25 mg by mouth 2 times daily 8/18/2024 at AM Yes Unknown, Entered By History No    Torsemide 40 MG TABS Take 80 mg by mouth daily 8/18/2024 at AM Yes Mc Chowdhury, DO Yes    traZODone (DESYREL) 50 MG tablet Take 50 mg by mouth at bedtime 8/17/2024 at HS Yes Unknown, Entered By  History Yes    Alcohol Swabs PADS Use to swab the area of the injection or rosenda as directed Per insurance coverage   Yaz Peña MD     blood glucose (NO BRAND SPECIFIED) lancets standard To use to test glucose level in the blood Use to test blood sugar  2  times daily as directed. To accompany glucose monitor brands per insurance coverage.   Yaz Peña MD     blood glucose (NO BRAND SPECIFIED) test strip To use to test glucose level in the blood Use to test blood sugar  2 times daily as directed. To accompany glucose monitor brands per insurance coverage.   Yaz Peña MD     blood glucose calibration (NO BRAND SPECIFIED) solution Used to calibrate the blood glucose monitor as needed and as directed.  To accompany  blood glucose brands per insurance coverage   Yaz Peña MD     blood glucose monitoring (NO BRAND SPECIFIED) meter device kit Use as directed , Per insurance coverage   Yaz Peña MD     insulin pen needle (32G X 4 MM) 32G X 4 MM miscellaneous Use as directed by provider Per insurance coverage   Yaz Peña MD     Sharps Container MISC Use as directed to dispose of needles, lancets and other sharps Per Insurance coverage   Yaz Peña MD

## 2024-08-18 NOTE — H&P
[facility]    History and Physical - Hospitalist Service       Date of Admission:  8/18/2024    Assessment & Plan      Per Beasley is a 84 year old male admitted on 8/18/2024.  He has a past medical history significant for hypertension, diabetes, HFpEF, paroxysmal atrial fibrillation, severe aortic stenosis status post TAVR, recurrent pneumothorax who presented to the hospital on 8/18 with generalized weakness, itchiness, decreased appetite.    Elevated alkaline phosphate and transaminitis  Biliary obstruction/Intrahepatic and extrahepatic bile duct dilation  -Presenting with 2 to 3 weeks of generalized weakness, generalized skin itchiness, decreased appetite and weight loss  -No abdominal pain.  -MRCP showed distention of the gallbladder with intrahepatic and extrahepatic bile duct dilation.  No cholelithiasis. No cholecystitis.  -The etiology of his extrahepatic biliary obstruction remains unclear, neoplasm is high on the differential given his age.     Plan  -Evaluated by gastroenterology, plans for ERCP at [facility] on 8/20 around 12 PM  -If the patient developed abdominal pain or any other concerning symptoms, gastroenterology suggested that the patient could possibly get his ERCP at [facility] on 8/19.  -Continue to monitor CMP  -NPO after midnight incase the patient need to get ERCP on 8/19. If not, diet can be resumed in the morning.     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  At that time pleural effusion was consistent with exudative nature, however, it was suspected that it was most likely transudative in nature but labs were consistent with exudative etiology given aggressive use of diuretic. Did not find cytology report.    Plan  -Attempt right thoracentesis on 8/19 (order placed)    Heart failure with preserved  action fraction  -At home on torsemide 80 mg daily  -BMP is elevated around 4400, however, this is nonspecific.  Patient appears close to euvolemic to mildly hypervolemic on examination.  -Saturating well on room air.    Plan  -Hold torsemide for tonight.  Resume torsemide home dose tomorrow 8/19 after checking kidney function.     Acute kidney injury on CKD III b  -Baseline creatinine around 1.5-2.  Creatinine presentation 2.8.  -Etiology of possible mild JONNATHAN remains unclear.    Plan  -Renal ultrasound  -Check urine and serum osmolality  -Get labs to calculate FEUrea  -Continue to monitor kidney function.    Hypertension  -At home on metoprolol tartrate 25 mg twice daily, amlodipine 10 mg daily.      Plan  -Continue home metoprolol tartrate with holding parameters.  -Continue home amlodipine with holding parameters.    Coronary artery disease s/p PCI w/ NUBIA to mid LCx 2021  Paroxysmal AF not on anticoagulation  Mild troponinemia  -High-sensitivity is mildly elevated and peaked around 39.  -No chest pain or significant shortness of breath.  -EKG showed A-fib without clear ST segment changes.  -Presentation not suggestive of an acute coronary syndrome.    Plan  -Hold home aspirin  -Hold home Eliquis. unclear if the planned ERCP will be low risk procedure or high risk such as biliary or pancreatic sphincterectomy. If the patient is not undergoing any procedures for more than 48 hours, consider starting heparin drip ([id number]).  -Hold home atorvastatin given transaminitis    COPD  Hx of spontaneous PTX c/b hydropneumothorax, admission 4/16/2024  Former smoker  -Former smoker with 20-pack-year history, PFTs showing moderate obstruction.   -No wheezing physical examination per    Diabetes  -At home on glipizide 5 mg daily    Plan  -Hold glipizide while inpatient given risk of hypoglycemia  -Start sliding scale insulin.  -Check A1c            Diet:  Cardiac/diabetic diet  DVT Prophylaxis: Pneumatic Compression  "Devices  Helm Catheter: Not present  Lines: None     Cardiac Monitoring: None  Code Status:  DNR/DNI    Clinically Significant Risk Factors Present on Admission          # Hypocalcemia: Lowest Ca = 8.6 mg/dL in last 2 days, will monitor and replace as appropriate     # Hypoalbuminemia: Lowest albumin = 3.3 g/dL at 8/18/2024  8:52 AM, will monitor as appropriate  # Drug Induced Coagulation Defect: home medication list includes an anticoagulant medication  # Drug Induced Platelet Defect: home medication list includes an antiplatelet medication  # Acute Kidney Injury, unspecified: based on a >150% or 0.3 mg/dL increase in last creatinine compared to past 90 day average, will monitor renal function  # Hypertension: Noted on problem list  # Chronic heart failure with preserved ejection fraction: heart failure noted on problem list and last echo with EF >50%        # Overweight: Estimated body mass index is 26 kg/m  as calculated from the following:    Height as of this encounter: 1.727 m (5' 8\").    Weight as of this encounter: 77.6 kg (171 lb).                    Disposition Plan     Medically Ready for Discharge: Anticipated in 2-4 Days           JERMAINE MALDONADO MD  Hospitalist Service  [facility]  Securely message with [employer] (more info)  Text page via [facility] Paging/Directory     ______________________________________________________________________    Chief Complaint   Generalized weakness    History is obtained from the patient    History of Present Illness   Per Beasley is a 84 year old male admitted on 8/18/2024.  He has a past medical history significant for hypertension, diabetes, HFpEF, paroxysmal atrial fibrillation, severe aortic stenosis status post TAVR, recurrent pneumothorax who presented to the hospital on 8/18 with generalized weakness, itchiness, decreased appetite.    Patient was recently hospitalized in July 2024 with shortness of breath.  Was found to have bilateral " pleural effusion status post thoracentesis consistent with exudative etiology.  Patient was diuresed and was discharged home.    Over the last 2 to 3 weeks he started to experience worsening generalized weakness, decreased appetite, generalized skin itchiness.  He denied any chest pain, fever or chills, abdominal pain.  He noticed change in the urine color (darker) no changes to his stool color.  He had a fall on 8/15 due to generalized weakness without any major trauma.  Given his symptoms he decided to come to the hospital for further evaluation.    Vitals on presentation largely unremarkable.  BMP showed creatinine of 2.8 (baseline creatinine around 2.1), calcium 8.6, alkaline phosphate around 800, ALT around 100 and AST around 160.  Total bilirubin 8.6, mainly elevation direct bilirubin.  Troponin mildly elevated around 39 and peaked.  No leukocytosis.  UA without signs of infection.        CT chest abdomen pelvis without IV contrast showed moderate right pleural effusion, mild extrahepatic biliary dilation suggestive of distal common bile duct obstruction.  Extensive calcified left common and bilateral common femoral arteries.    MRCP showed distention of the gallbladder with intrahepatic and extrahepatic bile duct dilation.  No cholelithiasis.              Per Beasley is a 84 year old male with PMH of severe aortic stenosis s/p TAVR, HFpEF, paroxysmal afib not anticoagulated, CAD s/p PCI, HTN, HLD, T2DM, COPD, and recent admission for recurrent pneumothorax c/b hydropneumothorax 4/16/2024, who is admitted with acute hypoxic respiratory failure 2/2 b/l pleural effusions in the context of acute on chronic heart failure exacerbation. S/p 0.85L thoracentesis with fluid studies initially consistent with exudative however most likely transudative due to fluid contraction per pulm. Cardiology signed off - increased amlodipine to 10mg daily, increased torsemide to 80mg daily, discontinued metoprolol for persistent  bradycardia, discontinued plavix and started on eliquis given renal impairment.        Past Medical History    History reviewed. No pertinent past medical history.    Past Surgical History   History reviewed. No pertinent surgical history.    Prior to Admission Medications   Prior to Admission Medications   Prescriptions Last Dose Informant Patient Reported? Taking?   Alcohol Swabs PADS   No No   Sig: Use to swab the area of the injection or rosenda as directed Per insurance coverage   Sharps Container MISC   No No   Sig: Use as directed to dispose of needles, lancets and other sharps Per Insurance coverage   Torsemide 40 MG TABS 8/18/2024 at AM  No Yes   Sig: Take 80 mg by mouth daily   amLODIPine (NORVASC) 10 MG tablet 8/18/2024 at AM  No Yes   Sig: Take 1 tablet (10 mg) by mouth daily   apixaban ANTICOAGULANT (ELIQUIS ANTICOAGULANT) 2.5 MG tablet 8/18/2024 at AM  No Yes   Sig: Take 1 tablet (2.5 mg) by mouth 2 times daily   aspirin (ASA) 81 MG chewable tablet 8/18/2024 at AM  Yes Yes   Sig: Take 81 mg by mouth daily   atorvastatin (LIPITOR) 40 MG tablet 8/18/2024 at AM  Yes Yes   Sig: Take 40 mg by mouth daily   blood glucose (NO BRAND SPECIFIED) lancets standard   No No   Sig: To use to test glucose level in the blood Use to test blood sugar  2  times daily as directed. To accompany glucose monitor brands per insurance coverage.   blood glucose (NO BRAND SPECIFIED) test strip   No No   Sig: To use to test glucose level in the blood Use to test blood sugar  2 times daily as directed. To accompany glucose monitor brands per insurance coverage.   blood glucose calibration (NO BRAND SPECIFIED) solution   No No   Sig: Used to calibrate the blood glucose monitor as needed and as directed.  To accompany  blood glucose brands per insurance coverage   blood glucose monitoring (NO BRAND SPECIFIED) meter device kit   No No   Sig: Use as directed , Per insurance coverage   glipiZIDE (GLUCOTROL XL) 5 MG 24 hr tablet 8/18/2024  at AM  Yes Yes   Sig: Take 5 mg by mouth every morning   glucose (BD GLUCOSE) 4 g chewable tablet More than a month at none recently  No Yes   Sig: Take 4 tablets by mouth every 15 minutes as needed for low blood sugar   insulin pen needle (32G X 4 MM) 32G X 4 MM miscellaneous   No No   Sig: Use as directed by provider Per insurance coverage   metoprolol tartrate (LOPRESSOR) 25 MG tablet 8/18/2024 at AM  Yes Yes   Sig: Take 25 mg by mouth 2 times daily   traZODone (DESYREL) 50 MG tablet 8/17/2024 at HS  Yes Yes   Sig: Take 50 mg by mouth at bedtime      Facility-Administered Medications: None          Physical Exam   Vital Signs: Temp: 97.9  F (36.6  C) Temp src: Temporal BP: (!) 156/70 Pulse: 64   Resp: 16 SpO2: 93 %      Weight: 171 lbs 0 oz    Physical Exam  Constitutional:       General: He is not in acute distress.     Appearance: He is not ill-appearing or toxic-appearing.   Eyes:      General: Scleral icterus present.   Cardiovascular:      Rate and Rhythm: Normal rate. Rhythm irregular.   Pulmonary:      Comments: Absence of breath sounds on the right base.  No wheezing in bilateral lung fields.  Abdominal:      General: There is no distension.      Tenderness: There is no abdominal tenderness. There is no guarding.   Musculoskeletal:      Comments: Trace nonpitting edema bilateral lower extremity below knee level.   Skin:     General: Skin is warm and dry.      Coloration: Skin is jaundiced.   Neurological:      Mental Status: He is alert.          Medical Decision Making       80 MINUTES SPENT BY ME on the date of service doing chart review, history, exam, documentation & further activities per the note.      Data     I have personally reviewed the following data over the past 24 hrs:    8.3  \   12.1 (L)   / 224     136 93 (L) 33.9 (H) /  221 (H)   3.5 28 2.83 (H) \     ALT: 98 (H) AST: 159 (H) AP: 798 (H) TBILI: 8.6 (H)   ALB: 3.3 (L) TOT PROTEIN: 7.3 LIPASE: 33     Trop: 36 (H) BNP: 4,473 (H)     TSH:  N/A T4: N/A A1C: 8.5 (H)     INR:  1.50 (H) PTT:  38   D-dimer:  N/A Fibrinogen:  N/A     Ferritin:  N/A % Retic:  N/A LDH:  238       Imaging results reviewed over the past 24 hrs:   Recent Results (from the past 24 hour(s))   CT Chest Abdomen Pelvis w/o Contrast    Narrative    EXAM: CT CHEST ABDOMEN PELVIS W/O CONTRAST  LOCATION: [facility]  DATE: 8/18/2024    INDICATION: hx of chf with dyspnea.  Pt fatigued with weight loss, new jaundice  COMPARISON: Chest CTA 7/1/2024.  TECHNIQUE: CT scan of the chest, abdomen, and pelvis was performed without IV contrast. Multiplanar reformats were obtained. Dose reduction techniques were used.   CONTRAST: None.    FINDINGS:   LUNGS AND PLEURA: Mildly decreased, moderate right pleural effusion. Significantly decreased small left pleural effusion with a small residual. Stable atelectatic changes within the right lower lobe. Resolved groundglass changes throughout the bilateral   upper and left lower lobes. Improved airspace changes within the anterior aspect of the left upper lobe and within the right middle lobe. Stable small nodular changes throughout both lungs.    MEDIASTINUM/AXILLAE: Stable, multiple partially calcified mediastinal and hilar lymph nodes. No new lymphadenopathy. TAVR. Moderate calcified atherosclerotic changes of the thoracic aorta without aneurysmal dilatation.    CORONARY ARTERY CALCIFICATION: Severe.    HEPATOBILIARY: Mild intra and extrahepatic biliary dilatation. Mildly dilated gallbladder. No radiopaque filling defects within the extrahepatic bile ducts.    PANCREAS: Mild fatty atrophy of the pancreatic head. No main pancreatic duct dilatation. No pancreatic masses are visualized.    SPLEEN: Normal.    ADRENAL GLANDS: Normal.    KIDNEYS/BLADDER: Small, left renal calculi. Dominant calculus measures 4 mm. No hydronephrosis.    BOWEL: No obstruction or inflammatory change. Marked left colonic diverticulosis.    LYMPH NODES:  Normal.    VASCULATURE: Extensive calcified atherosclerotic changes. No abdominal aortic aneurysm. Bulky atelectatic changes within the left common iliac artery producing what appears to be moderate stenosis. Bulky calcified sclerotic changes within the bilateral   common carotid arteries. There is a moderate to severe stenoses.    PELVIC ORGANS: Normal.    MUSCULOSKELETAL: Degenerative changes, lower cervical spine. Hypertrophic changes of the mid to lower thoracic spine. Degenerative/hypertrophic changes of the lumbar spine. Healed fracture of the posterolateral aspect of the left eighth rib. Ununited   fractures of the posterolateral aspects of the left ninth through 11th ribs.      Impression    IMPRESSION:  1.  Mildly decreased, moderate right pleural effusion. Significantly decreased left pleural effusion.  2.  Resolved groundglass changes throughout both lungs. Improved airspace changes within the left upper and right middle lobes.  3.  Mild intrauterine extra hepatic biliary dilatation. This suggests a distal common bile duct obstruction.  4.  Nonobstructing bilateral renal calculi.  5.  Left colonic diverticulosis. No evidence for acute diverticulitis.  6.  Extensive calcified atherosclerotic changes suggesting stenoses at the left common and bilateral common femoral arteries.   MR Abdomen MRCP without Contrast    Narrative    EXAM: MR ABDOMEN MRCP W/O CONTRAST  LOCATION: [facility]  DATE: 8/18/2024    INDICATION: new onset jaundice, dilated intra and exra hepatic biliary tree. Recommended by GI  COMPARISON: 8/18/2024 CT  TECHNIQUE: Routine MR liver/pancreas protocol including axial and coronal MRCP sequences. 2D and 3D reconstruction performed by MR technologist including MIP reconstruction and slab cholangiograms. If performed with contrast, additional dynamic T1 post   IV contrast images.  CONTRAST: None     FINDINGS:     MRCP: There is intrahepatic and extrahepatic bile duct  dilatation. The common bile duct is 15 mm in diameter. The gallbladder is distended. No gallstones or choledocholithiasis. Normal course and caliber of the pancreatic duct.    LIVER: Normal.    PANCREAS: Normal.    ADDITIONAL FINDINGS: Moderate right pleural effusion and trace left pleural effusion. Slightly elevated left hemidiaphragm. Colonic diverticulosis is noted. The spleen is mildly elongated. No lymphadenopathy. Atherosclerotic disease is present. The   cardiac valve prosthesis is not assessed on this study.      Impression    IMPRESSION:  1.  The gallbladder is distended. There is intrahepatic and extrahepatic bile duct dilatation. No choledocholithiasis. Consider ERCP.  2.  Moderate right pleural effusion and trace left pleural effusion.  3.  Colonic diverticulosis.

## 2024-08-18 NOTE — CONSULTS
[facility] Consultation     Per Beasley  [address]  [address]  [place] [address]  84 year old male     Admission Date/Time: 8/18/2024  Referring / Attending Physician:  Dr. Hall    HPI:  Per Beasley is a 84 year old male with PMH of CHF status post TAVR for aortic valve stenosis, CAD, COPD, hypertension, hyperlipidemia, pleural effusion, who presented to the ED for evaluation of shortness of breath, fatigue, jaundice, and unintended weight loss, we were asked to see for biliary dilatation on imaging concerning for CBD obstruction.    Today the patient reports that over the last several weeks he developed fatigue, decreased appetite, and unintentional weight loss.  He denies any associated abdominal pain or distention.  He reports about a 9 pound weight loss over the last several weeks.  He also notes having shortness of breath at baseline, but denies this getting significantly worse since the onset of his other symptoms.  He denies any associated nausea, vomiting, heartburn, reflux.  He denies any change in bowel movements, reporting 1 bowel movement daily.  He denies any black or bloody stools.  He reports 1 alcoholic beverage daily.  He reports heavier alcohol use many years ago, but denies ever drinking heavily on a daily basis.  He denies any abdominal surgeries.       PAST MEDICAL HISTORY:  Patient Active Problem List    Diagnosis Date Noted    Hypoxia 07/01/2024     Priority: Medium    JONNATHAN (acute kidney injury) (H24) 04/03/2024     Priority: Medium    Acute on chronic congestive heart failure, unspecified heart failure type (H) 04/03/2024     Priority: Medium    Dyspnea on exertion 01/11/2024     Priority: Medium    Bilateral pleural effusion 01/11/2024     Priority: Medium    Pneumothorax on right 01/11/2024     Priority: Medium    Wheezing 08/12/2022     Priority: Medium    Acute respiratory failure with hypoxia (H) 08/12/2022     Priority: Medium    Hypervolemia, unspecified  hypervolemia type 08/12/2022     Priority: Medium    Dermatochalasis of both upper eyelids 03/15/2022     Priority: Medium    Chronic obstructive pulmonary disease (H) 02/28/2022     Priority: Medium    Heart failure (H) 02/28/2022     Priority: Medium    Morbid (severe) obesity due to excess calories (H) 02/28/2022     Priority: Medium    Status post transcatheter aortic valve replacement (TAVR) using bioprosthesis 09/14/2021     Priority: Medium    Coronary artery disease involving native coronary artery of native heart with angina pectoris (H24) 08/13/2021     Priority: Medium    Aortic valve stenosis 07/09/2021     Priority: Medium    Exertional dyspnea 07/09/2021     Priority: Medium    Persistent atrial fibrillation (H) 07/09/2021     Priority: Medium    Type 2 diabetes mellitus with other circulatory complications (H) 12/10/2019     Priority: Medium    CAP (community acquired pneumonia) 12/04/2019     Priority: Medium    Granulomatous lung disease (H) 12/04/2019     Priority: Medium    Cellulitis of left lower extremity 08/25/2016     Priority: Medium    Pseudophakia, both eyes 01/29/2015     Priority: Medium    Routine general medical examination at a health care facility 06/04/2009     Priority: Medium     Formatting of this note might be different from the original.  Lipid(v77.91) 11/20/2008 Chol 228      HDL 42              PSA(v76.44) 11/20/2008 1.46      Anemia, unspecified 11/05/2007     Priority: Medium    Hyperlipidemia 11/05/2007     Priority: Medium    Hyposmolality and/or hyponatremia 11/05/2007     Priority: Medium     Formatting of this note might be different from the original.  mild      Essential hypertension 11/04/2007     Priority: Medium     SOCIAL HISTORY:  Social History     Tobacco Use    Smoking status: Former     Types: Cigarettes    Smokeless tobacco: Never     FAMILY HISTORY:  History reviewed. No pertinent family history.  ALLERGIES:   Allergies   Allergen Reactions     Blood-Group Specific Substance Other (See Comments)     Patient has a non specific antibody.  Blood product orders may be delayed.  Please draw on red top and 2 purple top tubes for all Type and Screen/ type and Cross match orders.    Hydrochlorothiazide Rash     MEDICATIONS:   No current facility-administered medications for this encounter.     Current Outpatient Medications   Medication Sig Dispense Refill    amLODIPine (NORVASC) 10 MG tablet Take 1 tablet (10 mg) by mouth daily 30 tablet 0    apixaban ANTICOAGULANT (ELIQUIS ANTICOAGULANT) 2.5 MG tablet Take 1 tablet (2.5 mg) by mouth 2 times daily 60 tablet 0    aspirin (ASA) 81 MG chewable tablet Take 81 mg by mouth daily      atorvastatin (LIPITOR) 40 MG tablet Take 40 mg by mouth daily      glipiZIDE (GLUCOTROL XL) 5 MG 24 hr tablet Take 5 mg by mouth every morning      glucose (BD GLUCOSE) 4 g chewable tablet Take 4 tablets by mouth every 15 minutes as needed for low blood sugar 50 tablet 0    metoprolol tartrate (LOPRESSOR) 25 MG tablet Take 25 mg by mouth 2 times daily      Torsemide 40 MG TABS Take 80 mg by mouth daily 60 tablet 0    traZODone (DESYREL) 50 MG tablet Take 50 mg by mouth at bedtime      Alcohol Swabs PADS Use to swab the area of the injection or rosenda as directed Per insurance coverage 100 each 0    blood glucose (NO BRAND SPECIFIED) lancets standard To use to test glucose level in the blood Use to test blood sugar  2  times daily as directed. To accompany glucose monitor brands per insurance coverage. 100 each 0    blood glucose (NO BRAND SPECIFIED) test strip To use to test glucose level in the blood Use to test blood sugar  2 times daily as directed. To accompany glucose monitor brands per insurance coverage. 100 strip 0    blood glucose calibration (NO BRAND SPECIFIED) solution Used to calibrate the blood glucose monitor as needed and as directed.  To accompany  blood glucose brands per insurance coverage 1 each 0    blood glucose  "monitoring (NO BRAND SPECIFIED) meter device kit Use as directed , Per insurance coverage 1 kit 0    insulin pen needle (32G X 4 MM) 32G X 4 MM miscellaneous Use as directed by provider Per insurance coverage 100 each 0    Sharps Container MISC Use as directed to dispose of needles, lancets and other sharps Per Insurance coverage 1 each 0       PHYSICAL EXAM:   /63   Pulse 61   Temp 97.9  F (36.6  C) (Temporal)   Resp 16   Ht 1.727 m (5' 8\")   Wt 77.6 kg (171 lb)   SpO2 93%   BMI 26.00 kg/m     GEN: Sitting upright in bed, wife at bedside  HEENT: No icterus  HRT: Appears well-perfused  LUNGS: Mildly increased respiratory effort  ABD: +bs, soft and nontender  SKIN: Visualized skin intact, jaundice appearing  MSKL: Mild lower extremity edema, left greater than right, patient states chronic  NEURO: Alert, answers questions appropriately     ADDITIONAL DATA:   I reviewed the patient's new clinical lab test results.   Recent Labs   Lab Test 08/18/24  0852 07/02/24  0454 07/01/24  1131 04/05/24  0533   WBC 8.3 7.9 7.8 8.1   HGB 12.1* 12.6* 12.0* 12.4*   MCV 81 85 88 87    172 166 185   INR 1.50*  --  1.11 2.29*     Recent Labs   Lab Test 08/18/24  0852 07/11/24  1109 07/04/24  0535   POTASSIUM 3.5 4.1 3.9   CHLORIDE 93* 95* 99   CO2 28 30* 28   BUN 33.9* 30.2* 45.3*   ANIONGAP 15 13 10     Recent Labs   Lab Test 08/18/24  0958 08/18/24  0852 07/01/24  1700 04/05/24  0631 01/12/24  0529 01/11/24  0921   ALBUMIN  --  3.3*  --   --  3.9 4.1   BILITOTAL  --  8.6*  --   --  0.6 0.4   ALT  --  98*  --   --  12 12   AST  --  159*  --   --  21 20   PROTEIN 70*  --  10* 30*  --   --    LIPASE  --  33  --   --   --  24        Imaging results:    EXAM: CT CHEST ABDOMEN PELVIS W/O CONTRAST  LOCATION: [facility]  DATE: 8/18/2024     INDICATION: hx of chf with dyspnea.  Pt fatigued with weight loss, new jaundice  COMPARISON: Chest CTA 7/1/2024.  TECHNIQUE: CT scan of the chest, abdomen, and " pelvis was performed without IV contrast. Multiplanar reformats were obtained. Dose reduction techniques were used.   CONTRAST: None.     FINDINGS:   LUNGS AND PLEURA: Mildly decreased, moderate right pleural effusion. Significantly decreased small left pleural effusion with a small residual. Stable atelectatic changes within the right lower lobe. Resolved groundglass changes throughout the bilateral   upper and left lower lobes. Improved airspace changes within the anterior aspect of the left upper lobe and within the right middle lobe. Stable small nodular changes throughout both lungs.  MEDIASTINUM/AXILLAE: Stable, multiple partially calcified mediastinal and hilar lymph nodes. No new lymphadenopathy. TAVR. Moderate calcified atherosclerotic changes of the thoracic aorta without aneurysmal dilatation.   CORONARY ARTERY CALCIFICATION: Severe.   HEPATOBILIARY: Mild intra and extrahepatic biliary dilatation. Mildly dilated gallbladder. No radiopaque filling defects within the extrahepatic bile ducts.   PANCREAS: Mild fatty atrophy of the pancreatic head. No main pancreatic duct dilatation. No pancreatic masses are visualized.  SPLEEN: Normal.  ADRENAL GLANDS: Normal.  KIDNEYS/BLADDER: Small, left renal calculi. Dominant calculus measures 4 mm. No hydronephrosis.  BOWEL: No obstruction or inflammatory change. Marked left colonic diverticulosis.  LYMPH NODES: Normal.  VASCULATURE: Extensive calcified atherosclerotic changes. No abdominal aortic aneurysm. Bulky atelectatic changes within the left common iliac artery producing what appears to be moderate stenosis. Bulky calcified sclerotic changes within the bilateral   common carotid arteries. There is a moderate to severe stenoses.  PELVIC ORGANS: Normal.  MUSCULOSKELETAL: Degenerative changes, lower cervical spine. Hypertrophic changes of the mid to lower thoracic spine. Degenerative/hypertrophic changes of the lumbar spine. Healed fracture of the posterolateral  aspect of the left eighth rib. Ununited   fractures of the posterolateral aspects of the left ninth through 11th ribs.                                                               IMPRESSION:  1.  Mildly decreased, moderate right pleural effusion. Significantly decreased left pleural effusion.  2.  Resolved groundglass changes throughout both lungs. Improved airspace changes within the left upper and right middle lobes.  3.  Mild intrauterine extra hepatic biliary dilatation. This suggests a distal common bile duct obstruction.  4.  Nonobstructing bilateral renal calculi.  5.  Left colonic diverticulosis. No evidence for acute diverticulitis.  6.  Extensive calcified atherosclerotic changes suggesting stenoses at the left common and bilateral common femoral arteries.    ASSESSMENT:    Per Beasley is a 84 year old male with PMH of CHF status post TAVR for aortic valve stenosis, CAD, COPD, hypertension, hyperlipidemia, pleural effusion, who presented to the ED for evaluation of shortness of breath, fatigue, jaundice, and unintended weight loss, we were asked to see for biliary dilatation on imaging concerning for CBD obstruction.    Fatigue/jaundice/unintended weight loss/biliary dilatation: Several week history of the symptoms, with initial imaging findings concerning for CBD obstruction with biliary dilatation as noted above.  Discussed the case with the emergency provider, and recommended MRCP.  MRCP is currently pending.  Discussed the case with her biliary provider, who recommended EUS and ERCP.  Biliary provider stated they could do this on 8/20/2024.  This will require transfer to another facility, [facility].  Plan will be to admit patient to [facility], and we will work on transfer tomorrow.  Shortness of breath: Patient with a complicated cardiac and pulmonary history inclusive of CHF status post TAVR, CAD, COPD, with likely CHF exacerbation, as well as ongoing pleural effusions.   Hemoglobin is stable compared to baseline, so less likely to be secondary to active bleeding.    PLAN:  -CT and MRCP complete, both with noted biliary dilatation, no focal lesion identified  -EUS/ERCP scheduled for 8/20/2024 at noon at [facility] with Dr. Magdaleno  -If the patient develops associated pain/worsening LFTs, could perform ERCP more emergently tomorrow at [facility]  -Will work on transfer with hospitalist service tomorrow versus day of procedure  -Okay to resume regular diet at this time, n.p.o. at midnight before the procedure    Nemo Rowe PA-C  [facility]  8/18/2024 10:59 AM  [phone] (office)    This case was discussed with Dr. Eugene who agrees to the above assessment and plan.    50 minutes of total time was spent today providing patient care, including patient evaluation, reviewing documentation/test result, and .   ________________________________________________________________________

## 2024-08-18 NOTE — ED TRIAGE NOTES
"Pt presents to the ED with c/o worsening SOB and weakness for \"a while now\". Denies fevers or cough or any pain. Pt c/o itching all over for the past week.         "

## 2024-08-19 ENCOUNTER — APPOINTMENT (OUTPATIENT)
Dept: ULTRASOUND IMAGING | Facility: CLINIC | Age: 84
DRG: 445 | End: 2024-08-19
Attending: STUDENT IN AN ORGANIZED HEALTH CARE EDUCATION/TRAINING PROGRAM
Payer: MEDICARE

## 2024-08-19 LAB
% LINING CELLS, BODY FLUID: 2 %
ALBUMIN SERPL BCG-MCNC: 3 G/DL (ref 3.5–5.2)
ALP SERPL-CCNC: 819 U/L (ref 40–150)
ALT SERPL W P-5'-P-CCNC: 100 U/L (ref 0–70)
ANION GAP SERPL CALCULATED.3IONS-SCNC: 16 MMOL/L (ref 7–15)
APPEARANCE FLD: ABNORMAL
AST SERPL W P-5'-P-CCNC: 175 U/L (ref 0–45)
BASOPHILS # BLD AUTO: 0 10E3/UL (ref 0–0.2)
BASOPHILS NFR BLD AUTO: 0 %
BILIRUB SERPL-MCNC: 8.7 MG/DL
BUN SERPL-MCNC: 37.7 MG/DL (ref 8–23)
CALCIUM SERPL-MCNC: 8.6 MG/DL (ref 8.8–10.4)
CELL COUNT BODY FLUID SOURCE: ABNORMAL
CHLORIDE SERPL-SCNC: 96 MMOL/L (ref 98–107)
COLOR FLD: YELLOW
CREAT SERPL-MCNC: 3.03 MG/DL (ref 0.67–1.17)
EGFRCR SERPLBLD CKD-EPI 2021: 20 ML/MIN/1.73M2
EOSINOPHIL # BLD AUTO: 0.1 10E3/UL (ref 0–0.7)
EOSINOPHIL NFR BLD AUTO: 1 %
ERYTHROCYTE [DISTWIDTH] IN BLOOD BY AUTOMATED COUNT: 15.1 % (ref 10–15)
GLUCOSE BLDC GLUCOMTR-MCNC: 153 MG/DL (ref 70–99)
GLUCOSE BLDC GLUCOMTR-MCNC: 164 MG/DL (ref 70–99)
GLUCOSE BLDC GLUCOMTR-MCNC: 172 MG/DL (ref 70–99)
GLUCOSE BLDC GLUCOMTR-MCNC: 184 MG/DL (ref 70–99)
GLUCOSE BODY FLUID SOURCE: NORMAL
GLUCOSE FLD-MCNC: 160 MG/DL
GLUCOSE SERPL-MCNC: 140 MG/DL (ref 70–99)
HCO3 SERPL-SCNC: 26 MMOL/L (ref 22–29)
HCT VFR BLD AUTO: 32.1 % (ref 40–53)
HGB BLD-MCNC: 11.2 G/DL (ref 13.3–17.7)
IMM GRANULOCYTES # BLD: 0 10E3/UL
IMM GRANULOCYTES NFR BLD: 0 %
LD BODY BODY FLUID SOURCE: NORMAL
LDH FLD L TO P-CCNC: 199 U/L
LYMPHOCYTES # BLD AUTO: 2.1 10E3/UL (ref 0.8–5.3)
LYMPHOCYTES NFR BLD AUTO: 24 %
LYMPHOCYTES NFR FLD MANUAL: 56 %
MAGNESIUM SERPL-MCNC: 2.4 MG/DL (ref 1.7–2.3)
MCH RBC QN AUTO: 27.5 PG (ref 26.5–33)
MCHC RBC AUTO-ENTMCNC: 34.9 G/DL (ref 31.5–36.5)
MCV RBC AUTO: 79 FL (ref 78–100)
MONOCYTES # BLD AUTO: 0.9 10E3/UL (ref 0–1.3)
MONOCYTES NFR BLD AUTO: 11 %
MONOS+MACROS NFR FLD MANUAL: 21 %
NEUTROPHILS # BLD AUTO: 5.4 10E3/UL (ref 1.6–8.3)
NEUTROPHILS NFR BLD AUTO: 64 %
NEUTS BAND NFR FLD MANUAL: 21 %
NRBC # BLD AUTO: 0 10E3/UL
NRBC BLD AUTO-RTO: 0 /100
PH BODY FLUID SOURCE: NORMAL
PH FLD: 7.5 PH
PLATELET # BLD AUTO: 213 10E3/UL (ref 150–450)
POTASSIUM SERPL-SCNC: 3.5 MMOL/L (ref 3.4–5.3)
POTASSIUM SERPL-SCNC: 3.5 MMOL/L (ref 3.4–5.3)
PROT FLD-MCNC: 5.1 G/DL
PROT SERPL-MCNC: 7.1 G/DL (ref 6.4–8.3)
PROTEIN BODY FLUID SOURCE: NORMAL
RBC # BLD AUTO: 4.07 10E6/UL (ref 4.4–5.9)
SODIUM SERPL-SCNC: 138 MMOL/L (ref 135–145)
WBC # BLD AUTO: 8.6 10E3/UL (ref 4–11)
WBC # FLD AUTO: 997 /UL

## 2024-08-19 PROCEDURE — 250N000013 HC RX MED GY IP 250 OP 250 PS 637: Performed by: STUDENT IN AN ORGANIZED HEALTH CARE EDUCATION/TRAINING PROGRAM

## 2024-08-19 PROCEDURE — 82247 BILIRUBIN TOTAL: CPT | Performed by: STUDENT IN AN ORGANIZED HEALTH CARE EDUCATION/TRAINING PROGRAM

## 2024-08-19 PROCEDURE — 83986 ASSAY PH BODY FLUID NOS: CPT | Performed by: STUDENT IN AN ORGANIZED HEALTH CARE EDUCATION/TRAINING PROGRAM

## 2024-08-19 PROCEDURE — 87070 CULTURE OTHR SPECIMN AEROBIC: CPT | Performed by: STUDENT IN AN ORGANIZED HEALTH CARE EDUCATION/TRAINING PROGRAM

## 2024-08-19 PROCEDURE — 84157 ASSAY OF PROTEIN OTHER: CPT | Performed by: STUDENT IN AN ORGANIZED HEALTH CARE EDUCATION/TRAINING PROGRAM

## 2024-08-19 PROCEDURE — 0W993ZZ DRAINAGE OF RIGHT PLEURAL CAVITY, PERCUTANEOUS APPROACH: ICD-10-PCS | Performed by: STUDENT IN AN ORGANIZED HEALTH CARE EDUCATION/TRAINING PROGRAM

## 2024-08-19 PROCEDURE — 85025 COMPLETE CBC W/AUTO DIFF WBC: CPT | Performed by: STUDENT IN AN ORGANIZED HEALTH CARE EDUCATION/TRAINING PROGRAM

## 2024-08-19 PROCEDURE — 36415 COLL VENOUS BLD VENIPUNCTURE: CPT | Performed by: STUDENT IN AN ORGANIZED HEALTH CARE EDUCATION/TRAINING PROGRAM

## 2024-08-19 PROCEDURE — 120N000001 HC R&B MED SURG/OB

## 2024-08-19 PROCEDURE — 83735 ASSAY OF MAGNESIUM: CPT | Performed by: STUDENT IN AN ORGANIZED HEALTH CARE EDUCATION/TRAINING PROGRAM

## 2024-08-19 PROCEDURE — 89050 BODY FLUID CELL COUNT: CPT | Performed by: STUDENT IN AN ORGANIZED HEALTH CARE EDUCATION/TRAINING PROGRAM

## 2024-08-19 PROCEDURE — 258N000003 HC RX IP 258 OP 636: Performed by: FAMILY MEDICINE

## 2024-08-19 PROCEDURE — 250N000012 HC RX MED GY IP 250 OP 636 PS 637: Performed by: STUDENT IN AN ORGANIZED HEALTH CARE EDUCATION/TRAINING PROGRAM

## 2024-08-19 PROCEDURE — 82945 GLUCOSE OTHER FLUID: CPT | Performed by: STUDENT IN AN ORGANIZED HEALTH CARE EDUCATION/TRAINING PROGRAM

## 2024-08-19 PROCEDURE — 272N000042 US THORACENTESIS

## 2024-08-19 PROCEDURE — 83615 LACTATE (LD) (LDH) ENZYME: CPT | Performed by: STUDENT IN AN ORGANIZED HEALTH CARE EDUCATION/TRAINING PROGRAM

## 2024-08-19 PROCEDURE — 88305 TISSUE EXAM BY PATHOLOGIST: CPT | Mod: TC | Performed by: STUDENT IN AN ORGANIZED HEALTH CARE EDUCATION/TRAINING PROGRAM

## 2024-08-19 PROCEDURE — 84132 ASSAY OF SERUM POTASSIUM: CPT | Performed by: STUDENT IN AN ORGANIZED HEALTH CARE EDUCATION/TRAINING PROGRAM

## 2024-08-19 PROCEDURE — 99232 SBSQ HOSP IP/OBS MODERATE 35: CPT | Performed by: FAMILY MEDICINE

## 2024-08-19 RX ORDER — SODIUM CHLORIDE 9 MG/ML
INJECTION, SOLUTION INTRAVENOUS CONTINUOUS
Status: DISCONTINUED | OUTPATIENT
Start: 2024-08-19 | End: 2024-08-20 | Stop reason: HOSPADM

## 2024-08-19 RX ADMIN — SODIUM CHLORIDE: 9 INJECTION, SOLUTION INTRAVENOUS at 11:36

## 2024-08-19 RX ADMIN — INSULIN ASPART 1 UNITS: 100 INJECTION, SOLUTION INTRAVENOUS; SUBCUTANEOUS at 18:16

## 2024-08-19 RX ADMIN — INSULIN ASPART 1 UNITS: 100 INJECTION, SOLUTION INTRAVENOUS; SUBCUTANEOUS at 12:03

## 2024-08-19 RX ADMIN — TRAZODONE HYDROCHLORIDE 50 MG: 50 TABLET ORAL at 21:12

## 2024-08-19 RX ADMIN — AMLODIPINE BESYLATE 10 MG: 5 TABLET ORAL at 08:52

## 2024-08-19 ASSESSMENT — ACTIVITIES OF DAILY LIVING (ADL)
ADLS_ACUITY_SCORE: 40
ADLS_ACUITY_SCORE: 40
ADLS_ACUITY_SCORE: 38
ADLS_ACUITY_SCORE: 44
ADLS_ACUITY_SCORE: 38
ADLS_ACUITY_SCORE: 40
ADLS_ACUITY_SCORE: 46
ADLS_ACUITY_SCORE: 40
ADLS_ACUITY_SCORE: 46
ADLS_ACUITY_SCORE: 40
ADLS_ACUITY_SCORE: 38
ADLS_ACUITY_SCORE: 42
ADLS_ACUITY_SCORE: 38
ADLS_ACUITY_SCORE: 38
ADLS_ACUITY_SCORE: 40
ADLS_ACUITY_SCORE: 42
ADLS_ACUITY_SCORE: 46

## 2024-08-19 NOTE — PLAN OF CARE
"BP (!) 167/84   Pulse 75   Temp 97.9  F (36.6  C) (Temporal)   Resp 16   Ht 1.727 m (5' 8\")   Wt 77.6 kg (171 lb)   SpO2 93%   BMI 26.00 kg/m        Pt arrived from ED with pursed lip breathing noted and pt report of \"shortness of breath,\" Dr. Pearson made aware via vocera page and assessed pt at bedside. Pt states that \"this is my normal breathing.\" VBG checked per [facility], PRN albuterol inhaler added. O2 sats 93% on RA, no O2 to be administered per Dr. Pearson d/t hx COPD. Gave PRN albuterol inhaler for SOB, \"not sure if it helps\" per pt report. Pt is up with SBA and walker, declines gait belt. 2 RN skin assess completed with Farida P. K and Mag protocols ordered, K check was 3.0, Mag 2.3. One time PO K to be given. Gave shift report to [facility] MARK Chávez.    Plan is NPO at midnight for US thoracentesis 8/19, then transfer to [place] 8/20 for ERCP.    Problem: COPD (Chronic Obstructive Pulmonary Disease)  Goal: Optimal Chronic Illness Coping  Outcome: Progressing  Intervention: Support and Optimize Psychosocial Response  Recent Flowsheet Documentation  Taken 8/18/2024 1745 by Heike Fowler, RN  Supportive Measures:   active listening utilized   decision-making supported   goal-setting facilitated    Problem: Heart Failure  Goal: Optimal Coping  Outcome: Progressing  Intervention: Support Psychosocial Response  Recent Flowsheet Documentation  Taken 8/18/2024 1745 by Heike Fowler, RN  Supportive Measures:   active listening utilized   decision-making supported   goal-setting facilitated     "

## 2024-08-19 NOTE — PROGRESS NOTES
[facility]    Medicine Progress Note - Hospitalist Service    Date of Admission:  8/18/2024    Assessment & Plan      Per Beasley is a 84 year old male admitted on 8/18/2024.  He has a past medical history significant for hypertension, diabetes, HFpEF, paroxysmal atrial fibrillation, severe aortic stenosis status post TAVR, recurrent pneumothorax who presented to the hospital on 8/18 with generalized weakness, itchiness, decreased appetite.    Elevated alkaline phosphate and transaminitis  Biliary obstruction/Intrahepatic and extrahepatic bile duct dilation  -Presenting with 2 to 3 weeks of generalized weakness, generalized skin itchiness, decreased appetite and weight loss  -No abdominal pain.  -MRCP showed distention of the gallbladder with intrahepatic and extrahepatic bile duct dilation.  No cholelithiasis. No cholecystitis.  -The etiology of his extrahepatic biliary obstruction remains unclear, neoplasm is high on the differential given his age.     Plan  -Evaluated by gastroenterology in ED, plans for ERCP at [facility] on 8/20 around 12 PM.  - Clear liquids for now maintinenance fluids for NPO after midnight.  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  At that time pleural effusion was consistent with exudative nature, however, it was suspected that it was most likely transudative in nature but labs were consistent with exudative etiology given aggressive use of diuretic. Did not find cytology report.    Plan  -Thoracentesis on 8/19: 1.6L studies pending.    Heart failure with preserved action fraction  -At home on torsemide 80 mg daily  -BMP is elevated around 4400, however, this is nonspecific.  Patient appears close to euvolemic to mildly hypervolemic on examination.  -Saturating well on room  air.    Plan  -Hold torsemide for now due to NPO status and he looks dry.  Follow I+O's    Acute kidney injury on CKD III b  -Baseline creatinine around 1.5-2.  Creatinine presentation 2.8.  -Etiology of possible mild JONNAHTAN remains unclear.    Plan  -Renal ultrasound - non-diagnostic.   - worse creat this morning 3.03 looks to be prerenal so will give some gentle IVF due to variable PO intake and NPO status.  - follow in and outs/daily weights.    Hypertension  -At home on metoprolol tartrate 25 mg twice daily, amlodipine 10 mg daily.      Plan  -Continue home metoprolol tartrate with holding parameters.  -Continue home amlodipine with holding parameters.    Coronary artery disease s/p PCI w/ NUBIA to mid LCx 2021  Paroxysmal AF not on anticoagulation  Mild troponinemia  -High-sensitivity is mildly elevated and peaked around 39.  -No chest pain or significant shortness of breath.  -EKG showed A-fib without clear ST segment changes.  -Presentation not suggestive of an acute coronary syndrome.    Plan  -Hold home aspirin   -Hold home Eliquis. unclear if the planned ERCP will be low risk procedure or high risk such as biliary or pancreatic sphincterectomy. If the patient is not undergoing any procedures for more than 48 hours, consider starting heparin drip ([id number] 5).  -Hold home atorvastatin given transaminitis    COPD  Hx of spontaneous PTX c/b hydropneumothorax, admission 4/16/2024  Former smoker  -Former smoker with 20-pack-year history, PFTs showing moderate obstruction.   -No wheezing physical examination per    Diabetes  -At home on glipizide 5 mg daily    Plan  -Held glipizide while inpatient given risk of hypoglycemia  -Start sliding scale insulin.  -Check A1c            Diet: Clear Liquid Diet    DVT Prophylaxis: Pneumatic Compression Devices  Helm Catheter: Not present  Lines: None     Cardiac Monitoring: None  Code Status: No CPR- Do NOT Intubate      Clinically Significant Risk Factors        #  "Hypokalemia: Lowest K = 3 mmol/L in last 2 days, will replace as needed       # Hypoalbuminemia: Lowest albumin = 3 g/dL at 8/19/2024  4:06 AM, will monitor as appropriate  # Coagulation Defect: INR = 1.50 (Ref range: 0.85 - 1.15) and/or PTT = 38 Seconds (Ref range: 22 - 38 Seconds), will monitor for bleeding   # Acute Kidney Injury, unspecified: based on a >150% or 0.3 mg/dL increase in last creatinine compared to past 90 day average, will monitor renal function  # Hypertension: Noted on problem list  # Chronic heart failure with preserved ejection fraction: heart failure noted on problem list and last echo with EF >50%         # DMII: A1C = 8.5 % (Ref range: <5.7 %) within past 6 months, PRESENT ON ADMISSION  # Overweight: Estimated body mass index is 25.38 kg/m  as calculated from the following:    Height as of this encounter: 1.727 m (5' 8\").    Weight as of this encounter: 75.7 kg (166 lb 14.4 oz)., PRESENT ON ADMISSION                  Disposition Plan     Medically Ready for Discharge: Anticipated in 2-4 Days             Isaac Herrera MD  Hospitalist Service  [facility]  Securely message with [employer] (more info)  Text page via [employer] Paging/Directory   ______________________________________________________________________    Interval History   Patient feels very tired and thirsty.  Mild abd discomfort.    Physical Exam   Vital Signs: Temp: 98.2  F (36.8  C) Temp src: Oral BP: 129/61 Pulse: 58   Resp: 20 SpO2: 93 % O2 Device: None (Room air)    Weight: 166 lbs 14.4 oz    Constitutional: awake, alert, cooperative, no apparent distress, and appears stated age  Respiratory: no increased work of breathing  Cardiovascular: normal S1 and S2  GI: normal bowel sounds and non-tender  Skin: jaundice noted    Medical Decision Making       49 MINUTES SPENT BY ME on the date of service doing chart review, history, exam, documentation & further activities per the note.      Data     I have " personally reviewed the following data over the past 24 hrs:    8.6  \   11.2 (L)   / 213     138 96 (L) 37.7 (H) /  164 (H)   3.5; 3.5 26 3.03 (H) \     ALT: 100 (H) AST: 175 (H) AP: 819 (H) TBILI: 8.7 (H)   ALB: 3.0 (L) TOT PROTEIN: 7.1 LIPASE: N/A       Imaging results reviewed over the past 24 hrs:   Recent Results (from the past 24 hour(s))   US Thoracentesis    Narrative    EXAM:   1. RIGHT THORACENTESIS  2. ULTRASOUND GUIDANCE  LOCATION: [facility]  DATE: 8/19/2024    INDICATION: Pleural effusion.    PROCEDURE: Informed consent obtained. Time out performed. The chest was prepped and draped in sterile fashion. 10 mL of 1 % lidocaine was infused into the local soft tissues. Under direct ultrasound guidance, a 5 [place] catheter system was placed into   the pleural effusion.     1.6 liters of clear red fluid were removed and sent to lab, if requested.    Patient tolerated procedure well.    Ultrasound imaging was obtained and placed in the patient's permanent medical record.      Impression    IMPRESSION:  Status post right ultrasound-guided thoracentesis.    Reference CPT Code: 42695

## 2024-08-19 NOTE — PROGRESS NOTES
"[facility] Progress Note       SUBJECTIVE:  Patient reports a little nausea and diffuse abdominal discomfort today, which he did not have yesterday, even after eating pot roast for dinner. He has no other complaints.        OBJECTIVE:  /58 (BP Location: Right arm)   Pulse 53   Temp 98.4  F (36.9  C) (Oral)   Resp 18   Ht 1.727 m (5' 8\")   Wt 75.7 kg (166 lb 14.4 oz)   SpO2 95%   BMI 25.38 kg/m    Temp (24hrs), Av.5  F (36.9  C), Min:98.4  F (36.9  C), Max:98.7  F (37.1  C)    Patient Vitals for the past 72 hrs:   Weight   24 0555 75.7 kg (166 lb 14.4 oz)   24 0827 77.6 kg (171 lb)       Intake/Output Summary (Last 24 hours) at 2024 1141  Last data filed at 2024 0232  Gross per 24 hour   Intake 480 ml   Output 100 ml   Net 380 ml        PHYSICAL EXAM  GEN: NAD, male appears stated age lying in bed  HRT: no LE edema  RESP: unlabored  ABD: +BS, soft, nontender  SKIN: No rash      Additional Data:  I have reviewed the patient's new clinical lab results:     Recent Labs   Lab Test 24  0406 24  0852 24  0454 24  1131 24  0533   WBC 8.6 8.3 7.9 7.8 8.1   HGB 11.2* 12.1* 12.6* 12.0* 12.4*   MCV 79 81 85 88 87    224 172 166 185   INR  --  1.50*  --  1.11 2.29*     Recent Labs   Lab Test 24  0406 24  2207 24  0852 24  1109   POTASSIUM 3.5  3.5 3.0* 3.5 4.1   CHLORIDE 96*  --  93* 95*   CO2 26  --  28 30*   BUN 37.7*  --  33.9* 30.2*   ANIONGAP 16*  --  15 13     Recent Labs   Lab Test 24  0406 24  0958 24  0852 24  1700 24  0631 24  0529 24  0921   ALBUMIN 3.0*  --  3.3*  --   --  3.9 4.1   BILITOTAL 8.7*  --  8.6*  --   --  0.6 0.4   *  --  98*  --   --  12 12   *  --  159*  --   --  21 20   PROTEIN  --  70*  --  10* 30*  --   --    LIPASE  --   --  33  --   --   --  24         Imaging results:  US-guided thoracentesis 24:  IMPRESSION:  Status post right " ultrasound-guided thoracentesis with 1.6 L removed.     Renal US 8/18/24:  IMPRESSION:  1.  Slightly echogenic kidneys suggestive of medical renal disease. No collecting system dilatation.  2.  Nonobstructing left renal calculus.  3.  Distended gallbladder and biliary ductal dilatation, better characterized on recent MRI.    MRCP w/o contrast 8/18/24:  FINDINGS:   MRCP: There is intrahepatic and extrahepatic bile duct dilatation. The common bile duct is 15 mm in diameter. The gallbladder is distended. No gallstones or choledocholithiasis. Normal course and caliber of the pancreatic duct.  LIVER: Normal.  PANCREAS: Normal.  ADDITIONAL FINDINGS: Moderate right pleural effusion and trace left pleural effusion. Slightly elevated left hemidiaphragm. Colonic diverticulosis is noted. The spleen is mildly elongated. No lymphadenopathy. Atherosclerotic disease is present. The cardiac valve prosthesis is not assessed on this study.                                                                   IMPRESSION:  1.  The gallbladder is distended. There is intrahepatic and extrahepatic bile duct dilatation. No choledocholithiasis. Consider ERCP.  2.  Moderate right pleural effusion and trace left pleural effusion.  3.  Colonic diverticulosis.      IMPRESSION:  Biliary ductal dilatation  Elevated liver enzymes  Fatigue  Weight loss  SOB  This is an 83 y/o male with PMH CHF, aortic stenosis s/p TAVR, CAD, COPD, HTN, HLD, diabetes, CKD 3, A fib, recurrent pneumothorax admitted 8/18 for bile duct dilation on imaging with elevated liver enzymes and right pleural effusion s/p 1.6 L thoracentesis 8/18. MRCP 8/18 shows a distended gallbladder with intrahepatic and extrahepatic bile duct dilation but no choledocholithiasis or mass noted. Liver tests elevated in mixed pattern but mostly alk phos/Tbili elevation, slight worsening in recheck today. WBC WNL, no fevers. GI team discussed with Biliary MD 8/18 and patient is scheduled for EUS +/-  ERCP 8/20 at [facility] with Dr. Magdaleno to look for choledocholithiasis, mass, papillary stenosis. He will need to be transported there for procedure and will return afterward. He notes some new abdominal discomfort and nausea without vomiting today, will continue to monitor as it may or may not be related to biliary process.     PLAN:  - Clear liquid diet today  - NPO at midnight for EUS +/- ERCP with Dr. Magdaleno 8/20 at [facility]- pt will need to be transported there and will return to [facility] after completion. Transportation has been set up for 10 am arrival to [facility] and he will be picked up at 4:00 pm to return to [facility]  - Supportive cares per medicine      (Dr. Moran)  Laine Castro PA-C  [facility]  8/19/2024 11:41 AM  [phone] (office)    40 minutes of total time was spent providing patient care, including patient evaluation, reviewing documentation/test results, , and documentation.  ________________________________________________________________________

## 2024-08-19 NOTE — PLAN OF CARE
Problem: Adult Inpatient Plan of Care  Goal: Optimal Comfort and Wellbeing  Outcome: Progressing  Intervention: Monitor Pain and Promote Comfort  Flowsheets (Taken 8/19/2024 1651)  Pain Management Interventions: declines  Intervention: Provide Person-Centered Care  Flowsheets (Taken 8/19/2024 1651)  Trust Relationship/Rapport:   care explained   choices provided   questions answered   Goal Outcome Evaluation:    A/Ox4. Denies pain, N/V this shift and tolerating clear liquid diet. VSS on room air. Will be NPO at midnight for planned ERCP at [facility] tomorrow.

## 2024-08-19 NOTE — PLAN OF CARE
Problem: Heart Failure  Goal: Optimal Cardiac Output  Outcome: Progressing     Problem: Heart Failure  Goal: Effective Oxygenation and Ventilation  Outcome: Progressing  Intervention: Promote Airway Secretion Clearance    Pt is A&Ox4, VSS on RA. SBA with walker when ambulating. Voiding adequately. Denies pain. Tele running afib. Denies chest pain, dizziness, N/V.  Has been NPO since midnight. SOB reported, pt states that's his baseline.

## 2024-08-20 ENCOUNTER — APPOINTMENT (OUTPATIENT)
Dept: RADIOLOGY | Facility: HOSPITAL | Age: 84
End: 2024-08-20
Attending: INTERNAL MEDICINE
Payer: MEDICARE

## 2024-08-20 ENCOUNTER — HOSPITAL ENCOUNTER (OUTPATIENT)
Facility: HOSPITAL | Age: 84
Discharge: OTHER INSTITUTION | End: 2024-08-20
Attending: INTERNAL MEDICINE | Admitting: INTERNAL MEDICINE
Payer: MEDICARE

## 2024-08-20 ENCOUNTER — ANESTHESIA EVENT (OUTPATIENT)
Dept: SURGERY | Facility: HOSPITAL | Age: 84
End: 2024-08-20
Payer: MEDICARE

## 2024-08-20 ENCOUNTER — ANESTHESIA (OUTPATIENT)
Dept: SURGERY | Facility: HOSPITAL | Age: 84
End: 2024-08-20
Payer: MEDICARE

## 2024-08-20 ENCOUNTER — HOSPITAL ENCOUNTER (OUTPATIENT)
Facility: CLINIC | Age: 84
Discharge: HOME | DRG: 445 | End: 2024-08-21
Attending: STUDENT IN AN ORGANIZED HEALTH CARE EDUCATION/TRAINING PROGRAM | Admitting: STUDENT IN AN ORGANIZED HEALTH CARE EDUCATION/TRAINING PROGRAM
Payer: MEDICARE

## 2024-08-20 VITALS
WEIGHT: 173.72 LBS | OXYGEN SATURATION: 97 % | RESPIRATION RATE: 18 BRPM | TEMPERATURE: 98.1 F | BODY MASS INDEX: 26.33 KG/M2 | HEIGHT: 68 IN | DIASTOLIC BLOOD PRESSURE: 58 MMHG | HEART RATE: 52 BPM | SYSTOLIC BLOOD PRESSURE: 116 MMHG

## 2024-08-20 VITALS
TEMPERATURE: 97.8 F | OXYGEN SATURATION: 94 % | RESPIRATION RATE: 14 BRPM | DIASTOLIC BLOOD PRESSURE: 61 MMHG | HEART RATE: 63 BPM | SYSTOLIC BLOOD PRESSURE: 133 MMHG | BODY MASS INDEX: 25.73 KG/M2 | WEIGHT: 169.8 LBS | HEIGHT: 68 IN

## 2024-08-20 DIAGNOSIS — I48.19 PERSISTENT ATRIAL FIBRILLATION (H): ICD-10-CM

## 2024-08-20 DIAGNOSIS — Z95.3 STATUS POST TRANSCATHETER AORTIC VALVE REPLACEMENT (TAVR) USING BIOPROSTHESIS: Primary | ICD-10-CM

## 2024-08-20 LAB
ALBUMIN SERPL BCG-MCNC: 2.7 G/DL (ref 3.5–5.2)
ALP SERPL-CCNC: 881 U/L (ref 40–150)
ALT SERPL W P-5'-P-CCNC: 118 U/L (ref 0–70)
ANION GAP SERPL CALCULATED.3IONS-SCNC: 14 MMOL/L (ref 7–15)
APTT PPP: 38 SECONDS (ref 22–38)
AST SERPL W P-5'-P-CCNC: 222 U/L (ref 0–45)
BILIRUB SERPL-MCNC: 9.9 MG/DL
BUN SERPL-MCNC: 40.1 MG/DL (ref 8–23)
CALCIUM SERPL-MCNC: 8.4 MG/DL (ref 8.8–10.4)
CHLORIDE SERPL-SCNC: 97 MMOL/L (ref 98–107)
CREAT SERPL-MCNC: 3.13 MG/DL (ref 0.67–1.17)
EGFRCR SERPLBLD CKD-EPI 2021: 19 ML/MIN/1.73M2
ERCP: NORMAL
GLUCOSE BLDC GLUCOMTR-MCNC: 135 MG/DL (ref 70–99)
GLUCOSE BLDC GLUCOMTR-MCNC: 152 MG/DL (ref 70–99)
GLUCOSE BLDC GLUCOMTR-MCNC: 155 MG/DL (ref 70–99)
GLUCOSE BLDC GLUCOMTR-MCNC: 187 MG/DL (ref 70–99)
GLUCOSE SERPL-MCNC: 152 MG/DL (ref 70–99)
HCO3 SERPL-SCNC: 24 MMOL/L (ref 22–29)
HOLD SPECIMEN: NORMAL
HOLD SPECIMEN: NORMAL
INR PPP: 1.64 (ref 0.85–1.15)
MAGNESIUM SERPL-MCNC: 2.4 MG/DL (ref 1.7–2.3)
MAGNESIUM SERPL-MCNC: 2.5 MG/DL (ref 1.7–2.3)
PATH REPORT.COMMENTS IMP SPEC: NORMAL
PATH REPORT.FINAL DX SPEC: NORMAL
PATH REPORT.GROSS SPEC: NORMAL
PATH REPORT.MICROSCOPIC SPEC OTHER STN: NORMAL
POTASSIUM SERPL-SCNC: 3.3 MMOL/L (ref 3.4–5.3)
POTASSIUM SERPL-SCNC: 4.1 MMOL/L (ref 3.4–5.3)
PROT SERPL-MCNC: 6.6 G/DL (ref 6.4–8.3)
SODIUM SERPL-SCNC: 135 MMOL/L (ref 135–145)
UPPER EUS: NORMAL

## 2024-08-20 PROCEDURE — 43274 ERCP DUCT STENT PLACEMENT: CPT | Performed by: ANESTHESIOLOGY

## 2024-08-20 PROCEDURE — 36415 COLL VENOUS BLD VENIPUNCTURE: CPT | Performed by: STUDENT IN AN ORGANIZED HEALTH CARE EDUCATION/TRAINING PROGRAM

## 2024-08-20 PROCEDURE — 250N000013 HC RX MED GY IP 250 OP 250 PS 637: Performed by: INTERNAL MEDICINE

## 2024-08-20 PROCEDURE — 88112 CYTOPATH CELL ENHANCE TECH: CPT | Mod: 26 | Performed by: PATHOLOGY

## 2024-08-20 PROCEDURE — 999N000182 XR SURGERY CARM FLUORO GREATER THAN 5 MIN: Mod: TC

## 2024-08-20 PROCEDURE — 370N000017 HC ANESTHESIA TECHNICAL FEE, PER MIN: Performed by: INTERNAL MEDICINE

## 2024-08-20 PROCEDURE — 360N000083 HC SURGERY LEVEL 3 W/ FLUORO, PER MIN: Performed by: INTERNAL MEDICINE

## 2024-08-20 PROCEDURE — 710N000012 HC RECOVERY PHASE 2, PER MINUTE: Performed by: INTERNAL MEDICINE

## 2024-08-20 PROCEDURE — 36415 COLL VENOUS BLD VENIPUNCTURE: CPT | Performed by: FAMILY MEDICINE

## 2024-08-20 PROCEDURE — 43274 ERCP DUCT STENT PLACEMENT: CPT | Performed by: NURSE ANESTHETIST, CERTIFIED REGISTERED

## 2024-08-20 PROCEDURE — 999N000141 HC STATISTIC PRE-PROCEDURE NURSING ASSESSMENT: Performed by: INTERNAL MEDICINE

## 2024-08-20 PROCEDURE — 83735 ASSAY OF MAGNESIUM: CPT | Performed by: STUDENT IN AN ORGANIZED HEALTH CARE EDUCATION/TRAINING PROGRAM

## 2024-08-20 PROCEDURE — 258N000003 HC RX IP 258 OP 636

## 2024-08-20 PROCEDURE — 85610 PROTHROMBIN TIME: CPT | Performed by: STUDENT IN AN ORGANIZED HEALTH CARE EDUCATION/TRAINING PROGRAM

## 2024-08-20 PROCEDURE — 85730 THROMBOPLASTIN TIME PARTIAL: CPT | Performed by: STUDENT IN AN ORGANIZED HEALTH CARE EDUCATION/TRAINING PROGRAM

## 2024-08-20 PROCEDURE — 88173 CYTOPATH EVAL FNA REPORT: CPT | Mod: TC | Performed by: INTERNAL MEDICINE

## 2024-08-20 PROCEDURE — 82962 GLUCOSE BLOOD TEST: CPT

## 2024-08-20 PROCEDURE — 99100 ANES PT EXTEME AGE<1 YR&>70: CPT | Performed by: NURSE ANESTHETIST, CERTIFIED REGISTERED

## 2024-08-20 PROCEDURE — 250N000011 HC RX IP 250 OP 636: Performed by: ANESTHESIOLOGY

## 2024-08-20 PROCEDURE — 88305 TISSUE EXAM BY PATHOLOGIST: CPT | Mod: TC | Performed by: INTERNAL MEDICINE

## 2024-08-20 PROCEDURE — 258N000003 HC RX IP 258 OP 636: Performed by: INTERNAL MEDICINE

## 2024-08-20 PROCEDURE — 99232 SBSQ HOSP IP/OBS MODERATE 35: CPT | Performed by: STUDENT IN AN ORGANIZED HEALTH CARE EDUCATION/TRAINING PROGRAM

## 2024-08-20 PROCEDURE — C1769 GUIDE WIRE: HCPCS | Performed by: INTERNAL MEDICINE

## 2024-08-20 PROCEDURE — 84132 ASSAY OF SERUM POTASSIUM: CPT | Performed by: STUDENT IN AN ORGANIZED HEALTH CARE EDUCATION/TRAINING PROGRAM

## 2024-08-20 PROCEDURE — C1874 STENT, COATED/COV W/DEL SYS: HCPCS | Performed by: INTERNAL MEDICINE

## 2024-08-20 PROCEDURE — 272N000001 HC OR GENERAL SUPPLY STERILE: Performed by: INTERNAL MEDICINE

## 2024-08-20 PROCEDURE — 80053 COMPREHEN METABOLIC PANEL: CPT | Performed by: FAMILY MEDICINE

## 2024-08-20 PROCEDURE — 255N000002 HC RX 255 OP 636: Performed by: INTERNAL MEDICINE

## 2024-08-20 PROCEDURE — 88305 TISSUE EXAM BY PATHOLOGIST: CPT | Mod: 26 | Performed by: PATHOLOGY

## 2024-08-20 DEVICE — STENT SYSTEM RMV
Type: IMPLANTABLE DEVICE | Site: BILE DUCT | Status: FUNCTIONAL
Brand: WALLFLEX BILIARY

## 2024-08-20 RX ORDER — FENTANYL CITRATE 50 UG/ML
25 INJECTION, SOLUTION INTRAMUSCULAR; INTRAVENOUS EVERY 5 MIN PRN
Status: CANCELLED | OUTPATIENT
Start: 2024-08-20

## 2024-08-20 RX ORDER — OXYCODONE HYDROCHLORIDE 5 MG/1
5 TABLET ORAL
Status: CANCELLED | OUTPATIENT
Start: 2024-08-20

## 2024-08-20 RX ORDER — NALOXONE HYDROCHLORIDE 0.4 MG/ML
0.2 INJECTION, SOLUTION INTRAMUSCULAR; INTRAVENOUS; SUBCUTANEOUS
Status: DISCONTINUED | OUTPATIENT
Start: 2024-08-20 | End: 2024-08-21 | Stop reason: HOSPADM

## 2024-08-20 RX ORDER — DEXTROSE MONOHYDRATE 25 G/50ML
25-50 INJECTION, SOLUTION INTRAVENOUS
Status: DISCONTINUED | OUTPATIENT
Start: 2024-08-20 | End: 2024-08-21 | Stop reason: HOSPADM

## 2024-08-20 RX ORDER — ONDANSETRON 4 MG/1
4 TABLET, ORALLY DISINTEGRATING ORAL EVERY 30 MIN PRN
Status: CANCELLED | OUTPATIENT
Start: 2024-08-20

## 2024-08-20 RX ORDER — METOPROLOL TARTRATE 25 MG/1
25 TABLET, FILM COATED ORAL 2 TIMES DAILY
Status: DISCONTINUED | OUTPATIENT
Start: 2024-08-20 | End: 2024-08-21 | Stop reason: HOSPADM

## 2024-08-20 RX ORDER — SODIUM CHLORIDE, SODIUM LACTATE, POTASSIUM CHLORIDE, CALCIUM CHLORIDE 600; 310; 30; 20 MG/100ML; MG/100ML; MG/100ML; MG/100ML
INJECTION, SOLUTION INTRAVENOUS CONTINUOUS
Status: DISCONTINUED | OUTPATIENT
Start: 2024-08-20 | End: 2024-08-20 | Stop reason: HOSPADM

## 2024-08-20 RX ORDER — PROPOFOL 10 MG/ML
INJECTION, EMULSION INTRAVENOUS CONTINUOUS PRN
Status: DISCONTINUED | OUTPATIENT
Start: 2024-08-20 | End: 2024-08-20

## 2024-08-20 RX ORDER — FLUMAZENIL 0.1 MG/ML
0.2 INJECTION, SOLUTION INTRAVENOUS
Status: CANCELLED | OUTPATIENT
Start: 2024-08-20 | End: 2024-08-21

## 2024-08-20 RX ORDER — NALOXONE HYDROCHLORIDE 1 MG/ML
0.1 INJECTION INTRAMUSCULAR; INTRAVENOUS; SUBCUTANEOUS
Status: CANCELLED | OUTPATIENT
Start: 2024-08-20

## 2024-08-20 RX ORDER — NALOXONE HYDROCHLORIDE 0.4 MG/ML
0.4 INJECTION, SOLUTION INTRAMUSCULAR; INTRAVENOUS; SUBCUTANEOUS
Status: DISCONTINUED | OUTPATIENT
Start: 2024-08-20 | End: 2024-08-21 | Stop reason: HOSPADM

## 2024-08-20 RX ORDER — DEXAMETHASONE SODIUM PHOSPHATE 4 MG/ML
4 INJECTION, SOLUTION INTRA-ARTICULAR; INTRALESIONAL; INTRAMUSCULAR; INTRAVENOUS; SOFT TISSUE
Status: DISCONTINUED | OUTPATIENT
Start: 2024-08-20 | End: 2024-08-20

## 2024-08-20 RX ORDER — FENTANYL CITRATE 50 UG/ML
50 INJECTION, SOLUTION INTRAMUSCULAR; INTRAVENOUS EVERY 5 MIN PRN
Status: CANCELLED | OUTPATIENT
Start: 2024-08-20

## 2024-08-20 RX ORDER — PROPOFOL 10 MG/ML
INJECTION, EMULSION INTRAVENOUS PRN
Status: DISCONTINUED | OUTPATIENT
Start: 2024-08-20 | End: 2024-08-20

## 2024-08-20 RX ORDER — ONDANSETRON 2 MG/ML
4 INJECTION INTRAMUSCULAR; INTRAVENOUS EVERY 30 MIN PRN
Status: DISCONTINUED | OUTPATIENT
Start: 2024-08-20 | End: 2024-08-20

## 2024-08-20 RX ORDER — LIDOCAINE 40 MG/G
CREAM TOPICAL
Status: DISCONTINUED | OUTPATIENT
Start: 2024-08-20 | End: 2024-08-20

## 2024-08-20 RX ORDER — TORSEMIDE 20 MG/1
80 TABLET ORAL DAILY
Status: DISCONTINUED | OUTPATIENT
Start: 2024-08-21 | End: 2024-08-21 | Stop reason: HOSPADM

## 2024-08-20 RX ORDER — LIDOCAINE 40 MG/G
CREAM TOPICAL
Status: CANCELLED | OUTPATIENT
Start: 2024-08-20

## 2024-08-20 RX ORDER — ONDANSETRON 4 MG/1
4 TABLET, ORALLY DISINTEGRATING ORAL EVERY 6 HOURS PRN
Status: CANCELLED | OUTPATIENT
Start: 2024-08-20

## 2024-08-20 RX ORDER — ONDANSETRON 2 MG/ML
4 INJECTION INTRAMUSCULAR; INTRAVENOUS EVERY 30 MIN PRN
Status: CANCELLED | OUTPATIENT
Start: 2024-08-20

## 2024-08-20 RX ORDER — TRAZODONE HYDROCHLORIDE 50 MG/1
50 TABLET, FILM COATED ORAL AT BEDTIME
Status: DISCONTINUED | OUTPATIENT
Start: 2024-08-20 | End: 2024-08-21 | Stop reason: HOSPADM

## 2024-08-20 RX ORDER — FENTANYL CITRATE 50 UG/ML
50 INJECTION, SOLUTION INTRAMUSCULAR; INTRAVENOUS EVERY 5 MIN PRN
Status: DISCONTINUED | OUTPATIENT
Start: 2024-08-20 | End: 2024-08-20

## 2024-08-20 RX ORDER — SODIUM CHLORIDE, SODIUM LACTATE, POTASSIUM CHLORIDE, CALCIUM CHLORIDE 600; 310; 30; 20 MG/100ML; MG/100ML; MG/100ML; MG/100ML
INJECTION, SOLUTION INTRAVENOUS CONTINUOUS
Status: CANCELLED | OUTPATIENT
Start: 2024-08-20

## 2024-08-20 RX ORDER — OXYCODONE HYDROCHLORIDE 5 MG/1
10 TABLET ORAL
Status: CANCELLED | OUTPATIENT
Start: 2024-08-20

## 2024-08-20 RX ORDER — LIDOCAINE 40 MG/G
CREAM TOPICAL
Status: DISCONTINUED | OUTPATIENT
Start: 2024-08-20 | End: 2024-08-20 | Stop reason: HOSPADM

## 2024-08-20 RX ORDER — SODIUM CHLORIDE, SODIUM LACTATE, POTASSIUM CHLORIDE, CALCIUM CHLORIDE 600; 310; 30; 20 MG/100ML; MG/100ML; MG/100ML; MG/100ML
INJECTION, SOLUTION INTRAVENOUS CONTINUOUS PRN
Status: DISCONTINUED | OUTPATIENT
Start: 2024-08-20 | End: 2024-08-20

## 2024-08-20 RX ORDER — HYDROMORPHONE HCL IN WATER/PF 6 MG/30 ML
0.4 PATIENT CONTROLLED ANALGESIA SYRINGE INTRAVENOUS EVERY 5 MIN PRN
Status: CANCELLED | OUTPATIENT
Start: 2024-08-20

## 2024-08-20 RX ORDER — NALOXONE HYDROCHLORIDE 1 MG/ML
0.4 INJECTION INTRAMUSCULAR; INTRAVENOUS; SUBCUTANEOUS
Status: CANCELLED | OUTPATIENT
Start: 2024-08-20

## 2024-08-20 RX ORDER — ONDANSETRON 4 MG/1
4 TABLET, ORALLY DISINTEGRATING ORAL EVERY 6 HOURS PRN
Status: DISCONTINUED | OUTPATIENT
Start: 2024-08-20 | End: 2024-08-21 | Stop reason: HOSPADM

## 2024-08-20 RX ORDER — HYDROMORPHONE HCL IN WATER/PF 6 MG/30 ML
0.2 PATIENT CONTROLLED ANALGESIA SYRINGE INTRAVENOUS EVERY 5 MIN PRN
Status: CANCELLED | OUTPATIENT
Start: 2024-08-20

## 2024-08-20 RX ORDER — AMLODIPINE BESYLATE 10 MG/1
10 TABLET ORAL DAILY
Status: DISCONTINUED | OUTPATIENT
Start: 2024-08-21 | End: 2024-08-21 | Stop reason: HOSPADM

## 2024-08-20 RX ORDER — DEXAMETHASONE SODIUM PHOSPHATE 10 MG/ML
4 INJECTION, SOLUTION INTRAMUSCULAR; INTRAVENOUS
Status: CANCELLED | OUTPATIENT
Start: 2024-08-20

## 2024-08-20 RX ORDER — FLUMAZENIL 0.1 MG/ML
0.2 INJECTION, SOLUTION INTRAVENOUS
Status: ACTIVE | OUTPATIENT
Start: 2024-08-20 | End: 2024-08-21

## 2024-08-20 RX ORDER — NALOXONE HYDROCHLORIDE 1 MG/ML
0.2 INJECTION INTRAMUSCULAR; INTRAVENOUS; SUBCUTANEOUS
Status: CANCELLED | OUTPATIENT
Start: 2024-08-20

## 2024-08-20 RX ORDER — LIDOCAINE 40 MG/G
CREAM TOPICAL
Status: DISCONTINUED | OUTPATIENT
Start: 2024-08-20 | End: 2024-08-21 | Stop reason: HOSPADM

## 2024-08-20 RX ORDER — NICOTINE POLACRILEX 4 MG
15-30 LOZENGE BUCCAL
Status: DISCONTINUED | OUTPATIENT
Start: 2024-08-20 | End: 2024-08-21 | Stop reason: HOSPADM

## 2024-08-20 RX ORDER — HYDROMORPHONE HCL IN WATER/PF 6 MG/30 ML
0.2 PATIENT CONTROLLED ANALGESIA SYRINGE INTRAVENOUS EVERY 5 MIN PRN
Status: DISCONTINUED | OUTPATIENT
Start: 2024-08-20 | End: 2024-08-20

## 2024-08-20 RX ORDER — ONDANSETRON 2 MG/ML
4 INJECTION INTRAMUSCULAR; INTRAVENOUS EVERY 6 HOURS PRN
Status: CANCELLED | OUTPATIENT
Start: 2024-08-20

## 2024-08-20 RX ORDER — ONDANSETRON 4 MG/1
4 TABLET, ORALLY DISINTEGRATING ORAL EVERY 30 MIN PRN
Status: DISCONTINUED | OUTPATIENT
Start: 2024-08-20 | End: 2024-08-20

## 2024-08-20 RX ORDER — OXYCODONE HYDROCHLORIDE 5 MG/1
10 TABLET ORAL
Status: DISCONTINUED | OUTPATIENT
Start: 2024-08-20 | End: 2024-08-20

## 2024-08-20 RX ORDER — SODIUM CHLORIDE, SODIUM LACTATE, POTASSIUM CHLORIDE, CALCIUM CHLORIDE 600; 310; 30; 20 MG/100ML; MG/100ML; MG/100ML; MG/100ML
INJECTION, SOLUTION INTRAVENOUS CONTINUOUS
Status: DISCONTINUED | OUTPATIENT
Start: 2024-08-20 | End: 2024-08-21

## 2024-08-20 RX ORDER — NALOXONE HYDROCHLORIDE 0.4 MG/ML
0.1 INJECTION, SOLUTION INTRAMUSCULAR; INTRAVENOUS; SUBCUTANEOUS
Status: DISCONTINUED | OUTPATIENT
Start: 2024-08-20 | End: 2024-08-20

## 2024-08-20 RX ORDER — OXYCODONE HYDROCHLORIDE 5 MG/1
5 TABLET ORAL
Status: DISCONTINUED | OUTPATIENT
Start: 2024-08-20 | End: 2024-08-20

## 2024-08-20 RX ORDER — ONDANSETRON 2 MG/ML
4 INJECTION INTRAMUSCULAR; INTRAVENOUS EVERY 6 HOURS PRN
Status: DISCONTINUED | OUTPATIENT
Start: 2024-08-20 | End: 2024-08-21 | Stop reason: HOSPADM

## 2024-08-20 RX ORDER — SODIUM CHLORIDE, SODIUM LACTATE, POTASSIUM CHLORIDE, CALCIUM CHLORIDE 600; 310; 30; 20 MG/100ML; MG/100ML; MG/100ML; MG/100ML
INJECTION, SOLUTION INTRAVENOUS CONTINUOUS
Status: DISCONTINUED | OUTPATIENT
Start: 2024-08-20 | End: 2024-08-20

## 2024-08-20 RX ORDER — ALBUTEROL SULFATE 90 UG/1
2 AEROSOL, METERED RESPIRATORY (INHALATION) EVERY 6 HOURS PRN
Status: DISCONTINUED | OUTPATIENT
Start: 2024-08-20 | End: 2024-08-21 | Stop reason: HOSPADM

## 2024-08-20 RX ORDER — HYDROMORPHONE HCL IN WATER/PF 6 MG/30 ML
0.4 PATIENT CONTROLLED ANALGESIA SYRINGE INTRAVENOUS EVERY 5 MIN PRN
Status: DISCONTINUED | OUTPATIENT
Start: 2024-08-20 | End: 2024-08-20

## 2024-08-20 RX ORDER — FENTANYL CITRATE 50 UG/ML
25 INJECTION, SOLUTION INTRAMUSCULAR; INTRAVENOUS EVERY 5 MIN PRN
Status: DISCONTINUED | OUTPATIENT
Start: 2024-08-20 | End: 2024-08-20

## 2024-08-20 RX ORDER — ASPIRIN 81 MG/1
81 TABLET, CHEWABLE ORAL DAILY
Status: DISCONTINUED | OUTPATIENT
Start: 2024-08-20 | End: 2024-08-20 | Stop reason: HOSPADM

## 2024-08-20 RX ADMIN — TRAZODONE HYDROCHLORIDE 50 MG: 50 TABLET ORAL at 21:13

## 2024-08-20 RX ADMIN — PROPOFOL 100 MCG/KG/MIN: 10 INJECTION, EMULSION INTRAVENOUS at 11:43

## 2024-08-20 RX ADMIN — PROPOFOL 20 MG: 10 INJECTION, EMULSION INTRAVENOUS at 12:26

## 2024-08-20 RX ADMIN — PROPOFOL 30 MG: 10 INJECTION, EMULSION INTRAVENOUS at 11:44

## 2024-08-20 RX ADMIN — SODIUM CHLORIDE, POTASSIUM CHLORIDE, SODIUM LACTATE AND CALCIUM CHLORIDE: 600; 310; 30; 20 INJECTION, SOLUTION INTRAVENOUS at 17:16

## 2024-08-20 RX ADMIN — SODIUM CHLORIDE, POTASSIUM CHLORIDE, SODIUM LACTATE AND CALCIUM CHLORIDE: 600; 310; 30; 20 INJECTION, SOLUTION INTRAVENOUS at 11:36

## 2024-08-20 ASSESSMENT — ACTIVITIES OF DAILY LIVING (ADL)
ADLS_ACUITY_SCORE: 46
ADLS_ACUITY_SCORE: 40
ADLS_ACUITY_SCORE: 47
ADLS_ACUITY_SCORE: 46
ADLS_ACUITY_SCORE: 40
ADLS_ACUITY_SCORE: 47
ADLS_ACUITY_SCORE: 46
ADLS_ACUITY_SCORE: 40
ADLS_ACUITY_SCORE: 40
ADLS_ACUITY_SCORE: 46
ADLS_ACUITY_SCORE: 46
ADLS_ACUITY_SCORE: 40
ADLS_ACUITY_SCORE: 47
ADLS_ACUITY_SCORE: 40
ADLS_ACUITY_SCORE: 40
ADLS_ACUITY_SCORE: 47
ADLS_ACUITY_SCORE: 46
ADLS_ACUITY_SCORE: 46

## 2024-08-20 ASSESSMENT — COPD QUESTIONNAIRES
CAT_SEVERITY: MODERATE
COPD: 1

## 2024-08-20 ASSESSMENT — ENCOUNTER SYMPTOMS: DYSRHYTHMIAS: 1

## 2024-08-20 NOTE — PROGRESS NOTES
[facility] MEDICINE  PROGRESS NOTE       Securely message me with Roque (more info)    Code Status: Prior       Identification/Summary:     Per Beasley is a 84 year old male admitted on 8/18/2024.  He has a past medical history significant for hypertension, diabetes, HFpEF, paroxysmal atrial fibrillation, severe aortic stenosis status post TAVR, recurrent pneumothorax who presented to the hospital on 8/18 with generalized weakness, itchiness, decreased appetite.    Barriers to Discharge: ERCP pending        Assessment and Plan:    Elevated alkaline phosphate and transaminitis  Biliary obstruction/Intrahepatic and extrahepatic bile duct dilation  -Presenting with 2 to 3 weeks of generalized weakness, generalized skin itchiness, decreased appetite and weight loss  -No abdominal pain.  -MRCP showed distention of the gallbladder with intrahepatic and extrahepatic bile duct dilation.  No cholelithiasis. No cholecystitis.  -The etiology of his extrahepatic biliary obstruction remains unclear, neoplasm is high on the differential given his age.      Plan  -Evaluated by gastroenterology in ED, plans for ERCP at [facility] on 8/20 around 12 PM.  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  At that time pleural effusion was consistent with exudative nature, however, it was suspected that it was most likely transudative in nature but labs were consistent with exudative etiology given aggressive use of diuretic. Did not find cytology report.     Plan  -Thoracentesis on 8/19: 1.6L studies pending.     Heart failure with preserved action fraction  -At home on torsemide 80 mg daily  -BMP is elevated around 4400, however, this is nonspecific.  Patient appears close to euvolemic to mildly hypervolemic on  examination.  -Saturating well on room air.     Plan  -Hold torsemide for now due to NPO status and he looks dry.  Follow I+O's     Acute kidney injury on CKD III b  -Baseline creatinine around 1.5-2.  Creatinine presentation 2.8.  -Etiology of possible mild JONNATHAN remains unclear.     Plan  -Renal ultrasound - non-diagnostic.   - worse creat this morning 3.03 looks to be prerenal so will give some gentle IVF due to variable PO intake and NPO status.  - follow in and outs/daily weights.     Hypertension  -At home on metoprolol tartrate 25 mg twice daily, amlodipine 10 mg daily.       Plan  -Continue home metoprolol tartrate with holding parameters.  -Continue home amlodipine with holding parameters.     Coronary artery disease s/p PCI w/ NUBIA to mid LCx 2021  Paroxysmal AF not on anticoagulation  Mild troponinemia  -High-sensitivity is mildly elevated and peaked around 39.  -No chest pain or significant shortness of breath.  -EKG showed A-fib without clear ST segment changes.  -Presentation not suggestive of an acute coronary syndrome.     Plan  -Hold home aspirin   -Hold home Eliquis. unclear if the planned ERCP will be low risk procedure or high risk such as biliary or pancreatic sphincterectomy. If the patient is not undergoing any procedures for more than 48 hours, consider starting heparin drip ([id number]).  -Hold home atorvastatin given transaminitis     COPD  Hx of spontaneous PTX c/b hydropneumothorax, admission 4/16/2024  Former smoker  -Former smoker with 20-pack-year history, PFTs showing moderate obstruction.   -No wheezing physical examination per     Diabetes  -At home on glipizide 5 mg daily     Plan  -Held glipizide while inpatient given risk of hypoglycemia  -Start sliding scale insulin.       Bilateral common femoral artery stenosis  Follow up as an outpatient    Anticoagulation   On hold      Therapy: PT  Helm:Not present  Lines: None       Current Diet  Orders Placed This Encounter      NPO per  "Anesthesia Guidelines for Procedure/Surgery Except for: Meds        Clinically Significant Risk Factors        # Hypokalemia: Lowest K = 3 mmol/L in last 2 days, will replace as needed       # Hypoalbuminemia: Lowest albumin = 2.7 g/dL at 8/20/2024  7:46 AM, will monitor as appropriate  # Coagulation Defect: INR = 1.64 (Ref range: 0.85 - 1.15) and/or PTT = 38 Seconds (Ref range: 22 - 38 Seconds), will monitor for bleeding   # Acute Kidney Injury, unspecified: based on a >150% or 0.3 mg/dL increase in last creatinine compared to past 90 day average, will monitor renal function  # Hypertension: Noted on problem list  # Chronic heart failure with preserved ejection fraction: heart failure noted on problem list and last echo with EF >50%         # DMII: A1C = 8.5 % (Ref range: <5.7 %) within past 6 months, PRESENT ON ADMISSION  # Overweight: Estimated body mass index is 26.41 kg/m  as calculated from the following:    Height as of this encounter: 1.727 m (5' 8\").    Weight as of this encounter: 78.8 kg (173 lb 11.6 oz)., PRESENT ON ADMISSION                  Interval History/Subjective:  Patient reports fatigue and mild itching. His bowel movement is white.    No current facility-administered medications for this encounter.    Physical Exam/Objective:  Temp:  [96.6  F (35.9  C)-98.3  F (36.8  C)] 97.5  F (36.4  C)  Pulse:  [52-64] 55  Resp:  [18-22] 22  BP: (114-143)/(56-71) 119/56  SpO2:  [93 %-97 %] 96 %  Wt Readings from Last 4 Encounters:   08/20/24 77 kg (169 lb 12.8 oz)   08/20/24 78.8 kg (173 lb 11.6 oz)   07/10/24 81.2 kg (179 lb)   07/04/24 76.7 kg (169 lb 3.2 oz)     Body mass index is 26.41 kg/m .    General Appearance: Alert and wake, not in distress, jaundiced  Respiratory: clear lungs, no crackles or wheezing  Cardiovascular: rhythmic, normal S1 and S2, no murmur  Neurology: oriented x 3  Psych: cooperative and calm, normal affect    Medications:   Personally Reviewed.  Medications   No current " facility-administered medications for this encounter.     No current facility-administered medications for this encounter.       Data reviewed today: I personally reviewed all new medications, labs, imaging/diagnostics reports over the past 24 hours. Pertinent findings include:    Imaging:   No results found for this or any previous visit (from the past 24 hour(s)).    Labs:  US Thoracentesis   Final Result   IMPRESSION:   Status post right ultrasound-guided thoracentesis.      Reference CPT Code: 82406      US Renal Complete Non-Vascular   Final Result   IMPRESSION:   1.  Slightly echogenic kidneys suggestive of medical renal disease. No collecting system dilatation.   2.  Nonobstructing left renal calculus.   3.  Distended gallbladder and biliary ductal dilatation, better characterized on recent MRI.      MR Abdomen MRCP without Contrast   Final Result   IMPRESSION:   1.  The gallbladder is distended. There is intrahepatic and extrahepatic bile duct dilatation. No choledocholithiasis. Consider ERCP.   2.  Moderate right pleural effusion and trace left pleural effusion.   3.  Colonic diverticulosis.         CT Chest Abdomen Pelvis w/o Contrast   Final Result   IMPRESSION:   1.  Mildly decreased, moderate right pleural effusion. Significantly decreased left pleural effusion.   2.  Resolved groundglass changes throughout both lungs. Improved airspace changes within the left upper and right middle lobes.   3.  Mild intrauterine extra hepatic biliary dilatation. This suggests a distal common bile duct obstruction.   4.  Nonobstructing bilateral renal calculi.   5.  Left colonic diverticulosis. No evidence for acute diverticulitis.   6.  Extensive calcified atherosclerotic changes suggesting stenoses at the left common and bilateral common femoral arteries.        Recent Results (from the past 24 hour(s))   Glucose by meter    Collection Time: 08/19/24  5:44 PM   Result Value Ref Range    GLUCOSE BY METER POCT 184 (H)  70 - 99 mg/dL   Glucose by meter    Collection Time: 08/19/24  9:16 PM   Result Value Ref Range    GLUCOSE BY METER POCT 172 (H) 70 - 99 mg/dL   Glucose by meter    Collection Time: 08/20/24  3:13 AM   Result Value Ref Range    GLUCOSE BY METER POCT 155 (H) 70 - 99 mg/dL   Glucose by meter    Collection Time: 08/20/24  6:28 AM   Result Value Ref Range    GLUCOSE BY METER POCT 135 (H) 70 - 99 mg/dL   Magnesium    Collection Time: 08/20/24  7:46 AM   Result Value Ref Range    Magnesium 2.4 (H) 1.7 - 2.3 mg/dL   Comprehensive metabolic panel    Collection Time: 08/20/24  7:46 AM   Result Value Ref Range    Sodium 135 135 - 145 mmol/L    Potassium 3.3 (L) 3.4 - 5.3 mmol/L    Carbon Dioxide (CO2) 24 22 - 29 mmol/L    Anion Gap 14 7 - 15 mmol/L    Urea Nitrogen 40.1 (H) 8.0 - 23.0 mg/dL    Creatinine 3.13 (H) 0.67 - 1.17 mg/dL    GFR Estimate 19 (L) >60 mL/min/1.73m2    Calcium 8.4 (L) 8.8 - 10.4 mg/dL    Chloride 97 (L) 98 - 107 mmol/L    Glucose 152 (H) 70 - 99 mg/dL    Alkaline Phosphatase 881 (H) 40 - 150 U/L     (H) 0 - 45 U/L     (H) 0 - 70 U/L    Protein Total 6.6 6.4 - 8.3 g/dL    Albumin 2.7 (L) 3.5 - 5.2 g/dL    Bilirubin Total 9.9 (H) <=1.2 mg/dL   INR    Collection Time: 08/20/24  8:49 AM   Result Value Ref Range    INR 1.64 (H) 0.85 - 1.15   Partial thromboplastin time    Collection Time: 08/20/24  8:49 AM   Result Value Ref Range    aPTT 38 22 - 38 Seconds   Extra Green Top (Lithium Heparin) Tube    Collection Time: 08/20/24  8:49 AM   Result Value Ref Range    Hold Specimen JIC    Extra Purple Top Tube    Collection Time: 08/20/24  8:49 AM   Result Value Ref Range    Hold Specimen JIC        Pending Labs:  Unresulted Labs Ordered in the Past 30 Days of this Admission       Date and Time Order Name Status Description    8/18/2024  3:12 PM Cytology, non-gynecologic In process     8/18/2024  3:12 PM Pleural fluid Aerobic Bacterial Culture Routine With Gram Stain Preliminary                 PAVAN  MD DG  [facility] Medicine  [facility]  Phone: #[phone]    Securely message me with Roque (more info)

## 2024-08-20 NOTE — ANESTHESIA PREPROCEDURE EVALUATION
Anesthesia Pre-Procedure Evaluation    Patient: Per Beasley   MRN: [id number] : 1940        Procedure : Procedure(s):  ENDOSCOPIC RETROGRADE CHOLANGIOPANCREATOGRAPHY  ESOPHAGOGASTRODUODENOSCOPY, WITH ENDOSCOPIC ULTRASOUND GUIDANCE          No past medical history on file.   No past surgical history on file.   Allergies   Allergen Reactions    Blood-Group Specific Substance Other (See Comments)     Patient has a non specific antibody.  Blood product orders may be delayed.  Please draw on red top and 2 purple top tubes for all Type and Screen/ type and Cross match orders.    Hydrochlorothiazide Rash      Social History     Tobacco Use    Smoking status: Former     Types: Cigarettes    Smokeless tobacco: Never   Substance Use Topics    Alcohol use: Not on file      Wt Readings from Last 1 Encounters:   24 78.8 kg (173 lb 11.6 oz)        Anesthesia Evaluation   Pt has had prior anesthetic.         ROS/MED HX  ENT/Pulmonary: Comment: Granulomatous lung disease    (+)                         moderate,  COPD,              Neurologic:  - neg neurologic ROS     Cardiovascular: Comment: EKG 24:  Atrial fibrillation  Septal infarct , age undetermined  Abnormal ECG  When compared with ECG of 2024 11:20,  Nonspecific T wave abnormality now evident in Inferior leads  Confirmed by SEE ED PROVIDER NOTE FOR, ECG INTERPRETATION ([id number]),  GERARDO UMANA ([id number]) on 2024 9:47:28 AM       Echo 24:  Interpretation Summary    1. Left ventricular function is normal.The ejection fraction is 55-60%. The  left ventricle is normal in size. No regional wall motion abnormalities noted.  2. Normal right ventricle size and systolic function.  3. Well seated 26-mm Mooney Catarino 3 Ultra aortic bioprosthetic valve with  normal gradients (PV: 2.8 m/sec, simpson gradient: 12 mm Hg). Acc Time: 70 ms.  DVI: 0.7.  4. IVC diameter and respiratory changes fall into an intermediate range  suggesting an RA pressure of  "8 mmHg.  Compared to prior study, there is no significant change.       (+) Dyslipidemia hypertension- -  CAD angina-  - -   Taking blood thinners   CHF     HALL.             dysrhythmias, a-fib,  valvular problems/murmurs type: AS          METS/Exercise Tolerance: 1 - Eating, dressing    Hematologic:  - neg hematologic  ROS     Musculoskeletal:  - neg musculoskeletal ROS     GI/Hepatic: Comment: Hyperbilirubinemia    (+)          cholecystitis/cholelithiasis,          Renal/Genitourinary:     (+) renal disease, type: CRI,            Endo:     (+)  type II DM,             Obesity,       Psychiatric/Substance Use:  - neg psychiatric ROS     Infectious Disease:  - neg infectious disease ROS     Malignancy:  - neg malignancy ROS     Other:  - neg other ROS          Physical Exam    Airway        Mallampati: III   TM distance: > 3 FB   Neck ROM: full   Mouth opening: > 3 cm    Respiratory Devices and Support         Dental  no notable dental history     (+) Edentulous      Cardiovascular          Rhythm and rate: irregular and bradycardia     Pulmonary           breath sounds clear to auscultation           OUTSIDE LABS:  CBC:   Lab Results   Component Value Date    WBC 8.6 08/19/2024    WBC 8.3 08/18/2024    HGB 11.2 (L) 08/19/2024    HGB 12.1 (L) 08/18/2024    HCT 32.1 (L) 08/19/2024    HCT 35.3 (L) 08/18/2024     08/19/2024     08/18/2024     BMP:   Lab Results   Component Value Date     08/20/2024     08/19/2024    POTASSIUM 3.3 (L) 08/20/2024    POTASSIUM 3.5 08/19/2024    POTASSIUM 3.5 08/19/2024    CHLORIDE 97 (L) 08/20/2024    CHLORIDE 96 (L) 08/19/2024    CO2 24 08/20/2024    CO2 26 08/19/2024    BUN 40.1 (H) 08/20/2024    BUN 37.7 (H) 08/19/2024    CR 3.13 (H) 08/20/2024    CR 3.03 (H) 08/19/2024     (H) 08/20/2024     (H) 08/20/2024     COAGS:   Lab Results   Component Value Date    PTT 38 08/20/2024    INR 1.64 (H) 08/20/2024     POC: No results found for: \"BGM\", \"HCG\", " "\"HCGS\"  HEPATIC:   Lab Results   Component Value Date    ALBUMIN 2.7 (L) 08/20/2024    PROTTOTAL 6.6 08/20/2024     (H) 08/20/2024     (H) 08/20/2024    ALKPHOS 881 (H) 08/20/2024    BILITOTAL 9.9 (H) 08/20/2024     OTHER:   Lab Results   Component Value Date    LACT 1.6 08/12/2022    A1C 8.5 (H) 08/18/2024    SANDRA 8.4 (L) 08/20/2024    MAG 2.4 (H) 08/20/2024    LIPASE 33 08/18/2024    TSH 1.57 01/11/2024    CRP 0.6 08/12/2022       Anesthesia Plan    ASA Status:  3    NPO Status:  NPO Appropriate    Anesthesia Type: MAC.   Induction: Intravenous, Propofol.   Maintenance: TIVA.        Consents    Anesthesia Plan(s) and associated risks, benefits, and realistic alternatives discussed. Questions answered and patient/representative(s) expressed understanding.     - Discussed: Risks, Benefits and Alternatives for BOTH SEDATION and the PROCEDURE were discussed     - Discussed with:  Patient       - Patient is DNR/DNI Status: No          Postoperative Care    Pain management: IV analgesics, Oral pain medications, Multi-modal analgesia.   PONV prophylaxis: Ondansetron (or other 5HT-3), Dexamethasone or Solumedrol     Comments:    Other Comments: TIVA with Propofol  Zofran for PONV           Samy Quinn MD    I have reviewed the pertinent notes and labs in the chart from the past 30 days and (re)examined the patient.  Any updates or changes from those notes are reflected in this note.    # Hypokalemia: Lowest K = 3 mmol/L in last 2 days, will replace as needed  # Hyponatremia: Lowest Na = 135 mmol/L in last 30 days, will monitor as appropriate      # Hypoalbuminemia: Lowest albumin = 2.7 g/dL in the past 30 days , will monitor as appropriate   # Coagulation Defect: INR = 1.64 (Ref range: 0.85 - 1.15) and/or PTT = 38 Seconds (Ref range: 22 - 38 Seconds), will monitor for bleeding   # DMII: A1C = 8.5 % (Ref range: <5.7 %) within past 6 months  # Overweight: Estimated body mass index is 26.41 kg/m  as calculated " "from the following:    Height as of 8/18/24: 1.727 m (5' 8\").    Weight as of an earlier encounter on 8/20/24: 78.8 kg (173 lb 11.6 oz).      "

## 2024-08-20 NOTE — PLAN OF CARE
"Goal Outcome Evaluation:  Problem: Adult Inpatient Plan of Care  Goal: Optimal Comfort and Wellbeing  Outcome: Progressing    PRIMARY DIAGNOSIS: \"GENERIC\" NURSING  OUTPATIENT/OBSERVATION GOALS TO BE MET BEFORE DISCHARGE:  ADLs back to baseline: Soft BPs    Activity and level of assistance: Ax1 with gait belt and walker    Pain status: Improved-controlled with oral pain medications.    Return to near baseline physical activity: Yes     Discharge Planner Nurse   Safe discharge environment identified:  ERCP scheduled for 8/20/24  Barriers to discharge: No       Entered by: Kaylee Baca RN 08/20/2024 12:15 AM     Please review provider order for any additional goals.   Nurse to notify provider when observation goals have been met and patient is ready for discharge.    Tele readings were A-fib and A-fib with occasional PVCs.                            "

## 2024-08-20 NOTE — PROGRESS NOTES
Pt transferred to [place] for ERCP. Transported by [facility]. Bed being held here at [facility], anticipating pt return this afternoon following procedure. Report called to [place]'s VICKY to Rita.

## 2024-08-20 NOTE — PLAN OF CARE
A/O x 4, VSS on RA. Denies pain currently. Pt returned from [facility] for ERCP around 1600. Biliary stents placed during that procedure. L PIV infusing LR at 100 ml/hr/. Tele: CHELSEA-dacia, bradycardic in the 50's, remains asymptomatic.

## 2024-08-20 NOTE — ANESTHESIA POSTPROCEDURE EVALUATION
Patient: Per Beasley    Procedure: Procedure(s):  ENDOSCOPIC RETROGRADE CHOLANGIOPANCREATOGRAPHY WITH BILIARY SPHINCTEROTOMY AND STENT PLACEMENT  ESOPHAGOGASTRODUODENOSCOPY, WITH ENDOSCOPIC ULTRASOUND GUIDANCE WITH FINE NEEDLE ASPIRATION OF PANCREATIC HEAD       Anesthesia Type:  MAC    Note:  Disposition: Outpatient   Postop Pain Control: Uneventful            Sign Out: Well controlled pain   PONV: No   Neuro/Psych: Uneventful            Sign Out: Acceptable/Baseline neuro status   Airway/Respiratory: Uneventful            Sign Out: Acceptable/Baseline resp. status   CV/Hemodynamics: Uneventful            Sign Out: Acceptable CV status; No obvious hypovolemia; No obvious fluid overload   Other NRE: NONE   DID A NON-ROUTINE EVENT OCCUR? No           Last vitals:  Vitals Value Taken Time   /61 08/20/24 1431   Temp 36.2  C (97.16  F) 08/20/24 1435   Pulse 53 08/20/24 1435   Resp 16 08/20/24 1400   SpO2 94 % 08/20/24 1435   Vitals shown include unfiled device data.    Electronically Signed By: Samy Quinn MD  August 20, 2024  2:36 PM

## 2024-08-20 NOTE — H&P
GENERAL PRE-PROCEDURE:   Procedure:  EUS/ERCP - Bile Duct Obstruction  Date/Time:  8/20/2024 11:28 AM    Verbal consent obtained?: Yes    Written consent obtained?: Yes    Risks and benefits: Risks, benefits and alternatives were discussed    Consent given by:  Patient  Patient states understanding of procedure being performed: Yes    Patient's understanding of procedure matches consent: Yes    Procedure consent matches procedure scheduled: Yes    Expected level of sedation:  Deep  Appropriately NPO:  Yes  ASA Class:  3  Mallampati  :  Grade 2- soft palate, base of uvula, tonsillar pillars, and portion of posterior pharyngeal wall visible  Lungs:  Lungs clear with good breath sounds bilaterally  Heart:  Normal heart sounds and rate and systolic murmur  History & Physical reviewed:  History and physical reviewed and no updates needed  Statement of review:  I have reviewed the lab findings, diagnostic data, medications, and the plan for sedation

## 2024-08-20 NOTE — PROGRESS NOTES
Called wife Tana with number listed on file, wife called back. Left phone number for work wants a call back after procedure at [place]. N [phone].

## 2024-08-20 NOTE — ANESTHESIA CARE TRANSFER NOTE
Patient: Per Beasley    Procedure: Procedure(s):  ENDOSCOPIC RETROGRADE CHOLANGIOPANCREATOGRAPHY WITH BILIARY SPHINCTEROTOMY AND STENT PLACEMENT  ESOPHAGOGASTRODUODENOSCOPY, WITH ENDOSCOPIC ULTRASOUND GUIDANCE WITH FINE NEEDLE ASPIRATION OF PANCREATIC HEAD       Diagnosis: Jaundice [R17]  Dilation of biliary tract [K83.8]  Diagnosis Additional Information: No value filed.    Anesthesia Type:   MAC     Note:    Oropharynx: oropharynx clear of all foreign objects and spontaneously breathing  Level of Consciousness: drowsy  Oxygen Supplementation: room air    Independent Airway: airway patency satisfactory and stable  Dentition: dentition unchanged  Vital Signs Stable: post-procedure vital signs reviewed and stable  Report to RN Given: handoff report given  Patient transferred to: Phase II    Handoff Report: Identifed the Patient, Identified the Reponsible Provider, Reviewed the pertinent medical history, Discussed the surgical course, Reviewed Intra-OP anesthesia mangement and issues during anesthesia, Set expectations for post-procedure period and Allowed opportunity for questions and acknowledgement of understanding      Vitals:  Vitals Value Taken Time   BP     Temp     Pulse     Resp     SpO2         Electronically Signed By: TAN Garrett CRNA  August 20, 2024  12:44 PM

## 2024-08-20 NOTE — PROGRESS NOTES
Patient's wife called for update post procedure, patient is doing well. Patient's wife was not updated per patients request. Updated patient and wife stated she would await patient at [facility]. Patient is to transfer back to [facility] via EMS around 1500.

## 2024-08-21 ENCOUNTER — APPOINTMENT (OUTPATIENT)
Dept: PHYSICAL THERAPY | Facility: CLINIC | Age: 84
DRG: 445 | End: 2024-08-21
Attending: STUDENT IN AN ORGANIZED HEALTH CARE EDUCATION/TRAINING PROGRAM
Payer: MEDICARE

## 2024-08-21 VITALS
RESPIRATION RATE: 18 BRPM | DIASTOLIC BLOOD PRESSURE: 58 MMHG | SYSTOLIC BLOOD PRESSURE: 119 MMHG | HEART RATE: 53 BPM | TEMPERATURE: 97.3 F | OXYGEN SATURATION: 92 %

## 2024-08-21 LAB
ALBUMIN SERPL BCG-MCNC: 2.7 G/DL (ref 3.5–5.2)
ALP SERPL-CCNC: 818 U/L (ref 40–150)
ALT SERPL W P-5'-P-CCNC: 101 U/L (ref 0–70)
ANION GAP SERPL CALCULATED.3IONS-SCNC: 16 MMOL/L (ref 7–15)
AST SERPL W P-5'-P-CCNC: 157 U/L (ref 0–45)
BILIRUB SERPL-MCNC: 5.7 MG/DL
BUN SERPL-MCNC: 41.2 MG/DL (ref 8–23)
CALCIUM SERPL-MCNC: 8.2 MG/DL (ref 8.8–10.4)
CHLORIDE SERPL-SCNC: 98 MMOL/L (ref 98–107)
CREAT SERPL-MCNC: 2.97 MG/DL (ref 0.67–1.17)
EGFRCR SERPLBLD CKD-EPI 2021: 20 ML/MIN/1.73M2
ERYTHROCYTE [DISTWIDTH] IN BLOOD BY AUTOMATED COUNT: 15.8 % (ref 10–15)
GLUCOSE BLDC GLUCOMTR-MCNC: 144 MG/DL (ref 70–99)
GLUCOSE BLDC GLUCOMTR-MCNC: 147 MG/DL (ref 70–99)
GLUCOSE BLDC GLUCOMTR-MCNC: 186 MG/DL (ref 70–99)
GLUCOSE SERPL-MCNC: 152 MG/DL (ref 70–99)
HCO3 SERPL-SCNC: 22 MMOL/L (ref 22–29)
HCT VFR BLD AUTO: 33.5 % (ref 40–53)
HGB BLD-MCNC: 11.6 G/DL (ref 13.3–17.7)
MCH RBC QN AUTO: 27.9 PG (ref 26.5–33)
MCHC RBC AUTO-ENTMCNC: 34.6 G/DL (ref 31.5–36.5)
MCV RBC AUTO: 81 FL (ref 78–100)
PLATELET # BLD AUTO: 212 10E3/UL (ref 150–450)
POTASSIUM SERPL-SCNC: 3.3 MMOL/L (ref 3.4–5.3)
PROT SERPL-MCNC: 6.5 G/DL (ref 6.4–8.3)
RBC # BLD AUTO: 4.16 10E6/UL (ref 4.4–5.9)
SODIUM SERPL-SCNC: 136 MMOL/L (ref 135–145)
WBC # BLD AUTO: 8.1 10E3/UL (ref 4–11)

## 2024-08-21 PROCEDURE — 82962 GLUCOSE BLOOD TEST: CPT

## 2024-08-21 PROCEDURE — 258N000003 HC RX IP 258 OP 636: Performed by: INTERNAL MEDICINE

## 2024-08-21 PROCEDURE — 99239 HOSP IP/OBS DSCHRG MGMT >30: CPT | Performed by: STUDENT IN AN ORGANIZED HEALTH CARE EDUCATION/TRAINING PROGRAM

## 2024-08-21 PROCEDURE — 85027 COMPLETE CBC AUTOMATED: CPT | Performed by: STUDENT IN AN ORGANIZED HEALTH CARE EDUCATION/TRAINING PROGRAM

## 2024-08-21 PROCEDURE — 250N000013 HC RX MED GY IP 250 OP 250 PS 637: Performed by: INTERNAL MEDICINE

## 2024-08-21 PROCEDURE — 250N000012 HC RX MED GY IP 250 OP 636 PS 637: Performed by: INTERNAL MEDICINE

## 2024-08-21 PROCEDURE — 82040 ASSAY OF SERUM ALBUMIN: CPT | Performed by: STUDENT IN AN ORGANIZED HEALTH CARE EDUCATION/TRAINING PROGRAM

## 2024-08-21 PROCEDURE — 97161 PT EVAL LOW COMPLEX 20 MIN: CPT | Mod: GP

## 2024-08-21 PROCEDURE — 36415 COLL VENOUS BLD VENIPUNCTURE: CPT | Performed by: STUDENT IN AN ORGANIZED HEALTH CARE EDUCATION/TRAINING PROGRAM

## 2024-08-21 RX ORDER — ASPIRIN 81 MG/1
81 TABLET, CHEWABLE ORAL DAILY
Qty: 30 TABLET | Refills: 0 | Status: SHIPPED | OUTPATIENT
Start: 2024-08-23

## 2024-08-21 RX ORDER — ATORVASTATIN CALCIUM 40 MG/1
40 TABLET, FILM COATED ORAL DAILY
Status: DISCONTINUED | OUTPATIENT
Start: 2024-08-21 | End: 2024-08-21 | Stop reason: HOSPADM

## 2024-08-21 RX ORDER — ASPIRIN 81 MG/1
81 TABLET, CHEWABLE ORAL DAILY
Status: DISCONTINUED | OUTPATIENT
Start: 2024-08-21 | End: 2024-08-21 | Stop reason: HOSPADM

## 2024-08-21 RX ORDER — METOPROLOL TARTRATE 25 MG/1
25 TABLET, FILM COATED ORAL 2 TIMES DAILY
Qty: 60 TABLET | Refills: 0 | Status: SHIPPED | OUTPATIENT
Start: 2024-08-21

## 2024-08-21 RX ADMIN — AMLODIPINE BESYLATE 10 MG: 10 TABLET ORAL at 08:13

## 2024-08-21 RX ADMIN — SODIUM CHLORIDE, POTASSIUM CHLORIDE, SODIUM LACTATE AND CALCIUM CHLORIDE: 600; 310; 30; 20 INJECTION, SOLUTION INTRAVENOUS at 03:41

## 2024-08-21 RX ADMIN — INSULIN ASPART 1 UNITS: 100 INJECTION, SOLUTION INTRAVENOUS; SUBCUTANEOUS at 12:57

## 2024-08-21 RX ADMIN — INSULIN ASPART 1 UNITS: 100 INJECTION, SOLUTION INTRAVENOUS; SUBCUTANEOUS at 08:12

## 2024-08-21 ASSESSMENT — ACTIVITIES OF DAILY LIVING (ADL)
ADLS_ACUITY_SCORE: 30
ADLS_ACUITY_SCORE: 23
ADLS_ACUITY_SCORE: 30
ADLS_ACUITY_SCORE: 40
ADLS_ACUITY_SCORE: 23
ADLS_ACUITY_SCORE: 24
ADLS_ACUITY_SCORE: 23
ADLS_ACUITY_SCORE: 40
ADLS_ACUITY_SCORE: 24
ADLS_ACUITY_SCORE: 30
ADLS_ACUITY_SCORE: 40
ADLS_ACUITY_SCORE: 40
ADLS_ACUITY_SCORE: 30
ADLS_ACUITY_SCORE: 24
ADLS_ACUITY_SCORE: 23

## 2024-08-21 NOTE — MEDICATION SCRIBE - ADMISSION MEDICATION HISTORY
Admission medication history completed at [facility]. Please see Pharmacist Admission Medication History note from 8/18/2024 - copied below.  Thank you, Priyanka Chen [facility] 8/20/2024 8:17 PM      Radha Seay RP   Pharmacist  Pharmacy     Pharmacy-Admission Medication History      Signed     Date of Service: 8/18/2024  9:18 AM  Creation Time: 8/18/2024  9:18 AM       Pharmacist Admission Medication History     Admission medication history is complete. The information provided in this note is only as accurate as the sources available at the time of the update.     Medication reconciliation/reorder completed by provider prior to medication history? No     Information Source(s): Patient and CareEverywhere/SureScripts via in-person     Pertinent Information:  none     Changes made to PTA medication list:  Added: trazodone, metoprolol  Deleted: arformoterol, Trelegy Ellipta, DuoNeb  Changed: None     Medication Affordability:        Allergies reviewed with patient and updates made in EHR: no     Medication History Completed By: Radha Seay, PharmPRISCILA, BCPS, DPLA 8/18/2024 9:19 AM              Prior to Admission medications    Medication Sig Last Dose Taking? Auth Provider Long Term End Date   amLODIPine (NORVASC) 10 MG tablet Take 1 tablet (10 mg) by mouth daily 8/18/2024 at AM Yes Mc Chowdhury, DO Yes     apixaban ANTICOAGULANT (ELIQUIS ANTICOAGULANT) 2.5 MG tablet Take 1 tablet (2.5 mg) by mouth 2 times daily 8/18/2024 at AM Yes Mc Chowdhury DO       aspirin (ASA) 81 MG chewable tablet Take 81 mg by mouth daily 8/18/2024 at AM Yes Unknown, Entered By History       atorvastatin (LIPITOR) 40 MG tablet Take 40 mg by mouth daily 8/18/2024 at AM Yes Unknown, Entered By History Yes     glipiZIDE (GLUCOTROL XL) 5 MG 24 hr tablet Take 5 mg by mouth every morning 8/18/2024 at AM Yes Reported, Patient Yes     glucose (BD GLUCOSE) 4 g chewable tablet Take 4 tablets by mouth every 15 minutes  as needed for low blood sugar More than a month at none recently Yes Yaz Peña MD       metoprolol tartrate (LOPRESSOR) 25 MG tablet Take 25 mg by mouth 2 times daily 8/18/2024 at AM Yes Unknown, Entered By History No     Torsemide 40 MG TABS Take 80 mg by mouth daily 8/18/2024 at AM Yes Mc Chowdhury, DO Yes     traZODone (DESYREL) 50 MG tablet Take 50 mg by mouth at bedtime 8/17/2024 at HS Yes Unknown, Entered By History Yes     Alcohol Swabs PADS Use to swab the area of the injection or rosenda as directed Per insurance coverage     Yaz Peña MD       blood glucose (NO BRAND SPECIFIED) lancets standard To use to test glucose level in the blood Use to test blood sugar  2  times daily as directed. To accompany glucose monitor brands per insurance coverage.     Yaz Peña MD       blood glucose (NO BRAND SPECIFIED) test strip To use to test glucose level in the blood Use to test blood sugar  2 times daily as directed. To accompany glucose monitor brands per insurance coverage.     Yaz Peña MD       blood glucose calibration (NO BRAND SPECIFIED) solution Used to calibrate the blood glucose monitor as needed and as directed.  To accompany  blood glucose brands per insurance coverage     Yaz Peña MD       blood glucose monitoring (NO BRAND SPECIFIED) meter device kit Use as directed , Per insurance coverage     Yaz Peña MD       insulin pen needle (32G X 4 MM) 32G X 4 MM miscellaneous Use as directed by provider Per insurance coverage     Yaz Peña MD       Sharps Container MISC Use as directed to dispose of needles, lancets and other sharps Per Insurance coverage     Yaz Peña MD                       ED to Hosp-Admission (Discharged) on 8/18/2024            Detailed Report          Note shared with patient

## 2024-08-21 NOTE — PROGRESS NOTES
08/21/24 1315   Appointment Info   Signing Clinician's Name / Credentials (PT) Keith Valdivia, PT   Quick Adds   Quick Adds Certification   Living Environment   People in Home spouse   Current Living Arrangements condominium   Home Accessibility no concerns   Number of Stairs, Main Entrance none   Transportation Anticipated family or friend will provide   Self-Care   Usual Activity Tolerance fair   Current Activity Tolerance fair   Equipment Currently Used at Home other (see comments);walker, rolling  (scooter)   Fall history within last six months yes   Number of times patient has fallen within last six months 2   Activity/Exercise/Self-Care Comment Indep with all basic adl's, drives, uses scooter for any distance, typically only ambulates 10' or less.    Wife does work outside the home   General Information   Onset of Illness/Injury or Date of Surgery 08/20/24   Pertinent History of Current Problem (include personal factors and/or comorbidities that impact the POC) 84 year old male admitted on 8/18/2024.  He has a past medical history significant for hypertension, diabetes, HFpEF, paroxysmal atrial fibrillation, severe aortic stenosis status post TAVR, recurrent pneumothorax who presented to the hospital on 8/18 with generalized weakness, itchiness, decreased appetite.   Existing Precautions/Restrictions no known precautions/restrictions   Cognition   Affect/Mental Status (Cognition) WFL   Range of Motion (ROM)   Range of Motion ROM is WFL   Strength (Manual Muscle Testing)   Strength (Manual Muscle Testing) No deficits observed during functional mobility   Strength Comments general weakness, at baseline   Bed Mobility   Bed Mobility supine-sit;sit-supine   Supine-Sit [place] (Bed Mobility) independent   Sit-Supine [place] (Bed Mobility) independent   Transfers   Transfers sit-stand transfer   Sit-Stand Transfer   Sit-Stand [place] (Transfers) modified independence   Assistive Device (Sit-Stand  Transfers) walker, front-wheeled   Gait/Stairs (Locomotion)   [place] Level (Gait) modified independence   Assistive Device (Gait) walker, front-wheeled   Distance in Feet (Gait) 6,14,12   Pattern (Gait) swing-through   Deviations/Abnormal Patterns (Gait) gait speed decreased   Balance   Balance no deficits were identified   Balance Comments stable with all mobility   Clinical Impression   Criteria for Skilled Therapeutic Intervention Evaluation only   PT Total Evaluation Time   PT Eval, Low Complexity Minutes (47399) 15   Therapy Certification   Start of care date 08/21/24   Certification date from 08/21/24   Certification date to 09/21/24   PT Discharge Planning   PT Plan dc PT   PT Discharge Recommendation (DC Rec) home with assist   PT Rationale for DC Rec Paitent independent with transfers and short distance ambulation. Has good home setup and support   PT Brief overview of current status Patient Indep with gait/transfers   PT Equipment Needed at Discharge walker, rolling   Total Session Time   Total Session Time (sum of timed and untimed services) 15

## 2024-08-21 NOTE — PROGRESS NOTES
Physical Therapy Discharge Summary    Reason for therapy discharge:    No further expectations of functional progress.    Progress towards therapy goal(s). See goals on Care Plan in [facility] electronic health record for goal details.  Goals met    Therapy recommendation(s):    No further therapy is recommended.

## 2024-08-21 NOTE — PLAN OF CARE
Pt AO x4. Denies pain. VSS on RA. Tolerating Clear liquids. Afib on tele rate 50s. Alarms utilized. Pt able to make needs known. Call light within reach and purposeful rounding performed. Saira Zuñiga RN      Problem: Adult Inpatient Plan of Care  Goal: Optimal Comfort and Wellbeing  Outcome: Progressing     Problem: Comorbidity Management  Goal: Blood Pressure in Desired Range  Outcome: Progressing    Problem: Adult Inpatient Plan of Care  Goal: Absence of Hospital-Acquired Illness or Injury  Outcome: Progressing

## 2024-08-21 NOTE — PROGRESS NOTES
[facility] Progress Note       SUBJECTIVE:  Patient states he no longer has any nausea or abdominal discomfort. He is tolerating regular diet.    Reviewed EUS/ERCP and preliminary pathology with patient.       OBJECTIVE:  /58 (BP Location: Right arm, Patient Position: Semi-Gates's, Cuff Size: Adult Regular)   Pulse 53   Temp 97.3  F (36.3  C) (Oral)   Resp 18   SpO2 92%   Temp (24hrs), Av.5  F (36.9  C), Min:98.4  F (36.9  C), Max:98.7  F (37.1  C)    No data found.      Intake/Output Summary (Last 24 hours) at 2024 1141  Last data filed at 2024 0232  Gross per 24 hour   Intake 480 ml   Output 100 ml   Net 380 ml        PHYSICAL EXAM  GEN: NAD, male appears stated age lying in bed  HRT: no LE edema  RESP: unlabored  ABD: +BS, soft, nontender  SKIN: No rash      Additional Data:  I have reviewed the patient's new clinical lab results:     Recent Labs   Lab Test 24  0752 24  0849 24  0406 24  0852 24  0454 24  1131   WBC 8.1  --  8.6 8.3   < > 7.8   HGB 11.6*  --  11.2* 12.1*   < > 12.0*   MCV 81  --  79 81   < > 88     --  213 224   < > 166   INR  --  1.64*  --  1.50*  --  1.11    < > = values in this interval not displayed.     Recent Labs   Lab Test 24  0752 24  1635 24  0746 24  0406   POTASSIUM 3.3* 4.1 3.3* 3.5  3.5   CHLORIDE 98  --  97* 96*   CO2 22  --  24 26   BUN 41.2*  --  40.1* 37.7*   ANIONGAP 16*  --  14 16*     Recent Labs   Lab Test 24  0752 24  0746 24  0406 24  0958 24  0852 24  1700 24  0631 24  0529 24  0921   ALBUMIN 2.7* 2.7* 3.0*  --  3.3*  --   --    < > 4.1   BILITOTAL 5.7* 9.9* 8.7*  --  8.6*  --   --    < > 0.4   * 118* 100*  --  98*  --   --    < > 12   * 222* 175*  --  159*  --   --    < > 20   PROTEIN  --   --   --  70*  --  10* 30*  --   --    LIPASE  --   --   --   --  33  --   --   --  24    < > = values in this  interval not displayed.         Imaging results:  US-guided thoracentesis 8/19/24:  IMPRESSION:  Status post right ultrasound-guided thoracentesis with 1.6 L removed.     Renal US 8/18/24:  IMPRESSION:  1.  Slightly echogenic kidneys suggestive of medical renal disease. No collecting system dilatation.  2.  Nonobstructing left renal calculus.  3.  Distended gallbladder and biliary ductal dilatation, better characterized on recent MRI.    MRCP w/o contrast 8/18/24:  FINDINGS:   MRCP: There is intrahepatic and extrahepatic bile duct dilatation. The common bile duct is 15 mm in diameter. The gallbladder is distended. No gallstones or choledocholithiasis. Normal course and caliber of the pancreatic duct.  LIVER: Normal.  PANCREAS: Normal.  ADDITIONAL FINDINGS: Moderate right pleural effusion and trace left pleural effusion. Slightly elevated left hemidiaphragm. Colonic diverticulosis is noted. The spleen is mildly elongated. No lymphadenopathy. Atherosclerotic disease is present. The cardiac valve prosthesis is not assessed on this study.                                                                   IMPRESSION:  1.  The gallbladder is distended. There is intrahepatic and extrahepatic bile duct dilatation. No choledocholithiasis. Consider ERCP.  2.  Moderate right pleural effusion and trace left pleural effusion.  3.  Colonic diverticulosis.    Procedures:  ERCP 8/20/24 ([facility]):  Findings:       The  film was normal. The esophagus was successfully intubated        under direct vision. The scope was advanced to a normal major papilla in        the descending duodenum without detailed examination of the pharynx,        larynx and associated structures, and upper GI tract. The upper GI tract        was grossly normal. The bile duct was deeply cannulated. Contrast was        injected. The lower third of the main bile duct contained a single        localized stenosis 18 mm in length. A wire was passed into the  biliary        tree. An 8 mm biliary sphincterotomy was made with a traction (standard)        sphincterotome using ERBE electrocautery. There was no        post-sphincterotomy bleeding. One 10 mm by 4 cm covered metal stent was        placed 4 cm into the common bile duct. Bile flowed through the stent.        The stent was in good position.     Impression:  - A single localized biliary stricture was found in                          the lower third of the main bile duct. The stricture                          was malignant appearing.                          - A biliary sphincterotomy was performed.                          - One covered metal stent was placed into the common                          bile duct.   Recommendation:        - Discharge the patient to home.                          - No anticoagulation for 72 hours.                          - Clear liquid diet for 24 hours.                          - If pain free after 24 hours advance diet slowly as                          tolerated.                          - Transfer patient to another hospital.     EUS 8/20/24 ([facility]):  Findings:       ENDOSONOGRAPHIC FINDING: :        An oval mass was identified in the pancreatic head. The mass was        hypoechoic. The mass measured 22 mm by 15 mm in maximal cross-sectional        diameter. The endosonographic borders were well-defined. The remainder        of the pancreas was examined. The endosonographic appearance of        parenchyma and the upstream pancreatic duct indicated duct dilation.        Fine needle aspiration for cytology was performed. Color Doppler imaging        was utilized prior to needle puncture to confirm a lack of significant        vascular structures within the needle path. Four passes were made with        the 22 gauge needle using a transduodenal approach. No stylet was used.        A cytologist was present and performed a preliminary cytologic        examination. Preliminary  cytology is suspicious for adenocarcinoma        (final results are pending).        The celiac axis including lymph nodes was unremarkable.        The left adrenal was examined and normal.        The examined portion of the left lobe of the liver demonstrated dilated        intra-hepatic ducts.        The pancreatic parenchyma demonstrated mild atrophy with a mass in the        head of the pancreas. The pancreatic duct measured 4.8 mm in the head,        1.8 mm in the body, and 1.6 mm in the tail.        The bile duct ranged in size from 14 mm to 18 mm with obstruction at the        level of the pancreatic head mass.                                                                                    Impression:   - A mass was identified in the pancreatic head. Fine                          needle aspiration performed.   Recommendation:        - Perform an ERCP today to drain the bile duct.                          - Await pathology results. (pending)      IMPRESSION:  Pancreatic Mass  Biliary obstruction  This is an 85 y/o male with PMH CHF, aortic stenosis s/p TAVR, CAD, COPD, HTN, HLD, diabetes, CKD 3, A fib, recurrent pneumothorax admitted 8/18 for bile duct dilation on imaging with elevated liver enzymes and right pleural effusion s/p 1.6 L thoracentesis 8/18. MRCP 8/18 shows a distended gallbladder with intrahepatic and extrahepatic bile duct dilation but no choledocholithiasis or mass noted. Liver tests elevated in mixed pattern but mostly alk phos/Tbili elevation consistent with biliary obstruction. EUS 8/20 shows a 22 mm pancreatic head mass concerning for malignancy, preliminary path c/w adenocarcinoma, final path pending. ERCP performed with malignancy appearing biliary stricture found, stent placed. I reviewed results with him and informed him Dr. Magdaleno will call once final pathology is back. Liver tests improved today. He is tolerating diet without nausea/abdominal discomfort her reported to me on 8/19.         PLAN:  - Regular diet as tolerated  - Await EUS path- Dr. Magdaleno will call pt with results and recommend appropriate follow up  - Okay to discharge today per GI  - Restart Eliquis and aspirin on 8/23, 72 hours after ERCP      (Dr. Moran)  Laine Castro PA-C  [facility]  8/21/2024 9:54 AM  [phone] (office)    25 minutes of total time was spent providing patient care, including patient evaluation, reviewing documentation/test results, , and documentation.  ________________________________________________________________________

## 2024-08-21 NOTE — PROGRESS NOTES
Patient is A/Ox4, VSS on RA. 1 assist with walker and gait belt in use when ambulating. Voiding adequately using the urinal at the bedside. Full sensation per Pt. IV saline locked, patent. No new skin issues noted. Patient denies pain during shift. Discharge pending physical therapy approval.

## 2024-08-21 NOTE — DISCHARGE INSTRUCTIONS
Follow up your pathology result on biopsy taken on 8/20. Follow up with GI doctor for this.  Don't restart aspirin or Eliquis until 8/23/2024.  Your atorvastatin is on hold due to liver abnormality. Resume later when liver function is back to normal.  You have narrowing of your bilateral common femoral arteries which will require follow-up as an outpatient.

## 2024-08-21 NOTE — DISCHARGE SUMMARY
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  -MRCP showed distention of the gallbladder with intrahepatic and extrahepatic bile duct dilation.  No cholelithiasis. No cholecystitis.  -ERCp on 8/20 at [facility] showed single localized biliary stricture was found in the lower thrid of a the mail bile duct. The stricture was malignant appearing. A biliary sphincterotomy was performed. One covered metal stent was placed in the common bile duct.  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  At that time pleural effusion was consistent with exudative nature, however, it was suspected that it was most likely transudative in nature but labs were consistent with exudative etiology given aggressive use of diuretic. Did not find cytology report.  -Thoracentesis on 8/19: 1.6L studies showed inflammation     Heart failure with preserved action fraction  -At home on torsemide 80 mg daily     Acute kidney injury on CKD III b  -Baseline creatinine around 1.5-2.  Creatinine presentation 2.8.  -Etiology of possible mild JONNATHAN remains unclear.  -Renal ultrasound - non-diagnostic.   - worse creat this morning 3.03 looks to be prerenal so will give some gentle IVF due to variable PO intake and NPO status.> Cr 2.97 on  "8/21  -follow up with PCP     Hypertension  -At home on metoprolol tartrate 25 mg twice daily, amlodipine 10 mg daily.    -Continue home metoprolol tartrate with holding parameters.  -Continue home amlodipine with holding parameters.     Coronary artery disease s/p PCI w/ NUBIA to mid LCx 2021  Paroxysmal AF not on anticoagulation  Mild troponinemia  -High-sensitivity is mildly elevated and peaked around 39.  -No chest pain or significant shortness of breath.  -EKG showed A-fib without clear ST segment changes.  -Presentation not suggestive of an acute coronary syndrome.  -There was a recent Echo which is stable  -Atorvastatin is on hold because of the liver function abnormality, resume when able     COPD  Hx of spontaneous PTX c/b hydropneumothorax, admission 4/16/2024  Former smoker  -Former smoker with 20-pack-year history, PFTs showing moderate obstruction.   -No wheezing physical examination per     Diabetes  -At home on glipizide 5 mg daily     Bilateral common femoral artery stenosis  Follow up as an outpatient  Continue aspirin and atorvastatin when able    Clinically Significant Risk Factors     # DMII: A1C = 8.5 % (Ref range: <5.7 %) within past 6 months  # Overweight: Estimated body mass index is 25.82 kg/m  as calculated from the following:    Height as of an earlier encounter on 8/20/24: 1.727 m (5' 8\").    Weight as of an earlier encounter on 8/20/24: 77 kg (169 lb 12.8 oz).       Follow-ups Needed After Discharge   Follow-up Appointments     Follow-up and recommended labs and tests       Follow up with primary care provider, RADHA SR, within 7 days for   hospital follow- up.  The following labs/tests are recommended: CMP, CBC            Unresulted Labs Ordered in the Past 30 Days of this Admission       Date and Time Order Name Status Description    8/20/2024 12:11 PM Cytology, non-gynecologic In process     8/18/2024  3:12 PM Pleural fluid Aerobic Bacterial Culture Routine With Gram Stain Preliminary  "        These results will be followed up by our group    Discharge Disposition   Discharged to home  Condition at discharge: Stable    Hospital Course   Per Beasley is a 84 year old male admitted on 8/18/2024.  He has a past medical history significant for hypertension, diabetes, HFpEF, paroxysmal atrial fibrillation, severe aortic stenosis status post TAVR, recurrent pneumothorax who presented to the hospital on 8/18 with generalized weakness, itchiness, decreased appetite.  He was found to have biliary tract obstruction due to a pancreatic head mass which was biopsied on 8/20/2024.  His aspirin and Eliquis was on hold for this ERCP, to be restarted on 8/23.  His bilirubin came down after the stent was placed on 8/20 and he is, bile duct.  His JONNATAHN also improved with IV fluid and holding torsemide.  His atorvastatin is on hold for liver function abnormality.  He also has stenosis in bilateral common femoral arteries which will require follow-up as an outpatient.         Consultations This Hospital Stay   PHYSICAL THERAPY ADULT IP CONSULT    Code Status   No CPR- Do NOT Intubate    Time Spent on this Encounter   I, PAVAN CONTE MD, personally saw the patient today and spent greater than 30 minutes discharging this patient.       PAVAN CONTE MD  [address] [address]  Phone: [phone]  Fax: [phone]  ______________________________________________________________________    Physical Exam   Vital Signs: Temp: 97.3  F (36.3  C) Temp src: Oral BP: 119/58 Pulse: 53   Resp: 18 SpO2: 92 % O2 Device: None (Room air)    Weight: 0 lbs 0 oz  General Appearance: Alert and wake, not in distress  Neurology: oriented x 3  Psych: cooperative and calm, normal affect  GI: soft, non-tender, normal bowel sound  Extremities: LE edema: none         Primary Care Physician   RADHA SR    Discharge Orders      Reason for your hospital stay    Yellow skin  (jaundice) caused by a mass in your pancreatis     Follow-up and recommended labs and tests     Follow up with primary care provider, RADHA SR, within 7 days for hospital follow- up.  The following labs/tests are recommended: CMP, CBC     Activity    Your activity upon discharge: activity as tolerated     Diet    Follow this diet upon discharge: Current Diet:Orders Placed This Encounter     Low fat diet     PRIMARY CARE FOLLOW-UP SCHEDULING    Please see details below            Significant Results and Procedures   Most Recent 3 CBC's:  Recent Labs   Lab Test 08/21/24  0752 08/19/24  0406 08/18/24  0852   WBC 8.1 8.6 8.3   HGB 11.6* 11.2* 12.1*   MCV 81 79 81    213 224     Most Recent 3 BMP's:  Recent Labs   Lab Test 08/21/24  0752 08/21/24  0648 08/20/24  2109 08/20/24  1635 08/20/24  1114 08/20/24  0746 08/19/24  0651 08/19/24  0406     --   --   --   --  135  --  138   POTASSIUM 3.3*  --   --  4.1  --  3.3*  --  3.5  3.5   CHLORIDE 98  --   --   --   --  97*  --  96*   CO2 22  --   --   --   --  24  --  26   BUN 41.2*  --   --   --   --  40.1*  --  37.7*   CR 2.97*  --   --   --   --  3.13*  --  3.03*   ANIONGAP 16*  --   --   --   --  14  --  16*   SANDRA 8.2*  --   --   --   --  8.4*  --  8.6*   * 144* 187* 147*   < > 152*   < > 140*    < > = values in this interval not displayed.     Most Recent 2 LFT's:  Recent Labs   Lab Test 08/21/24  0752 08/20/24  0746   * 222*   * 118*   ALKPHOS 818* 881*   BILITOTAL 5.7* 9.9*     Most Recent 3 INR's:  Recent Labs   Lab Test 08/20/24  0849 08/18/24  0852 07/01/24  1131   INR 1.64* 1.50* 1.11   ,   Results for orders placed or performed during the hospital encounter of 08/20/24   XR Surgery CEDRIC  Fluoro G/T 5 Min    Narrative    This exam was marked as non-reportable because it will not be read by a   radiologist or a [facility] non-radiologist provider.             Discharge Medications   Current Discharge Medication List        CONTINUE  these medications which have CHANGED    Details   apixaban ANTICOAGULANT (ELIQUIS ANTICOAGULANT) 2.5 MG tablet Take 1 tablet (2.5 mg) by mouth 2 times daily. Do not start before August 23, 2024.  Qty: 60 tablet, Refills: 0    Associated Diagnoses: Persistent atrial fibrillation (H)      aspirin (ASA) 81 MG chewable tablet Take 1 tablet (81 mg) by mouth daily. Do not start before August 23, 2024.  Qty: 30 tablet, Refills: 0    Associated Diagnoses: Status post transcatheter aortic valve replacement (TAVR) using bioprosthesis      metoprolol tartrate (LOPRESSOR) 25 MG tablet Take 1 tablet (25 mg) by mouth 2 times daily.  Qty: 60 tablet, Refills: 0    Comments: Hold for sBP < 120 or HR < 65  Associated Diagnoses: Persistent atrial fibrillation (H)           CONTINUE these medications which have NOT CHANGED    Details   Alcohol Swabs PADS Use to swab the area of the injection or rosenda as directed Per insurance coverage  Qty: 100 each, Refills: 0    Associated Diagnoses: Type 2 diabetes mellitus without complication, without long-term current use of insulin (H)      amLODIPine (NORVASC) 10 MG tablet Take 1 tablet (10 mg) by mouth daily  Qty: 30 tablet, Refills: 0    Associated Diagnoses: Essential hypertension      atorvastatin (LIPITOR) 40 MG tablet Take 40 mg by mouth daily      blood glucose (NO BRAND SPECIFIED) lancets standard To use to test glucose level in the blood Use to test blood sugar  2  times daily as directed. To accompany glucose monitor brands per insurance coverage.  Qty: 100 each, Refills: 0    Associated Diagnoses: Type 2 diabetes mellitus without complication, without long-term current use of insulin (H)      blood glucose (NO BRAND SPECIFIED) test strip To use to test glucose level in the blood Use to test blood sugar  2 times daily as directed. To accompany glucose monitor brands per insurance coverage.  Qty: 100 strip, Refills: 0    Associated Diagnoses: Type 2 diabetes mellitus without  complication, without long-term current use of insulin (H)      blood glucose calibration (NO BRAND SPECIFIED) solution Used to calibrate the blood glucose monitor as needed and as directed.  To accompany  blood glucose brands per insurance coverage  Qty: 1 each, Refills: 0    Associated Diagnoses: Type 2 diabetes mellitus without complication, without long-term current use of insulin (H)      blood glucose monitoring (NO BRAND SPECIFIED) meter device kit Use as directed , Per insurance coverage  Qty: 1 kit, Refills: 0    Associated Diagnoses: Type 2 diabetes mellitus without complication, without long-term current use of insulin (H)      glipiZIDE (GLUCOTROL XL) 5 MG 24 hr tablet Take 5 mg by mouth every morning      glucose (BD GLUCOSE) 4 g chewable tablet Take 4 tablets by mouth every 15 minutes as needed for low blood sugar  Qty: 50 tablet, Refills: 0    Associated Diagnoses: Type 2 diabetes mellitus without complication, without long-term current use of insulin (H)      insulin pen needle (32G X 4 MM) 32G X 4 MM miscellaneous Use as directed by provider Per insurance coverage  Qty: 100 each, Refills: 0    Associated Diagnoses: Type 2 diabetes mellitus without complication, without long-term current use of insulin (H)      Sharps Container MISC Use as directed to dispose of needles, lancets and other sharps Per Insurance coverage  Qty: 1 each, Refills: 0    Associated Diagnoses: Type 2 diabetes mellitus without complication, without long-term current use of insulin (H)      Torsemide 40 MG TABS Take 80 mg by mouth daily  Qty: 60 tablet, Refills: 0    Associated Diagnoses: Acute on chronic congestive heart failure, unspecified heart failure type (H)      traZODone (DESYREL) 50 MG tablet Take 50 mg by mouth at bedtime           Allergies   Allergies   Allergen Reactions    Blood-Group Specific Substance Other (See Comments)     Patient has a non specific antibody.  Blood product orders may be delayed.  Please draw  on red top and 2 purple top tubes for all Type and Screen/ type and Cross match orders.    Hydrochlorothiazide Rash

## 2024-08-21 NOTE — PLAN OF CARE
A/O. VSS on RA. Denies pain. LR infusing at 100 mL/hr. Call light within reach. Able to make needs known. NAEON. Will continue to monitor.    Goal Outcome Evaluation:    Problem: Adult Inpatient Plan of Care  Goal: Absence of Hospital-Acquired Illness or Injury  Intervention: Prevent Skin Injury  Recent Flowsheet Documentation  Taken 8/21/2024 0100 by Jocelyne Bae RN  Body Position: position changed independently     Problem: Adult Inpatient Plan of Care  Goal: Absence of Hospital-Acquired Illness or Injury  Intervention: Prevent Infection  Recent Flowsheet Documentation  Taken 8/21/2024 0100 by Jocelyne Bae RN  Infection Prevention:   cohorting utilized   equipment surfaces disinfected   hand hygiene promoted   rest/sleep promoted   single patient room provided     Problem: Pain Acute  Goal: Optimal Pain Control and Function  Outcome: Progressing  Intervention: Prevent or Manage Pain  Recent Flowsheet Documentation  Taken 8/21/2024 0100 by Jocelyne Bae RN  Medication Review/Management: medications reviewed     Problem: Fall Injury Risk  Goal: Absence of Fall and Fall-Related Injury  Outcome: Progressing  Intervention: Identify and Manage Contributors  Recent Flowsheet Documentation  Taken 8/21/2024 0100 by Jocelyne Bae RN  Medication Review/Management: medications reviewed  Intervention: Promote Injury-Free Environment  Recent Flowsheet Documentation  Taken 8/21/2024 0100 by Jocelyne Bae RN  Safety Promotion/Fall Prevention:   safety round/check completed   supervised activity   room near nurse's station   room door open   nonskid shoes/slippers when out of bed   increase visualization of patient   lighting adjusted   increased rounding and observation

## 2024-08-22 ENCOUNTER — PATIENT OUTREACH (OUTPATIENT)
Dept: CARE COORDINATION | Facility: CLINIC | Age: 84
End: 2024-08-22
Payer: MEDICARE

## 2024-08-22 ENCOUNTER — NURSE TRIAGE (OUTPATIENT)
Dept: NURSING | Facility: CLINIC | Age: 84
End: 2024-08-22
Payer: MEDICARE

## 2024-08-22 NOTE — TELEPHONE ENCOUNTER
Wife calling with a question about patient's medication the metoprolol. No consent on file to communicate. Patient came to phone to give verbal consent to speak with wife.     Reviewed dosing instructions and the parameters on when to hold the medication.     metoprolol tartrate (LOPRESSOR) 25 MG tablet  25 mg, 2 TIMES DAILY           Summary: Take 1 tablet (25 mg) by mouth 2 times daily., Disp-60 tablet, R-0, E-Prescribe Hold for sBP < 120 or HR < 65          Tana verbalized understanding and will call back with any further questions or concerns.   Gloria Ellis RN   08/22/24 5:42 AM  [facility] Nurse Advisor  Reason for Disposition   Caller has medicine question only, adult not sick, AND triager answers question    Protocols used: Medication Question Call-A-

## 2024-08-22 NOTE — PROGRESS NOTES
[facility]    Background: Transitional Care Management program identified per system criteria and reviewed by [facility] team for possible outreach.    Assessment: Upon chart review, CCR Team member will not proceed with patient outreach related to this episode of Transitional Care Management program due to reason below:    Patient has active communication with a nurse, provider or care team for reason of post-hospital follow up plan.  Outreach call by CCR team not indicated to minimize duplicative efforts.     Plan: Transitional Care Management episode addressed appropriately per reason noted above.          SHARRI Guerrero  [phone]  [facility]

## 2024-08-22 NOTE — TELEPHONE ENCOUNTER
"Wife Sharon calling back. Pt gave verbal consent.    Has 2 questions:    1) Metoprolol: If SBP <120 this AM, do I wait a few hours and recheck BP; or do I wait until this evening?  - Hold AM metoprolol, then check BP this evening. Continue to hold if SBP < 120 or HR < 65    2) Eliquis - Pt has Eliquis 2.5 mg at home. \"Can he use what we have at home, or do we need to  the one from pharmacy?\" FNA advised okay to use current supply.    No further questions. Caller hung up.    Daisy Coleman RN/[place] Nurse Advisor    "

## 2024-08-23 LAB
PATH REPORT.COMMENTS IMP SPEC: ABNORMAL
PATH REPORT.COMMENTS IMP SPEC: YES
PATH REPORT.FINAL DX SPEC: ABNORMAL
PATH REPORT.GROSS SPEC: ABNORMAL
PATH REPORT.MICROSCOPIC SPEC OTHER STN: ABNORMAL

## 2024-08-23 PROCEDURE — 88177 CYTP FNA EVAL EA ADDL: CPT | Mod: 26 | Performed by: PATHOLOGY

## 2024-08-23 PROCEDURE — 88172 CYTP DX EVAL FNA 1ST EA SITE: CPT | Mod: 26 | Performed by: PATHOLOGY

## 2024-08-23 PROCEDURE — 88173 CYTOPATH EVAL FNA REPORT: CPT | Mod: 26 | Performed by: PATHOLOGY

## 2024-08-23 PROCEDURE — 88305 TISSUE EXAM BY PATHOLOGIST: CPT | Mod: 26 | Performed by: PATHOLOGY

## 2024-08-24 LAB
BACTERIA PLR CULT: NO GROWTH
GRAM STAIN RESULT: NORMAL
GRAM STAIN RESULT: NORMAL

## 2024-08-25 ENCOUNTER — NURSE TRIAGE (OUTPATIENT)
Dept: NURSING | Facility: CLINIC | Age: 84
End: 2024-08-25
Payer: MEDICARE

## 2024-08-25 NOTE — TELEPHONE ENCOUNTER
Verbal consent received to speak with wife Tana    Patients wife calling with questions regarding patients metoprolol and holding it per ordered parameters. This morning patients BP is 134/85 and HR is 57. Wife is concerned that holding this medication will cause him harm Explained to caller that the parameters are put in place by the doctor to prevent harm by keeping BP or HR from becoming to low.  Caller verbalized understanding of care advice.  Zena Falk RN on 8/25/2024 at 7:10 AM      Reason for Disposition    Caller has medicine question only, adult not sick, AND triager answers question    Protocols used: Medication Question Call-A-

## 2024-08-27 ENCOUNTER — TRANSFERRED RECORDS (OUTPATIENT)
Dept: HEALTH INFORMATION MANAGEMENT | Facility: CLINIC | Age: 84
End: 2024-08-27
Payer: MEDICARE

## 2024-09-29 ENCOUNTER — HEALTH MAINTENANCE LETTER (OUTPATIENT)
Age: 84
End: 2024-09-29

## 2024-12-08 ENCOUNTER — HOSPITAL ENCOUNTER (INPATIENT)
Facility: CLINIC | Age: 84
DRG: 521 | End: 2024-12-08
Attending: EMERGENCY MEDICINE | Admitting: INTERNAL MEDICINE
Payer: MEDICARE

## 2024-12-08 ENCOUNTER — APPOINTMENT (OUTPATIENT)
Dept: CT IMAGING | Facility: CLINIC | Age: 84
DRG: 521 | End: 2024-12-08
Attending: ORTHOPAEDIC SURGERY
Payer: MEDICARE

## 2024-12-08 ENCOUNTER — APPOINTMENT (OUTPATIENT)
Dept: RADIOLOGY | Facility: CLINIC | Age: 84
DRG: 521 | End: 2024-12-08
Attending: EMERGENCY MEDICINE
Payer: MEDICARE

## 2024-12-08 ENCOUNTER — APPOINTMENT (OUTPATIENT)
Dept: CT IMAGING | Facility: CLINIC | Age: 84
DRG: 521 | End: 2024-12-08
Attending: EMERGENCY MEDICINE
Payer: MEDICARE

## 2024-12-08 ENCOUNTER — HEALTH MAINTENANCE LETTER (OUTPATIENT)
Age: 84
End: 2024-12-08

## 2024-12-08 DIAGNOSIS — R41.0 DELIRIUM: ICD-10-CM

## 2024-12-08 DIAGNOSIS — N40.1 BENIGN PROSTATIC HYPERPLASIA WITH URINARY RETENTION: Primary | ICD-10-CM

## 2024-12-08 DIAGNOSIS — L89.626 PRESSURE INJURY OF DEEP TISSUE OF LEFT HEEL: ICD-10-CM

## 2024-12-08 DIAGNOSIS — J96.01 ACUTE RESPIRATORY FAILURE WITH HYPOXIA (H): ICD-10-CM

## 2024-12-08 DIAGNOSIS — I50.9 CONGESTIVE HEART FAILURE, UNSPECIFIED HF CHRONICITY, UNSPECIFIED HEART FAILURE TYPE (H): ICD-10-CM

## 2024-12-08 DIAGNOSIS — R33.8 BENIGN PROSTATIC HYPERPLASIA WITH URINARY RETENTION: Primary | ICD-10-CM

## 2024-12-08 DIAGNOSIS — J93.9 PNEUMOTHORAX ON RIGHT: ICD-10-CM

## 2024-12-08 DIAGNOSIS — J90 PLEURAL EFFUSION ON RIGHT: ICD-10-CM

## 2024-12-08 DIAGNOSIS — E11.59 TYPE 2 DIABETES MELLITUS WITH OTHER CIRCULATORY COMPLICATIONS (H): ICD-10-CM

## 2024-12-08 DIAGNOSIS — S72.002A HIP FRACTURE, LEFT, CLOSED, INITIAL ENCOUNTER (H): ICD-10-CM

## 2024-12-08 LAB
ANION GAP SERPL CALCULATED.3IONS-SCNC: 13 MMOL/L (ref 7–15)
BASOPHILS # BLD AUTO: 0 10E3/UL (ref 0–0.2)
BASOPHILS NFR BLD AUTO: 1 %
BUN SERPL-MCNC: 27.5 MG/DL (ref 8–23)
CALCIUM SERPL-MCNC: 9.2 MG/DL (ref 8.8–10.4)
CHLORIDE SERPL-SCNC: 94 MMOL/L (ref 98–107)
CREAT SERPL-MCNC: 2.15 MG/DL (ref 0.67–1.17)
EGFRCR SERPLBLD CKD-EPI 2021: 29 ML/MIN/1.73M2
EOSINOPHIL # BLD AUTO: 0.4 10E3/UL (ref 0–0.7)
EOSINOPHIL NFR BLD AUTO: 7 %
ERYTHROCYTE [DISTWIDTH] IN BLOOD BY AUTOMATED COUNT: 12.4 % (ref 10–15)
GLUCOSE SERPL-MCNC: 473 MG/DL (ref 70–99)
HCO3 SERPL-SCNC: 24 MMOL/L (ref 22–29)
HCT VFR BLD AUTO: 33.7 % (ref 40–53)
HGB BLD-MCNC: 11.6 G/DL (ref 13.3–17.7)
HOLD SPECIMEN: NORMAL
IMM GRANULOCYTES # BLD: 0 10E3/UL
IMM GRANULOCYTES NFR BLD: 0 %
LYMPHOCYTES # BLD AUTO: 2.5 10E3/UL (ref 0.8–5.3)
LYMPHOCYTES NFR BLD AUTO: 39 %
MCH RBC QN AUTO: 27.8 PG (ref 26.5–33)
MCHC RBC AUTO-ENTMCNC: 34.4 G/DL (ref 31.5–36.5)
MCV RBC AUTO: 81 FL (ref 78–100)
MONOCYTES # BLD AUTO: 0.6 10E3/UL (ref 0–1.3)
MONOCYTES NFR BLD AUTO: 10 %
NEUTROPHILS # BLD AUTO: 2.8 10E3/UL (ref 1.6–8.3)
NEUTROPHILS NFR BLD AUTO: 44 %
NRBC # BLD AUTO: 0 10E3/UL
NRBC BLD AUTO-RTO: 0 /100
PLATELET # BLD AUTO: 167 10E3/UL (ref 150–450)
POTASSIUM SERPL-SCNC: 4.2 MMOL/L (ref 3.4–5.3)
RADIOLOGIST FLAGS: ABNORMAL
RBC # BLD AUTO: 4.17 10E6/UL (ref 4.4–5.9)
SODIUM SERPL-SCNC: 131 MMOL/L (ref 135–145)
WBC # BLD AUTO: 6.3 10E3/UL (ref 4–11)

## 2024-12-08 PROCEDURE — 71250 CT THORAX DX C-: CPT | Mod: MG

## 2024-12-08 PROCEDURE — 120N000001 HC R&B MED SURG/OB

## 2024-12-08 PROCEDURE — 70450 CT HEAD/BRAIN W/O DYE: CPT | Mod: ME

## 2024-12-08 PROCEDURE — 36415 COLL VENOUS BLD VENIPUNCTURE: CPT | Performed by: EMERGENCY MEDICINE

## 2024-12-08 PROCEDURE — 73700 CT LOWER EXTREMITY W/O DYE: CPT | Mod: LT,MF

## 2024-12-08 PROCEDURE — 250N000011 HC RX IP 250 OP 636: Performed by: EMERGENCY MEDICINE

## 2024-12-08 PROCEDURE — 72125 CT NECK SPINE W/O DYE: CPT | Mod: ME

## 2024-12-08 PROCEDURE — 99285 EMERGENCY DEPT VISIT HI MDM: CPT | Mod: 25

## 2024-12-08 PROCEDURE — 85730 THROMBOPLASTIN TIME PARTIAL: CPT | Performed by: EMERGENCY MEDICINE

## 2024-12-08 PROCEDURE — G1010 CDSM STANSON: HCPCS

## 2024-12-08 PROCEDURE — 73552 X-RAY EXAM OF FEMUR 2/>: CPT | Mod: LT

## 2024-12-08 PROCEDURE — 85025 COMPLETE CBC W/AUTO DIFF WBC: CPT | Performed by: EMERGENCY MEDICINE

## 2024-12-08 PROCEDURE — 85610 PROTHROMBIN TIME: CPT | Performed by: EMERGENCY MEDICINE

## 2024-12-08 PROCEDURE — 80048 BASIC METABOLIC PNL TOTAL CA: CPT | Performed by: EMERGENCY MEDICINE

## 2024-12-08 PROCEDURE — 72170 X-RAY EXAM OF PELVIS: CPT

## 2024-12-08 PROCEDURE — 99222 1ST HOSP IP/OBS MODERATE 55: CPT | Mod: AI | Performed by: INTERNAL MEDICINE

## 2024-12-08 PROCEDURE — 93005 ELECTROCARDIOGRAM TRACING: CPT | Performed by: EMERGENCY MEDICINE

## 2024-12-08 RX ORDER — AMOXICILLIN 250 MG
1 CAPSULE ORAL 2 TIMES DAILY PRN
Status: DISCONTINUED | OUTPATIENT
Start: 2024-12-08 | End: 2024-12-17 | Stop reason: HOSPADM

## 2024-12-08 RX ORDER — HYDROMORPHONE HCL IN WATER/PF 6 MG/30 ML
0.4 PATIENT CONTROLLED ANALGESIA SYRINGE INTRAVENOUS
Status: DISCONTINUED | OUTPATIENT
Start: 2024-12-08 | End: 2024-12-09

## 2024-12-08 RX ORDER — DEXTROSE MONOHYDRATE 25 G/50ML
25-50 INJECTION, SOLUTION INTRAVENOUS
Status: DISCONTINUED | OUTPATIENT
Start: 2024-12-08 | End: 2024-12-16

## 2024-12-08 RX ORDER — POLYETHYLENE GLYCOL 3350 17 G/17G
17 POWDER, FOR SOLUTION ORAL 2 TIMES DAILY PRN
Status: DISCONTINUED | OUTPATIENT
Start: 2024-12-08 | End: 2024-12-17 | Stop reason: HOSPADM

## 2024-12-08 RX ORDER — OXYCODONE HYDROCHLORIDE 5 MG/1
5 TABLET ORAL EVERY 4 HOURS PRN
Status: DISCONTINUED | OUTPATIENT
Start: 2024-12-08 | End: 2024-12-12

## 2024-12-08 RX ORDER — ACETAMINOPHEN 650 MG/1
650 SUPPOSITORY RECTAL EVERY 6 HOURS PRN
Status: DISCONTINUED | OUTPATIENT
Start: 2024-12-08 | End: 2024-12-17 | Stop reason: HOSPADM

## 2024-12-08 RX ORDER — HYDROMORPHONE HCL IN WATER/PF 6 MG/30 ML
0.2 PATIENT CONTROLLED ANALGESIA SYRINGE INTRAVENOUS
Status: DISCONTINUED | OUTPATIENT
Start: 2024-12-08 | End: 2024-12-09

## 2024-12-08 RX ORDER — HYDROMORPHONE HYDROCHLORIDE 1 MG/ML
0.5 INJECTION, SOLUTION INTRAMUSCULAR; INTRAVENOUS; SUBCUTANEOUS ONCE
Status: COMPLETED | OUTPATIENT
Start: 2024-12-08 | End: 2024-12-08

## 2024-12-08 RX ORDER — AMOXICILLIN 250 MG
2 CAPSULE ORAL 2 TIMES DAILY PRN
Status: DISCONTINUED | OUTPATIENT
Start: 2024-12-08 | End: 2024-12-17 | Stop reason: HOSPADM

## 2024-12-08 RX ORDER — ACETAMINOPHEN 325 MG/1
650 TABLET ORAL EVERY 6 HOURS PRN
Status: DISCONTINUED | OUTPATIENT
Start: 2024-12-08 | End: 2024-12-17 | Stop reason: HOSPADM

## 2024-12-08 RX ORDER — SODIUM CHLORIDE, SODIUM LACTATE, POTASSIUM CHLORIDE, CALCIUM CHLORIDE 600; 310; 30; 20 MG/100ML; MG/100ML; MG/100ML; MG/100ML
INJECTION, SOLUTION INTRAVENOUS CONTINUOUS
Status: DISCONTINUED | OUTPATIENT
Start: 2024-12-09 | End: 2024-12-09

## 2024-12-08 RX ORDER — LIDOCAINE 40 MG/G
CREAM TOPICAL
Status: DISCONTINUED | OUTPATIENT
Start: 2024-12-08 | End: 2024-12-17 | Stop reason: HOSPADM

## 2024-12-08 RX ORDER — NICOTINE POLACRILEX 4 MG
15-30 LOZENGE BUCCAL
Status: DISCONTINUED | OUTPATIENT
Start: 2024-12-08 | End: 2024-12-16

## 2024-12-08 RX ADMIN — HYDROMORPHONE HYDROCHLORIDE 0.5 MG: 1 INJECTION, SOLUTION INTRAMUSCULAR; INTRAVENOUS; SUBCUTANEOUS at 23:21

## 2024-12-08 ASSESSMENT — COLUMBIA-SUICIDE SEVERITY RATING SCALE - C-SSRS
2. HAVE YOU ACTUALLY HAD ANY THOUGHTS OF KILLING YOURSELF IN THE PAST MONTH?: NO
6. HAVE YOU EVER DONE ANYTHING, STARTED TO DO ANYTHING, OR PREPARED TO DO ANYTHING TO END YOUR LIFE?: NO
1. IN THE PAST MONTH, HAVE YOU WISHED YOU WERE DEAD OR WISHED YOU COULD GO TO SLEEP AND NOT WAKE UP?: NO

## 2024-12-08 ASSESSMENT — ACTIVITIES OF DAILY LIVING (ADL)
ADLS_ACUITY_SCORE: 58
ADLS_ACUITY_SCORE: 58

## 2024-12-09 ENCOUNTER — APPOINTMENT (OUTPATIENT)
Dept: CT IMAGING | Facility: CLINIC | Age: 84
DRG: 521 | End: 2024-12-09
Attending: RADIOLOGY
Payer: MEDICARE

## 2024-12-09 LAB
ABO + RH BLD: NORMAL
ANION GAP SERPL CALCULATED.3IONS-SCNC: 12 MMOL/L (ref 7–15)
APTT PPP: 34 SECONDS (ref 22–38)
ATRIAL RATE - MUSE: 84 BPM
BLD GP AB SCN SERPL QL: NEGATIVE
BUN SERPL-MCNC: 26.3 MG/DL (ref 8–23)
CALCIUM SERPL-MCNC: 9.1 MG/DL (ref 8.8–10.4)
CHLORIDE SERPL-SCNC: 97 MMOL/L (ref 98–107)
CREAT SERPL-MCNC: 1.98 MG/DL (ref 0.67–1.17)
DIASTOLIC BLOOD PRESSURE - MUSE: 65 MMHG
EGFRCR SERPLBLD CKD-EPI 2021: 32 ML/MIN/1.73M2
GLUCOSE BLDC GLUCOMTR-MCNC: 209 MG/DL (ref 70–99)
GLUCOSE BLDC GLUCOMTR-MCNC: 228 MG/DL (ref 70–99)
GLUCOSE BLDC GLUCOMTR-MCNC: 234 MG/DL (ref 70–99)
GLUCOSE BLDC GLUCOMTR-MCNC: 283 MG/DL (ref 70–99)
GLUCOSE BLDC GLUCOMTR-MCNC: 296 MG/DL (ref 70–99)
GLUCOSE BLDC GLUCOMTR-MCNC: 317 MG/DL (ref 70–99)
GLUCOSE BLDC GLUCOMTR-MCNC: 408 MG/DL (ref 70–99)
GLUCOSE SERPL-MCNC: 326 MG/DL (ref 70–99)
HCO3 SERPL-SCNC: 24 MMOL/L (ref 22–29)
HOLD SPECIMEN: NORMAL
INR PPP: 1.5 (ref 0.85–1.15)
INTERPRETATION ECG - MUSE: NORMAL
LDH SERPL L TO P-CCNC: 210 U/L (ref 0–250)
P AXIS - MUSE: NORMAL DEGREES
POTASSIUM SERPL-SCNC: 3.9 MMOL/L (ref 3.4–5.3)
PR INTERVAL - MUSE: NORMAL MS
PROT SERPL-MCNC: 7.3 G/DL (ref 6.4–8.3)
QRS DURATION - MUSE: 90 MS
QT - MUSE: 452 MS
QTC - MUSE: 452 MS
R AXIS - MUSE: 69 DEGREES
SODIUM SERPL-SCNC: 133 MMOL/L (ref 135–145)
SPECIMEN EXP DATE BLD: NORMAL
SYSTOLIC BLOOD PRESSURE - MUSE: 134 MMHG
T AXIS - MUSE: 67 DEGREES
VENTRICULAR RATE- MUSE: 60 BPM

## 2024-12-09 PROCEDURE — 272N000431 CT CHEST TUBE WITH CATH PLACEMENT

## 2024-12-09 PROCEDURE — 82945 GLUCOSE OTHER FLUID: CPT | Performed by: INTERNAL MEDICINE

## 2024-12-09 PROCEDURE — 0W9930Z DRAINAGE OF RIGHT PLEURAL CAVITY WITH DRAINAGE DEVICE, PERCUTANEOUS APPROACH: ICD-10-PCS | Performed by: RADIOLOGY

## 2024-12-09 PROCEDURE — 89050 BODY FLUID CELL COUNT: CPT | Performed by: INTERNAL MEDICINE

## 2024-12-09 PROCEDURE — 82962 GLUCOSE BLOOD TEST: CPT

## 2024-12-09 PROCEDURE — 250N000011 HC RX IP 250 OP 636: Performed by: FAMILY MEDICINE

## 2024-12-09 PROCEDURE — 120N000001 HC R&B MED SURG/OB

## 2024-12-09 PROCEDURE — 84157 ASSAY OF PROTEIN OTHER: CPT | Performed by: INTERNAL MEDICINE

## 2024-12-09 PROCEDURE — 86901 BLOOD TYPING SEROLOGIC RH(D): CPT

## 2024-12-09 PROCEDURE — 84155 ASSAY OF PROTEIN SERUM: CPT | Performed by: INTERNAL MEDICINE

## 2024-12-09 PROCEDURE — 83615 LACTATE (LD) (LDH) ENZYME: CPT | Performed by: INTERNAL MEDICINE

## 2024-12-09 PROCEDURE — 32557 INSERT CATH PLEURA W/ IMAGE: CPT

## 2024-12-09 PROCEDURE — 250N000011 HC RX IP 250 OP 636: Performed by: INTERNAL MEDICINE

## 2024-12-09 PROCEDURE — 88341 IMHCHEM/IMCYTCHM EA ADD ANTB: CPT | Mod: TC | Performed by: INTERNAL MEDICINE

## 2024-12-09 PROCEDURE — 80048 BASIC METABOLIC PNL TOTAL CA: CPT | Performed by: INTERNAL MEDICINE

## 2024-12-09 PROCEDURE — 258N000003 HC RX IP 258 OP 636: Performed by: INTERNAL MEDICINE

## 2024-12-09 PROCEDURE — 86923 COMPATIBILITY TEST ELECTRIC: CPT | Performed by: ORTHOPAEDIC SURGERY

## 2024-12-09 PROCEDURE — 250N000013 HC RX MED GY IP 250 OP 250 PS 637: Performed by: INTERNAL MEDICINE

## 2024-12-09 PROCEDURE — 99232 SBSQ HOSP IP/OBS MODERATE 35: CPT | Performed by: FAMILY MEDICINE

## 2024-12-09 PROCEDURE — 99222 1ST HOSP IP/OBS MODERATE 55: CPT | Performed by: INTERNAL MEDICINE

## 2024-12-09 PROCEDURE — 250N000013 HC RX MED GY IP 250 OP 250 PS 637: Performed by: FAMILY MEDICINE

## 2024-12-09 PROCEDURE — 250N000011 HC RX IP 250 OP 636: Performed by: RADIOLOGY

## 2024-12-09 PROCEDURE — 36415 COLL VENOUS BLD VENIPUNCTURE: CPT | Performed by: INTERNAL MEDICINE

## 2024-12-09 PROCEDURE — 250N000012 HC RX MED GY IP 250 OP 636 PS 637: Performed by: INTERNAL MEDICINE

## 2024-12-09 PROCEDURE — 87070 CULTURE OTHR SPECIMN AEROBIC: CPT | Performed by: INTERNAL MEDICINE

## 2024-12-09 RX ORDER — NALOXONE HYDROCHLORIDE 0.4 MG/ML
0.4 INJECTION, SOLUTION INTRAMUSCULAR; INTRAVENOUS; SUBCUTANEOUS
Status: DISCONTINUED | OUTPATIENT
Start: 2024-12-09 | End: 2024-12-10 | Stop reason: HOSPADM

## 2024-12-09 RX ORDER — ONDANSETRON 2 MG/ML
4 INJECTION INTRAMUSCULAR; INTRAVENOUS EVERY 6 HOURS PRN
Status: DISCONTINUED | OUTPATIENT
Start: 2024-12-09 | End: 2024-12-10

## 2024-12-09 RX ORDER — METOPROLOL TARTRATE 25 MG/1
25 TABLET, FILM COATED ORAL 2 TIMES DAILY
Status: DISCONTINUED | OUTPATIENT
Start: 2024-12-09 | End: 2024-12-14

## 2024-12-09 RX ORDER — NALOXONE HYDROCHLORIDE 0.4 MG/ML
0.2 INJECTION, SOLUTION INTRAMUSCULAR; INTRAVENOUS; SUBCUTANEOUS
Status: DISCONTINUED | OUTPATIENT
Start: 2024-12-09 | End: 2024-12-10 | Stop reason: HOSPADM

## 2024-12-09 RX ORDER — NOREPINEPHRINE BITARTRATE 0.02 MG/ML
.01-.6 INJECTION, SOLUTION INTRAVENOUS CONTINUOUS
Status: DISCONTINUED | OUTPATIENT
Start: 2024-12-09 | End: 2024-12-09

## 2024-12-09 RX ORDER — FENTANYL CITRATE 50 UG/ML
25-50 INJECTION, SOLUTION INTRAMUSCULAR; INTRAVENOUS EVERY 5 MIN PRN
Status: DISCONTINUED | OUTPATIENT
Start: 2024-12-09 | End: 2024-12-09 | Stop reason: ALTCHOICE

## 2024-12-09 RX ORDER — MORPHINE SULFATE 2 MG/ML
2 INJECTION, SOLUTION INTRAMUSCULAR; INTRAVENOUS
Status: DISCONTINUED | OUTPATIENT
Start: 2024-12-09 | End: 2024-12-17 | Stop reason: HOSPADM

## 2024-12-09 RX ORDER — AMLODIPINE BESYLATE 10 MG/1
10 TABLET ORAL DAILY
Status: DISCONTINUED | OUTPATIENT
Start: 2024-12-09 | End: 2024-12-14

## 2024-12-09 RX ORDER — MORPHINE SULFATE 4 MG/ML
4 INJECTION, SOLUTION INTRAMUSCULAR; INTRAVENOUS
Status: DISCONTINUED | OUTPATIENT
Start: 2024-12-09 | End: 2024-12-17 | Stop reason: HOSPADM

## 2024-12-09 RX ORDER — FLUMAZENIL 0.1 MG/ML
0.2 INJECTION, SOLUTION INTRAVENOUS
Status: DISCONTINUED | OUTPATIENT
Start: 2024-12-09 | End: 2024-12-10 | Stop reason: HOSPADM

## 2024-12-09 RX ORDER — FUROSEMIDE 10 MG/ML
40 INJECTION INTRAMUSCULAR; INTRAVENOUS DAILY
Status: DISCONTINUED | OUTPATIENT
Start: 2024-12-09 | End: 2024-12-13

## 2024-12-09 RX ORDER — TRAZODONE HYDROCHLORIDE 50 MG/1
50 TABLET, FILM COATED ORAL AT BEDTIME
Status: DISCONTINUED | OUTPATIENT
Start: 2024-12-09 | End: 2024-12-17 | Stop reason: HOSPADM

## 2024-12-09 RX ADMIN — SODIUM CHLORIDE, POTASSIUM CHLORIDE, SODIUM LACTATE AND CALCIUM CHLORIDE: 600; 310; 30; 20 INJECTION, SOLUTION INTRAVENOUS at 00:45

## 2024-12-09 RX ADMIN — METOPROLOL TARTRATE 25 MG: 25 TABLET, FILM COATED ORAL at 23:02

## 2024-12-09 RX ADMIN — FUROSEMIDE 40 MG: 10 INJECTION, SOLUTION INTRAMUSCULAR; INTRAVENOUS at 07:54

## 2024-12-09 RX ADMIN — INSULIN ASPART 6 UNITS: 100 INJECTION, SOLUTION INTRAVENOUS; SUBCUTANEOUS at 00:51

## 2024-12-09 RX ADMIN — INSULIN ASPART 3 UNITS: 100 INJECTION, SOLUTION INTRAVENOUS; SUBCUTANEOUS at 09:36

## 2024-12-09 RX ADMIN — MIDAZOLAM HYDROCHLORIDE 0.5 MG: 1 INJECTION, SOLUTION INTRAMUSCULAR; INTRAVENOUS at 08:51

## 2024-12-09 RX ADMIN — HYDROMORPHONE HYDROCHLORIDE 0.4 MG: 0.2 INJECTION, SOLUTION INTRAMUSCULAR; INTRAVENOUS; SUBCUTANEOUS at 06:07

## 2024-12-09 RX ADMIN — OXYCODONE 5 MG: 5 TABLET ORAL at 02:52

## 2024-12-09 RX ADMIN — FENTANYL CITRATE 25 MCG: 50 INJECTION INTRAMUSCULAR; INTRAVENOUS at 08:51

## 2024-12-09 RX ADMIN — ONDANSETRON 4 MG: 2 INJECTION, SOLUTION INTRAMUSCULAR; INTRAVENOUS at 16:13

## 2024-12-09 RX ADMIN — HYDROMORPHONE HYDROCHLORIDE 0.4 MG: 0.2 INJECTION, SOLUTION INTRAMUSCULAR; INTRAVENOUS; SUBCUTANEOUS at 14:37

## 2024-12-09 RX ADMIN — INSULIN ASPART 4 UNITS: 100 INJECTION, SOLUTION INTRAVENOUS; SUBCUTANEOUS at 06:10

## 2024-12-09 RX ADMIN — TRAZODONE HYDROCHLORIDE 50 MG: 50 TABLET ORAL at 23:02

## 2024-12-09 RX ADMIN — HYDROMORPHONE HYDROCHLORIDE 0.4 MG: 0.2 INJECTION, SOLUTION INTRAMUSCULAR; INTRAVENOUS; SUBCUTANEOUS at 03:41

## 2024-12-09 RX ADMIN — ACETAMINOPHEN 650 MG: 325 TABLET ORAL at 02:52

## 2024-12-09 ASSESSMENT — ACTIVITIES OF DAILY LIVING (ADL)
ADLS_ACUITY_SCORE: 62
ADLS_ACUITY_SCORE: 58
ADLS_ACUITY_SCORE: 62
ADLS_ACUITY_SCORE: 75
ADLS_ACUITY_SCORE: 58
ADLS_ACUITY_SCORE: 62
ADLS_ACUITY_SCORE: 75
ADLS_ACUITY_SCORE: 75
ADLS_ACUITY_SCORE: 58
ADLS_ACUITY_SCORE: 52
ADLS_ACUITY_SCORE: 58
ADLS_ACUITY_SCORE: 58
ADLS_ACUITY_SCORE: 52
ADLS_ACUITY_SCORE: 52
ADLS_ACUITY_SCORE: 75
ADLS_ACUITY_SCORE: 52
ADLS_ACUITY_SCORE: 75
ADLS_ACUITY_SCORE: 75
ADLS_ACUITY_SCORE: 52
ADLS_ACUITY_SCORE: 52
ADLS_ACUITY_SCORE: 75
ADLS_ACUITY_SCORE: 58
ADLS_ACUITY_SCORE: 58

## 2024-12-09 NOTE — INTERVAL H&P NOTE
"I have reviewed the surgical (or preoperative) H&P that is linked to this encounter, and examined the patient. There are no significant changes    Clinical Conditions Present on Arrival:  Clinically Significant Risk Factors Present on Admission         # Hyponatremia: Lowest Na = 131 mmol/L in last 2 days, will monitor as appropriate  # Hypochloremia: Lowest Cl = 94 mmol/L in last 2 days, will monitor as appropriate        # Drug Induced Coagulation Defect: home medication list includes an anticoagulant medication       # DMII: A1C = N/A within past 6 months  # Overweight: Estimated body mass index is 25.85 kg/m  as calculated from the following:    Height as of this encounter: 1.727 m (5' 8\").    Weight as of this encounter: 77.1 kg (170 lb).       "

## 2024-12-09 NOTE — PLAN OF CARE
This writer was paged this AM to notify of an IR consult for a tunneled chest tube placement- RIGHT sided.    Per chart review, patient is having a RIGHT pigtail chest tube placed by our CT colleagues this AM.  Pulmonology consulted.    IR to sign off.  Please call or reconsult PADMINI GARCIA NP on 12/9/2024 at 8:29 AM

## 2024-12-09 NOTE — IR NOTE
Patient Name: Per Beasley  Medical Record Number: [id number]  Today's Date: 12/9/2024    Procedure: Chest tube placement   Proceduralist: Dr. Hutchison    Procedure Start: 0850  Procedure end: 0911    Sedation medications administered: 0.5 mg midazolam and 25 mcg fentanyl   Sedation time: 15 minutes    Report given to: Darling Gonzalez RN    Other Notes: Pt arrived to CT room 1 from  ED 07 . Consent reviewed. Pt denies any questions or concerns regarding procedure. Pt positioned supine and monitored per protocol. Pt tolerated procedure without any noted complications. VSS on RA. Pt transferred back to  ED 07 .

## 2024-12-09 NOTE — PLAN OF CARE
Problem: Pain Acute  Goal: Optimal Pain Control and Function  Outcome: Progressing  Intervention: Prevent or Manage Pain  Recent Flowsheet Documentation  Taken 12/9/2024 1641 by Darling Gonzalez RN  Medication Review/Management: medications reviewed     Problem: Gas Exchange Impaired  Goal: Optimal Gas Exchange  Outcome: Progressing   Goal Outcome Evaluation:    Pt is getting IV pain medication for pain.  He got a chest tube placed around 0900 today and the canister was changed with 1600 ml out.  He is able to be on RA at this time.  He was on 4L NC.  Pt is on strict bedrest d/t left hip fracture.  We are doing micro shifts for position change.  Pt is alert and oriented and able to make his needs known.

## 2024-12-09 NOTE — H&P
"[facility]    History and Physical - Hospitalist Service       Date of Admission:  12/8/2024    Assessment & Plan    Active Problems:    Chronic obstructive pulmonary disease (H)    Coronary artery disease involving native coronary artery of native heart with angina pectoris (H)    Type 2 diabetes mellitus with other circulatory complications (H)    Essential hypertension    Hyperlipidemia    Status post transcatheter aortic valve replacement (TAVR) using bioprosthesis    Persistent atrial fibrillation (H)    Pneumothorax on right    Pleural effusion on right    Hip fracture, left, closed, initial encounter (H)      Per Beasley is a 84 year old male admitted on 12/8/2024. He has medical history significant for CAD, chronic diastolic heart failure, COPD, type 2 diabetes mellitus, hypertension, CKD stage IV, chronic normocytic anemia, and aortic stenosis status post TAVR.  He presented to the ED with complaints of fall.  Denies head trauma or loss of consciousness.  Reports that he was going to the bathroom without using his walker when his legs gave out and he fell on his left side.    In the ED, CT brain was negative for any acute intracranial process.  CT cervical spine did not show any fracture or posttraumatic subluxation.  No high-grade spinal canal or neuroforaminal stenosis.  Incidental small right pneumothorax and moderate right pleural effusion.  CT of the chest confirms 10% right pneumothorax.  Reports concurrent moderate right and small left pleural effusion.  X-ray of the pelvis and XR of the left femur showed \"Mildly displaced acute transverse fracture of the left femoral neck. The distal portion of the fracture is displaced laterally by 0.7 cm and there is approximately 1.0 cm of override. No significant angulation about the fracture.\"       # Left hip fracture: Secondary to fall.Reports that he was going to the bathroom without using his walker when his legs gave out and " he fell on his left side.  As needed pain regimen  Bedrest  Orthopedic surgery consult  Gentle hydration      # Right apical pneumothorax: Small  # Moderate right pleural effusion  IV Lasix in the morning  IR thoracentesis    # Type 2 diabetes with hyperglycemia: Blood glucose of 473.  Could be due to stress.  Hemoglobin A1c of 7.8 in June 2024  Correctional scale  Hypoglycemia protocol  Hold glipizide    # Paroxysmal atrial fibrillation  # On chronic anticoagulation with Eliquis  Hold Eliquis  Continue metoprolol  # CAD  # Hypertension   Continue amlodipine, aspirin, and metoprolol    # Insomnia  Trazodone    # Chronic anemia: Hemoglobin stable  Monitor CBC    # CKD stage IV: Creatinine of 2.55.  Creatinine was 2.91 in August 2024.  Monitor renal function  Avoid nephrotoxins  Renal dosing of medications    # Hyponatremia: Sodium of 131  Monitor sodium levels  Gentle hydration.    # COPD patient on prophylactic history of COPD.  Not on any meds.  Titrate supplemental oxygen to keep O2 saturation between 88% and 92%              Diet: NPO per Anesthesia Guidelines for Procedure/Surgery Except for: Meds, Ice Chips    DVT Prophylaxis: DOAC  Helm Catheter: Not present  Lines: None     Cardiac Monitoring: None  Code Status: No CPR- Do NOT Intubate      Clinically Significant Risk Factors Present on Admission         # Hyponatremia: Lowest Na = 131 mmol/L in last 2 days, will monitor as appropriate  # Hypochloremia: Lowest Cl = 94 mmol/L in last 2 days, will monitor as appropriate       # Drug Induced Coagulation Defect: home medication list includes an anticoagulant medication  # Drug Induced Platelet Defect: home medication list includes an antiplatelet medication   # Hypertension: Noted on problem list  # Chronic heart failure with preserved ejection fraction: heart failure noted on problem list and last echo with EF >50%         # DMII: A1C = N/A within past 6 months    # Overweight: Estimated body mass index is  "25.85 kg/m  as calculated from the following:    Height as of this encounter: 1.727 m (5' 8\").    Weight as of this encounter: 77.1 kg (170 lb).              Disposition Plan     Medically Ready for Discharge: Anticipated in 2-4 Days           Adams Mahmood MD  Hospitalist Service  [facility]  Securely message with [employer] (more info)  Text page via [facility] Paging/Directory     ______________________________________________________________________    Chief Complaint   Fall, left hip pain.     History is obtained from the patient    History of Present Illness   Per Beasley is a 84 year old male with  medical history significant for CAD, chronic diastolic heart failure, COPD, type 2 diabetes mellitus, hypertension, CKD stage IV, chronic normocytic anemia, and aortic stenosis status post TAVR.  He presented to the ED with complaints of fall.  Patient reports that he uses walker at baseline.  Stated that he was going to the bathroom.  Did not use his walker.  Reports that his knees gave out and he fell on his left side.  Reports 8/10, sharp, constant, nonradiating pain.  Denies head trauma or loss of consciousness.  Reports that his pain is improved after receiving pain medications in the ED.  He denies any fevers, chills, chest pain, shortness of breath, nausea, vomiting, diarrhea, constipation, dysuria, hematuria, focal weakness or loss of sensation, lightheadedness, vision changes, spinning sensation, paresthesias, or any other new symptoms preceding his fall.    He denies any tobacco, alcohol, or illicit drugs.      Past Medical History    Past Medical History:   Diagnosis Date    JONNATHAN (acute kidney injury) (H)     Aortic stenosis     Biliary obstruction (H)     Biliary obstruction (H)     Bradycardia     CAD (coronary artery disease)     CKD (chronic kidney disease) stage 3, GFR 30-59 ml/min (H)     COPD (chronic obstructive pulmonary disease) (H)     Diabetes mellitus, type 2 (H)     " Generalized muscle weakness     Heart failure with reduced ejection fraction, NYHA class I (H)     HTN (hypertension)     Paroxysmal atrial fibrillation (H)     Pleural effusion     right       Past Surgical History   Past Surgical History:   Procedure Laterality Date    ENDOSCOPIC RETROGRADE CHOLANGIOPANCREATOGRAM N/A 8/20/2024    Procedure: ENDOSCOPIC RETROGRADE CHOLANGIOPANCREATOGRAPHY WITH BILIARY SPHINCTEROTOMY AND STENT PLACEMENT;  Surgeon: Oc Magdaleno MD;  [place] [facility] [place]    ESOPHAGOSCOPY, GASTROSCOPY, DUODENOSCOPY (EGD), COMBINED N/A 8/20/2024    Procedure: ESOPHAGOGASTRODUODENOSCOPY, WITH ENDOSCOPIC ULTRASOUND GUIDANCE WITH FINE NEEDLE ASPIRATION OF PANCREATIC HEAD;  Surgeon: Oc Magdaleno MD;  [place] [facility] [place]    HC VALVE (TAVR)      s/p PCI with NUBIA to MID LCx 2021         Prior to Admission Medications   Prior to Admission Medications   Prescriptions Last Dose Informant Patient Reported? Taking?   Alcohol Swabs PADS   No No   Sig: Use to swab the area of the injection or rosenda as directed Per insurance coverage   Sharps Container MISC   No No   Sig: Use as directed to dispose of needles, lancets and other sharps Per Insurance coverage   Torsemide 40 MG TABS   No No   Sig: Take 80 mg by mouth daily   amLODIPine (NORVASC) 10 MG tablet   No No   Sig: Take 1 tablet (10 mg) by mouth daily   apixaban ANTICOAGULANT (ELIQUIS ANTICOAGULANT) 2.5 MG tablet   No No   Sig: Take 1 tablet (2.5 mg) by mouth 2 times daily. Do not start before August 23, 2024.   aspirin (ASA) 81 MG chewable tablet   No No   Sig: Take 1 tablet (81 mg) by mouth daily. Do not start before August 23, 2024.   blood glucose (NO BRAND SPECIFIED) lancets standard   No No   Sig: To use to test glucose level in the blood Use to test blood sugar  2  times daily as directed. To accompany glucose monitor brands per insurance coverage.   blood glucose (NO BRAND SPECIFIED) test strip   No No   Sig: To use to test glucose  level in the blood Use to test blood sugar  2 times daily as directed. To accompany glucose monitor brands per insurance coverage.   blood glucose calibration (NO BRAND SPECIFIED) solution   No No   Sig: Used to calibrate the blood glucose monitor as needed and as directed.  To accompany  blood glucose brands per insurance coverage   blood glucose monitoring (NO BRAND SPECIFIED) meter device kit   No No   Sig: Use as directed , Per insurance coverage   glipiZIDE (GLUCOTROL XL) 5 MG 24 hr tablet   Yes No   Sig: Take 5 mg by mouth every morning   glucose (BD GLUCOSE) 4 g chewable tablet   No No   Sig: Take 4 tablets by mouth every 15 minutes as needed for low blood sugar   insulin pen needle (32G X 4 MM) 32G X 4 MM miscellaneous   No No   Sig: Use as directed by provider Per insurance coverage   metoprolol tartrate (LOPRESSOR) 25 MG tablet   No No   Sig: Take 1 tablet (25 mg) by mouth 2 times daily.   traZODone (DESYREL) 50 MG tablet   Yes No   Sig: Take 50 mg by mouth at bedtime      Facility-Administered Medications: None        Review of Systems    As per HPI otherwise 14 point review of systems is negative.     Physical Exam   Vital Signs: Temp: 97.8  F (36.6  C)   BP: 138/64 Pulse: 65   Resp: 18 SpO2: 92 % O2 Device: Nasal cannula Oxygen Delivery: 2 LPM  Weight: 170 lbs 0 oz    General: AAOx3, NAD, pleasant.  HEENT: NCAT, pupils are equal and round, anicteric, oral mucosa is moist.  Neck: Supple  Cardiac: RRR.  ?Faint murmur. No rub or gallop.  Respiratory: Diminished breath sounds in the mid to lower right lung.  Fine crackles.  No wheezes, rales, or rhonchi.  Abdomen: Soft, NT, ND, + bowel sounds  Extremity: 2+ edema bilateral lower extremities, worse on the left.  DP pulses palpable.  Left leg is externally rotated.  Neuro: AAO x3,   Psych: Calm and cooperative.       Medical Decision Making       More than 60 MINUTES SPENT BY ME on the date of service doing chart review, history, exam, documentation & further  activities per the note.      Data     I have personally reviewed the following data over the past 24 hrs:    6.3  \   11.6 (L)   / 167     131 (L) 94 (L) 27.5 (H) /  473 (H)   4.2 24 2.15 (H) \     INR:  1.50 (H) PTT:  34   D-dimer:  N/A Fibrinogen:  N/A       Imaging results reviewed over the past 24 hrs:   Recent Results (from the past 24 hours)   Head CT w/o contrast    Narrative    EXAM: CT HEAD W/O CONTRAST  LOCATION: [facility]  DATE: 12/8/2024    INDICATION: Trauma.  COMPARISON: None.  TECHNIQUE: Routine CT Head without IV contrast. Multiplanar reformats. Dose reduction techniques were used.    FINDINGS:  INTRACRANIAL CONTENTS: No intracranial hemorrhage, extraaxial collection, or mass effect. No CT evidence of acute infarct. Moderate presumed chronic small vessel ischemic changes. Moderate generalized volume loss. No hydrocephalus.     VISUALIZED ORBITS/SINUSES/MASTOIDS: No intraorbital abnormality. No paranasal sinus mucosal disease. No middle ear or mastoid effusion.    BONES/SOFT TISSUES: No acute abnormality.      Impression    IMPRESSION:  1.  No CT evidence for acute intracranial process.  2.  Brain atrophy and presumed chronic microvascular ischemic changes as above.   CT Cervical Spine w/o Contrast    Narrative    EXAM: CT CERVICAL SPINE W/O CONTRAST  LOCATION: [facility]  DATE: 12/8/2024    INDICATION: Trauma.  COMPARISON: None.  TECHNIQUE: Routine CT Cervical Spine without IV contrast. Multiplanar reformats. Dose reduction techniques were used.    FINDINGS:  VERTEBRA: Mild rightward curvature centered at C5 and leftward curvature of the upper thoracic spine. Minimal posterior spondylolisthesis at C3-C4. Otherwise, normal alignment. Normal vertebral body heights. Severe disc degeneration C3-C4, C5-C6, and   C6-C7. No fracture or posttraumatic subluxation.     CANAL/FORAMINA: Moderate spinal canal stenosis at C3-C4 with severe bilateral foraminal  stenoses. Moderate to severe left foraminal stenosis C4-C5. Severe right foraminal stenosis C5-C6. Moderate right foraminal stenosis C6-C7.    PARASPINAL: Moderate to large right pleural effusion. Small right-sided pneumothorax.      Impression    IMPRESSION:  1.  No fracture or posttraumatic subluxation.  2.  No high-grade spinal canal or neural foraminal stenosis.  3.  Small right pneumothorax and moderate pleural effusion. Consider dedicated chest CT for more complete evaluation.    The results were communicated to Dr. Larry at 10:33 PM on 12/08/2024.   XR Pelvis 1/2 Views    Narrative    EXAM: PELVIS AND LEFT FEMUR 5 VIEWS  LOCATION: [facility]  DATE/TIME: 12/8/2024 9:56 PM CST    INDICATION: Trauma.  COMPARISON: None.      Impression    IMPRESSION:   1. Mildly displaced acute transverse fracture of the left femoral neck. The distal portion of the fracture is displaced laterally by 0.7 cm and there is approximately 1.0 cm of override. No significant angulation about the fracture.  2. Mild degenerative changes in bilateral hip joints.  3. Partial visualization of a left knee arthroplasty.   XR Femur Left 2 Views    Narrative    EXAM: PELVIS AND LEFT FEMUR 5 VIEWS  LOCATION: [facility]  DATE/TIME: 12/8/2024 9:56 PM CST    INDICATION: Trauma.  COMPARISON: None.      Impression    IMPRESSION:   1. Mildly displaced acute transverse fracture of the left femoral neck. The distal portion of the fracture is displaced laterally by 0.7 cm and there is approximately 1.0 cm of override. No significant angulation about the fracture.  2. Mild degenerative changes in bilateral hip joints.  3. Partial visualization of a left knee arthroplasty.   CT Chest w/o Contrast   Result Value    Radiologist flags Pneumothorax (AA)    Narrative    EXAM: CT CHEST W/O CONTRAST  LOCATION: [facility]  DATE: 12/8/2024    INDICATION: Possible pneumothorax on ct c  "spine  COMPARISON: Cervical spine CT from prior to this examination. Chest CT from 08/18/2024.  TECHNIQUE: CT chest without IV contrast. Multiplanar reformats were obtained. Dose reduction techniques were used.  CONTRAST: None.    FINDINGS:   LUNGS AND PLEURA:   *  Small right-sided pneumothorax (less than 10% by volume of the right chest).  *  Moderate right and small left simple density nonloculated pleural effusions.  *  Diffuse upper lung predominant interstitial micronodules are unchanged from August 2024. This could represent the result of sarcoidosis, silicosis, or coal workers pneumoconiosis, among other possibilities.  *  Minimal chronic \"platelike\" scarring in both lungs with areas of \"round\" atelectasis in the right middle and right lower lobes.    MEDIASTINUM/AXILLAE: Normal heart size without pericardial effusion. 3 vessel coronary artery disease. Previous transcatheter aortic valve replacement. Nonaneurysmal thoracic aorta. Calcified mildly enlarged hilar or mediastinal lymph nodes are   unchanged.    UPPER ABDOMEN: Metallic CBD stent with expected intrahepatic pneumobilia. No biliary dilatation.    MUSCULOSKELETAL: Unremarkable.      Impression    IMPRESSION:   *  Small right-sided pneumothorax (10% by volume) in the setting of likely underlying chronic lung disease from either sarcoidosis, silicosis, or coal workers pneumoconiosis.  *  Three-vessel coronary artery disease. Prior transcatheter aortic valve replacement.  *  Well-positioned metallic CBD stent without biliary dilatation (partially imaged).      [Critical Result: Pneumothorax]    Finding was identified on 12/8/2024 11:06 PM CST.     Dr. Larry was contacted by me on 12/8/2024 11:17 PM CST and verbalized understanding of the critical result.        "

## 2024-12-09 NOTE — ED NOTES
Bed: [facility]-  Expected date: 12/8/24  Expected time:   Means of arrival: Ambulance  Comments:  [facility]  83 y/o M  Fall   No head strike / No LOC  + Blood thinners  Left hip pain

## 2024-12-09 NOTE — CONSULTS
ORTHOPEDIC CONSULTATION    Consultation  Per Nova,  1940, MRN [id number]    Hip fracture, left, closed, initial encounter (H)  Pleural effusion on right  Pneumothorax on right    PCP: Maicol Farmer, [phone]   Code status:  No CPR- Do NOT Intubate       Extended Emergency Contact Information  Primary Emergency Contact: COURTNEY NOVA  Home Phone: [phone]  Relation: Son  Secondary Emergency Contact: JOSEF NOVA  Address: [address] [place] [address]  Home Phone: [phone]  Relation: Spouse         IMPRESSION:  Left displaced femoral neck fracture      PLAN:  This patient was discussed with Dr. Arriaga, on-call surgeon for [facility] and they are in agreement with the following plan.   - Discussed treatment options including surgical intervention with left hip hemiarthroplasty . Discussed risks of procedure including but not limited to, injury to nerves, arteries or veins, malunion, nonunion, periprosthetic fracture, DVT or even death with the patient and they are in agreement with proceeding.  - Will take to the OR tomorrow (Tuesday 12/10) at 3:25 for Left Hip hemiarthroplasty pending medical optimization  - NPO at midnight for OR tomorrow. As long as it's okay medically, patient can eat today.   - Type & Screen: pending  - Medical optimization per Hospitalist. Hgb 11.6. INR 1.5.  - Pain control. Currently well controlled on IV dilaudid.   - NWB left lower extremity.    Thank you for including [facility] in the care of Per Nova. It has been a pleasure participating in their care.      CHIEF COMPLAINT: Left hip fracture      HISTORY OF PRESENT ILLNESS:  The patient is seen in orthopedic consultation at the request of Obi Garcia DO.  The patient is a 84 year old male who  presented to the ED last night with c/o left hip pain after a mechanical fall, slipping on his socks. Dr. Arriaga was on call and placed a plan of care note. He  "was found to have a concomitant pneuomothroax, which a chest tube was placed for this morning by IR. Pending Pulmonology consult. Per has pain globally in his left hip that travels down his thigh and is unable to bear weight. He has no history of injury to this hip. He has increased pain with transitioning positions in bed, but at rest his pain is tolerable. His pain is manageable on his current regimen.     PAST MEDICAL HISTORY:     Past Medical History:   Diagnosis Date    JONNATHAN (acute kidney injury) (H)     Aortic stenosis     Biliary obstruction (H)     Biliary obstruction (H)     Bradycardia     CAD (coronary artery disease)     CKD (chronic kidney disease) stage 3, GFR 30-59 ml/min (H)     COPD (chronic obstructive pulmonary disease) (H)     Diabetes mellitus, type 2 (H)     Generalized muscle weakness     Heart failure with reduced ejection fraction, NYHA class I (H)     HTN (hypertension)     Paroxysmal atrial fibrillation (H)     Pleural effusion     right       ALLERGIES:   Blood-group specific substance and Hydrochlorothiazide    MEDICATIONS ON ADMISSION:  Medications were reviewed.  They do include:   (Not in a hospital admission)      SOCIAL HISTORY:    reports that he has quit smoking. His smoking use included cigarettes. He has never used smokeless tobacco. He reports that he does not currently use alcohol.     FAMILY HISTORY:  No family history on file.    REVIEW OF SYSTEMS:   See Admission History and Physical     Clinically Significant Risk Factors Present on Admission     # Drug Induced Coagulation Defect: home medication list includes an anticoagulant medication     # Overweight: Estimated body mass index is 25.85 kg/m  as calculated from the following:    Height as of this encounter: 1.727 m (5' 8\").    Weight as of this encounter: 77.1 kg (170 lb).       Risk Factors for Delirium:     #Age: >80      PHYSICAL EXAMINATION:  General: On examination, the patient is resting comfortably, NAD and " awake, lying supine in bed and oriented to person, place and time   SKIN: No ecchymosis seen over left hip, skin intact  Pulses:  Palpable bilateral dorsalis pedis pulses.  Sensation: intact and equal bilaterally to the distal lower extremities, feet and toes.  Tenderness: Left hip tenderness noted. No tenderness noted in calfs bilaterally  ROM: Equal ankle plantar and dorsiflexion, moves all toes bilaterally. Unable to lift leg off bed due to pain  Motor:  +5/5 ankle DF, PF, EHL bilaterally.  Left  hip and knee not assessed due to fracture. Contralateral hip flexion and knee extension +5/5.   Extremity: Left lower extremity externally rotated & shortened, thigh & calf compartments soft    Temp:  [97.3  F (36.3  C)-97.8  F (36.6  C)] 97.3  F (36.3  C)  Pulse:  [48-70] 66  Resp:  [12-28] 19  BP: (108-160)/(52-69) 143/65  SpO2:  [89 %-98 %] 91 %    RADIOGRAPHIC EVALUATION:  Radiographs personally reviewed.    XR PELVIS AND LEFT FEMUR:  IMPRESSION:   1. Mildly displaced acute transverse fracture of the left femoral neck. The distal portion of the fracture is displaced laterally by 0.7 cm and there is approximately 1.0 cm of override. No significant angulation about the fracture.  2. Mild degenerative changes in bilateral hip joints.  3. Partial visualization of a left knee arthroplasty.    CT HIP LEFT W/O CONTRAST:  FINDINGS:   BONES:  -There is an acute comminuted fracture through the left femoral neck with moderate associated angulation convex anteriorly. Moderate degenerative changes left AC joint. Moderate vascular calcification. The exam is otherwise normal.  2. SOFT TISSUES:  -Normal.    LABORATORY DATA:     Lab Results   Component Value Date    INR 1.50 (H) 12/08/2024       Lab Results   Component Value Date    WBC 6.3 12/08/2024     Lab Results   Component Value Date    RBC 4.17 12/08/2024     Lab Results   Component Value Date    HGB 11.6 12/08/2024     Lab Results   Component Value Date    HCT 33.7 12/08/2024  "    No components found for: \"MCT\"  Lab Results   Component Value Date    MCV 81 12/08/2024     Lab Results   Component Value Date    MCH 27.8 12/08/2024     Lab Results   Component Value Date    MCHC 34.4 12/08/2024     Lab Results   Component Value Date    RDW 12.4 12/08/2024     Lab Results   Component Value Date     12/08/2024         Yaz Butt PA-C/Dr. Arriaga  [facility] -- 12/9/2024 11:10 AM           "

## 2024-12-09 NOTE — PLAN OF CARE
Per Beasley is an 84 year old male with multiple medical problems, including DMII, CKD, CAD, HTN, COPD, aortic stenosis s/p aortic valve replacment, CHF, and Afib on Eliquis, who presented to the ED late tonight with left hip pain and inability to bear weight after a slip and ground level fall. Radiographs demonstrated a displaced femoral neck fracture on the left, as well as a small right pneumothorax. Given the patient's medical complexity, recent last dose of Eliquis, and concomitant pneumothorax, will likely plan for orthopedic surgical intervention with left hip hemiarthroplasty on tomorrow vs Tuesday 12/10 pending medical optimization.

## 2024-12-09 NOTE — ED NOTES
Called pt's wife to give update about plan of care. Pt appears to be sleeping, resting with eyes closed, resp EUL. Report given to MARK Hastings

## 2024-12-09 NOTE — ED TRIAGE NOTES
Pt arrived via EMS from home. Pt was ambulating in his socks and slipped on the bathroom floor, falling and causing L groin pain. Pt has not stood on extremity and has severe pain when moving LLE. Dependent edema to BLE, worse to LLE, but pt states this is chronic.     Triage Assessment (Adult)       Row Name 12/08/24 [id number]          Triage Assessment    Airway WDL WDL        Respiratory WDL    Respiratory WDL WDL        Skin Circulation/Temperature WDL    Skin Circulation/Temperature WDL WDL        Peripheral/Neurovascular WDL    Peripheral Neurovascular WDL WDL        Cognitive/Neuro/Behavioral WDL    Cognitive/Neuro/Behavioral WDL WDL

## 2024-12-09 NOTE — ED PROVIDER NOTES
EMERGENCY DEPARTMENT ENCOUNTER      NAME: Per Beasley  AGE: 84 year old male  YOB: 1940  MRN: [id number]  EVALUATION DATE & TIME: 2024  9:19 PM    PCP: Maicol Farmer    ED PROVIDER: Obi Larry D.O.      Chief Complaint   Patient presents with    Fall    Groin Pain       FINAL IMPRESSION:  1. Hip fracture, left, closed, initial encounter (H)    2. Pleural effusion on right    3. Pneumothorax on right        ED COURSE & MEDICAL DECISION MAKIN:23 PM I met with the patient to gather history and to perform my initial exam. I discussed the plan for care while in the Emergency Department.  10:36 PM Radiologist said they saw a small pnuemo on patient chest. We will add another CT scan of the chest.   11:18 PM I talked to Hospitalist  regarding patients admission.    11:26 PM I talked to  from orthopedic regarding patient.        Pertinent Labs & Imaging studies reviewed. (See chart for details)  84 year old male presents to the Emergency Department for evaluation of left hip pain status post mechanical fall after slipping on his socks.  Patient is on Eliquis which prompted me to perform a CT scan of the head and C-spine as well even though the patient had no symptoms above the waist.  CT scan of the head did not show any evidence of intracranial bleed or skull fracture.  There was no evidence of fracture or dislocation on CT as well however we incidentally found a small pneumothorax.  Because of this a CT scan of the chest was also performed that does show a moderate-sized pleural effusion with a small pneumothorax approximately 10% volume on the right.  I consulted with pulmonology.  As the patient is asymptomatic with this, and there is no chest pain or shortness of breath, we decided that we would get a pulmonology consult in the morning and determine if a pigtail catheter needed placed at that time as this may be related to his underlying pancreatic cancer.  As for his  left hip, he has significant femoral neck fracture without dislocation.  I have consulted with orthopedic surgery.  Plan is for admission for further management to the hospitalist service.    Medical Decision Making  Obtained supplemental history:Supplemental history obtained?: Documented in chart  Reviewed external records: External records reviewed?: Documented in chart and Outpatient Record: Patient was seen at [facility] on 11/08/24  Care impacted by chronic illness:Chronic Kidney Disease, Diabetes, Heart Disease, Hypertension, and Other: Biliary obstruction, COPD, JONNATHAN, Aortic stenosis, CAD, CHF, Paroxysmal atrial fibrillation, and pleural effusion  Did you consider but not order tests?: Work up considered but not performed and documented in chart, if applicable  Did you interpret images independently?: Independent interpretation of ECG and images noted in documentation, when applicable.  Consultation discussion with other provider:Did you involve another provider (consultant, , pharmacy, etc.)?: I discussed the care with another health care provider, see documentation for details.  Admit.    MIPS: Not Applicable        At the conclusion of the encounter I discussed the results of all of the tests and the disposition. The questions were answered. The patient or family acknowledged understanding and was agreeable with the care plan.        HPI    Patient information was obtained from: Patient     Use of : N/A     Per Beasley is a 84 year old male who presents to the ED for evaluation of a fall and left hip pain.     Per EMS, patient slipped on and fell in the bathroom tonight. No LOC or any head injury. Patient is on Eliquis for AFIB. He currently endorses left hip pain. He rates it a 2/10 with rest and 4/10 with movements. No other complaints at this time.     Per Chart Review: patient was seen at [facility] on 11/08/24 for a phone visit.       PAST MEDICAL  HISTORY:  Past Medical History:   Diagnosis Date    JONNATHAN (acute kidney injury) (H)     Aortic stenosis     Biliary obstruction (H)     Biliary obstruction (H)     Bradycardia     CAD (coronary artery disease)     CKD (chronic kidney disease) stage 3, GFR 30-59 ml/min (H)     COPD (chronic obstructive pulmonary disease) (H)     Diabetes mellitus, type 2 (H)     Generalized muscle weakness     Heart failure with reduced ejection fraction, NYHA class I (H)     HTN (hypertension)     Paroxysmal atrial fibrillation (H)     Pleural effusion     right       PAST SURGICAL HISTORY:  Past Surgical History:   Procedure Laterality Date    ENDOSCOPIC RETROGRADE CHOLANGIOPANCREATOGRAM N/A 8/20/2024    Procedure: ENDOSCOPIC RETROGRADE CHOLANGIOPANCREATOGRAPHY WITH BILIARY SPHINCTEROTOMY AND STENT PLACEMENT;  Surgeon: Oc Magdaleno MD;  Location: [facility] OR    ESOPHAGOSCOPY, GASTROSCOPY, DUODENOSCOPY (EGD), COMBINED N/A 8/20/2024    Procedure: ESOPHAGOGASTRODUODENOSCOPY, WITH ENDOSCOPIC ULTRASOUND GUIDANCE WITH FINE NEEDLE ASPIRATION OF PANCREATIC HEAD;  Surgeon: Oc Magdaleno MD;  Location: [facility] OR    HC VALVE (TAVR)      s/p PCI with NUBIA to MID LCx 2021           CURRENT MEDICATIONS:    No current facility-administered medications for this encounter.     Current Outpatient Medications   Medication Sig Dispense Refill    Alcohol Swabs PADS Use to swab the area of the injection or rosenda as directed Per insurance coverage 100 each 0    amLODIPine (NORVASC) 10 MG tablet Take 1 tablet (10 mg) by mouth daily 30 tablet 0    apixaban ANTICOAGULANT (ELIQUIS ANTICOAGULANT) 2.5 MG tablet Take 1 tablet (2.5 mg) by mouth 2 times daily. Do not start before August 23, 2024. 60 tablet 0    aspirin (ASA) 81 MG chewable tablet Take 1 tablet (81 mg) by mouth daily. Do not start before August 23, 2024. 30 tablet 0    blood glucose (NO BRAND SPECIFIED) lancets standard To use to test glucose level in the blood Use to test  blood sugar  2  times daily as directed. To accompany glucose monitor brands per insurance coverage. 100 each 0    blood glucose (NO BRAND SPECIFIED) test strip To use to test glucose level in the blood Use to test blood sugar  2 times daily as directed. To accompany glucose monitor brands per insurance coverage. 100 strip 0    blood glucose calibration (NO BRAND SPECIFIED) solution Used to calibrate the blood glucose monitor as needed and as directed.  To accompany  blood glucose brands per insurance coverage 1 each 0    blood glucose monitoring (NO BRAND SPECIFIED) meter device kit Use as directed , Per insurance coverage 1 kit 0    glipiZIDE (GLUCOTROL XL) 5 MG 24 hr tablet Take 5 mg by mouth every morning      glucose (BD GLUCOSE) 4 g chewable tablet Take 4 tablets by mouth every 15 minutes as needed for low blood sugar 50 tablet 0    insulin pen needle (32G X 4 MM) 32G X 4 MM miscellaneous Use as directed by provider Per insurance coverage 100 each 0    metoprolol tartrate (LOPRESSOR) 25 MG tablet Take 1 tablet (25 mg) by mouth 2 times daily. 60 tablet 0    Sharps Container MISC Use as directed to dispose of needles, lancets and other sharps Per Insurance coverage 1 each 0    Torsemide 40 MG TABS Take 80 mg by mouth daily 60 tablet 0    traZODone (DESYREL) 50 MG tablet Take 50 mg by mouth at bedtime           ALLERGIES:  Allergies   Allergen Reactions    Blood-Group Specific Substance Other (See Comments)     Patient has a non specific antibody.  Blood product orders may be delayed.  Please draw on red top and 2 purple top tubes for all Type and Screen/ type and Cross match orders.    Hydrochlorothiazide Rash       FAMILY HISTORY:  No family history on file.    SOCIAL HISTORY:  Social History     Socioeconomic History    Marital status:    Tobacco Use    Smoking status: Former     Types: Cigarettes    Smokeless tobacco: Never   Substance and Sexual Activity    Alcohol use: Not Currently     Social  "Drivers of Health     Financial Resource Strain: Low Risk  (4/22/2024)    Received from [facility] [facility]    Financial Resource Strain     Difficulty of Paying Living Expenses: 3   Food Insecurity: No Food Insecurity (4/22/2024)    Received from [facility] [employer] [facility]    Food Insecurity     Do you worry your food will run out before you are able to buy more?: 1   Transportation Needs: No Transportation Needs (4/22/2024)    Received from [facility] [employer]    Transportation Needs     Does lack of transportation keep you from medical appointments?: 1     Does lack of transportation keep you from work, meetings or getting things that you need?: 1   Social Connections: Socially Integrated (4/22/2024)    Received from [facility] [employer] [facility]    Social Connections     Do you often feel lonely or isolated from those around you?: 0   Housing Stability: Low Risk  (4/22/2024)    Received from [facility]    Housing Stability     What is your housing situation today?: 1       VITALS:  Patient Vitals for the past 24 hrs:   BP Temp Pulse Resp SpO2 Height Weight   12/08/24 2330 -- -- 65 18 92 % -- --   12/08/24 2300 138/64 -- 66 18 94 % -- --   12/08/24 2238 -- -- 65 -- 93 % -- --   12/08/24 2138 -- -- 63 -- 91 % -- --   12/08/24 2128 (!) 150/69 97.8  F (36.6  C) 70 18 -- 1.727 m (5' 8\") 77.1 kg (170 lb)       PHYSICAL EXAM    VITAL SIGNS: /64   Pulse 65   Temp 97.8  F (36.6  C)   Resp 18   Ht 1.727 m (5' 8\")   Wt 77.1 kg (170 lb)   SpO2 92%   BMI 25.85 kg/m      General Appearance: Well-appearing, well-nourished, no acute distress   Head:  Normocephalic, without obvious abnormality, atraumatic  Eyes:  PERRL, conjunctiva/corneas clear, EOM's intact,  ENT:  Lips, mucosa, and tongue normal, membranes are moist without pallor  Neck:  Normal ROM, symmetrical, trachea midline    Chest:  No " tenderness or deformity, no crepitus  Cardio:  Regular rate and rhythm, no murmur, rub or gallop, 2+ pulses symmetric in all extremities  Pulm:  Clear to auscultation bilaterally, respirations unlabored,  Abdomen:  Soft, non-tender, no rebound or guarding.  Musculoskeletal:  2+ edema to the left lower leg. Tenderness to palpation to the left lower hip,  no cyanosis, good ROM of major joints  Integument:  Warm, Dry, No erythema, No rash.    Neurologic:  Alert & oriented.  No focal deficits appreciated.    Psychiatric:  Affect normal, Judgment normal, Mood normal.      LABS  Results for orders placed or performed during the hospital encounter of 12/08/24 (from the past 24 hours)   [place] Draw    Narrative    The following orders were created for panel order [place] Draw.  Procedure                               Abnormality         Status                     ---------                               -----------         ------                     Extra Blue Top Tube[[id number]]                              Final result               Extra Green Top (Lithium...[[id number]]                      Final result               Extra Purple Top Tube[[id number]]                            Final result                 Please view results for these tests on the individual orders.   Extra Blue Top Tube   Result Value Ref Range    Hold Specimen JIC    Extra Green Top (Lithium Heparin) Tube   Result Value Ref Range    Hold Specimen JIC    Extra Purple Top Tube   Result Value Ref Range    Hold Specimen JIC    Head CT w/o contrast    Narrative    EXAM: CT HEAD W/O CONTRAST  LOCATION: [facility]  DATE: 12/8/2024    INDICATION: Trauma.  COMPARISON: None.  TECHNIQUE: Routine CT Head without IV contrast. Multiplanar reformats. Dose reduction techniques were used.    FINDINGS:  INTRACRANIAL CONTENTS: No intracranial hemorrhage, extraaxial collection, or mass effect. No CT evidence of acute infarct. Moderate presumed  chronic small vessel ischemic changes. Moderate generalized volume loss. No hydrocephalus.     VISUALIZED ORBITS/SINUSES/MASTOIDS: No intraorbital abnormality. No paranasal sinus mucosal disease. No middle ear or mastoid effusion.    BONES/SOFT TISSUES: No acute abnormality.      Impression    IMPRESSION:  1.  No CT evidence for acute intracranial process.  2.  Brain atrophy and presumed chronic microvascular ischemic changes as above.   CT Cervical Spine w/o Contrast    Narrative    EXAM: CT CERVICAL SPINE W/O CONTRAST  LOCATION: [facility]  DATE: 12/8/2024    INDICATION: Trauma.  COMPARISON: None.  TECHNIQUE: Routine CT Cervical Spine without IV contrast. Multiplanar reformats. Dose reduction techniques were used.    FINDINGS:  VERTEBRA: Mild rightward curvature centered at C5 and leftward curvature of the upper thoracic spine. Minimal posterior spondylolisthesis at C3-C4. Otherwise, normal alignment. Normal vertebral body heights. Severe disc degeneration C3-C4, C5-C6, and   C6-C7. No fracture or posttraumatic subluxation.     CANAL/FORAMINA: Moderate spinal canal stenosis at C3-C4 with severe bilateral foraminal stenoses. Moderate to severe left foraminal stenosis C4-C5. Severe right foraminal stenosis C5-C6. Moderate right foraminal stenosis C6-C7.    PARASPINAL: Moderate to large right pleural effusion. Small right-sided pneumothorax.      Impression    IMPRESSION:  1.  No fracture or posttraumatic subluxation.  2.  No high-grade spinal canal or neural foraminal stenosis.  3.  Small right pneumothorax and moderate pleural effusion. Consider dedicated chest CT for more complete evaluation.    The results were communicated to Dr. Larry at 10:33 PM on 12/08/2024.   XR Pelvis 1/2 Views    Narrative    EXAM: PELVIS AND LEFT FEMUR 5 VIEWS  LOCATION: [facility]  DATE/TIME: 12/8/2024 9:56 PM CST    INDICATION: Trauma.  COMPARISON: None.      Impression    IMPRESSION:  "  1. Mildly displaced acute transverse fracture of the left femoral neck. The distal portion of the fracture is displaced laterally by 0.7 cm and there is approximately 1.0 cm of override. No significant angulation about the fracture.  2. Mild degenerative changes in bilateral hip joints.  3. Partial visualization of a left knee arthroplasty.   XR Femur Left 2 Views    Narrative    EXAM: PELVIS AND LEFT FEMUR 5 VIEWS  LOCATION: [facility]  DATE/TIME: 12/8/2024 9:56 PM CST    INDICATION: Trauma.  COMPARISON: None.      Impression    IMPRESSION:   1. Mildly displaced acute transverse fracture of the left femoral neck. The distal portion of the fracture is displaced laterally by 0.7 cm and there is approximately 1.0 cm of override. No significant angulation about the fracture.  2. Mild degenerative changes in bilateral hip joints.  3. Partial visualization of a left knee arthroplasty.   CT Chest w/o Contrast   Result Value Ref Range    Radiologist flags Pneumothorax (AA)     Narrative    EXAM: CT CHEST W/O CONTRAST  LOCATION: [facility]  DATE: 12/8/2024    INDICATION: Possible pneumothorax on ct c spine  COMPARISON: Cervical spine CT from prior to this examination. Chest CT from 08/18/2024.  TECHNIQUE: CT chest without IV contrast. Multiplanar reformats were obtained. Dose reduction techniques were used.  CONTRAST: None.    FINDINGS:   LUNGS AND PLEURA:   *  Small right-sided pneumothorax (less than 10% by volume of the right chest).  *  Moderate right and small left simple density nonloculated pleural effusions.  *  Diffuse upper lung predominant interstitial micronodules are unchanged from August 2024. This could represent the result of sarcoidosis, silicosis, or coal workers pneumoconiosis, among other possibilities.  *  Minimal chronic \"platelike\" scarring in both lungs with areas of \"round\" atelectasis in the right middle and right lower " lobes.    MEDIASTINUM/AXILLAE: Normal heart size without pericardial effusion. 3 vessel coronary artery disease. Previous transcatheter aortic valve replacement. Nonaneurysmal thoracic aorta. Calcified mildly enlarged hilar or mediastinal lymph nodes are   unchanged.    UPPER ABDOMEN: Metallic CBD stent with expected intrahepatic pneumobilia. No biliary dilatation.    MUSCULOSKELETAL: Unremarkable.      Impression    IMPRESSION:   *  Small right-sided pneumothorax (10% by volume) in the setting of likely underlying chronic lung disease from either sarcoidosis, silicosis, or coal workers pneumoconiosis.  *  Three-vessel coronary artery disease. Prior transcatheter aortic valve replacement.  *  Well-positioned metallic CBD stent without biliary dilatation (partially imaged).      [Critical Result: Pneumothorax]    Finding was identified on 12/8/2024 11:06 PM CST.     Dr. Larry was contacted by me on 12/8/2024 11:17 PM CST and verbalized understanding of the critical result.            RADIOLOGY  CT Chest w/o Contrast   Final Result   Abnormal   IMPRESSION:    *  Small right-sided pneumothorax (10% by volume) in the setting of likely underlying chronic lung disease from either sarcoidosis, silicosis, or coal workers pneumoconiosis.   *  Three-vessel coronary artery disease. Prior transcatheter aortic valve replacement.   *  Well-positioned metallic CBD stent without biliary dilatation (partially imaged).         [Critical Result: Pneumothorax]      Finding was identified on 12/8/2024 11:06 PM CST.       Dr. Larry was contacted by me on 12/8/2024 11:17 PM CST and verbalized understanding of the critical result.          XR Femur Left 2 Views   Final Result   IMPRESSION:    1. Mildly displaced acute transverse fracture of the left femoral neck. The distal portion of the fracture is displaced laterally by 0.7 cm and there is approximately 1.0 cm of override. No significant angulation about the fracture.   2. Mild  degenerative changes in bilateral hip joints.   3. Partial visualization of a left knee arthroplasty.      XR Pelvis 1/2 Views   Final Result   IMPRESSION:    1. Mildly displaced acute transverse fracture of the left femoral neck. The distal portion of the fracture is displaced laterally by 0.7 cm and there is approximately 1.0 cm of override. No significant angulation about the fracture.   2. Mild degenerative changes in bilateral hip joints.   3. Partial visualization of a left knee arthroplasty.      CT Cervical Spine w/o Contrast   Final Result   IMPRESSION:   1.  No fracture or posttraumatic subluxation.   2.  No high-grade spinal canal or neural foraminal stenosis.   3.  Small right pneumothorax and moderate pleural effusion. Consider dedicated chest CT for more complete evaluation.      The results were communicated to Dr. Larry at 10:33 PM on 12/08/2024.      Head CT w/o contrast   Final Result   IMPRESSION:   1.  No CT evidence for acute intracranial process.   2.  Brain atrophy and presumed chronic microvascular ischemic changes as above.      CT Hip Left w/o Contrast    (Results Pending)            MEDICATIONS GIVEN IN THE EMERGENCY:  Medications   HYDROmorphone (PF) (DILAUDID) injection 0.5 mg (0.5 mg Intravenous $Given 12/8/24 8997)       NEW PRESCRIPTIONS STARTED AT TODAY'S ER VISIT  New Prescriptions    No medications on file        I, Daja Farnsworth, am serving as a scribe to document services personally performed by Obi Larry D.O., based on my observations and the provider's statements to me.  I, Obi Larry D.O., attest that Daja Farnsworth is acting in a scribe capacity, has observed my performance of the services and has documented them in accordance with my direction.     Obi Larry D.O.  Emergency Medicine  [facility] EMERGENCY ROOM  [address] [facility] [address] [facility] [address]  [phone]  Dept: [phone]       Obi Larry DO  12/08/24 0670

## 2024-12-09 NOTE — PHARMACY-ADMISSION MEDICATION HISTORY
"Pharmacy Intern Admission Medication History    Admission medication history is complete. The information provided in this note is only as accurate as the sources available at the time of the update.    Information Source(s): Patient via in-person    Pertinent Information: Atorvastatin 40 mg on dispense report, last filled 08/05/24. Per note from 08/21/24 \"His atorvastatin is on hold for liver function abnormality.\"  Patient is unsure if taking or not. Per dispense report Torsemide directions are 40 mg in AM and 20 mg at 2 PM as of 11/18/24. Per patient he takes 80 mg once daily.     Changes made to PTA medication list:  Added: None  Deleted: Aspirin  Changed: None    Allergies reviewed with patient and updates made in EHR: yes    Medication History Completed By: Delmy Gibson 12/9/2024 7:58 AM    PTA Med List   Medication Sig Last Dose/Taking    amLODIPine (NORVASC) 10 MG tablet Take 1 tablet (10 mg) by mouth daily 12/8/2024 Morning    apixaban ANTICOAGULANT (ELIQUIS ANTICOAGULANT) 2.5 MG tablet Take 1 tablet (2.5 mg) by mouth 2 times daily. Do not start before August 23, 2024. 12/8/2024 Morning    glipiZIDE (GLUCOTROL XL) 5 MG 24 hr tablet Take 5 mg by mouth every morning 12/8/2024 Morning    metoprolol tartrate (LOPRESSOR) 25 MG tablet Take 1 tablet (25 mg) by mouth 2 times daily. 12/8/2024 Morning    Torsemide 40 MG TABS Take 80 mg by mouth daily 12/8/2024 Morning    traZODone (DESYREL) 50 MG tablet Take 50 mg by mouth at bedtime 12/7/2024      "

## 2024-12-09 NOTE — CONSULTS
INITIAL PULMONARY   12/9/2024      Admit Date: 12/8/2024  Hospital Day: 1   CODE: No CPR- Do NOT Intubate    Reason for Consult: Right-sided pneumothorax and pleural effusion      Assessment/Plan:   Per Beasley is a 84 year old male with a past medical history significant for pancreatic cancer s/p placement of a CBD stent, aortic stenosis s/p TAVR, bradycardia, CAD, CKD, COPD, DM, HTN, A-fib admitted for a fall.  Imaging at the time presentation demonstrated a right-sided pneumothorax with a moderate right-sided pleural effusion for which she underwent placement of a chest tube.      Recommend:  Right-sided pneumothorax and pleural effusion: Demonstrates a moderate-sized right-sided pleural effusion given that his hemoglobin is fairly stable I am not clear of the etiology of this.  I have ordered pleural fluid studies to determine nature of effusion.  I will also collect Gram stain and cultures.  Continue chest tube to suction at -20 cm of water.  Known pancreatic cancer: Follows with gastroenterology for this and does have a CBD stent in place.  I am not entirely clear if his effusion is related to his pancreatic cancer or not but we will check pleural fluid cytology.  Left-sided femoral neck fracture: Will defer to orthopedics and primary team to manage this.  In reviewing the chart, it is thought surgical intervention may occur on 12/10.    Will continue to follow with you.    Sharon Lacey MD  Pulmonary and Critical Care  [facility]      HPI:   CCx: Right-sided pleural effusion and pneumothorax    HPI: Mr. Beasley is an 84 year old male with a past medical history significant for pancreatic cancer s/p placement of a CBD stent, aortic stenosis s/p TAVR, bradycardia, CAD, CKD, COPD, DM, HTN, A-fib admitted for a fall.  Apparently he had a mechanical fall after slipping on his socks at home and is noted to be on Eliquis at baseline.  He underwent a CT scan of his head and neck which did not demonstrate  "any acute intracranial abnormalities, however, chest imaging as a part of the trauma protocol did reveal a pneumothorax and a moderate pleural effusion and a left femoral neck fracture.    ROS: Pertinent positives alluded to in the HPI. Remainder of 10 point ROS is negative.                                                                                                                                                       Medical/Surgical history:      Allergies:  Reviewed in Epic    PTA medications:  (Not in a hospital admission)      Family Hx:  Reviewed.    Social Hx:  Reviewed.    Exam/Data:   ROS: 10 point ROS obtained, pertinant positives alluded to in HPI    Vitals  BP (!) 162/70   Pulse 59   Temp 97.3  F (36.3  C) (Oral)   Resp (!) 33   Ht 1.727 m (5' 8\")   Wt 77.1 kg (170 lb)   SpO2 92%   BMI 25.85 kg/m    BP - Mean:  [75-99] 88  No intake/output data recorded.  Weight change:   [unfilled]  EXAM:  Physical Exam  HENT:      Head: Normocephalic.      Mouth/Throat:      Mouth: Mucous membranes are dry.   Cardiovascular:      Rate and Rhythm: Bradycardia present. Rhythm irregular.      Heart sounds: Normal heart sounds.   Pulmonary:      Breath sounds: Normal breath sounds.      Comments: Right sided chest tube noted.  Abdominal:      General: Abdomen is flat.   Skin:     General: Skin is warm.   Neurological:      General: No focal deficit present.      Mental Status: He is alert.           Medications:     Current Facility-Administered Medications   Medication Dose Route Frequency Provider Last Rate Last Admin     Current Facility-Administered Medications   Medication Dose Route Frequency Provider Last Rate Last Admin    [Held by provider] amLODIPine (NORVASC) tablet 10 mg  10 mg Oral Daily Severino Salomon MD        [Held by provider] apixaban ANTICOAGULANT (ELIQUIS) tablet 2.5 mg  2.5 mg Oral BID Severino Salomon MD        furosemide (LASIX) injection 40 mg  40 mg Intravenous Daily " Adams Mahmood MD   40 mg at 12/09/24 0754    insulin aspart (NovoLOG) injection (RAPID ACTING)  1-7 Units Subcutaneous TID w/meals Severino Salomon MD        metoprolol tartrate (LOPRESSOR) tablet 25 mg  25 mg Oral BID Severino Salomon MD        sodium chloride (PF) 0.9% PF flush 3 mL  3 mL Intracatheter Q8H Adams Mahmood MD   3 mL at 12/09/24 0045    traZODone (DESYREL) tablet 50 mg  50 mg Oral At Bedtime Severino Salomon MD             DATA  All laboratory and radiology has been personally reviewed by myself today.  Recent Labs   Lab 12/08/24 2138   WBC 6.3   HGB 11.6*   HCT 33.7*        Recent Labs   Lab 12/09/24  0554 12/08/24 2138   * 131*   CO2 24 24   BUN 26.3* 27.5*     IMAGING:   CT Chest w/o Contrast 12/8/24:  *  Small right-sided pneumothorax (10% by volume) in the setting of likely underlying chronic lung disease from either sarcoidosis, silicosis, or coal workers pneumoconiosis.  *  Three-vessel coronary artery disease. Prior transcatheter aortic valve replacement.  *  Well-positioned metallic CBD stent without biliary dilatation (partially imaged).       Sharon Lacey MD  Pulmonary and Critical Care  [facility]

## 2024-12-09 NOTE — PRE-PROCEDURE
GENERAL PRE-PROCEDURE:   Procedure:  CT guided right chest tube placement with moderate sedation  Date/Time:  12/9/2024 8:45 AM    Written consent obtained?: Yes    Risks and benefits: Risks, benefits and alternatives were discussed    Consent given by:  Patient  Patient states understanding of procedure being performed: Yes    Patient's understanding of procedure matches consent: Yes    Procedure consent matches procedure scheduled: Yes    Expected level of sedation:  Moderate  Appropriately NPO:  Yes  ASA Class:  2  Mallampati  :  Grade 2- soft palate, base of uvula, tonsillar pillars, and portion of posterior pharyngeal wall visible  Lungs:  Lungs clear with good breath sounds bilaterally  Heart:  Normal heart sounds and rate  History & Physical reviewed:  History and physical reviewed and no updates needed  Statement of review:  I have reviewed the lab findings, diagnostic data, medications, and the plan for sedation

## 2024-12-10 ENCOUNTER — ANESTHESIA (OUTPATIENT)
Dept: SURGERY | Facility: CLINIC | Age: 84
End: 2024-12-10
Payer: MEDICARE

## 2024-12-10 ENCOUNTER — APPOINTMENT (OUTPATIENT)
Dept: ULTRASOUND IMAGING | Facility: CLINIC | Age: 84
DRG: 521 | End: 2024-12-10
Attending: STUDENT IN AN ORGANIZED HEALTH CARE EDUCATION/TRAINING PROGRAM
Payer: MEDICARE

## 2024-12-10 ENCOUNTER — ANESTHESIA EVENT (OUTPATIENT)
Dept: SURGERY | Facility: CLINIC | Age: 84
End: 2024-12-10
Payer: MEDICARE

## 2024-12-10 ENCOUNTER — APPOINTMENT (OUTPATIENT)
Dept: RADIOLOGY | Facility: CLINIC | Age: 84
DRG: 521 | End: 2024-12-10
Attending: ORTHOPAEDIC SURGERY
Payer: MEDICARE

## 2024-12-10 ENCOUNTER — APPOINTMENT (OUTPATIENT)
Dept: RADIOLOGY | Facility: CLINIC | Age: 84
DRG: 521 | End: 2024-12-10
Attending: INTERNAL MEDICINE
Payer: MEDICARE

## 2024-12-10 LAB
ANION GAP SERPL CALCULATED.3IONS-SCNC: 10 MMOL/L (ref 7–15)
APPEARANCE FLD: ABNORMAL
BASOPHILS # BLD AUTO: 0 10E3/UL (ref 0–0.2)
BASOPHILS NFR BLD AUTO: 0 %
BASOPHILS NFR FLD MANUAL: 1 %
BLD PROD TYP BPU: NORMAL
BLOOD COMPONENT TYPE: NORMAL
BUN SERPL-MCNC: 24.9 MG/DL (ref 8–23)
CALCIUM SERPL-MCNC: 9.1 MG/DL (ref 8.8–10.4)
CELL COUNT BODY FLUID SOURCE: ABNORMAL
CHLORIDE SERPL-SCNC: 98 MMOL/L (ref 98–107)
CODING SYSTEM: NORMAL
COLOR FLD: ABNORMAL
CREAT SERPL-MCNC: 1.9 MG/DL (ref 0.67–1.17)
CROSSMATCH: NORMAL
EGFRCR SERPLBLD CKD-EPI 2021: 34 ML/MIN/1.73M2
EOSINOPHIL # BLD AUTO: 0.3 10E3/UL (ref 0–0.7)
EOSINOPHIL NFR BLD AUTO: 4 %
EOSINOPHIL NFR FLD MANUAL: 16 %
ERYTHROCYTE [DISTWIDTH] IN BLOOD BY AUTOMATED COUNT: 12.5 % (ref 10–15)
GLUCOSE BLDC GLUCOMTR-MCNC: 195 MG/DL (ref 70–99)
GLUCOSE BLDC GLUCOMTR-MCNC: 211 MG/DL (ref 70–99)
GLUCOSE BLDC GLUCOMTR-MCNC: 239 MG/DL (ref 70–99)
GLUCOSE BLDC GLUCOMTR-MCNC: 275 MG/DL (ref 70–99)
GLUCOSE BLDC GLUCOMTR-MCNC: 290 MG/DL (ref 70–99)
GLUCOSE BLDC GLUCOMTR-MCNC: 309 MG/DL (ref 70–99)
GLUCOSE BODY FLUID SOURCE: NORMAL
GLUCOSE FLD-MCNC: 210 MG/DL
GLUCOSE SERPL-MCNC: 278 MG/DL (ref 70–99)
HCO3 SERPL-SCNC: 26 MMOL/L (ref 22–29)
HCT VFR BLD AUTO: 34.1 % (ref 40–53)
HGB BLD-MCNC: 11.5 G/DL (ref 13.3–17.7)
IMM GRANULOCYTES # BLD: 0 10E3/UL
IMM GRANULOCYTES NFR BLD: 0 %
LD BODY BODY FLUID SOURCE: NORMAL
LDH FLD L TO P-CCNC: 339 U/L
LYMPHOCYTES # BLD AUTO: 2.6 10E3/UL (ref 0.8–5.3)
LYMPHOCYTES NFR BLD AUTO: 33 %
LYMPHOCYTES NFR FLD MANUAL: 82 %
MAGNESIUM SERPL-MCNC: 2.4 MG/DL (ref 1.7–2.3)
MCH RBC QN AUTO: 27.8 PG (ref 26.5–33)
MCHC RBC AUTO-ENTMCNC: 33.7 G/DL (ref 31.5–36.5)
MCV RBC AUTO: 83 FL (ref 78–100)
MONOCYTES # BLD AUTO: 0.6 10E3/UL (ref 0–1.3)
MONOCYTES NFR BLD AUTO: 7 %
MONOS+MACROS NFR FLD MANUAL: 0 %
NEUTROPHILS # BLD AUTO: 4.2 10E3/UL (ref 1.6–8.3)
NEUTROPHILS NFR BLD AUTO: 55 %
NEUTS BAND NFR FLD MANUAL: 1 %
NRBC # BLD AUTO: 0 10E3/UL
NRBC BLD AUTO-RTO: 0 /100
PLATELET # BLD AUTO: 148 10E3/UL (ref 150–450)
POTASSIUM SERPL-SCNC: 5 MMOL/L (ref 3.4–5.3)
PROT FLD-MCNC: 3.8 G/DL
PROTEIN BODY FLUID SOURCE: NORMAL
RBC # BLD AUTO: 4.13 10E6/UL (ref 4.4–5.9)
SODIUM SERPL-SCNC: 134 MMOL/L (ref 135–145)
UNIT ABO/RH: NORMAL
UNIT NUMBER: NORMAL
UNIT STATUS: NORMAL
UNIT TYPE ISBT: 9500
WBC # BLD AUTO: 7.7 10E3/UL (ref 4–11)
WBC # FLD AUTO: 967 /UL

## 2024-12-10 PROCEDURE — 250N000013 HC RX MED GY IP 250 OP 250 PS 637

## 2024-12-10 PROCEDURE — 999N000065 XR CHEST PORT 1 VIEW

## 2024-12-10 PROCEDURE — 250N000009 HC RX 250: Performed by: ORTHOPAEDIC SURGERY

## 2024-12-10 PROCEDURE — 250N000011 HC RX IP 250 OP 636: Performed by: INTERNAL MEDICINE

## 2024-12-10 PROCEDURE — 120N000001 HC R&B MED SURG/OB

## 2024-12-10 PROCEDURE — 36415 COLL VENOUS BLD VENIPUNCTURE: CPT | Performed by: FAMILY MEDICINE

## 2024-12-10 PROCEDURE — 83735 ASSAY OF MAGNESIUM: CPT | Performed by: FAMILY MEDICINE

## 2024-12-10 PROCEDURE — 250N000011 HC RX IP 250 OP 636: Performed by: ORTHOPAEDIC SURGERY

## 2024-12-10 PROCEDURE — 258N000003 HC RX IP 258 OP 636: Performed by: ORTHOPAEDIC SURGERY

## 2024-12-10 PROCEDURE — 250N000011 HC RX IP 250 OP 636: Mod: JZ | Performed by: ORTHOPAEDIC SURGERY

## 2024-12-10 PROCEDURE — 99232 SBSQ HOSP IP/OBS MODERATE 35: CPT | Performed by: FAMILY MEDICINE

## 2024-12-10 PROCEDURE — 250N000025 HC SEVOFLURANE, PER MIN: Performed by: ORTHOPAEDIC SURGERY

## 2024-12-10 PROCEDURE — 250N000011 HC RX IP 250 OP 636: Performed by: NURSE ANESTHETIST, CERTIFIED REGISTERED

## 2024-12-10 PROCEDURE — 999N000141 HC STATISTIC PRE-PROCEDURE NURSING ASSESSMENT: Performed by: ORTHOPAEDIC SURGERY

## 2024-12-10 PROCEDURE — 250N000009 HC RX 250: Performed by: ANESTHESIOLOGY

## 2024-12-10 PROCEDURE — 93971 EXTREMITY STUDY: CPT | Mod: LT

## 2024-12-10 PROCEDURE — 370N000017 HC ANESTHESIA TECHNICAL FEE, PER MIN: Performed by: ORTHOPAEDIC SURGERY

## 2024-12-10 PROCEDURE — 250N000011 HC RX IP 250 OP 636: Performed by: ANESTHESIOLOGY

## 2024-12-10 PROCEDURE — 250N000009 HC RX 250: Performed by: NURSE ANESTHETIST, CERTIFIED REGISTERED

## 2024-12-10 PROCEDURE — 99222 1ST HOSP IP/OBS MODERATE 55: CPT | Performed by: CLINICAL NURSE SPECIALIST

## 2024-12-10 PROCEDURE — 99233 SBSQ HOSP IP/OBS HIGH 50: CPT | Performed by: INTERNAL MEDICINE

## 2024-12-10 PROCEDURE — 250N000011 HC RX IP 250 OP 636: Mod: JZ | Performed by: FAMILY MEDICINE

## 2024-12-10 PROCEDURE — 250N000013 HC RX MED GY IP 250 OP 250 PS 637: Performed by: FAMILY MEDICINE

## 2024-12-10 PROCEDURE — 250N000013 HC RX MED GY IP 250 OP 250 PS 637: Performed by: INTERNAL MEDICINE

## 2024-12-10 PROCEDURE — C1713 ANCHOR/SCREW BN/BN,TIS/BN: HCPCS | Performed by: ORTHOPAEDIC SURGERY

## 2024-12-10 PROCEDURE — 85025 COMPLETE CBC W/AUTO DIFF WBC: CPT | Performed by: FAMILY MEDICINE

## 2024-12-10 PROCEDURE — 258N000003 HC RX IP 258 OP 636: Performed by: NURSE ANESTHETIST, CERTIFIED REGISTERED

## 2024-12-10 PROCEDURE — 0SRS019 REPLACEMENT OF LEFT HIP JOINT, FEMORAL SURFACE WITH METAL SYNTHETIC SUBSTITUTE, CEMENTED, OPEN APPROACH: ICD-10-PCS | Performed by: ORTHOPAEDIC SURGERY

## 2024-12-10 PROCEDURE — C1776 JOINT DEVICE (IMPLANTABLE): HCPCS | Performed by: ORTHOPAEDIC SURGERY

## 2024-12-10 PROCEDURE — 88311 DECALCIFY TISSUE: CPT | Mod: TC | Performed by: ORTHOPAEDIC SURGERY

## 2024-12-10 PROCEDURE — 272N000001 HC OR GENERAL SUPPLY STERILE: Performed by: ORTHOPAEDIC SURGERY

## 2024-12-10 PROCEDURE — 250N000012 HC RX MED GY IP 250 OP 636 PS 637: Performed by: FAMILY MEDICINE

## 2024-12-10 PROCEDURE — 360N000077 HC SURGERY LEVEL 4, PER MIN: Performed by: ORTHOPAEDIC SURGERY

## 2024-12-10 PROCEDURE — 82565 ASSAY OF CREATININE: CPT | Performed by: FAMILY MEDICINE

## 2024-12-10 PROCEDURE — 999N000065 XR PELVIS AND HIP PORTABLE LEFT 2 VIEWS

## 2024-12-10 PROCEDURE — 258N000001 HC RX 258: Performed by: ORTHOPAEDIC SURGERY

## 2024-12-10 PROCEDURE — 710N000010 HC RECOVERY PHASE 1, LEVEL 2, PER MIN: Performed by: ORTHOPAEDIC SURGERY

## 2024-12-10 DEVICE — 25MM BUCK CEMENT PLUG WITH                                    DISPOSABLE INSERTER
Type: IMPLANTABLE DEVICE | Site: HIP | Status: FUNCTIONAL
Brand: BUCK

## 2024-12-10 DEVICE — CEMENTRALIZER STEM CENTRALIZER 11.0MM CEMENTED
Type: IMPLANTABLE DEVICE | Site: HIP | Status: FUNCTIONAL
Brand: CEMENTRALIZER

## 2024-12-10 DEVICE — SELF CENTERING BI-POLAR HEAD 28MM ID 52MM OD
Type: IMPLANTABLE DEVICE | Site: HIP | Status: FUNCTIONAL
Brand: SELF CENTERING

## 2024-12-10 DEVICE — ARTICUL/EZE FEMORAL HEAD DIAMETER 28MM +5 12/14 TAPER
Type: IMPLANTABLE DEVICE | Site: HIP | Status: FUNCTIONAL
Brand: ARTICUL/EZE

## 2024-12-10 DEVICE — SUMMIT FEMORAL STEM 12/14 TAPER CEMENTED SIZE 6 HI 127MM
Type: IMPLANTABLE DEVICE | Site: HIP | Status: FUNCTIONAL
Brand: SUMMIT

## 2024-12-10 DEVICE — TOBRA FULL DOSE ANTIBIOTIC BONE CEMENT, 10 PACK CATALOG NUMBER IS 6197-9-010
Type: IMPLANTABLE DEVICE | Site: HIP | Status: FUNCTIONAL
Brand: SIMPLEX

## 2024-12-10 RX ORDER — SODIUM CHLORIDE, SODIUM LACTATE, POTASSIUM CHLORIDE, CALCIUM CHLORIDE 600; 310; 30; 20 MG/100ML; MG/100ML; MG/100ML; MG/100ML
INJECTION, SOLUTION INTRAVENOUS CONTINUOUS
Status: DISCONTINUED | OUTPATIENT
Start: 2024-12-10 | End: 2024-12-10 | Stop reason: HOSPADM

## 2024-12-10 RX ORDER — CEFAZOLIN SODIUM 2 G/100ML
2 INJECTION, SOLUTION INTRAVENOUS SEE ADMIN INSTRUCTIONS
Status: DISCONTINUED | OUTPATIENT
Start: 2024-12-10 | End: 2024-12-10 | Stop reason: HOSPADM

## 2024-12-10 RX ORDER — PROCHLORPERAZINE MALEATE 5 MG/1
5 TABLET ORAL EVERY 6 HOURS PRN
Status: DISCONTINUED | OUTPATIENT
Start: 2024-12-10 | End: 2024-12-17 | Stop reason: HOSPADM

## 2024-12-10 RX ORDER — FENTANYL CITRATE 50 UG/ML
100 INJECTION, SOLUTION INTRAMUSCULAR; INTRAVENOUS
Status: DISCONTINUED | OUTPATIENT
Start: 2024-12-10 | End: 2024-12-10 | Stop reason: HOSPADM

## 2024-12-10 RX ORDER — SODIUM CHLORIDE, SODIUM LACTATE, POTASSIUM CHLORIDE, CALCIUM CHLORIDE 600; 310; 30; 20 MG/100ML; MG/100ML; MG/100ML; MG/100ML
INJECTION, SOLUTION INTRAVENOUS CONTINUOUS PRN
Status: DISCONTINUED | OUTPATIENT
Start: 2024-12-10 | End: 2024-12-10

## 2024-12-10 RX ORDER — HYDROMORPHONE HCL IN WATER/PF 6 MG/30 ML
0.4 PATIENT CONTROLLED ANALGESIA SYRINGE INTRAVENOUS EVERY 5 MIN PRN
Status: DISCONTINUED | OUTPATIENT
Start: 2024-12-10 | End: 2024-12-10 | Stop reason: HOSPADM

## 2024-12-10 RX ORDER — ONDANSETRON 2 MG/ML
4 INJECTION INTRAMUSCULAR; INTRAVENOUS EVERY 30 MIN PRN
Status: DISCONTINUED | OUTPATIENT
Start: 2024-12-10 | End: 2024-12-10 | Stop reason: HOSPADM

## 2024-12-10 RX ORDER — LIDOCAINE HYDROCHLORIDE 10 MG/ML
INJECTION, SOLUTION INFILTRATION; PERINEURAL
Status: COMPLETED | OUTPATIENT
Start: 2024-12-10 | End: 2024-12-10

## 2024-12-10 RX ORDER — AMOXICILLIN 250 MG
1 CAPSULE ORAL 2 TIMES DAILY
Status: DISCONTINUED | OUTPATIENT
Start: 2024-12-10 | End: 2024-12-17 | Stop reason: HOSPADM

## 2024-12-10 RX ORDER — ONDANSETRON 4 MG/1
4 TABLET, ORALLY DISINTEGRATING ORAL EVERY 6 HOURS PRN
Status: DISCONTINUED | OUTPATIENT
Start: 2024-12-10 | End: 2024-12-17 | Stop reason: HOSPADM

## 2024-12-10 RX ORDER — MAGNESIUM HYDROXIDE 1200 MG/15ML
LIQUID ORAL PRN
Status: DISCONTINUED | OUTPATIENT
Start: 2024-12-10 | End: 2024-12-10 | Stop reason: HOSPADM

## 2024-12-10 RX ORDER — CEFAZOLIN SODIUM/WATER 2 G/20 ML
SYRINGE (ML) INTRAVENOUS
Status: DISCONTINUED
Start: 2024-12-10 | End: 2024-12-10 | Stop reason: HOSPADM

## 2024-12-10 RX ORDER — FLUMAZENIL 0.1 MG/ML
0.2 INJECTION, SOLUTION INTRAVENOUS
Status: DISCONTINUED | OUTPATIENT
Start: 2024-12-10 | End: 2024-12-10 | Stop reason: HOSPADM

## 2024-12-10 RX ORDER — FENTANYL CITRATE 50 UG/ML
25 INJECTION, SOLUTION INTRAMUSCULAR; INTRAVENOUS EVERY 5 MIN PRN
Status: DISCONTINUED | OUTPATIENT
Start: 2024-12-10 | End: 2024-12-10 | Stop reason: HOSPADM

## 2024-12-10 RX ORDER — CEFAZOLIN SODIUM 2 G/100ML
2 INJECTION, SOLUTION INTRAVENOUS
Status: COMPLETED | OUTPATIENT
Start: 2024-12-10 | End: 2024-12-10

## 2024-12-10 RX ORDER — TRANEXAMIC ACID 650 MG/1
1950 TABLET ORAL ONCE
Status: DISCONTINUED | OUTPATIENT
Start: 2024-12-10 | End: 2024-12-10

## 2024-12-10 RX ORDER — HYDROMORPHONE HCL IN WATER/PF 6 MG/30 ML
0.2 PATIENT CONTROLLED ANALGESIA SYRINGE INTRAVENOUS EVERY 5 MIN PRN
Status: DISCONTINUED | OUTPATIENT
Start: 2024-12-10 | End: 2024-12-10 | Stop reason: HOSPADM

## 2024-12-10 RX ORDER — POLYETHYLENE GLYCOL 3350 17 G/17G
17 POWDER, FOR SOLUTION ORAL DAILY
Status: DISCONTINUED | OUTPATIENT
Start: 2024-12-11 | End: 2024-12-17 | Stop reason: HOSPADM

## 2024-12-10 RX ORDER — ONDANSETRON 2 MG/ML
INJECTION INTRAMUSCULAR; INTRAVENOUS PRN
Status: DISCONTINUED | OUTPATIENT
Start: 2024-12-10 | End: 2024-12-10

## 2024-12-10 RX ORDER — VANCOMYCIN HYDROCHLORIDE 1 G/20ML
INJECTION, POWDER, LYOPHILIZED, FOR SOLUTION INTRAVENOUS
Status: DISCONTINUED
Start: 2024-12-10 | End: 2024-12-10 | Stop reason: HOSPADM

## 2024-12-10 RX ORDER — BISACODYL 10 MG
10 SUPPOSITORY, RECTAL RECTAL DAILY PRN
Status: DISCONTINUED | OUTPATIENT
Start: 2024-12-13 | End: 2024-12-17 | Stop reason: HOSPADM

## 2024-12-10 RX ORDER — VANCOMYCIN HYDROCHLORIDE 1 G/20ML
INJECTION, POWDER, LYOPHILIZED, FOR SOLUTION INTRAVENOUS PRN
Status: DISCONTINUED | OUTPATIENT
Start: 2024-12-10 | End: 2024-12-10 | Stop reason: HOSPADM

## 2024-12-10 RX ORDER — ASPIRIN 81 MG/1
81 TABLET ORAL 2 TIMES DAILY
Status: DISCONTINUED | OUTPATIENT
Start: 2024-12-10 | End: 2024-12-13

## 2024-12-10 RX ORDER — ETOMIDATE 2 MG/ML
INJECTION INTRAVENOUS PRN
Status: DISCONTINUED | OUTPATIENT
Start: 2024-12-10 | End: 2024-12-10

## 2024-12-10 RX ORDER — SODIUM CHLORIDE, SODIUM LACTATE, POTASSIUM CHLORIDE, CALCIUM CHLORIDE 600; 310; 30; 20 MG/100ML; MG/100ML; MG/100ML; MG/100ML
INJECTION, SOLUTION INTRAVENOUS CONTINUOUS
Status: DISCONTINUED | OUTPATIENT
Start: 2024-12-10 | End: 2024-12-11

## 2024-12-10 RX ORDER — ONDANSETRON 2 MG/ML
4 INJECTION INTRAMUSCULAR; INTRAVENOUS EVERY 6 HOURS PRN
Status: DISCONTINUED | OUTPATIENT
Start: 2024-12-10 | End: 2024-12-17 | Stop reason: HOSPADM

## 2024-12-10 RX ORDER — CEFAZOLIN SODIUM 1 G/3ML
1 INJECTION, POWDER, FOR SOLUTION INTRAMUSCULAR; INTRAVENOUS EVERY 8 HOURS
Status: COMPLETED | OUTPATIENT
Start: 2024-12-11 | End: 2024-12-11

## 2024-12-10 RX ORDER — DEXTROSE MONOHYDRATE 25 G/50ML
25-50 INJECTION, SOLUTION INTRAVENOUS
Status: DISCONTINUED | OUTPATIENT
Start: 2024-12-10 | End: 2024-12-17 | Stop reason: HOSPADM

## 2024-12-10 RX ORDER — FENTANYL CITRATE 50 UG/ML
50 INJECTION, SOLUTION INTRAMUSCULAR; INTRAVENOUS EVERY 5 MIN PRN
Status: DISCONTINUED | OUTPATIENT
Start: 2024-12-10 | End: 2024-12-10 | Stop reason: HOSPADM

## 2024-12-10 RX ORDER — NICOTINE POLACRILEX 4 MG
15-30 LOZENGE BUCCAL
Status: DISCONTINUED | OUTPATIENT
Start: 2024-12-10 | End: 2024-12-17 | Stop reason: HOSPADM

## 2024-12-10 RX ORDER — NALOXONE HYDROCHLORIDE 0.4 MG/ML
0.1 INJECTION, SOLUTION INTRAMUSCULAR; INTRAVENOUS; SUBCUTANEOUS
Status: DISCONTINUED | OUTPATIENT
Start: 2024-12-10 | End: 2024-12-10 | Stop reason: HOSPADM

## 2024-12-10 RX ORDER — FENTANYL CITRATE 50 UG/ML
50 INJECTION, SOLUTION INTRAMUSCULAR; INTRAVENOUS
Status: DISCONTINUED | OUTPATIENT
Start: 2024-12-10 | End: 2024-12-10 | Stop reason: HOSPADM

## 2024-12-10 RX ORDER — BISACODYL 10 MG
10 SUPPOSITORY, RECTAL RECTAL ONCE
Status: COMPLETED | OUTPATIENT
Start: 2024-12-10 | End: 2024-12-10

## 2024-12-10 RX ORDER — ONDANSETRON 4 MG/1
4 TABLET, ORALLY DISINTEGRATING ORAL EVERY 30 MIN PRN
Status: DISCONTINUED | OUTPATIENT
Start: 2024-12-10 | End: 2024-12-10 | Stop reason: HOSPADM

## 2024-12-10 RX ORDER — LIDOCAINE 40 MG/G
CREAM TOPICAL
Status: DISCONTINUED | OUTPATIENT
Start: 2024-12-10 | End: 2024-12-17 | Stop reason: HOSPADM

## 2024-12-10 RX ORDER — LIDOCAINE 40 MG/G
CREAM TOPICAL
Status: DISCONTINUED | OUTPATIENT
Start: 2024-12-10 | End: 2024-12-10 | Stop reason: HOSPADM

## 2024-12-10 RX ORDER — DEXAMETHASONE SODIUM PHOSPHATE 4 MG/ML
INJECTION, SOLUTION INTRA-ARTICULAR; INTRALESIONAL; INTRAMUSCULAR; INTRAVENOUS; SOFT TISSUE PRN
Status: DISCONTINUED | OUTPATIENT
Start: 2024-12-10 | End: 2024-12-10

## 2024-12-10 RX ORDER — DEXAMETHASONE SODIUM PHOSPHATE 4 MG/ML
4 INJECTION, SOLUTION INTRA-ARTICULAR; INTRALESIONAL; INTRAMUSCULAR; INTRAVENOUS; SOFT TISSUE
Status: DISCONTINUED | OUTPATIENT
Start: 2024-12-10 | End: 2024-12-10 | Stop reason: HOSPADM

## 2024-12-10 RX ORDER — GLYCOPYRROLATE 0.2 MG/ML
INJECTION, SOLUTION INTRAMUSCULAR; INTRAVENOUS PRN
Status: DISCONTINUED | OUTPATIENT
Start: 2024-12-10 | End: 2024-12-10

## 2024-12-10 RX ORDER — FENTANYL CITRATE 50 UG/ML
INJECTION, SOLUTION INTRAMUSCULAR; INTRAVENOUS PRN
Status: DISCONTINUED | OUTPATIENT
Start: 2024-12-10 | End: 2024-12-10

## 2024-12-10 RX ADMIN — FUROSEMIDE 40 MG: 10 INJECTION, SOLUTION INTRAMUSCULAR; INTRAVENOUS at 07:49

## 2024-12-10 RX ADMIN — HYDROMORPHONE HYDROCHLORIDE 0.4 MG: 0.2 INJECTION, SOLUTION INTRAMUSCULAR; INTRAVENOUS; SUBCUTANEOUS at 19:19

## 2024-12-10 RX ADMIN — PHENYLEPHRINE HYDROCHLORIDE 50 MCG: 10 INJECTION INTRAVENOUS at 17:27

## 2024-12-10 RX ADMIN — DEXAMETHASONE SODIUM PHOSPHATE 4 MG: 4 INJECTION, SOLUTION INTRA-ARTICULAR; INTRALESIONAL; INTRAMUSCULAR; INTRAVENOUS; SOFT TISSUE at 16:26

## 2024-12-10 RX ADMIN — FENTANYL CITRATE 25 MCG: 50 INJECTION INTRAMUSCULAR; INTRAVENOUS at 18:16

## 2024-12-10 RX ADMIN — Medication 150 MG: at 18:03

## 2024-12-10 RX ADMIN — ASPIRIN 81 MG: 81 TABLET, COATED ORAL at 21:43

## 2024-12-10 RX ADMIN — PHENYLEPHRINE HYDROCHLORIDE 100 MCG: 10 INJECTION INTRAVENOUS at 17:57

## 2024-12-10 RX ADMIN — ONDANSETRON 4 MG: 2 INJECTION INTRAMUSCULAR; INTRAVENOUS at 18:09

## 2024-12-10 RX ADMIN — FENTANYL CITRATE 50 MCG: 50 INJECTION INTRAMUSCULAR; INTRAVENOUS at 18:56

## 2024-12-10 RX ADMIN — FENTANYL CITRATE 25 MCG: 50 INJECTION INTRAMUSCULAR; INTRAVENOUS at 16:11

## 2024-12-10 RX ADMIN — FENTANYL CITRATE 25 MCG: 50 INJECTION INTRAMUSCULAR; INTRAVENOUS at 17:11

## 2024-12-10 RX ADMIN — SODIUM CHLORIDE, POTASSIUM CHLORIDE, SODIUM LACTATE AND CALCIUM CHLORIDE: 600; 310; 30; 20 INJECTION, SOLUTION INTRAVENOUS at 16:02

## 2024-12-10 RX ADMIN — OXYCODONE 5 MG: 5 TABLET ORAL at 00:36

## 2024-12-10 RX ADMIN — PHENYLEPHRINE HYDROCHLORIDE 100 MCG: 10 INJECTION INTRAVENOUS at 17:29

## 2024-12-10 RX ADMIN — LIDOCAINE HYDROCHLORIDE 2 ML: 10 INJECTION, SOLUTION INFILTRATION; PERINEURAL at 16:26

## 2024-12-10 RX ADMIN — SENNOSIDES AND DOCUSATE SODIUM 1 TABLET: 50; 8.6 TABLET ORAL at 21:43

## 2024-12-10 RX ADMIN — ACETAMINOPHEN 650 MG: 325 TABLET ORAL at 07:45

## 2024-12-10 RX ADMIN — METOPROLOL TARTRATE 25 MG: 25 TABLET, FILM COATED ORAL at 07:54

## 2024-12-10 RX ADMIN — TRANEXAMIC ACID 1 G: 1 INJECTION, SOLUTION INTRAVENOUS at 16:37

## 2024-12-10 RX ADMIN — CEFAZOLIN SODIUM 2 G: 2 INJECTION, SOLUTION INTRAVENOUS at 16:02

## 2024-12-10 RX ADMIN — ONDANSETRON 4 MG: 2 INJECTION, SOLUTION INTRAMUSCULAR; INTRAVENOUS at 18:38

## 2024-12-10 RX ADMIN — INSULIN GLARGINE 12 UNITS: 100 INJECTION, SOLUTION SUBCUTANEOUS at 21:56

## 2024-12-10 RX ADMIN — GLYCOPYRROLATE 0.2 MG: 0.2 INJECTION INTRAMUSCULAR; INTRAVENOUS at 16:44

## 2024-12-10 RX ADMIN — GLYCOPYRROLATE 0.2 MG: 0.2 INJECTION INTRAMUSCULAR; INTRAVENOUS at 17:55

## 2024-12-10 RX ADMIN — OXYCODONE 5 MG: 5 TABLET ORAL at 07:45

## 2024-12-10 RX ADMIN — FENTANYL CITRATE 50 MCG: 50 INJECTION INTRAMUSCULAR; INTRAVENOUS at 19:03

## 2024-12-10 RX ADMIN — ETOMIDATE 16 MG: 2 INJECTION, SOLUTION INTRAVENOUS at 16:26

## 2024-12-10 RX ADMIN — PHENYLEPHRINE HYDROCHLORIDE 100 MCG: 10 INJECTION INTRAVENOUS at 17:40

## 2024-12-10 RX ADMIN — BISACODYL 10 MG: 10 SUPPOSITORY RECTAL at 11:10

## 2024-12-10 RX ADMIN — SODIUM CHLORIDE, POTASSIUM CHLORIDE, SODIUM LACTATE AND CALCIUM CHLORIDE: 600; 310; 30; 20 INJECTION, SOLUTION INTRAVENOUS at 17:32

## 2024-12-10 RX ADMIN — MORPHINE SULFATE 2 MG: 2 INJECTION, SOLUTION INTRAMUSCULAR; INTRAVENOUS at 03:05

## 2024-12-10 RX ADMIN — ROCURONIUM BROMIDE 40 MG: 10 INJECTION, SOLUTION INTRAVENOUS at 16:26

## 2024-12-10 RX ADMIN — LIDOCAINE HYDROCHLORIDE 2 ML: 10 INJECTION, SOLUTION INFILTRATION; PERINEURAL at 16:12

## 2024-12-10 RX ADMIN — FENTANYL CITRATE 25 MCG: 50 INJECTION INTRAMUSCULAR; INTRAVENOUS at 17:00

## 2024-12-10 ASSESSMENT — ACTIVITIES OF DAILY LIVING (ADL)
ADLS_ACUITY_SCORE: 53
ADLS_ACUITY_SCORE: 55
ADLS_ACUITY_SCORE: 53
ADLS_ACUITY_SCORE: 55
ADLS_ACUITY_SCORE: 53
ADLS_ACUITY_SCORE: 55
ADLS_ACUITY_SCORE: 53
ADLS_ACUITY_SCORE: 52
ADLS_ACUITY_SCORE: 53
ADLS_ACUITY_SCORE: 55
ADLS_ACUITY_SCORE: 53
DEPENDENT_IADLS:: INDEPENDENT
ADLS_ACUITY_SCORE: 53

## 2024-12-10 ASSESSMENT — COPD QUESTIONNAIRES: COPD: 1

## 2024-12-10 NOTE — PLAN OF CARE
Goal Outcome Evaluation:  Patient alert and oriented x4. Intermittent confusion.   1 L O2 NC. Lung sounds: pleural rub, diminished.   Chest tube in place. Output: 10 ml in canister, 30 ml sent to lab, 40 ml total. Set to suction, -20.   Pain 8/10. PRN medication administered.   Voiding.   PIV patent. Flushed.   Strict bedrest.   VSS. Afebrile.     Makes needs known. Call light within reach. Bed at lowest position. Bed alarm on.         HLM Admission: 8- Walk 250 feet or more  HLM Daily1- Lying in bed              Problem: Adult Inpatient Plan of Care  Goal: Optimal Comfort and Wellbeing  Outcome: Progressing  Intervention: Monitor Pain and Promote Comfort  Recent Flowsheet Documentation  Taken 12/10/2024 0036 by Shirley Currie, RN  Pain Management Interventions: medication (see MAR)     Problem: Gas Exchange Impaired  Goal: Optimal Gas Exchange  Outcome: Progressing

## 2024-12-10 NOTE — PROGRESS NOTES
"CLINICAL NUTRITION SERVICES - ASSESSMENT NOTE     Nutrition Prescription    RECOMMENDATIONS FOR MDs/PROVIDERS TO ORDER:  none    Malnutrition Status:    Pt does not meet 2 criteria     Recommendations by Registered Dietitian (RD):  Start Glucerna in chocolate daily when diet advances     Future/Additional Recommendations:  Will monitor progress towards goals     REASON FOR ASSESSMENT  Per Beasley is a/an 84 year old male assessed by the dietitian for Admission Nutrition Risk Screen for positive for wt loss, reduced po     Pertinent Medical Admission/History: hip fx, pneumothorax, pancreatic cancer, DM2, afib, CKD, anemia, COPD    NUTRITION HISTORY  Allergies: NKFA  Pt lives with wife in a condo. He states he has no issues getting food at home. He says his appetite has been poor for 2-3 weeks. He has been struggling with constipation too which has added to his decreased appetite. He doesn't drink nutrition supplements at home.     CURRENT NUTRITION ORDERS  Diet: NPO for surgery was on a moderate consistent carbohydrate  Intake/Tolerance: 12/9 pt ordered 1181kcal and 67g protein via 2 meals     PHYSICAL FINDINGS  See malnutrition section below.  Per flowsheet:  Edema- +1-+3 BLE   GI- constipation  Skin- upper flank incision, no skin breakdown though  Pain- 9/10 pain   Oral- pt has dentures, occasionally has some chewing issues     LABS  Labs reviewed, meter glucose-296, 309; Na-133, BUN-26.3, Cr-1.98  Last Hgb A1c was on 8/18/24-8.5    MEDICATIONS  Medications reviewed, lasix, novolog    ANTHROPOMETRICS  Height: 172.7 cm (5' 8\")  Most Recent Weight: 74.3 kg (163 lb 12.8 oz)    IBW: 70 kg  BMI: Normal BMI  Weight History:   Wt Readings from Last 10 Encounters:   12/10/24 74.3 kg (163 lb 12.8 oz)   08/20/24 77 kg (169 lb 12.8 oz)   08/20/24 78.8 kg (173 lb 11.6 oz)   07/10/24 81.2 kg (179 lb)   07/04/24 76.7 kg (169 lb 3.2 oz)   04/04/24 74.4 kg (164 lb 2 oz)   01/12/24 76.8 kg (169 lb 6.4 oz)   09/16/22 94.8 kg " (209 lb)   08/14/22 91.7 kg (202 lb 1.6 oz)     Suspect 179# as elevated or volume up. Using 173# wt pt is down 6% in 4 months     ASSESSED NUTRITION NEEDS  Dosing Weight: 74 kg  Estimated Energy Needs: 0746-7705 kcals/day (25 - 30 kcals/kg)  Justification: Maintenance  Estimated Protein Needs: 74-89 grams protein/day (1 - 1.2 grams of pro/kg)  Justification: Maintenance  Estimated Fluid Needs: 7661-5118 mL/day (25 - 30 mL/kg)   Justification: Maintenance      MALNUTRITION:  % Weight Loss:  Weight loss does not meet criteria for malnutrition 6% in 4 months, possible more wt loss as pt has a bit of fluid retention; will trend  % Intake:  <75% for >/= 1 month (moderate malnutrition)  Subcutaneous Fat Loss:  None observed  Muscle Loss:  None observed  Fluid Retention:  Moderate +1-+3 BLE     Malnutrition Diagnosis: Patient does not meet two of the above criteria necessary for diagnosing malnutrition, but at risk       NUTRITION DIAGNOSIS  Unintended weight loss related to reduced po as evidenced by 6% wt loss in 4 months       INTERVENTIONS  Implementation  When diet advances recommend Glucerna in chocolate daily   Education Needs- none noted at this time     Goals  Diet advancement vs nutrition support within 2-3 days.  Patient to consume % of nutritionally adequate meals three times per day, or the equivalent with supplements/snacks.  Regular Bms      Monitoring/Evaluation  Will monitor progress towards goals

## 2024-12-10 NOTE — PLAN OF CARE
Pt has been weaned to 1L NC. He still desaturates with movement but recovers his sats quickly. Chest tube is at 30ml of serosanginous output. LLE remains more swollen than right and is painful to any movement. Bed in lowest position, call light within reach, and bed alarm armed. Pt calls appropriately. He is on strict bedrest.         Goal Outcome Evaluation:           Overall Patient Progress: improvingOverall Patient Progress: improving       Problem: Adult Inpatient Plan of Care  Goal: Plan of Care Review  Description: The Plan of Care Review/Shift note should be completed every shift.  The Outcome Evaluation is a brief statement about your assessment that the patient is improving, declining, or no change.  This information will be displayed automatically on your shift  note.  Outcome: Progressing  Flowsheets (Taken 12/9/2024 1902)  Overall Patient Progress: improving  Goal: Absence of Hospital-Acquired Illness or Injury  Intervention: Identify and Manage Fall Risk  Recent Flowsheet Documentation  Taken 12/9/2024 1749 by Tana Santiago, RN  Safety Promotion/Fall Prevention:   safety round/check completed   room organization consistent   room near nurse's station   room door open   patient and family education   nonskid shoes/slippers when out of bed   clutter free environment maintained  Intervention: Prevent Infection  Recent Flowsheet Documentation  Taken 12/9/2024 1749 by Tana Santiago, RN  Infection Prevention: single patient room provided  Goal: Optimal Comfort and Wellbeing  Outcome: Progressing  Goal: Readiness for Transition of Care  Intervention: Mutually Develop Transition Plan  Recent Flowsheet Documentation  Taken 12/9/2024 1700 by Tana Santiago, RN  Equipment Currently Used at Home: walker, rolling

## 2024-12-10 NOTE — PROGRESS NOTES
Paged MD in regards to hyperglycemia. Correction dose of insulin ordered and given.     Also paged about pain, redness, edema on left lower extremity. MD order an US.     Update: US negative for DVT.

## 2024-12-10 NOTE — PROGRESS NOTES
Called [facility] with update that pulmonology would like chest tube to continue to be hooked up to suction during surgery.

## 2024-12-10 NOTE — PLAN OF CARE
Goal Outcome Evaluation:      Plan of Care Reviewed With: patient          Outcome Evaluation: DC home with HC vs TCU pending medical progression following surgery

## 2024-12-10 NOTE — PROGRESS NOTES
Pulmonary Progress Note  12/10/2024      Admit Date: 12/8/2024  Hospital Day: 2   CODE: No CPR- Do NOT Intubate    Reason for Consult: Right sided pleural effusion and pneumothorax      Interim Events:     No acute changes overnight.    Assessment/Plan:   Per Beasley is a 84 year old male with a past medical history significant for pancreatic cancer s/p placement of a CBD stent, aortic stenosis s/p TAVR, bradycardia, CAD, CKD, COPD, DM, HTN, A-fib admitted for a fall.  Imaging at the time presentation demonstrated a right-sided pneumothorax with a moderate right-sided pleural effusion for which she underwent placement of a chest tube.      Recommend:  Right-sided pneumothorax and pleural effusion: Demonstrates a moderate-sized right-sided pleural effusion  which is mostly lymphocytic and likely malignant from his known pancreatic cancer.   Continue chest tube to suction at -20 cm of water.  CXR today.  Known pancreatic cancer: Follows with gastroenterology for this and does have a CBD stent in place. Has previously seen [facility] in September and declined any treatments for this.  Have placed a consult to palliative care strongly feel that this patient should be managed in a more comfort based means.  Left-sided femoral neck fracture: Given pancreatic cancer and poor prognosis, think that the patients pain should be managed medically and should not have surgical intervention.     Will continue to follow with you.     Sharon Lacey MD  Pulmonary and Critical Care  [facility]            Medications:     Current Facility-Administered Medications   Medication Dose Route Frequency Provider Last Rate Last Admin     Current Facility-Administered Medications   Medication Dose Route Frequency Provider Last Rate Last Admin    [Held by provider] amLODIPine (NORVASC) tablet 10 mg  10 mg Oral Daily Severino Salomon MD        [Held by provider] apixaban ANTICOAGULANT (ELIQUIS) tablet 2.5 mg  2.5 mg Oral BID  "Severino Salomon MD        furosemide (LASIX) injection 40 mg  40 mg Intravenous Daily Adams Mahmood MD   40 mg at 12/10/24 0749    insulin aspart (NovoLOG) injection (RAPID ACTING)  1-7 Units Subcutaneous Q4H Misbah Hahn MD   2 Units at 12/10/24 1105    metoprolol tartrate (LOPRESSOR) tablet 25 mg  25 mg Oral BID Severino Salomon MD   25 mg at 12/10/24 0754    sodium chloride (PF) 0.9% PF flush 3 mL  3 mL Intracatheter Q8H Adams Mahmood MD   3 mL at 12/10/24 0747    traZODone (DESYREL) tablet 50 mg  50 mg Oral At Bedtime Severino Salomon MD   50 mg at 12/09/24 2302         Exam/Data:   Vitals  /57 (BP Location: Left arm)   Pulse 50   Temp 97.3  F (36.3  C) (Oral)   Resp 20   Ht 1.727 m (5' 8\")   Wt 74.3 kg (163 lb 12.8 oz)   SpO2 93%   BMI 24.91 kg/m       I/O last 3 completed shifts:  In: 1021.25 [I.V.:1021.25]  Out: 830 [Urine:800; Chest Tube:30]  Weight change: -2.811 kg (-6 lb 3.2 oz)  [unfilled]  Resp: 20    EXAM:  Physical Exam        DATA:   Latest Reference Range & Units 12/09/24 22:46   Cell Count Fluid Source  Other   Total Nucleated Cells /uL 967   % Neutrophils Fluid % 1   % Lymphocytes Fluid % 82   % Mono/Macro Fluid % 0   % Eosinophils Fluid % 16   % Basophils Fluid % 1   Color Fluid Colorless, Yellow  Orange !   Appearance Fluid Clear  Hazy !   Glucose Fluid Source  Other   Glucose Fluid mg/dL 210   LD Fluid Source  Other   Lactate Dehydrogenase Fluid U/L 339   Protein Fluid Source  Other   Protein Total Fluid g/dL 3.8       IMAGING:   CT Hip Left w/o Contrast    Addendum Date: 12/10/2024    EXAM: CT HIP LEFT W/O CONTRAST LOCATION: [facility] DATE: 12/9/2024 INDICATION: Evaluate for pathologic fracture; hip pain; fracture, known or rule out, or trauma; no fracture follow-up or history of osteoarthritis; no hip x-ray result available. COMPARISON: X-ray 12/8/2024, CT from 8/18/2024. TECHNIQUE: Noncontrast. Axial, sagittal and coronal " thin-section reconstruction. Dose reduction techniques were used. IMPRESSION: 1.  Acute left femoral neck fracture with mild anterosuperior displacement. No intertrochanteric extension. Moderate left hip lipohemarthrosis. There is normal joint alignment. 2.  There is irregular lucency in the area of the fracture site. This is favored to represent osteopenia because this appeared symmetric on the prior CT, however a pathologic fracture cannot be definitively excluded. Discussed with KAT Oliveira by Dr. Walter Long via telephone on 12/10/2024 at 9:18 AM.     Result Date: 12/10/2024  EXAM: CT HIP LEFT W/O CONTRAST LOCATION: [facility] DATE: 12/9/2024 INDICATION: evaluate for pathologic fracture; Hip pain; Fracture, known or r o, or trauma; No fracture f u or history of osteoarthritis; No hip x ray result available COMPARISON: X-ray 12/08/2024 TECHNIQUE: Noncontrast. Axial, sagittal and coronal thin-section reconstruction. Dose reduction techniques were used. FINDINGS: BONES: -There is an acute comminuted fracture through the left femoral neck with moderate associated angulation convex anteriorly. Moderate degenerative changes left AC joint. Moderate vascular calcification. The exam is otherwise normal. SOFT TISSUES: -Normal.     IMPRESSION: 1.      CT Chest Tube with Cath Placement    Result Date: 12/9/2024  EXAM: 1. PERCUTANEOUS CHEST TUBE PLACEMENT RIGHT PLEURAL SPACE 2. CT GUIDANCE 3. CONSCIOUS SEDATION LOCATION: [facility] DATE: 12/9/2024 INDICATION: Right hydropneumothorax. TECHNIQUE: Dose reduction techniques were used. PROCEDURE: Informed consent obtained. Site marked. Prior images reviewed. Required items made available. Patient identity confirmed verbally and with arm band. Patient reevaluated immediately before administering sedation. Universal protocol was followed. Time out performed. The site was prepped and draped in sterile fashion. 10 mL of 1%  "lidocaine was infused into the local soft tissues. Using standard technique and under direct CT guidance, a 10 Syriac chest tube catheter was inserted into the pleural space. The catheter was fixed in place with sutures and adhesive device, and the tubing was banded. Chest tube placed to Pleur-evac suction. SPECIMEN: None. BLOOD LOSS: Minimal. The patient tolerated the procedure well. No immediate complications. SEDATION: Versed 0.5 mg. Fentanyl 25 mcg. The procedure was performed with administration intravenous conscious sedation with appropriate preoperative, intraoperative, and postoperative evaluation. 15 minutes of supervised face to face conscious sedation time was provided by a radiology nurse under my direct supervision.     IMPRESSION: 1.  Successful CT-guided chest tube placement into right pleural space. Reference CPT Codes: 81236, 42400    CT Chest w/o Contrast    Result Date: 12/8/2024  EXAM: CT CHEST W/O CONTRAST LOCATION: [facility] DATE: 12/8/2024 INDICATION: Possible pneumothorax on ct c spine COMPARISON: Cervical spine CT from prior to this examination. Chest CT from 08/18/2024. TECHNIQUE: CT chest without IV contrast. Multiplanar reformats were obtained. Dose reduction techniques were used. CONTRAST: None. FINDINGS: LUNGS AND PLEURA: *  Small right-sided pneumothorax (less than 10% by volume of the right chest). *  Moderate right and small left simple density nonloculated pleural effusions. *  Diffuse upper lung predominant interstitial micronodules are unchanged from August 2024. This could represent the result of sarcoidosis, silicosis, or coal workers pneumoconiosis, among other possibilities. *  Minimal chronic \"platelike\" scarring in both lungs with areas of \"round\" atelectasis in the right middle and right lower lobes. MEDIASTINUM/AXILLAE: Normal heart size without pericardial effusion. 3 vessel coronary artery disease. Previous transcatheter aortic valve " replacement. Nonaneurysmal thoracic aorta. Calcified mildly enlarged hilar or mediastinal lymph nodes are unchanged. UPPER ABDOMEN: Metallic CBD stent with expected intrahepatic pneumobilia. No biliary dilatation. MUSCULOSKELETAL: Unremarkable.     IMPRESSION: *  Small right-sided pneumothorax (10% by volume) in the setting of likely underlying chronic lung disease from either sarcoidosis, silicosis, or coal workers pneumoconiosis. *  Three-vessel coronary artery disease. Prior transcatheter aortic valve replacement. *  Well-positioned metallic CBD stent without biliary dilatation (partially imaged). [Critical Result: Pneumothorax] Finding was identified on 12/8/2024 11:06 PM CST. Dr. Larry was contacted by me on 12/8/2024 11:17 PM CST and verbalized understanding of the critical result.     Head CT w/o contrast    Result Date: 12/8/2024  EXAM: CT HEAD W/O CONTRAST LOCATION: [facility] DATE: 12/8/2024 INDICATION: Trauma. COMPARISON: None. TECHNIQUE: Routine CT Head without IV contrast. Multiplanar reformats. Dose reduction techniques were used. FINDINGS: INTRACRANIAL CONTENTS: No intracranial hemorrhage, extraaxial collection, or mass effect. No CT evidence of acute infarct. Moderate presumed chronic small vessel ischemic changes. Moderate generalized volume loss. No hydrocephalus. VISUALIZED ORBITS/SINUSES/MASTOIDS: No intraorbital abnormality. No paranasal sinus mucosal disease. No middle ear or mastoid effusion. BONES/SOFT TISSUES: No acute abnormality.     IMPRESSION: 1.  No CT evidence for acute intracranial process. 2.  Brain atrophy and presumed chronic microvascular ischemic changes as above.    CT Cervical Spine w/o Contrast    Result Date: 12/8/2024  EXAM: CT CERVICAL SPINE W/O CONTRAST LOCATION: [facility] DATE: 12/8/2024 INDICATION: Trauma. COMPARISON: None. TECHNIQUE: Routine CT Cervical Spine without IV contrast. Multiplanar reformats. Dose reduction  techniques were used. FINDINGS: VERTEBRA: Mild rightward curvature centered at C5 and leftward curvature of the upper thoracic spine. Minimal posterior spondylolisthesis at C3-C4. Otherwise, normal alignment. Normal vertebral body heights. Severe disc degeneration C3-C4, C5-C6, and C6-C7. No fracture or posttraumatic subluxation. CANAL/FORAMINA: Moderate spinal canal stenosis at C3-C4 with severe bilateral foraminal stenoses. Moderate to severe left foraminal stenosis C4-C5. Severe right foraminal stenosis C5-C6. Moderate right foraminal stenosis C6-C7. PARASPINAL: Moderate to large right pleural effusion. Small right-sided pneumothorax.     IMPRESSION: 1.  No fracture or posttraumatic subluxation. 2.  No high-grade spinal canal or neural foraminal stenosis. 3.  Small right pneumothorax and moderate pleural effusion. Consider dedicated chest CT for more complete evaluation. The results were communicated to Dr. Larry at 10:33 PM on 12/08/2024.    XR Femur Left 2 Views    Result Date: 12/8/2024  EXAM: PELVIS AND LEFT FEMUR 5 VIEWS LOCATION: [facility] DATE/TIME: 12/8/2024 9:56 PM CST INDICATION: Trauma. COMPARISON: None.     IMPRESSION: 1. Mildly displaced acute transverse fracture of the left femoral neck. The distal portion of the fracture is displaced laterally by 0.7 cm and there is approximately 1.0 cm of override. No significant angulation about the fracture. 2. Mild degenerative changes in bilateral hip joints. 3. Partial visualization of a left knee arthroplasty.    XR Pelvis 1/2 Views    Result Date: 12/8/2024  EXAM: PELVIS AND LEFT FEMUR 5 VIEWS LOCATION: [facility] DATE/TIME: 12/8/2024 9:56 PM CST INDICATION: Trauma. COMPARISON: None.     IMPRESSION: 1. Mildly displaced acute transverse fracture of the left femoral neck. The distal portion of the fracture is displaced laterally by 0.7 cm and there is approximately 1.0 cm of override. No significant  angulation about the fracture. 2. Mild degenerative changes in bilateral hip joints. 3. Partial visualization of a left knee arthroplasty.         Sharon Lacey MD  Pulmonary and Critical Care  [facility]

## 2024-12-10 NOTE — PROGRESS NOTES
Patient called asking why his request for his medication montelukast (SINGULAIR) 10 MG tablet was denied per the pharmacy.    [facility] MEDICINE PROGRESS NOTE      Identification/Summary:   Per Beasley is a 84 year old male admitted on 12/8/2024. He has medical history significant for CAD, chronic diastolic heart failure, COPD, type 2 diabetes mellitus, hypertension, CKD stage IV, chronic normocytic anemia, and aortic stenosis status post TAVR.  Also a recent diagnosis 4 months ago of adenocarcinoma of the pancreas.  Now is receiving chemotherapy.  He has a common bile duct stent.  He presented to the ED with complaints of fall.  Denies head trauma or loss of consciousness.  Reports that he was going to the bathroom without using his walker when his legs gave out and he fell on his left side.     Hospital course is significant for ongoing pain.  Orthopedic surgery is going to take him to surgery tomorrow for surgical repair.      Was also seen by pulmonary to evaluate the pleural effusions and the pneumothorax and he underwent thoracentesis infectious to placement with fluid sent for analysis.  He is a bit more comfortable this afternoon with some intermittent nausea.    Assessment and Plan:        # Left hip fracture: Secondary to fall.Reports that he was going to the bathroom without using his walker when his legs gave out and he fell on his left side.  As needed pain regimen  Bedrest  Orthopedic surgery consult  Plan: To surgery tomorrow     # Right apical pneumothorax: Small  # Moderate right pleural effusion  Thoracentesis with chest tube placement today  We consulted pulmonary  Suspect this could be related to his pancreatic cancer    #Pancreatic cancer diagnosed in August 2024  Receiving chemo  Initially had jaundice  No common bile duct stent     # Type 2 diabetes with hyperglycemia: Blood glucose of 473 initially.  Last glucose was 228.   Hemoglobin A1c of 7.8 in June 2024  Correctional scale and I started basal insulin today  Hypoglycemia protocol  Hold glipizide     # Paroxysmal atrial  fibrillation  # On chronic anticoagulation with Eliquis  Hold Eliquis  Continue metoprolol  # CAD, aspirin on hold  # Hypertension   Continue amlodipine,and metoprolol     # Insomnia  Trazodone     # Chronic anemia: Hemoglobin stable  Monitor CBC     # CKD stage IV: Creatinine of 2.55.  Creatinine was 2.91 in August 2024.  Monitor renal function  Avoid nephrotoxins  Renal dosing of medications     # Hyponatremia: Sodium of 131 initially now 133  Monitor sodium levels  Gentle hydration.     # COPD patient on prophylactic history of COPD.  Not on any meds.  Titrate supplemental oxygen to keep O2 saturation between 88% and 92%       Diet: Diabetic diet now but NPO per Anesthesia Guidelines for Procedure/Surgery Except for: Meds, Ice Chips after midnight  DVT Prophylaxis: DOAC and aspirin on hold and can be reinitiated after surgery  Helm Catheter: Not present  Lines: None     Cardiac Monitoring: None  Code Status: No CPR- Do NOT Intubate and confirmed with discussion with patient today.      Anticipated possible discharge in 2-3 days once pain adequately controlled and pleural effusion evaluation complete           The patient's care was discussed with the Bedside Nurse and Patient.    Seveirno Salomon MD  [facility]  Phone: #[phone]  Securely message with the Vocera Web Console (learn more here)  Text page via [employer] Paging/Directory     Interval History/Subjective:  Patient reports she is fairly comfortable now with pain currently 5/10.  Has had intermittent nausea.  Tolerating clear liquids.  No chest pain or shortness of breath.    Physical Exam/Objective:  Temp:  [97.3  F (36.3  C)-98  F (36.7  C)] 97.3  F (36.3  C)  Pulse:  [] 68  Resp:  [11-33] 22  BP: (108-193)/() 136/62  SpO2:  [81 %-99 %] 91 %  Body mass index is 25.85 kg/m .    GENERAL:  Alert, appears comfortable, in no acute distress, appears stated age   HEAD:  Normocephalic,  without obvious abnormality, atraumatic   LUNGS:   Clear to auscultation bilaterally   HEART:  Regular rate and rhythm, no murmur   ABDOMEN:   Soft, non-tender, seems mildly distended throughout   EXTREMITIES: Left leg is shortened and externally rotated   SKIN: Dry to touch, no exanthems in the visualized areas   NEURO: Alert, appears cognitively intact, moves all four extremities freely   PSYCH: Cooperative, behavior is appropriate      Data reviewed today: I personally reviewed all new medications, labs, imaging/diagnostics reports over the past 24 hours. Pertinent findings include:    Imaging:   Recent Results (from the past 24 hours)   Head CT w/o contrast    Narrative    EXAM: CT HEAD W/O CONTRAST  LOCATION: [facility]  DATE: 12/8/2024    INDICATION: Trauma.  COMPARISON: None.  TECHNIQUE: Routine CT Head without IV contrast. Multiplanar reformats. Dose reduction techniques were used.    FINDINGS:  INTRACRANIAL CONTENTS: No intracranial hemorrhage, extraaxial collection, or mass effect. No CT evidence of acute infarct. Moderate presumed chronic small vessel ischemic changes. Moderate generalized volume loss. No hydrocephalus.     VISUALIZED ORBITS/SINUSES/MASTOIDS: No intraorbital abnormality. No paranasal sinus mucosal disease. No middle ear or mastoid effusion.    BONES/SOFT TISSUES: No acute abnormality.      Impression    IMPRESSION:  1.  No CT evidence for acute intracranial process.  2.  Brain atrophy and presumed chronic microvascular ischemic changes as above.   CT Cervical Spine w/o Contrast    Narrative    EXAM: CT CERVICAL SPINE W/O CONTRAST  LOCATION: [facility]  DATE: 12/8/2024    INDICATION: Trauma.  COMPARISON: None.  TECHNIQUE: Routine CT Cervical Spine without IV contrast. Multiplanar reformats. Dose reduction techniques were used.    FINDINGS:  VERTEBRA: Mild rightward curvature centered at C5 and leftward curvature of the upper thoracic  spine. Minimal posterior spondylolisthesis at C3-C4. Otherwise, normal alignment. Normal vertebral body heights. Severe disc degeneration C3-C4, C5-C6, and   C6-C7. No fracture or posttraumatic subluxation.     CANAL/FORAMINA: Moderate spinal canal stenosis at C3-C4 with severe bilateral foraminal stenoses. Moderate to severe left foraminal stenosis C4-C5. Severe right foraminal stenosis C5-C6. Moderate right foraminal stenosis C6-C7.    PARASPINAL: Moderate to large right pleural effusion. Small right-sided pneumothorax.      Impression    IMPRESSION:  1.  No fracture or posttraumatic subluxation.  2.  No high-grade spinal canal or neural foraminal stenosis.  3.  Small right pneumothorax and moderate pleural effusion. Consider dedicated chest CT for more complete evaluation.    The results were communicated to Dr. Larry at 10:33 PM on 12/08/2024.   XR Pelvis 1/2 Views    Narrative    EXAM: PELVIS AND LEFT FEMUR 5 VIEWS  LOCATION: [facility]  DATE/TIME: 12/8/2024 9:56 PM CST    INDICATION: Trauma.  COMPARISON: None.      Impression    IMPRESSION:   1. Mildly displaced acute transverse fracture of the left femoral neck. The distal portion of the fracture is displaced laterally by 0.7 cm and there is approximately 1.0 cm of override. No significant angulation about the fracture.  2. Mild degenerative changes in bilateral hip joints.  3. Partial visualization of a left knee arthroplasty.   XR Femur Left 2 Views    Narrative    EXAM: PELVIS AND LEFT FEMUR 5 VIEWS  LOCATION: [facility]  DATE/TIME: 12/8/2024 9:56 PM CST    INDICATION: Trauma.  COMPARISON: None.      Impression    IMPRESSION:   1. Mildly displaced acute transverse fracture of the left femoral neck. The distal portion of the fracture is displaced laterally by 0.7 cm and there is approximately 1.0 cm of override. No significant angulation about the fracture.  2. Mild degenerative changes in bilateral hip  "joints.  3. Partial visualization of a left knee arthroplasty.   CT Chest w/o Contrast   Result Value    Radiologist flags Pneumothorax (AA)    Narrative    EXAM: CT CHEST W/O CONTRAST  LOCATION: [facility]  DATE: 12/8/2024    INDICATION: Possible pneumothorax on ct c spine  COMPARISON: Cervical spine CT from prior to this examination. Chest CT from 08/18/2024.  TECHNIQUE: CT chest without IV contrast. Multiplanar reformats were obtained. Dose reduction techniques were used.  CONTRAST: None.    FINDINGS:   LUNGS AND PLEURA:   *  Small right-sided pneumothorax (less than 10% by volume of the right chest).  *  Moderate right and small left simple density nonloculated pleural effusions.  *  Diffuse upper lung predominant interstitial micronodules are unchanged from August 2024. This could represent the result of sarcoidosis, silicosis, or coal workers pneumoconiosis, among other possibilities.  *  Minimal chronic \"platelike\" scarring in both lungs with areas of \"round\" atelectasis in the right middle and right lower lobes.    MEDIASTINUM/AXILLAE: Normal heart size without pericardial effusion. 3 vessel coronary artery disease. Previous transcatheter aortic valve replacement. Nonaneurysmal thoracic aorta. Calcified mildly enlarged hilar or mediastinal lymph nodes are   unchanged.    UPPER ABDOMEN: Metallic CBD stent with expected intrahepatic pneumobilia. No biliary dilatation.    MUSCULOSKELETAL: Unremarkable.      Impression    IMPRESSION:   *  Small right-sided pneumothorax (10% by volume) in the setting of likely underlying chronic lung disease from either sarcoidosis, silicosis, or coal workers pneumoconiosis.  *  Three-vessel coronary artery disease. Prior transcatheter aortic valve replacement.  *  Well-positioned metallic CBD stent without biliary dilatation (partially imaged).      [Critical Result: Pneumothorax]    Finding was identified on 12/8/2024 11:06 PM CST.     Dr. Larry was " contacted by me on 12/8/2024 11:17 PM CST and verbalized understanding of the critical result.      CT Hip Left w/o Contrast    Narrative    EXAM: CT HIP LEFT W/O CONTRAST  LOCATION: [facility]  DATE: 12/9/2024    INDICATION: evaluate for pathologic fracture; Hip pain; Fracture, known or r o, or trauma; No fracture f u or history of osteoarthritis; No hip x ray result available  COMPARISON: X-ray 12/08/2024  TECHNIQUE: Noncontrast. Axial, sagittal and coronal thin-section reconstruction. Dose reduction techniques were used.     FINDINGS:     BONES:  -There is an acute comminuted fracture through the left femoral neck with moderate associated angulation convex anteriorly. Moderate degenerative changes left AC joint. Moderate vascular calcification. The exam is otherwise normal.    SOFT TISSUES:  -Normal.      Impression    IMPRESSION:  1.       CT Chest Tube with Cath Placement    Narrative    EXAM:  1. PERCUTANEOUS CHEST TUBE PLACEMENT RIGHT PLEURAL SPACE  2. CT GUIDANCE  3. CONSCIOUS SEDATION    LOCATION: [facility]  DATE: 12/9/2024    INDICATION: Right hydropneumothorax.  TECHNIQUE: Dose reduction techniques were used.    PROCEDURE: Informed consent obtained. Site marked. Prior images reviewed. Required items made available. Patient identity confirmed verbally and with arm band. Patient reevaluated immediately before administering sedation. Universal protocol was   followed. Time out performed. The site was prepped and draped in sterile fashion. 10 mL of 1% lidocaine was infused into the local soft tissues. Using standard technique and under direct CT guidance, a 10 Burkinan chest tube catheter was inserted into the   pleural space. The catheter was fixed in place with sutures and adhesive device, and the tubing was banded. Chest tube placed to Pleur-evac suction.    SPECIMEN: None.    BLOOD LOSS: Minimal.    The patient tolerated the procedure well. No immediate  complications.    SEDATION: Versed 0.5 mg. Fentanyl 25 mcg. The procedure was performed with administration intravenous conscious sedation with appropriate preoperative, intraoperative, and postoperative evaluation.    15 minutes of supervised face to face conscious sedation time was provided by a radiology nurse under my direct supervision.      Impression    IMPRESSION:  1.  Successful CT-guided chest tube placement into right pleural space.    Reference CPT Codes: 99022, 12727       Labs:  Most Recent 3 CBC's:  Recent Labs   Lab Test 12/08/24 2138 08/21/24  0752 08/19/24  0406   WBC 6.3 8.1 8.6   HGB 11.6* 11.6* 11.2*   MCV 81 81 79    212 213     Most Recent 3 BMP's:  Recent Labs   Lab Test 12/09/24  1816 12/09/24  1306 12/09/24  0929 12/09/24  0559 12/09/24  0554 12/09/24  0049 12/08/24 2138 08/21/24  1240 08/21/24  0752   NA  --   --   --   --  133*  --  131*  --  136   POTASSIUM  --   --   --   --  3.9  --  4.2  --  3.3*   CHLORIDE  --   --   --   --  97*  --  94*  --  98   CO2  --   --   --   --  24  --  24  --  22   BUN  --   --   --   --  26.3*  --  27.5*  --  41.2*   CR  --   --   --   --  1.98*  --  2.15*  --  2.97*   ANIONGAP  --   --   --   --  12  --  13  --  16*   SANDRA  --   --   --   --  9.1  --  9.2  --  8.2*   * 234* 283*   < > 326*   < > 473*   < > 152*    < > = values in this interval not displayed.     Most Recent 2 LFT's:  Recent Labs   Lab Test 08/21/24 0752 08/20/24  0746   * 222*   * 118*   ALKPHOS 818* 881*   BILITOTAL 5.7* 9.9*       Medications:   Personally Reviewed.  Medications   Current Facility-Administered Medications   Medication Dose Route Frequency Provider Last Rate Last Admin     Current Facility-Administered Medications   Medication Dose Route Frequency Provider Last Rate Last Admin    [Held by provider] amLODIPine (NORVASC) tablet 10 mg  10 mg Oral Daily Severino Salomon MD        [Held by provider] apixaban ANTICOAGULANT (ELIQUIS) tablet  2.5 mg  2.5 mg Oral BID Severino Salomon MD        furosemide (LASIX) injection 40 mg  40 mg Intravenous Daily Adams Mahmood MD   40 mg at 12/09/24 0754    insulin aspart (NovoLOG) injection (RAPID ACTING)  1-7 Units Subcutaneous TID w/meals Severino Salomon MD   2 Units at 12/09/24 1852    metoprolol tartrate (LOPRESSOR) tablet 25 mg  25 mg Oral BID eSverino Salomon MD        sodium chloride (PF) 0.9% PF flush 3 mL  3 mL Intracatheter Q8H Adams Mahmood MD   3 mL at 12/09/24 1852    traZODone (DESYREL) tablet 50 mg  50 mg Oral At Bedtime Severino Salomon MD

## 2024-12-10 NOTE — ANESTHESIA PREPROCEDURE EVALUATION
Anesthesia Pre-Procedure Evaluation    Patient: Per Beasley   MRN: [id number] : 1940        Procedure : Procedure(s):  HEMIARTHROPLASTY, HIP, BIPOLAR          Past Medical History:   Diagnosis Date    JONNATHAN (acute kidney injury) (H)     Aortic stenosis     Biliary obstruction (H)     Biliary obstruction (H)     Bradycardia     CAD (coronary artery disease)     CKD (chronic kidney disease) stage 3, GFR 30-59 ml/min (H)     COPD (chronic obstructive pulmonary disease) (H)     Diabetes mellitus, type 2 (H)     Generalized muscle weakness     Heart failure with reduced ejection fraction, NYHA class I (H)     HTN (hypertension)     Paroxysmal atrial fibrillation (H)     Pleural effusion     right      Past Surgical History:   Procedure Laterality Date    ENDOSCOPIC RETROGRADE CHOLANGIOPANCREATOGRAM N/A 2024    Procedure: ENDOSCOPIC RETROGRADE CHOLANGIOPANCREATOGRAPHY WITH BILIARY SPHINCTEROTOMY AND STENT PLACEMENT;  Surgeon: Oc Magdaleno MD;  Location: [facility] [place]    ESOPHAGOSCOPY, GASTROSCOPY, DUODENOSCOPY (EGD), COMBINED N/A 2024    Procedure: ESOPHAGOGASTRODUODENOSCOPY, WITH ENDOSCOPIC ULTRASOUND GUIDANCE WITH FINE NEEDLE ASPIRATION OF PANCREATIC HEAD;  Surgeon: Oc Magdaleno MD;  Location: [facility] OR    HC VALVE (TAVR)      s/p PCI with NUBIA to MID LCx         Allergies   Allergen Reactions    Blood-Group Specific Substance Other (See Comments)     Patient has a non specific antibody.  Blood product orders may be delayed.  Please draw on red top and 2 purple top tubes for all Type and Screen/ type and Cross match orders.    Hydrochlorothiazide Rash      Social History     Tobacco Use    Smoking status: Former     Types: Cigarettes    Smokeless tobacco: Never   Substance Use Topics    Alcohol use: Not Currently      Wt Readings from Last 1 Encounters:   12/10/24 74.3 kg (163 lb 12.8 oz)        Anesthesia Evaluation   Pt has had prior anesthetic.     No history of anesthetic  complications       ROS/MED HX  ENT/Pulmonary: Comment: Right pleural effusion, chest tube in place    (+)                          COPD,              Neurologic: Comment: Somnolent but arousable, follows commands, answers simple questions appropriately      Cardiovascular: Comment: Echo 7/2024:    1. Left ventricular function is normal.The ejection fraction is 55-60%. The  left ventricle is normal in size. No regional wall motion abnormalities noted.  2. Normal right ventricle size and systolic function.  3. Well seated 26-mm Mooney Catarino 3 Ultra aortic bioprosthetic valve with  normal gradients (PV: 2.8 m/sec, simpson gradient: 12 mm Hg). Acc Time: 70 ms.  DVI: 0.7.  4. IVC diameter and respiratory changes fall into an intermediate range  suggesting an RA pressure of 8 mmHg.  Compared to prior study, there is no significant change.      (+)  hypertension- -  CAD -  - -   Taking blood thinners (Eliquis, last dose 12/8)   CHF                     valvular problems/murmurs (s/p TAVR) type: AS          METS/Exercise Tolerance:     Hematologic:     (+)      anemia,          Musculoskeletal:       GI/Hepatic: Comment: Metastatic pancreatic cancer      Renal/Genitourinary:     (+) renal disease, type: CRI,            Endo:     (+)  type II DM,                    Psychiatric/Substance Use:       Infectious Disease:       Malignancy:       Other:            Physical Exam    Airway        Mallampati: II   TM distance: > 3 FB   Neck ROM: full   Mouth opening: > 3 cm    Respiratory Devices and Support         Dental       (+) Edentulous      Cardiovascular          Rhythm and rate: regular and normal     Pulmonary           breath sounds clear to auscultation           OUTSIDE LABS:  CBC:   Lab Results   Component Value Date    WBC 7.7 12/10/2024    WBC 6.3 12/08/2024    HGB 11.5 (L) 12/10/2024    HGB 11.6 (L) 12/08/2024    HCT 34.1 (L) 12/10/2024    HCT 33.7 (L) 12/08/2024     (L) 12/10/2024     12/08/2024  "    BMP:   Lab Results   Component Value Date     (L) 12/10/2024     (L) 12/09/2024    POTASSIUM 5.0 12/10/2024    POTASSIUM 3.9 12/09/2024    CHLORIDE 98 12/10/2024    CHLORIDE 97 (L) 12/09/2024    CO2 26 12/10/2024    CO2 24 12/09/2024    BUN 24.9 (H) 12/10/2024    BUN 26.3 (H) 12/09/2024    CR 1.90 (H) 12/10/2024    CR 1.98 (H) 12/09/2024     (H) 12/10/2024     (H) 12/10/2024     COAGS:   Lab Results   Component Value Date    PTT 34 12/08/2024    INR 1.50 (H) 12/08/2024     POC: No results found for: \"BGM\", \"HCG\", \"HCGS\"  HEPATIC:   Lab Results   Component Value Date    ALBUMIN 2.7 (L) 08/21/2024    PROTTOTAL 7.3 12/09/2024     (H) 08/21/2024     (H) 08/21/2024    ALKPHOS 818 (H) 08/21/2024    BILITOTAL 5.7 (H) 08/21/2024     OTHER:   Lab Results   Component Value Date    LACT 1.6 08/12/2022    A1C 8.5 (H) 08/18/2024    SANDRA 9.1 12/10/2024    MAG 2.4 (H) 12/10/2024    LIPASE 33 08/18/2024    TSH 1.57 01/11/2024    CRP 0.6 08/12/2022       Anesthesia Plan    ASA Status:  4    NPO Status:  NPO Appropriate    Anesthesia Type: General.     - Airway: ETT   Induction: Intravenous.      Techniques and Equipment:     - Lines/Monitors: Arterial Line     Consents    Anesthesia Plan(s) and associated risks, benefits, and realistic alternatives discussed. Questions answered and patient/representative(s) expressed understanding.     - Discussed: Risks, Benefits and Alternatives for the PROCEDURE were discussed     - Discussed with:  Spouse, Patient (and son)       - Patient is DNR/DNI Status: Yes             Suspend during perioperative period? Yes.             Agree to: chest compression/defibrillation, chemical resuscitation (for easily reversible issues).        Postoperative Care    Pain management: IV analgesics, Multi-modal analgesia.   PONV prophylaxis: Dexamethasone or Solumedrol, Ondansetron (or other 5HT-3)     Comments:    Other Comments: Per hospitalist note:  Long discussion " with Dr. Lacey, pulmonary along with [facility] PA and then palliative care, Jessica Martines.  Also met with patient, his son and wife.  Family seems to have very realistic expectations about what his metastatic pancreatic cancer means for his life expectancy and wellbeing in the near future.  They understand that this surgery carries elevated risk because of his underlying health issues but want to weigh that against having to not have the surgery and likely ending up primarily at bedrest for the rest of his life.  He has been very active all his life and even recently is still been able to do many of the things he enjoys.     Patient and family are fully aware of the elevated risk of the surgery but would want to be intubated for the surgery but if there was some significant perioperative event would not want any heroic measures to be performed at that time.  He would want to resume full no CPR and DO NOT INTUBATE following the surgery.             Girma Grijalva MD    I have reviewed the pertinent notes and labs in the chart from the past 30 days and (re)examined the patient.  Any updates or changes from those notes are reflected in this note.     # Hyponatremia: Lowest Na = 131 mmol/L in last 2 days, will monitor as appropriate  # Hypochloremia: Lowest Cl = 94 mmol/L in last 2 days, will monitor as appropriate       # Coagulation Defect: INR = 1.50 (Ref range: 0.85 - 1.15) and/or PTT = 34 Seconds (Ref range: 22 - 38 Seconds), will monitor for bleeding    # Hypertension: Noted on problem list  # Acute heart failure with preserved ejection fraction: heart failure noted on problem list, last echo with EF >50%, and receiving IV diuretics          # DMII: A1C = N/A within past 6 months, PRESENT ON ADMISSION      # Financial/Environmental Concerns: none

## 2024-12-10 NOTE — PROGRESS NOTES
[facility] MEDICINE PROGRESS NOTE      Identification/Summary:   Per Beasley is a 84-year-old male admitted on 12/8/2024. PMHx significant for CAD, chronic diastolic heart failure, COPD, type 2 diabetes mellitus, HTN, CKD stage IV, chronic normocytic anemia, and aortic stenosis s/p TAVR.  Recent diagnosis of pancreatic adenocarcinoma 4 months ago and pt not receiving chemotherapy nor other interventions based on [facility] Initial Consultation note, dated 9/2/2024.  Presented to the ED for mechanical fall.  Denies head trauma or loss of consciousness.  Reports going to the bathroom without his walker when his legs gave out and fell on his left side.     Hospital course significant for ongoing pain.  Orthopedic surgical repair planned for 12/10/2024.     Also seen by pulmonology to evaluate the pleural effusions and pneumothorax. He underwent thoracentesis with right-sided chest tube placement on 12/9, with pleural fluid studies. Results show a transudate with pending gram stain, cultures, and cytology.      Pt remains comfortable and asked for laxative suppository for constipation.     EMR review: [facility] Initial Consultation note, dated 9/3/2024, identified pt as a poor candidate for systemic chemotherapy due to multiple comorbidities and fraility and not interested in chemotherapy. Pt declined referral to hepatobiliary surgery to discuss surgical options.     Long discussion with Dr. Lacey, pulmonary along with [facility] PA and then palliative care, Jessica Martines.  Also met with patient, his son and wife.  Family seems to have very realistic expectations about what his metastatic pancreatic cancer means for his life expectancy and wellbeing in the near future.  They understand that this surgery carries elevated risk because of his underlying health issues but want to weigh that against having to not have the surgery and likely ending up primarily at bedrest for the  rest of his life.  He has been very active all his life and even recently is still been able to do many of the things he enjoys.    Patient and family are fully aware of the elevated risk of the surgery but would want to be intubated for the surgery but if there was some significant perioperative event would not want any heroic measures to be performed at that time.  He would want to resume full no CPR and DO NOT INTUBATE following the surgery.    I did follow-up and talk again with orthopedic surgery team.  The orthopedic surgeon will meet with family prior to surgery.    Assessment and Plan:     # Left hip fracture: 2/2 to fall. Reported falling on the way to the bathroom without walker and legs gave out, falling on his left-side. US LE showed no DVT.   PRN pain regimen  Bedrest  Orthopedic surgery consult appreciated  Plan: Surgery 12/10/2024 in light of above discussion     # Right apical pneumothorax: Small  # Moderate right pleural effusion  Thoracentesis with R-sided chest tube placement 12/9/2024  Pulmonary medicine consulted   Monitor for results of gram stain, cultures, and cytology  Suspect this is likely related to his pancreatic cancer    #Pancreatic cancer diagnosed in August 2024  Not receiving chemotherapy  See oncologist note from September 2024.  Discussed this with family and patient today.  Initially had jaundice  No common bile duct stent     # Type 2 diabetes with hyperglycemia: Blood glucose of 473 initially.  Last glucose was 278.   Hemoglobin A1c of 7.8 in June 2024  Correctional scale and basal insulin started 12/9  Hypoglycemia protocol  Hold glipizide     # Paroxysmal atrial fibrillation  # On chronic anticoagulation with Eliquis  Hold Eliquis  Continue metoprolol    # CAD, aspirin on hold    # Hypertension   Continue amlodipine,and metoprolol     # Insomnia  Trazodone     # Chronic anemia: Hemoglobin stable  Monitor CBC     # CKD stage IV: Creatinine of 2.55.  Creatinine was 2.91 in  August 2024.  Monitor renal function  Avoid nephrotoxins  Renal dosing of medications     # Hyponatremia: Sodium of 131 initially; now 134  Monitor sodium levels  Gentle hydration.     # COPD patient on prophylactic history of COPD.  Not on any meds.  Titrate supplemental oxygen to keep O2 saturation between 88% and 92%       Diet: Diabetic diet now but NPO per Anesthesia Guidelines for Procedure/Surgery Except for: Meds, Ice Chips after midnight  DVT Prophylaxis: DOAC and aspirin on hold and can be reinitiated after surgery  Helm Catheter: Not present  Lines: None     Cardiac Monitoring: None  Code Status: No CPR- Do NOT Intubate and confirmed with discussion with patient today.      Anticipated possible discharge in 2-3 days once pain adequately controlled and pleural effusion evaluation complete           The patient's care was discussed with attending physician, Dr. Severino Salomon MD.     Ferny Aguilar MD JD, PGY-2  [facility]  [facility]  Phone: #[phone]  Securely message with the Vocera Web Console (learn more here)  Text page via [facility] Paging/Directory     Patient seen and examined independently  Patient discussed with family medicine resident  Agree with documentation, history, exam, assessment and plan as outlined above and below    Severino Salomon MD  [facility] Medicine Service    Interval History/Subjective:  Patient reports she is fairly comfortable now with pain at a minimum. Reports constipation and has requested laxative. NPO in anticipation of surgery later today.    See above for other details.  Patient feels quite comfortable currently.  He and family are pretty adamant that he wants to have a full of life as possible during the time he has left.  He and family want to proceed with the surgery even if it is palliative in nature.    Physical  Exam/Objective:  Temp:  [97.2  F (36.2  C)-98  F (36.7  C)] 97.3  F (36.3  C)  Pulse:  [] 63  Resp:  [20-22] 20  BP: (125-193)/() 146/63  SpO2:  [89 %-99 %] 93 %  Body mass index is 24.91 kg/m .    GENERAL:  Alert, appears comfortable, in no acute distress   HEAD:  Normocephalic, without obvious abnormality, atraumatic   LUNGS:   Clear to auscultation bilaterally, symmetric rise of chest, no increased work of breathing   HEART:  Regular rate and rhythm, no murmur, no peripheral edema, dorsal pedal pulse bilaterally intact   ABDOMEN:   Soft, non-tender, mildly distended throughout, normal bowel sounds    EXTREMITIES: Left leg is shortened and externally rotated   SKIN: Dry to touch, no exanthems in the visualized areas   NEURO: Alert, appears cognitively intact, moves all four extremities freely   PSYCH: Cooperative, behavior is appropriate      Data reviewed today: I personally reviewed all new medications, labs, imaging/diagnostics reports over the past 24 hours. Pertinent findings include:    Imaging:   Recent Results (from the past 24 hours)   US Lower Extremity Venous Duplex Left    Narrative    EXAM: US LOWER EXTREMITY VENOUS DUPLEX LEFT  LOCATION: [facility]  DATE: 12/10/2024    INDICATION: Erythema, swelling,  COMPARISON: None.  TECHNIQUE: Venous Duplex ultrasound of the left lower extremity with and without compression, augmentation and duplex. Color flow and spectral Doppler with waveform analysis performed.    FINDINGS: Exam includes the common femoral, femoral, popliteal, and contralateral common femoral veins as well as segmentally visualized deep calf veins and greater saphenous vein.     LEFT: No deep vein thrombosis. No superficial thrombophlebitis. No popliteal cyst.      Impression    IMPRESSION:  No deep venous thrombosis in the left lower extremity.       Labs:  Most Recent 3 CBC's:  Recent Labs   Lab Test 12/10/24  0737 12/08/24  2138 08/21/24  0752   WBC 7.7  6.3 8.1   HGB 11.5* 11.6* 11.6*   MCV 83 81 81   * 167 212     Most Recent 3 BMP's:  Recent Labs   Lab Test 12/10/24  0740 12/10/24  0737 12/10/24  0207 12/09/24  0559 12/09/24  0554 12/09/24  0049 12/08/24  2138   NA  --  134*  --   --  133*  --  131*   POTASSIUM  --  5.0  --   --  3.9  --  4.2   CHLORIDE  --  98  --   --  97*  --  94*   CO2  --  26  --   --  24  --  24   BUN  --  24.9*  --   --  26.3*  --  27.5*   CR  --  1.90*  --   --  1.98*  --  2.15*   ANIONGAP  --  10  --   --  12  --  13   SANDRA  --  9.1  --   --  9.1  --  9.2   * 278* 309*   < > 326*   < > 473*    < > = values in this interval not displayed.     Most Recent 2 LFT's:  Recent Labs   Lab Test 08/21/24  0752 08/20/24  0746   * 222*   * 118*   ALKPHOS 818* 881*   BILITOTAL 5.7* 9.9*       Medications:   Personally Reviewed.  Medications   Current Facility-Administered Medications   Medication Dose Route Frequency Provider Last Rate Last Admin     Current Facility-Administered Medications   Medication Dose Route Frequency Provider Last Rate Last Admin    [Held by provider] amLODIPine (NORVASC) tablet 10 mg  10 mg Oral Daily Severino Salomon MD        [Held by provider] apixaban ANTICOAGULANT (ELIQUIS) tablet 2.5 mg  2.5 mg Oral BID Severino Salomon MD        bisacodyl (DULCOLAX) suppository 10 mg  10 mg Rectal Once Ferny Aguilar MD        furosemide (LASIX) injection 40 mg  40 mg Intravenous Daily Adams Mahmood MD   40 mg at 12/10/24 0749    insulin aspart (NovoLOG) injection (RAPID ACTING)  1-7 Units Subcutaneous Q4H Misbah Hahn MD   3 Units at 12/10/24 0755    metoprolol tartrate (LOPRESSOR) tablet 25 mg  25 mg Oral BID Severino Salomon MD   25 mg at 12/10/24 0754    sodium chloride (PF) 0.9% PF flush 3 mL  3 mL Intracatheter Q8H Adams Mahmood MD   3 mL at 12/10/24 9127    traZODone (DESYREL) tablet 50 mg  50 mg Oral At Bedtime Severino Salomon MD   50 mg at 12/09/24 7086

## 2024-12-10 NOTE — CONSULTS
ORTHOPEDIC CONSULTATION    Consultation  Per Beasley,  1940, MRN [id number]    [unfilled]  Pleural effusion on right [J90]  Pneumothorax on right [J93.9]  Hip fracture, left, closed, initial encounter (H) [S72.002A]    PCP: Maicol Farmer, [phone]   Code status:  No CPR- Do NOT Intubate       Extended Emergency Contact Information  Primary Emergency Contact: COURTNEY BEASLEY  Home Phone: [phone]  Relation: Son  Secondary Emergency Contact: JOSEF BEASLEY  Address: [address] [place] [address]  Home Phone: [phone]  Relation: Spouse         IMPRESSION:  84-year-old male with a complex past medical history including metastatic pancreatic cancer who presented 2 days ago with a displaced left femoral neck fracture.     PLAN:  N.p.o.  Holding anticoagulation  Plan to proceed with left hip cemented bipolar hemiarthroplasty this afternoon    Preoperatively, the nature of the procedure, risks and benefits, as well as alternatives including nonsurgical management were discussed in detail with the patient. I reviewed and discussed the patient's condition and relevant images with the patient. We discussed options for further evaluation and treatment, including conservative non-operative management versus surgical intervention.      From a conservative standpoint, the patient can pursue non-operative management, which would include prolonged nonweightbearing and likely bedrest, which carries a high risk of morbidity and mortality due to prolonged and diminished mobilization/ambulation and it's associated complications, which include but are not limited to blood clots (DVT/PE), skin ulcerations and pressure sores, and pneumonia. In the long term, there is also the risk of chronic pain, fracture nonunion, fracture malunion, and avascular necrosis of the femoral head.  However given the patient's recent diagnosis of metastatic pancreatic cancer and his multiple other medical  problems, I do not feel this would be an unreasonable option in his case.    From a surgical standpoint, we discussed closed reduction vs open reduction and internal fixation. Given, the patient's fracture morphology and degree of displacement, internal fixation would also carry the risks of chronic pain, fracture nonunion, fracture malunion, and avascular necrosis of the femoral head. However, this procedure would likely be quicker, requiring less time under anesthesia and blood loss when compared to hip arthroplasty.    We also discussed what total hip arthroplasty or hemiarthroplasty involves, as well as the postoperative recovery and rehabilitation.  Given the above findings which include the patient s age and activity level, I have recommended a Hemiarthroplasty. This procedure has a high likelihood of providing immediate post-operative mobilization and improved pain control. The patient understands the indications and possible complications of surgery.    We also discussed at length the risks and benefits of arthroplasty surgery. Our discussion included but was not limited to the risk of pain, bleeding, infection, blood clots (DVT, PE), wound issues, continued chronic pain in the hip, post-operative joint stiffness, painful arc of motion, difficulty with ambulation, iatrogenic fracture, damage to nearby neurovascular structures, hip instability/dislocation, allergic reaction to implanted components, implant wear, loosening and/or failure requiring revision, and possible post-operative leg length discrepancy (apparent or actual). The possibility of intra-operative and/or post-operative medical complications such as anesthesia complications or reactions, respiratory and cardiovascular events, stroke, heart attack and/or death were also discussed.    I also had a long discussion with the patient and his family regarding his high risk of medical complication related to his multiple chronic medical comorbidities as  well as his recent diagnosis of metastatic pancreatic cancer that is now involving bilateral lungs.  I reiterated to the patient and his family what they had already been told by the intensivist that there is a reasonably high likelihood that Per does not survive this hospitalization or even the surgery.    The patient demonstrated an understanding of the indications of surgery and possible complications, but is ultimately decided that he is unable to tolerate the pain and immobility associated with continued nonoperative management.  He accepts the above-mentioned risks, and together we have decided to proceed with surgery. All of patients questions were answered preoperatively at which time informed consent was taken.       CHIEF COMPLAINT: Left hip pain    HISTORY OF PRESENT ILLNESS:  Per Beasley is an 84-year-old male with a complex past medical history including coronary artery disease, congestive heart failure, COPD, type 2 diabetes with a most recent hemoglobin A1c of 7.8, hypertension, A-fib on Eliquis, chronic kidney disease stage IV, aortic stenosis status post TAVR, active pancreatic cancer with recent diagnosis of metastatic pleural effusions status post chest tube placement yesterday, who presented with left hip pain and inability to bear weight after ground-level fall late on 10/8/2024.  Radiographs and CT obtained in the ED demonstrated a displaced femoral neck fracture without definitive evidence for pathologic lesion.  After discussing at length the risks, benefits, and alternatives to surgery, the patient and his family all unanimously agreed to proceed with a left hip hemiarthroplasty.  We initially discussed proceeding with surgery yesterday on 12/9/2024, but his case was ultimately delayed for evaluation by pulmonology, chest tube placement, and medical optimization.    PAST MEDICAL HISTORY:  Past Medical History:   Diagnosis Date    JONNATHAN (acute kidney injury) (H)     Aortic stenosis      Biliary obstruction (H)     Biliary obstruction (H)     Bradycardia     CAD (coronary artery disease)     CKD (chronic kidney disease) stage 3, GFR 30-59 ml/min (H)     COPD (chronic obstructive pulmonary disease) (H)     Diabetes mellitus, type 2 (H)     Generalized muscle weakness     Heart failure with reduced ejection fraction, NYHA class I (H)     HTN (hypertension)     Paroxysmal atrial fibrillation (H)     Pleural effusion     right       ALLERGIES:   Review of patient's allergies indicates   Allergies   Allergen Reactions    Blood-Group Specific Substance Other (See Comments)     Patient has a non specific antibody.  Blood product orders may be delayed.  Please draw on red top and 2 purple top tubes for all Type and Screen/ type and Cross match orders.    Hydrochlorothiazide Rash       MEDICATIONS UPON ADMISSION:  Medications Prior to Admission   Medication Sig Dispense Refill Last Dose/Taking    amLODIPine (NORVASC) 10 MG tablet Take 1 tablet (10 mg) by mouth daily 30 tablet 0 12/8/2024 Morning    apixaban ANTICOAGULANT (ELIQUIS ANTICOAGULANT) 2.5 MG tablet Take 1 tablet (2.5 mg) by mouth 2 times daily. Do not start before August 23, 2024. 60 tablet 0 12/8/2024 Morning    glipiZIDE (GLUCOTROL XL) 5 MG 24 hr tablet Take 5 mg by mouth every morning   12/8/2024 Morning    metoprolol tartrate (LOPRESSOR) 25 MG tablet Take 1 tablet (25 mg) by mouth 2 times daily. 60 tablet 0 12/8/2024 Morning    Torsemide 40 MG TABS Take 80 mg by mouth daily 60 tablet 0 12/8/2024 Morning    traZODone (DESYREL) 50 MG tablet Take 50 mg by mouth at bedtime   12/7/2024    Alcohol Swabs PADS Use to swab the area of the injection or rosenda as directed Per insurance coverage 100 each 0     blood glucose (NO BRAND SPECIFIED) lancets standard To use to test glucose level in the blood Use to test blood sugar  2  times daily as directed. To accompany glucose monitor brands per insurance coverage. 100 each 0     blood glucose (NO BRAND  SPECIFIED) test strip To use to test glucose level in the blood Use to test blood sugar  2 times daily as directed. To accompany glucose monitor brands per insurance coverage. 100 strip 0     blood glucose calibration (NO BRAND SPECIFIED) solution Used to calibrate the blood glucose monitor as needed and as directed.  To accompany  blood glucose brands per insurance coverage 1 each 0     blood glucose monitoring (NO BRAND SPECIFIED) meter device kit Use as directed , Per insurance coverage 1 kit 0     glucose (BD GLUCOSE) 4 g chewable tablet Take 4 tablets by mouth every 15 minutes as needed for low blood sugar 50 tablet 0     insulin pen needle (32G X 4 MM) 32G X 4 MM miscellaneous Use as directed by provider Per insurance coverage 100 each 0     Sharps Container MISC Use as directed to dispose of needles, lancets and other sharps Per Insurance coverage 1 each 0        SOCIAL HISTORY:   he  reports that he has quit smoking. His smoking use included cigarettes. He has never used smokeless tobacco. He reports that he does not currently use alcohol.    FAMILY HISTORY:  family history is not on file.      REVIEW OF SYSTEMS:   Reviewed with patient. See HPI, otherwise negative.    PHYSICAL EXAMINATION:  Vitals: Temp:  [97.2  F (36.2  C)-98  F (36.7  C)] 97.4  F (36.3  C)  Pulse:  [50-68] 58  Resp:  [20-22] 20  BP: (125-146)/(57-66) 127/63  SpO2:  [89 %-99 %] 94 %    Cardiac: RRR by pulse exam  Pulmonary: Nonlabored breathing, on O2 via NC    LLE:  Left lower extremity short and externally rotated  Sensation intact to light touch in the saphenous, sural, tibial, SPN, DPN distributions  Fires EHL/FHL/TA/GSC  DP pulse is faintly palpable, toes are warm and well perfused with brisk capillary refill      RADIOGRAPHIC EVALUATION:  Radiographs and CT of the left hip demonstrate a displaced left femoral neck fracture with surrounding osteopenia.  There is no definitive evidence of pathologic lesion, although this cannot be  completely ruled out without intraoperative biopsy.    PERTINENT LABS:  Lab Results   Component Value Date    WBC 7.7 12/10/2024     Lab Results   Component Value Date    RBC 4.13 12/10/2024     Lab Results   Component Value Date    HGB 11.5 12/10/2024     Lab Results   Component Value Date    HCT 34.1 12/10/2024     Lab Results   Component Value Date    MCV 83 12/10/2024     Lab Results   Component Value Date    MCH 27.8 12/10/2024     Lab Results   Component Value Date    MCHC 33.7 12/10/2024     Lab Results   Component Value Date    RDW 12.5 12/10/2024     Lab Results   Component Value Date     12/10/2024     Last Comprehensive Metabolic Panel:  Lab Results   Component Value Date     (L) 12/10/2024    POTASSIUM 5.0 12/10/2024    CHLORIDE 98 12/10/2024    CO2 26 12/10/2024    ANIONGAP 10 12/10/2024     (H) 12/10/2024    BUN 24.9 (H) 12/10/2024    CR 1.90 (H) 12/10/2024    GFRESTIMATED 34 (L) 12/10/2024    SANDRA 9.1 12/10/2024         Osvaldo Arriaga MD   [facility]  12/10/2024

## 2024-12-10 NOTE — OP NOTE
Operative Report    PATIENT Per Beasley   DATE OF SURGERY:  12/8/2024 - 12/10/2024      PREOPERATIVE DIAGNOSIS   Hip fracture, left, closed, initial encounter (H) [S72.002A].    POSTOPERATIVE DIAGNOSIS   Hip fracture, left, closed, initial encounter (H) [S72.002A].    PROCEDURE PERFORMED   left hip hemiarthroplasty    IMPLANTS  Implant Name Type Inv. Item Serial No.  Lot No. LRB No. Used Action   BONE CEMENT SIMPLEX W/TOBRAMYCIN 6197-9-001 - [id number] Cement, Bone BONE CEMENT SIMPLEX W/TOBRAMYCIN 6197-9-001  [employer] [id number] Left 1 Implanted   BONE CEMENT SIMPLEX W/TOBRAMYCIN 6197-9-001 - [id number] Cement, Bone BONE CEMENT SIMPLEX W/TOBRAMYCIN 6197-9-001  [employer] [id number] Left 2 Implanted   IMP STEM CENTRALIZER DEPUY 11.0MM 1376- - [id number] Total Joint Component/Insert IMP STEM CENTRALIZER DEPUY 11.0MM 1376-  [employer]- [id number] Left 1 Implanted   STEM FEMORAL SUMMIT HO CEMENT SZ 6 - [id number] Total Joint Component/Insert STEM FEMORAL SUMMIT HO CEMENT SZ 6  [employer]- [id number] Left 1 Implanted   IMP HEAD FEMORAL DEPUY BIPOLAR 02N52WL [id number] - [id number] Total Joint Component/Insert IMP HEAD FEMORAL DEPUY BIPOLAR 02H02XA [id number]  [employer]- [id number] Left 1 Implanted   IMP HEAD FEMORAL DEPUY [place] 28MM +5MM 1365- - [id number] Total Joint Component/Insert IMP HEAD FEMORAL DEPUY [place] 28MM +5MM 1365-  [employer]- [id number] Left 1 Implanted   PLUG CEMENT MOHR W/INSERT 25MM 4826-4049 - [id number] Total Joint Component/Insert PLUG CEMENT MOHR W/INSERT 25MM 4397-5968  [employer] [id number] Left 1 Implanted   Depuy Cemented [place] stem, size 6, high offset  28mm cobalt chrome head, +5mm  52mm outer diameter bipolar head    SURGEON  Osvaldo Arriaga MD    ASSISTANT   Jameson Almonte PA-C  A skilled assistant was required for patient positioning, surgical assistance, wound closure and monitoring patient's safety  throughout the case.      ANESTHESIA  General      FINDINGS:  Successful L hip hemiarthroplasty.    SPECIMENS:  Femoral head sent for gross. Bone sample at fracture site sent for permanent to rule out pathologic lesion    ESTIMATED BLOOD LOSS:  300cc    COMPLICATIONS   None.        INDICATION FOR PROCEDURE  Per Beasley is an 84-year-old male with a complex past medical history including coronary artery disease, congestive heart failure, COPD, type 2 diabetes with a most recent hemoglobin A1c of 7.8, hypertension, A-fib on Eliquis, chronic kidney disease stage IV, aortic stenosis status post TAVR, active pancreatic cancer with recent diagnosis of metastatic pleural effusions status post chest tube placement yesterday, who presented with left hip pain and inability to bear weight after ground-level fall late on 10/8/2024.  Radiographs and CT obtained in the ED demonstrated a displaced femoral neck fracture without definitive evidence for pathologic lesion.  After discussing at length the risks, benefits, and alternatives to surgery, the patient and his family all unanimously agreed to proceed with a left hip hemiarthroplasty.  We initially discussed proceeding with surgery yesterday on 12/9/2024, but his case was ultimately delayed for evaluation by pulmonology, chest tube placement, and medical optimization.     PREOPERATIVE EXAMINATION:   Cardiac: RRR by pulse exam  Pulmonary: Nonlabored breathing on room air    LLE:  LLE short and externally rotated  Sensation intact to light touch in the saphenous, sural, tibial, SPN, DPN distributions  Fires EHL/FHL/TA/GSC  DP pulse is faintly palpable, toes are warm and well perfused with brisk capillary refill      INFORMED CONSENT  Per Beasley was identified in the preoperative holding area and was identified using medical record number, name, and date of birth, all of which were confirmed. The operative extremity was marked using an indelible marker. Once again, all  risks and benefits as well as alternatives to surgical intervention were discussed with the patient in detail and all their questions were answered. Risks discussed included but were not limited to:  persistent pain, infection, bleeding, scarring, stiffness, damage to surrounding structures, thromboembolic events, fracture, malalignment/malrotation, leg length inequality, dislocation or instability, implant complications, severe limb dysfunction, loss of limb, and loss of life.     I also had a long discussion with the patient and his family regarding his high risk of medical complication related to his multiple chronic medical comorbidities as well as his recent diagnosis of metastatic pancreatic cancer that is now involving bilateral lungs.  I reiterated to the patient and his family what they had already been told by the intensivist that there is a reasonably high likelihood that Per does not survive this hospitalization or even the surgery.     The patient demonstrated an understanding of the indications of surgery and possible complications, but is ultimately decided that he is unable to tolerate the pain and immobility associated with continued nonoperative management.  He accepts the above-mentioned risks, and together we have decided to proceed with surgery. All of patients questions were answered preoperatively at which time informed consent was taken.     DESCRIPTION OF PROCEDURE   Per Beasley was brought back to the operating room.  General anesthetic was administered without complication.  The patient was then transferred to the OR table and into the lateral decubitus position.  All bony prominences were well-padded and an axillary roll was placed.  The patient was then prepped and draped in the usual sterile fashion.    A timeout was performed prior to the procedure.  Three separate staff members confirmed the patient's name, correct site and side of surgery and procedure being performed.   Antibiotics and tranexamic acid were confirmed to be given prior to incision.     We utilized a standard posterior approach.  We came sharply down through the skin.  Electrocautery was used through the soft tissues.  We identified the IT band.  This was then divided.  A Charnley retractor was placed.  Identified the posterior superior corner of the greater trochanter.  Bursal tissue was taken down.  Identified the posterior capsule.  The posterior capsule and short external rotators were taken down in a single flap and tagged.  Hematoma from the fracture was encountered on entering the capsule.  The remaining femoral neck was rotated up and out.  A neck cut was made at about 4 mm above the lesser trochanter as templated.  We then removed the remaining femoral head with a corkscrew. The surrounding bone was notable for diffuse osteopenia but no definitive pathologic lesion. A sample of bone from the fracture site was sent for permanent and the femoral head was sent for gross.  The acetabulum was appropriately sized for the bipolar head at a 52 mm.  We then turned our attention back to femoral preparation.  A box osteotome was utilized.  A canal finder was utilized.  We then sequentially broached up to a size 6, which achieved good proximal fill and rotational control.  We reduced and trialed.  We had good length and stability with a high offset +5mm neck.  As such we dislocated and removed the broach.  We then mixed the cement on the back table and prepped the femoral canal for cementing.  Cement restrictor was placed.  The canal was thoroughly irrigated and dried.  We utilized a retrograde cementing technique.  We pressurized the cement.  We inserted the femoral stem into the canal with about 20 degrees of anteversion as we had when we broached.  We held the stem in place and allow the cement to cure.  The excess cement was removed from around the stem and within the acetabulum.  The final head was then placed and  the head was reduced.  We had good stability with flexion to 90 degrees and internal rotation to 70 degrees.  The wound was then thoroughly irrigated. 1g of vancomycin power was placed deep to capsule. The posterior capsule was repaired with a #5 Ethibond.  The IT band was repaired with #1 Vicryl and a running #1 stratafix.  The fat layer was closed with a running 0 Vicryl.  The deep dermal tissue was closed with 2-0 Vicryl.  Skin was closed with a running subcuticular 3-0 Monocryl and skin glue. The wound was then dressed with a sterile Mepilex dressing.     All sponge needle instrument counts are correct in the procedure.     The patient was rolled from the lateral decubitus position taken to the recovery room in stable condition.      POSTOPERATIVE PLAN:  -Weightbearing as tolerated to the LLE with a walker at all times for stability  -Posterior precautions x 3 months  -OK to resume home ASA 81mg on POD0. OK to resume home Eliquis on POD1 from an orthopedic perspective  -IV cefazolin x 2 doses postop  -Multimodal analgesia  -PT/OT  -Please follow-up with Dr. Arriaga/Toshia Carnes PA-C in 2 weeks at [facility]. Call our scheduling line at [phone] to make an appointment if you do not already have one scheduled. Call [phone] to reach Dr. Arriaga's team with questions or concerns.         Osvaldo Arriaga MD  [facility]

## 2024-12-10 NOTE — CONSULTS
Care Management Initial Consult    General Information  Assessment completed with: Patient,    Type of CM/SW Visit: Initial Assessment    Primary Care Provider verified and updated as needed: Yes   Reason for Consult: discharge planning       Communication Assessment  Patient's communication style: spoken language (English or Bilingual)    Hearing Difficulty or Deaf: no   Wear Glasses or Blind: no    Cognitive  Cognitive/Neuro/Behavioral: .WDL except  Level of Consciousness: intermittent confusion     Orientation: disoriented to, place, situation             Living Environment:   People in home: spouse     Current living Arrangements: condominium           Family/Social Support:  Care provided by: self  Provides care for: no one  Marital Status:   Support system: Wife          Description of Support System: Supportive, Involved         Current Resources:   Patient receiving home care services: No        Community Resources: None  Equipment currently used at home: walker, rolling  Supplies currently used at home: None    Employment/Financial:  Employment Status: retired        Financial Concerns: none   Referral to Financial Worker: No       Does the patient's insurance plan have a 3 day qualifying hospital stay waiver?  No    Lifestyle & Psychosocial Needs:  Social Drivers of Health     Food Insecurity: Low Risk  (12/9/2024)    Food Insecurity     Within the past 12 months, did you worry that your food would run out before you got money to buy more?: No     Within the past 12 months, did the food you bought just not last and you didn t have money to get more?: No   Depression: At risk (8/16/2023)    Received from [facility], [facility]    PHQ-2     PHQ-2 TOTAL SCORE: 3   Housing Stability: Low Risk  (12/9/2024)    Housing Stability     Do you have housing? : Yes     Are you worried about losing your housing?: No   Tobacco Use: Medium Risk  (8/20/2024)    Patient History     Smoking Tobacco Use: Former     Smokeless Tobacco Use: Never     Passive Exposure: Not on file   Financial Resource Strain: Low Risk  (12/9/2024)    Financial Resource Strain     Within the past 12 months, have you or your family members you live with been unable to get utilities (heat, electricity) when it was really needed?: No   Alcohol Use: Not on file   Transportation Needs: Low Risk  (12/9/2024)    Transportation Needs     Within the past 12 months, has lack of transportation kept you from medical appointments, getting your medicines, non-medical meetings or appointments, work, or from getting things that you need?: No   Physical Activity: Not on file   Interpersonal Safety: Low Risk  (12/9/2024)    Interpersonal Safety     Do you feel physically and emotionally safe where you currently live?: Yes     Within the past 12 months, have you been hit, slapped, kicked or otherwise physically hurt by someone?: No     Within the past 12 months, have you been humiliated or emotionally abused in other ways by your partner or ex-partner?: No   Stress: Not on file   Social Connections: Socially Integrated (4/22/2024)    Received from [facility] & [facility]    Social Connections     Do you often feel lonely or isolated from those around you?: 0   Health Literacy: Not on file       Functional Status:  Prior to admission patient needed assistance:   Dependent ADLs:: Ambulation-walker  Dependent IADLs:: Independent       Mental Health Status:  Mental Health Status: No Current Concerns       Chemical Dependency Status:  Chemical Dependency Status: No Current Concerns             Additional Information:  11:10 AM  Assessment: Follow   Last Note:12/10/24  Plan: Assessed.  Lives in a condo with wife.  Uses a walker.    Needs: TBD after surgery.  Pt has chest tube currently.  On O2 but not at baseline  Hand off sent: No  Transport: Family      [place] CHRISTEN [facility]ano [facility]

## 2024-12-10 NOTE — PLAN OF CARE
Problem: Adult Inpatient Plan of Care  Goal: Optimal Comfort and Wellbeing  Outcome: Progressing  Intervention: Monitor Pain and Promote Comfort  Recent Flowsheet Documentation  Taken 12/10/2024 5051 by Thong Arroyo RN  Pain Management Interventions: medication (see MAR)   Goal Outcome Evaluation:  VSS and afebrile, aside from intermittent bradycardia, provider updated. Received PRN Oxycodone and Tylenol once this morning for pain, with noted relief. Patient seen by pulmonology and palliative, and has decided to proceed with surgery this afternoon. Chlorhexidine bath done. Report given to VICKY. Belongings went with patient/family, as patient will likely move to ortho floor after surgery.

## 2024-12-10 NOTE — CONSULTS
Palliative Care Consultation Note  [facility]      Patient: Per Beasley  Date of Admission:  12/8/2024    Requesting Clinician / Team: Dr. Lacey  Reason for consult: Goals of care       Recommendations & Counseling     GOALS OF CARE:   Goals are primarily comfort focused.    Patient recently diagnosed with pancreatic cancer and has opted to not pursue cancer directed treatment.  He values comfort and quality of life over quantity of life.    Given patient's significant pain and immobility due to his hip fracture, patient is willing to pursue surgery as a palliative measure.  He has discussed with family, orthopedics, pulmonary and HMS, weighing risk versus potential benefit.  He verbalizes understanding that he has terminal cancer and is a high risk surgical candidate.    I discussed hospice philosophy and services with patient and family.  They may consider pursuing hospice after surgery, depending on how he progresses clinically through his post operative period.  They are aware of hospice as an option and have also discussed with his oncologist.    Palliative care will continue to follow    ADVANCE CARE PLANNING:  No health care directive on file. Per system policy, Surrogate Decision-makers for Patients With Diminished Decision-making Capacity offers guidance on possible decision-makers. Spouse has been identified as a surrogate decision maker.   There is no POLST form on file, defer to patient and/or next of kin for decisions   Code status: No CPR- Do NOT Intubate    MEDICAL MANAGEMENT:   We are not actively managing symptoms at this time.    Patient is opting for surgical repair of his hip fracture in hopes of having better pain control.  Surgery planned for this afternoon.      If patient continues to have burden from pleural fluid in the future, could consider tunneled catheter placement for comfort/palliation.      PSYCHOSOCIAL/SPIRITUAL SUPPORT:  , 5  chinedu    Palliative Care will continue to follow. Thank you for the consult and allowing us to aid in the care of Per Beasley.    These recommendations have been discussed with Dr. Salomon, Dr. Lacey, bedside RN.    Yolanda Martines, CNS  [facility], Palliative Care  Securely message with the [employer] Web Console (learn more here) or  Text page via [facility] Paging/Directory         Assessment      Per Beasley is a 84-year-old male admitted on 12/8/2024. PMHx significant for CAD, chronic diastolic heart failure, COPD, type 2 diabetes mellitus, HTN, CKD stage IV, chronic normocytic anemia, and aortic stenosis s/p TAVR.  Recent diagnosis of pancreatic adenocarcinoma 4 months ago and pt not receiving chemotherapy nor other interventions based on [facility] Initial Consultation note, dated 9/2/2024.  Presented to the ED for mechanical fall and found to have L hip fracture  Denies head trauma or loss of consciousness.  Reports going to the bathroom without his walker when his legs gave out and fell on his left side     Hospital course significant for ongoing pain.  Orthopedic surgical repair planned for 12/10/2024.   Also seen by pulmonology to evaluate the pleural effusions and pneumothorax. He underwent thoracentesis with right-sided chest tube placement on 12/9, with pleural fluid studies. Results show a transudate with pending gram stain, cultures, and cytology.      Today, the patient was seen for:  Goals of care    History of Present Illness   Met with patient spouse and youngest son.   Introduced the role of palliative care as an interdisciplinary team that cares for patients with serious illness to help support symptom management, communication, coping for patients and their families as well as support with medical decision making.    Patient states he was diagnosed with pancreatic cancer a few months ago.  Per family he has opted for no cancer directed treatments.  They have discussed the option of hospice with  oncology but have not pursued yet.     Patient and family all in agreement that if orthopedic surgery for fracture could help his overall comfort and potentially allow him to not be bed-bound then would want to pursue surgery.  They all understand that he has a limited life expectancy due to pancreatic cancer and that he is higher risk for surgery.  Patient states he is willing to undergo surgery despite risks.      Prognosis, Goals, & Planning:   Functional Status just prior to this current hospitalization:  ECOG2 (Ambulatory and capable of all selfcare but unable to carry out any work activities; may need help with IADLs up and about > 50% of waking hours)    Prognosis, Goals, and/or Advance Care Planning:  We discussed general treatment options (full/restorative, selective/conservatives, and comfort only/hospice). We then discussed how these specifically apply to patient given his terminal cancer diagnosis, pleural effusion, other underlying health conditions.  Based on this discussion, patient has decided to pursue surgery as a palliative intervention, with overall goals being more focused on comfort and quality of life    Code Status was addressed today:   Confirmed DNR/DNI    Patient's decision making preferences: shared with support from loved ones        Patient has decision-making capacity today for complex decisions: Intact, forgetful at times, therefore recommend having support from surrogate decision maker.           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     Medications:  Reviewed this patient's medication profile and medications from this hospitalization.      ROS:  Comprehensive ROS is reviewed and is  negative except as here & per HPI: Patient reports significant pain in right lower extremity due to fracture.  He states IV pain medication is helpful.  No other pain or symptom complaints at this time.  Prior to fall with fracture, patient states he has not been having cancer related pain, nausea or shortness of breath.     Physical Exam   Vital Signs with Ranges  Temp:  [97.2  F (36.2  C)-98  F (36.7  C)] 97.3  F (36.3  C)  Pulse:  [50-68] 50  Resp:  [20-22] 20  BP: (125-146)/(57-66) 129/57  SpO2:  [89 %-99 %] 93 %  Wt Readings from Last 10 Encounters:   12/10/24 74.3 kg (163 lb 12.8 oz)   08/20/24 77 kg (169 lb 12.8 oz)   08/20/24 78.8 kg (173 lb 11.6 oz)   07/10/24 81.2 kg (179 lb)   07/04/24 76.7 kg (169 lb 3.2 oz)   04/04/24 74.4 kg (164 lb 2 oz)   01/12/24 76.8 kg (169 lb 6.4 oz)   09/16/22 94.8 kg (209 lb)   08/14/22 91.7 kg (202 lb 1.6 oz)     163 lbs 12.83 oz    PHYSICAL EXAM:  Alert, Oriented X 4, NAD  Breathing nonlabored at rest.    Data reviewed:  Results for orders placed or performed during the hospital encounter of 12/08/24 (from the past 24 hours)   Glucose by meter   Result Value Ref Range    GLUCOSE BY METER POCT 228 (H) 70 - 99 mg/dL   Cell count with differential fluid    Narrative    The following orders were created for panel order Cell count with differential fluid.  Procedure                               Abnormality         Status                     ---------                               -----------         ------                     Cell Count Body Fluid[[id number]]        Abnormal            Final result               Differential Body Fluid[[id number]]                          Final result                 Please view results for these tests on the individual orders.   Pleural fluid Aerobic Bacterial Culture Routine With Gram Stain    Specimen: Pleural Cavity, Right; Pleural fluid   Result Value Ref Range    Gram Stain Result No organisms seen     Gram Stain Result 1+ WBC seen    Glucose  fluid   Result Value Ref Range    Glucose Fluid Source Other     Glucose fluid 210 mg/dL    Narrative    No reference ranges have been established. This result should be interpreted in the context of the patient's clinical condition and compared to simultaneous measurement in the patient's blood. This is a lab developed test. It has not been cleared or approved by the FDA. FDA clearance is not required for clinical use.   Lactate dehydrogenase fluid   Result Value Ref Range    LD Fluid Source Other     Lactate dehydrogenase fluid 339 U/L    Narrative    No reference ranges have been established. This result should be interpreted in the context of the patient's clinical condition and compared to simultaneous measurement in the patient's blood. This is a lab developed test. It has not been cleared or approved by the FDA. FDA clearance is not required for clinical use.   Protein fluid   Result Value Ref Range    Protein Fluid Source Other     Protein Total Fluid 3.8 g/dL    Narrative    No reference ranges have been established. This result should be interpreted in the context of the patient's clinical condition and compared to simultaneous measurement in the patient's blood. This is a lab developed test. It has not been cleared or approved by the FDA. FDA clearance is not required for clinical use.   Cell Count Body Fluid   Result Value Ref Range    Color Orange (A) Colorless, Yellow    Clarity Hazy (A) Clear    Cell Count Fluid Source Other     Total Nucleated Cells 967 /uL    Narrative    No reference ranges have been established.  This result  should be interpreted in the context of the patient's clinical condition and   compared to simultaneous measurement in the patient's blood.        Small clot present, count may be inaccurate.   Differential Body Fluid   Result Value Ref Range    % Neutrophils 1 %    % Lymphocytes 82 %    % Monocyte/Macrophages 0 %    % Eosinophils 16 %    % Basophils 1 %    Narrative    No  reference ranges have been established. This result should be interpreted in the context of the patient's clinical condition and compared to simultaneous measurement in the patient's blood.   Glucose by meter   Result Value Ref Range    GLUCOSE BY METER POCT 296 (H) 70 - 99 mg/dL   Glucose by meter   Result Value Ref Range    GLUCOSE BY METER POCT 309 (H) 70 - 99 mg/dL   US Lower Extremity Venous Duplex Left    Narrative    EXAM: US LOWER EXTREMITY VENOUS DUPLEX LEFT  LOCATION: [facility]  DATE: 12/10/2024    INDICATION: Erythema, swelling,  COMPARISON: None.  TECHNIQUE: Venous Duplex ultrasound of the left lower extremity with and without compression, augmentation and duplex. Color flow and spectral Doppler with waveform analysis performed.    FINDINGS: Exam includes the common femoral, femoral, popliteal, and contralateral common femoral veins as well as segmentally visualized deep calf veins and greater saphenous vein.     LEFT: No deep vein thrombosis. No superficial thrombophlebitis. No popliteal cyst.      Impression    IMPRESSION:  No deep venous thrombosis in the left lower extremity.   CBC with Platelets & Differential    Narrative    The following orders were created for panel order CBC with Platelets & Differential.  Procedure                               Abnormality         Status                     ---------                               -----------         ------                     CBC with platelets and d...[[id number]]  Abnormal            Final result                 Please view results for these tests on the individual orders.   Basic metabolic panel   Result Value Ref Range    Sodium 134 (L) 135 - 145 mmol/L    Potassium 5.0 3.4 - 5.3 mmol/L    Chloride 98 98 - 107 mmol/L    Carbon Dioxide (CO2) 26 22 - 29 mmol/L    Anion Gap 10 7 - 15 mmol/L    Urea Nitrogen 24.9 (H) 8.0 - 23.0 mg/dL    Creatinine 1.90 (H) 0.67 - 1.17 mg/dL    GFR Estimate 34 (L) >60 mL/min/1.73m2     "Calcium 9.1 8.8 - 10.4 mg/dL    Glucose 278 (H) 70 - 99 mg/dL   Magnesium   Result Value Ref Range    Magnesium 2.4 (H) 1.7 - 2.3 mg/dL   CBC with platelets and differential   Result Value Ref Range    WBC Count 7.7 4.0 - 11.0 10e3/uL    RBC Count 4.13 (L) 4.40 - 5.90 10e6/uL    Hemoglobin 11.5 (L) 13.3 - 17.7 g/dL    Hematocrit 34.1 (L) 40.0 - 53.0 %    MCV 83 78 - 100 fL    MCH 27.8 26.5 - 33.0 pg    MCHC 33.7 31.5 - 36.5 g/dL    RDW 12.5 10.0 - 15.0 %    Platelet Count 148 (L) 150 - 450 10e3/uL    % Neutrophils 55 %    % Lymphocytes 33 %    % Monocytes 7 %    % Eosinophils 4 %    % Basophils 0 %    % Immature Granulocytes 0 %    NRBCs per 100 WBC 0 <1 /100    Absolute Neutrophils 4.2 1.6 - 8.3 10e3/uL    Absolute Lymphocytes 2.6 0.8 - 5.3 10e3/uL    Absolute Monocytes 0.6 0.0 - 1.3 10e3/uL    Absolute Eosinophils 0.3 0.0 - 0.7 10e3/uL    Absolute Basophils 0.0 0.0 - 0.2 10e3/uL    Absolute Immature Granulocytes 0.0 <=0.4 10e3/uL    Absolute NRBCs 0.0 10e3/uL   Glucose by meter   Result Value Ref Range    GLUCOSE BY METER POCT 275 (H) 70 - 99 mg/dL   Glucose by meter   Result Value Ref Range    GLUCOSE BY METER POCT 239 (H) 70 - 99 mg/dL   POC US Guidance Needle Placement    Narrative    Ultrasound was performed as guidance to an anesthesia procedure.  Click   \"PACS images\" hyperlink below to view any stored images.  For specific   procedure details, view procedure note authored by anesthesia.   Glucose by meter   Result Value Ref Range    GLUCOSE BY METER POCT 195 (H) 70 - 99 mg/dL   Prepare red blood cells (unit)   Result Value Ref Range    Blood Component Type Red Blood Cells     Product Code [id number]     Unit Status Ready for issue     Unit Number [id number]     CROSSMATCH Compatible     CODING SYSTEM [id number]          65 MINUTES SPENT BY ME on the date of service doing chart review, history, exam, documentation & further activities per the note.          "

## 2024-12-10 NOTE — ANESTHESIA PROCEDURE NOTES
Airway       Patient location during procedure: OR       Procedure Start/Stop Times: 12/10/2024 4:28 PM  Staff -        Anesthesiologist:  Girma Grijalva MD       CRNA: Rae Rudolph APRN CRNA       Performed By: CRNA  Consent for Airway        Urgency: elective  Indications and Patient Condition       Indications for airway management: willis-procedural       Induction type:intravenous       Mask difficulty assessment: 1 - vent by mask    Final Airway Details       Final airway type: endotracheal airway       Successful airway: ETT - single  Endotracheal Airway Details        ETT size (mm): 7.5       Cuffed: yes       Cuff volume (mL): 8       Successful intubation technique: direct laryngoscopy       DL Blade Type: [place] 2       Grade View of Cords: 1       Adjucts: stylet       Position: Right       Measured from: lips       Secured at (cm): 22       Bite block used: None    Post intubation assessment        Placement verified by: capnometry and equal breath sounds        Number of attempts at approach: 1       Number of other approaches attempted: 0       Secured with: tape       Ease of procedure: easy       Dentition: Intact and Unchanged    Medication(s) Administered   Medication Administration Time: 12/10/2024 4:28 PM

## 2024-12-10 NOTE — ANESTHESIA PROCEDURE NOTES
Arterial Line Procedure Note    Pre-Procedure   Staff -        Anesthesiologist:  Girma Grijalva MD       Performed By: anesthesiologist       Location: OR       Pre-Anesthestic Checklist: patient identified, IV checked, risks and benefits discussed, informed consent, monitors and equipment checked and pre-op evaluation  Timeout:       Correct Patient: Yes        Correct Procedure: Yes        Correct Site: Yes        Correct Position: Yes   Line Placement:   This line was placed Pre Induction starting at 12/10/2024 4:12 PM and ending at 12/10/2024 4:18 PM  Procedure   Procedure: arterial line and new line       Laterality: left       Insertion Site: brachial.  Sterile Prep        Standard elements of sterile barrier followed       Skin prep: Chloraprep  Insertion/Injection        Technique: ultrasound guided        1. Ultrasound was used to evaluate the access site.       2. Artery evaluated via ultrasound for patency/adequacy.       3. Using real-time ultrasound the needle/catheter was observed entering the artery/vein.       5. The visualized structures were anatomically normal.       6. There were no apparent abnormal pathologic findings.       Catheter Type/Size: 20 G, 12 cm  Narrative        Tegaderm and Biopatch dressing used.       Complications: None apparent,        Arterial waveform: Yes        IBP within 10% of NIBP: Yes

## 2024-12-10 NOTE — PROGRESS NOTES
Brief progress note:    Paged by nursing regarding his high BS, adjusted insulin to NPO sliding scale.    Paged by nursing reporting pain in his left knee as well as swelling and erythema, US ordered to R/O DVT.    Misbah Hahn MD  Hospitalist

## 2024-12-11 ENCOUNTER — APPOINTMENT (OUTPATIENT)
Dept: RADIOLOGY | Facility: CLINIC | Age: 84
DRG: 521 | End: 2024-12-11
Payer: MEDICARE

## 2024-12-11 ENCOUNTER — APPOINTMENT (OUTPATIENT)
Dept: PHYSICAL THERAPY | Facility: CLINIC | Age: 84
DRG: 521 | End: 2024-12-11
Payer: MEDICARE

## 2024-12-11 ENCOUNTER — APPOINTMENT (OUTPATIENT)
Dept: OCCUPATIONAL THERAPY | Facility: CLINIC | Age: 84
DRG: 521 | End: 2024-12-11
Payer: MEDICARE

## 2024-12-11 LAB
ALBUMIN UR-MCNC: 10 MG/DL
APPEARANCE UR: CLEAR
BILIRUB UR QL STRIP: NEGATIVE
COLOR UR AUTO: ABNORMAL
GLUCOSE BLDC GLUCOMTR-MCNC: 154 MG/DL (ref 70–99)
GLUCOSE BLDC GLUCOMTR-MCNC: 177 MG/DL (ref 70–99)
GLUCOSE BLDC GLUCOMTR-MCNC: 227 MG/DL (ref 70–99)
GLUCOSE BLDC GLUCOMTR-MCNC: 266 MG/DL (ref 70–99)
GLUCOSE UR STRIP-MCNC: NEGATIVE MG/DL
HGB BLD-MCNC: 11.6 G/DL (ref 13.3–17.7)
HGB UR QL STRIP: ABNORMAL
KETONES UR STRIP-MCNC: NEGATIVE MG/DL
LEUKOCYTE ESTERASE UR QL STRIP: NEGATIVE
MUCOUS THREADS #/AREA URNS LPF: PRESENT /LPF
NITRATE UR QL: NEGATIVE
PH UR STRIP: 5 [PH] (ref 5–7)
RBC URINE: 16 /HPF
SP GR UR STRIP: 1.01 (ref 1–1.03)
UROBILINOGEN UR STRIP-MCNC: <2 MG/DL
WBC URINE: 4 /HPF

## 2024-12-11 PROCEDURE — 85018 HEMOGLOBIN: CPT

## 2024-12-11 PROCEDURE — 97161 PT EVAL LOW COMPLEX 20 MIN: CPT | Mod: GP

## 2024-12-11 PROCEDURE — 97530 THERAPEUTIC ACTIVITIES: CPT | Mod: GP

## 2024-12-11 PROCEDURE — 250N000013 HC RX MED GY IP 250 OP 250 PS 637

## 2024-12-11 PROCEDURE — 99233 SBSQ HOSP IP/OBS HIGH 50: CPT | Performed by: INTERNAL MEDICINE

## 2024-12-11 PROCEDURE — 250N000013 HC RX MED GY IP 250 OP 250 PS 637: Performed by: FAMILY MEDICINE

## 2024-12-11 PROCEDURE — 250N000011 HC RX IP 250 OP 636: Mod: JZ

## 2024-12-11 PROCEDURE — 250N000011 HC RX IP 250 OP 636

## 2024-12-11 PROCEDURE — 81001 URINALYSIS AUTO W/SCOPE: CPT | Performed by: NURSE PRACTITIONER

## 2024-12-11 PROCEDURE — 99232 SBSQ HOSP IP/OBS MODERATE 35: CPT | Performed by: FAMILY MEDICINE

## 2024-12-11 PROCEDURE — 97166 OT EVAL MOD COMPLEX 45 MIN: CPT | Mod: GO

## 2024-12-11 PROCEDURE — 120N000001 HC R&B MED SURG/OB

## 2024-12-11 PROCEDURE — 97110 THERAPEUTIC EXERCISES: CPT | Mod: GP

## 2024-12-11 PROCEDURE — 250N000009 HC RX 250: Performed by: NURSE PRACTITIONER

## 2024-12-11 PROCEDURE — 36415 COLL VENOUS BLD VENIPUNCTURE: CPT

## 2024-12-11 PROCEDURE — 97535 SELF CARE MNGMENT TRAINING: CPT | Mod: GO

## 2024-12-11 PROCEDURE — 71045 X-RAY EXAM CHEST 1 VIEW: CPT

## 2024-12-11 RX ORDER — LIDOCAINE HYDROCHLORIDE 20 MG/ML
JELLY TOPICAL ONCE
Status: COMPLETED | OUTPATIENT
Start: 2024-12-11 | End: 2024-12-11

## 2024-12-11 RX ADMIN — ASPIRIN 81 MG: 81 TABLET, COATED ORAL at 08:01

## 2024-12-11 RX ADMIN — FUROSEMIDE 40 MG: 10 INJECTION, SOLUTION INTRAMUSCULAR; INTRAVENOUS at 08:01

## 2024-12-11 RX ADMIN — OXYCODONE HYDROCHLORIDE 2.5 MG: 5 TABLET ORAL at 16:34

## 2024-12-11 RX ADMIN — ASPIRIN 81 MG: 81 TABLET, COATED ORAL at 20:49

## 2024-12-11 RX ADMIN — OXYCODONE 5 MG: 5 TABLET ORAL at 20:49

## 2024-12-11 RX ADMIN — MORPHINE SULFATE 2 MG: 2 INJECTION, SOLUTION INTRAMUSCULAR; INTRAVENOUS at 21:30

## 2024-12-11 RX ADMIN — ACETAMINOPHEN 650 MG: 325 TABLET ORAL at 17:21

## 2024-12-11 RX ADMIN — LIDOCAINE HYDROCHLORIDE: 20 JELLY TOPICAL at 10:17

## 2024-12-11 RX ADMIN — APIXABAN 2.5 MG: 2.5 TABLET, FILM COATED ORAL at 20:50

## 2024-12-11 RX ADMIN — ACETAMINOPHEN 650 MG: 325 TABLET ORAL at 12:13

## 2024-12-11 RX ADMIN — TRAZODONE HYDROCHLORIDE 50 MG: 50 TABLET ORAL at 20:49

## 2024-12-11 RX ADMIN — APIXABAN 2.5 MG: 2.5 TABLET, FILM COATED ORAL at 07:59

## 2024-12-11 RX ADMIN — CEFAZOLIN 1 G: 1 INJECTION, POWDER, FOR SOLUTION INTRAMUSCULAR; INTRAVENOUS at 07:59

## 2024-12-11 RX ADMIN — METOPROLOL TARTRATE 25 MG: 25 TABLET, FILM COATED ORAL at 07:59

## 2024-12-11 RX ADMIN — POLYETHYLENE GLYCOL 3350 17 G: 17 POWDER, FOR SOLUTION ORAL at 08:00

## 2024-12-11 RX ADMIN — SENNOSIDES AND DOCUSATE SODIUM 1 TABLET: 50; 8.6 TABLET ORAL at 08:01

## 2024-12-11 RX ADMIN — CEFAZOLIN 1 G: 1 INJECTION, POWDER, FOR SOLUTION INTRAMUSCULAR; INTRAVENOUS at 00:29

## 2024-12-11 ASSESSMENT — ACTIVITIES OF DAILY LIVING (ADL)
ADLS_ACUITY_SCORE: 55
ADLS_ACUITY_SCORE: 54
ADLS_ACUITY_SCORE: 55
ADLS_ACUITY_SCORE: 52
ADLS_ACUITY_SCORE: 55
ADLS_ACUITY_SCORE: 54
ADLS_ACUITY_SCORE: 55
ADLS_ACUITY_SCORE: 52
ADLS_ACUITY_SCORE: 55

## 2024-12-11 NOTE — PROGRESS NOTES
12/11/24 1105   Appointment Info   Signing Clinician's Name / Credentials (PT) Keith Valdivia, PT   Quick Adds   Quick Adds Certification   Living Environment   People in Home spouse   Current Living Arrangements condominium   Home Accessibility no concerns   Self-Care   Usual Activity Tolerance fair   Current Activity Tolerance poor   Equipment Currently Used at Home walker, rolling;other (see comments)  (electric scooter in hallway)   Fall history within last six months yes   Number of times patient has fallen within last six months 1   Activity/Exercise/Self-Care Comment Indep with most ADL's, wife does shopping/cooking/cleaning   General Information   Onset of Illness/Injury or Date of Surgery 12/08/24   Pertinent History of Current Problem (include personal factors and/or comorbidities that impact the POC) LEFT HIP HEMIARTHROPLASTY,  BIPOLAR 84 year old male with a past medical history significant for pancreatic cancer s/p placement of a CBD stent, aortic stenosis s/p TAVR, bradycardia, CAD, CKD, COPD, DM, HTN, A-fib admitted for a fall.  Imaging at the time presentation demonstrated a right-sided pneumothorax with a moderate right-sided pleural effusion for which she underwent placement of a chest tube.   Existing Precautions/Restrictions no hip IR;no hip ADD past midline;90 degree hip flexion  (chest tube)   Weight-Bearing Status - LUE weight-bearing as tolerated   Cognition   Affect/Mental Status (Cognition) WNL   Range of Motion (ROM)   Range of Motion ROM is WFL   Strength (Manual Muscle Testing)   Strength (Manual Muscle Testing) No deficits observed during functional mobility   Transfers   Transfers sit-stand transfer   Sit-Stand Transfer   Sit-Stand [place] (Transfers) verbal cues;moderate assist (50% patient effort);2 person assist   Assistive Device (Sit-Stand Transfers) walker, front-wheeled   Balance   Balance no deficits were identified   Clinical Impression   Criteria for Skilled  Therapeutic Intervention Yes, treatment indicated   PT Diagnosis (PT) Impaired functional mobility   Influenced by the following impairments weakness, pain   Functional limitations due to impairments transfers, gait   Clinical Presentation (PT Evaluation Complexity) stable   Clinical Presentation Rationale Patient presents as medically diagnosed   Clinical Decision Making (Complexity) low complexity   Planned Therapy Interventions (PT) home exercise program;gait training;transfer training;patient/family education   Risk & Benefits of therapy have been explained patient   PT Total Evaluation Time   PT Eval, Low Complexity Minutes (70221) 10   Therapy Certification   Start of care date 12/11/24   Certification date from 12/11/24   Certification date to 12/18/24   Physical Therapy Goals   PT Frequency Daily   PT Predicted Duration/Target Date for Goal Attainment 12/18/24   PT Goals Transfers;Gait;PT Goal 1   PT: Transfers Supervision/stand-by assist;Sit to/from stand;Within precautions;Assistive device   PT: Gait Assistive device;Rolling walker;Within precautions;10 feet;Minimal assist   PT: Goal 1 Indep with HEP   Interventions   Interventions Quick Adds Therapeutic Activity;Therapeutic Procedure   Therapeutic Procedure/Exercise   Ther. Procedure: strength, endurance, ROM, flexibillity Minutes (61954) 8   Symptoms Noted During/After Treatment increased pain   Treatment Detail/Skilled Intervention ap, qs, assisted heel slides x 10, extra time needed for cueing, minimial ability to dorsiflex but some movement noted.  Paitent needing extra time to follow cueing and for room set up, precautions reviewed   Therapeutic Activity   Therapeutic Activities: dynamic activities to improve functional performance Minutes (10576) 20   Symptoms Noted During/After Treatment Fatigue   Treatment Detail/Skilled Intervention Paitent needing mod assist of 2 to sit up on edge of bed, able to move L LE with use of leg . Paitent  educated on STS , precautions again,  Patient needing min-mod assist of 2 to stand from raised eob.  Patient needing mod assist of 2 to pivot to chair needing assist to turn walker, able to move feet but very slow and fearful.  Once in chair needing time to recover.  oxygen sats staying in 90's.  Milena stood 2nd time for virgen placement and felt very light headed , sat back down, bp and oxygen  were WNL.  Patient  feeling better after sitting for several minutes.   PT Discharge Planning   PT Plan progress with standing, tranfers, gait as able. RUFINO HEP, Ale has chest tube  along with posterior precautions   PT Discharge Recommendation (DC Rec) Transitional Care Facility   PT Rationale for DC Rec Patient needing mod assist of 2 for transfers at this time, lives with wife . Would benefit from further rehab to improve mobility/safety prior to returning to home   PT Brief overview of current status Ale mod-max assist of 2 for transfers   PT Equipment Needed at Discharge walker, rolling   Physical Therapy Time and Intention   Timed Code Treatment Minutes 28   Total Session Time (sum of timed and untimed services) 38

## 2024-12-11 NOTE — PROGRESS NOTES
[facility] MEDICINE PROGRESS NOTE      Identification/Summary: Per Beasley is a 84-year-old male admitted on 12/8/2024. PMHx significant for CAD, chronic diastolic heart failure, COPD, type 2 diabetes mellitus, HTN, CKD stage IV, chronic normocytic anemia, and aortic stenosis s/p TAVR.  Recent diagnosis of pancreatic adenocarcinoma 4 months ago and pt not receiving chemotherapy nor other interventions based on [facility] Initial Consultation note, dated 9/2/2024.  Presented to the ED for mechanical fall.  Denies head trauma or loss of consciousness.  Reports going to the bathroom without his walker when his legs gave out and fell on his left side.     Hospital course significant for ongoing pain.  Orthopedic surgical repair yesterday and that went well without any complications.     Also seen by pulmonology to evaluate the pleural effusions and pneumothorax. He underwent thoracentesis with right-sided chest tube placement on 12/9, with pleural fluid studies. Results show a transudate with pending gram stain, cultures, and cytology.       EMR review: [facility] Initial Consultation note, dated 9/3/2024, identified pt as a poor candidate for systemic chemotherapy due to multiple comorbidities and fraility and not interested in chemotherapy. Pt declined referral to hepatobiliary surgery to discuss surgical options.      Yesterday I had a long discussion with Dr. Lacey, pulmonary along with [place] orthopedic PA and then palliative care, Jessica Martines.  Also met with patient, his son and wife.  Family seems to have very realistic expectations about what his metastatic pancreatic cancer means for his life expectancy and wellbeing in the near future.  They understand that this surgery carries elevated risk because of his underlying health issues but want to weigh that against having to not have the surgery and likely ending up primarily at bedrest for the rest of his life.  He has been  very active all his life and even recently is still been able to do many of the things he enjoys.     Patient and family are fully aware of the elevated risk of the surgery but would want to be intubated for the surgery but if there was some significant perioperative event would not want any heroic measures to be performed at that time.  He would want to resume full no CPR and DO NOT INTUBATE following the surgery.     I did follow-up and talk again with orthopedic surgery team.  The surgery went well yesterday afternoon the patient reports she is doing well today with less pain.     Assessment and Plan:     # Left hip fracture: 2/2 to fall.  Now postop day #1.  Reported falling on the way to the bathroom without walker and legs gave out, falling on his left-side. US LE showed no DVT.   Plan: Per orthopedic surgical team     # Right apical pneumothorax: Small  # Moderate right pleural effusion  Thoracentesis with R-sided chest tube placement 12/9/2024  Pulmonary medicine consulted   Monitor for results of gram stain, cultures, and cytology  Suspect this is likely related to his pancreatic cancer  Plan: Per pulmonary and they may do chest tube to water clamp tomorrow     #Pancreatic cancer diagnosed in August 2024  Not receiving chemotherapy  See oncologist note from September 2024.  Discussed this with family and patient today.  Initially had jaundice  Now common bile duct stent     # Type 2 diabetes with hyperglycemia: Blood glucose of 473 initially.  Last glucose was 227  Hemoglobin A1c of 7.8 in June 2024  Correctional scale and basal insulin started 12/9  Also have him on Lantus now  Hypoglycemia protocol  Hold glipizide     # Paroxysmal atrial fibrillation  # On chronic anticoagulation with Eliquis  Apixaban restarted this morning  Continue metoprolol     # CAD, aspirin has been on hold but restarted last night     # Hypertension   Continue metoprolol and amlodipine on hold     # Insomnia  Trazodone     #  Chronic anemia: Hemoglobin stable  Monitor CBC     # CKD stage IV: Creatinine of 2.55.  Creatinine was 2.91 in August 2024.  Monitor renal function  Avoid nephrotoxins  Renal dosing of medications  Creatinine yesterday 1.9  Plan: Recheck in the morning     # Hyponatremia: Sodium of 131 initially; now 134  Monitor sodium levels     # COPD patient on prophylactic history of COPD.  Not on any meds.  Titrate supplemental oxygen to keep O2 saturation between 88% and 92%        Diet: Diabetic diet   DVT Prophylaxis: DOAC and aspirin are now restarted  Code Status: No CPR- Do NOT Intubate and confirmed with discussion with patient today.        Anticipated possible discharge in 2-3 days once pain adequately controlled and pleural effusion evaluation complete       The patient's care was discussed with the Bedside Nurse, Patient, and orthopedic surgical team PA .    Severino Salomon MD  [facility]  Phone: #[phone]  Securely message with the Vocera Web Console (learn more here)  Text page via [employer] Paging/Directory     Interval History/Subjective:  Patient feels like he is doing much better.  Pain is improved.  No nausea.  No dyspnea.  No chest pain.    Physical Exam/Objective:  Temp:  [97.1  F (36.2  C)-98.1  F (36.7  C)] 98  F (36.7  C)  Pulse:  [53-91] 80  Resp:  [11-29] 18  BP: (126-170)/() 142/64  MAP:  [88 mmHg-124 mmHg] 88 mmHg  Arterial Line BP: (138-161)/() 144/65  SpO2:  [88 %-99 %] 96 %  Body mass index is 24.91 kg/m .    GENERAL:  Alert, appears comfortable, in no acute distress, appears stated age   HEAD:  Normocephalic, without obvious abnormality, atraumatic   LUNGS:   Clear to auscultation bilaterally, no rales, rhonchi, or wheezing, symmetric chest rise on inhalation, respirations unlabored   HEART:  Regular rate and rhythm, no murmur   ABDOMEN:   Soft, non-tender   EXTREMITIES: No ankle edema   SKIN: Dry to touch, no exanthems in  "the visualized areas   NEURO: Alert, peers cognitively intact, moves all four extremities freely   PSYCH: Cooperative, behavior is appropriate      Data reviewed today: I personally reviewed all new medications, labs, imaging/diagnostics reports over the past 24 hours. Pertinent findings include:    Imaging:   Recent Results (from the past 24 hours)   POC US Guidance Needle Placement    Narrative    Ultrasound was performed as guidance to an anesthesia procedure.  Click   \"PACS images\" hyperlink below to view any stored images.  For specific   procedure details, view procedure note authored by anesthesia.   XR Pelvis and Hip Portable Left 2 Views    Narrative    EXAM: XR PELVIS AND HIP PORTABLE LEFT 2 VIEWS  LOCATION: [facility]  DATE: 12/10/2024    INDICATION: s p L hip pedro  COMPARISON: 12/08/2024      Impression    IMPRESSION: Left hip hemiarthroplasty. The hardware is in expected alignment. No periprosthetic fracture. Postoperative gas within the soft tissues about the left hip.   XR Chest Port 1 View    Narrative    EXAM: XR CHEST PORT 1 VIEW  LOCATION: [facility]  DATE: 12/10/2024    INDICATION: Pneumothorax, status post chest tube placement.  COMPARISON: Chest CT 12/8/2024; chest radiograph 7/3/2024.      Impression    IMPRESSION: There is a small right basilar pneumothorax, not significantly changed when correlated to chest CT performed on 12/8/2024. There is a new small bore thoracostomy tube overlying the inferior right hemithorax. Additional small right pleural   effusion and right basilar atelectasis and/or infiltrate. Left lung is clear. No left pleural effusion or pneumothorax.    Heart size appears mildly enlarged. Transcatheter aortic valve replacement. Atherosclerotic calcifications of the thoracic aorta. Postsurgical changes of the inferior right neck.    Old, healed fracture deformity of the posterolateral left eighth rib.       Labs:  Most " Recent 3 CBC's:  Recent Labs   Lab Test 12/11/24  0541 12/10/24  0737 12/08/24 2138 08/21/24  0752   WBC  --  7.7 6.3 8.1   HGB 11.6* 11.5* 11.6* 11.6*   MCV  --  83 81 81   PLT  --  148* 167 212     Most Recent 3 BMP's:  Recent Labs   Lab Test 12/11/24  0627 12/11/24 0222 12/10/24  2138 12/10/24  0740 12/10/24  0737 12/09/24  0559 12/09/24  0554 12/09/24  0049 12/08/24 2138   NA  --   --   --   --  134*  --  133*  --  131*   POTASSIUM  --   --   --   --  5.0  --  3.9  --  4.2   CHLORIDE  --   --   --   --  98  --  97*  --  94*   CO2  --   --   --   --  26  --  24  --  24   BUN  --   --   --   --  24.9*  --  26.3*  --  27.5*   CR  --   --   --   --  1.90*  --  1.98*  --  2.15*   ANIONGAP  --   --   --   --  10  --  12  --  13   SANDRA  --   --   --   --  9.1  --  9.1  --  9.2   * 266* 290*   < > 278*   < > 326*   < > 473*    < > = values in this interval not displayed.     Most Recent 2 LFT's:  Recent Labs   Lab Test 08/21/24 0752 08/20/24  0746   * 222*   * 118*   ALKPHOS 818* 881*   BILITOTAL 5.7* 9.9*       Medications:   Personally Reviewed.  Medications   Current Facility-Administered Medications   Medication Dose Route Frequency Provider Last Rate Last Admin     Current Facility-Administered Medications   Medication Dose Route Frequency Provider Last Rate Last Admin    [Held by provider] amLODIPine (NORVASC) tablet 10 mg  10 mg Oral Daily Jameson Almonte PA-C        apixaban ANTICOAGULANT (ELIQUIS) tablet 2.5 mg  2.5 mg Oral BID Severino Salomon MD   2.5 mg at 12/11/24 0759    aspirin EC tablet 81 mg  81 mg Oral BID Jameson Almonte PA-C   81 mg at 12/11/24 0801    furosemide (LASIX) injection 40 mg  40 mg Intravenous Daily Jameson Almonte PA-C   40 mg at 12/11/24 0801    insulin aspart (NovoLOG) injection (RAPID ACTING)  1-7 Units Subcutaneous TID w/meals Severino Salomon MD        insulin glargine (LANTUS PEN) injection 12 Units  12 Units Subcutaneous Pending sale to [facility] Severino Salomon  MD Reggie   12 Units at 12/11/24 0800    metoprolol tartrate (LOPRESSOR) tablet 25 mg  25 mg Oral BID Jameson Almonte PA-C   25 mg at 12/11/24 0759    polyethylene glycol (MIRALAX) Packet 17 g  17 g Oral Daily Jameson Almonte PA-C   17 g at 12/11/24 0800    senna-docusate (SENOKOT-S/PERICOLACE) 8.6-50 MG per tablet 1 tablet  1 tablet Oral BID Jameson Almonte PA-C   1 tablet at 12/11/24 0801    sodium chloride (PF) 0.9% PF flush 3 mL  3 mL Intracatheter Q8H Jameson Almonte PA-C   3 mL at 12/11/24 1234    sodium chloride (PF) 0.9% PF flush 3 mL  3 mL Intracatheter Q8H Jameson Almonte PA-C   3 mL at 12/11/24 0917    traZODone (DESYREL) tablet 50 mg  50 mg Oral At Bedtime Jameson Almonte PA-C   50 mg at 12/09/24 2302

## 2024-12-11 NOTE — PROGRESS NOTES
Pulmonary Progress Note    Admit Date: 12/8/2024  Hospital Day: 3   CODE: No CPR- Do NOT Intubate    Reason for Consult: Right sided pleural effusion and pneumothorax      Interim Events:     No acute changes overnight.  He denies any concerns, no pain at chest tube insertion site    Assessment/Plan:   Per Beasley is a 84 year old male with a past medical history significant for pancreatic cancer s/p placement of a CBD stent, aortic stenosis s/p TAVR, bradycardia, CAD, CKD, COPD, DM, HTN, A-fib admitted for a fall.  Imaging at the time presentation demonstrated a right-sided pneumothorax with a moderate right-sided pleural effusion for which she underwent placement of a chest tube.      Recommend:  Right-sided pneumothorax and pleural effusion: Demonstrates a moderate-sized right-sided pleural effusion  which is mostly lymphocytic and likely malignant from his known pancreatic cancer.   Continue chest tube to suction at -20 cm of water.  CXR daily.  Chest tube with 185cc out, will watch output and likely put to water seal tomorrow    Medications:     Current Facility-Administered Medications   Medication Dose Route Frequency Provider Last Rate Last Admin     Current Facility-Administered Medications   Medication Dose Route Frequency Provider Last Rate Last Admin    [Held by provider] amLODIPine (NORVASC) tablet 10 mg  10 mg Oral Daily Jameson Almonte PA-C        apixaban ANTICOAGULANT (ELIQUIS) tablet 2.5 mg  2.5 mg Oral BID Severino Salomon MD   2.5 mg at 12/11/24 0759    aspirin EC tablet 81 mg  81 mg Oral BID Jameson Almonte PA-C   81 mg at 12/11/24 0801    furosemide (LASIX) injection 40 mg  40 mg Intravenous Daily Jameson Almonte PA-C   40 mg at 12/11/24 0801    insulin aspart (NovoLOG) injection (RAPID ACTING)  1-7 Units Subcutaneous Q4H Jameson Almonte PA-C   2 Units at 12/11/24 0630    insulin glargine (LANTUS PEN) injection 12 Units  12 Units Subcutaneous QAM  Severino Salomon MD   12  "Units at 12/11/24 0800    metoprolol tartrate (LOPRESSOR) tablet 25 mg  25 mg Oral BID KirtJameson lubinPALMA   25 mg at 12/11/24 0759    polyethylene glycol (MIRALAX) Packet 17 g  17 g Oral Daily EverardoevangelistJameson lubinPALMA   17 g at 12/11/24 0800    senna-docusate (SENOKOT-S/PERICOLACE) 8.6-50 MG per tablet 1 tablet  1 tablet Oral BID Suzy Jameson DICKSONPALMA   1 tablet at 12/11/24 0801    sodium chloride (PF) 0.9% PF flush 3 mL  3 mL Intracatheter Q8H Kirtamee Jameson DICKSONPALMA   3 mL at 12/10/24 2143    sodium chloride (PF) 0.9% PF flush 3 mL  3 mL Intracatheter Q8H Kirtamee Jamesno DICKSON, PALMA   3 mL at 12/11/24 0917    traZODone (DESYREL) tablet 50 mg  50 mg Oral At Bedtime Kirtamee Jameson DICKSONPALMA   50 mg at 12/09/24 2302         Exam/Data:   Vitals  BP (!) 142/64 (BP Location: Left arm)   Pulse 80   Temp 98  F (36.7  C) (Oral)   Resp 18   Ht 1.727 m (5' 8\")   Wt 74.3 kg (163 lb 12.8 oz)   SpO2 96%   BMI 24.91 kg/m    Arterial Line BP: (138-161)/() 144/65  MAP:  [88 mmHg-124 mmHg] 88 mmHg  BP - Mean:  [] 87  I/O last 3 completed shifts:  In: 2711 [P.O.:550; I.V.:2161]  Out: 510 [Urine:325; Chest Tube:185]  Weight change:   [unfilled]  Resp: 18    EXAM:  Gen: awake and alert, sitting in bed  Chest tube in place with dressing  CV: RRR  Abd: soft, nontender  Ext: leg with bandage in place      DATA:   Latest Reference Range & Units 12/09/24 22:46   Cell Count Fluid Source  Other   Total Nucleated Cells /uL 967   % Neutrophils Fluid % 1   % Lymphocytes Fluid % 82   % Mono/Macro Fluid % 0   % Eosinophils Fluid % 16   % Basophils Fluid % 1   Color Fluid Colorless, Yellow  Orange !   Appearance Fluid Clear  Hazy !   Glucose Fluid Source  Other   Glucose Fluid mg/dL 210   LD Fluid Source  Other   Lactate Dehydrogenase Fluid U/L 339   Protein Fluid Source  Other   Protein Total Fluid g/dL 3.8       IMAGING:   CT Hip Left w/o Contrast    Addendum Date: 12/10/2024    EXAM: CT HIP LEFT W/O CONTRAST LOCATION: [facility] DATE: 12/9/2024 INDICATION: Evaluate for pathologic fracture; hip pain; fracture, known or rule out, or trauma; no fracture follow-up or history of osteoarthritis; no hip x-ray result available. COMPARISON: X-ray 12/8/2024, CT from 8/18/2024. TECHNIQUE: Noncontrast. Axial, sagittal and coronal thin-section reconstruction. Dose reduction techniques were used. IMPRESSION: 1.  Acute left femoral neck fracture with mild anterosuperior displacement. No intertrochanteric extension. Moderate left hip lipohemarthrosis. There is normal joint alignment. 2.  There is irregular lucency in the area of the fracture site. This is favored to represent osteopenia because this appeared symmetric on the prior CT, however a pathologic fracture cannot be definitively excluded. Discussed with KAT Oliveira by Dr. Walter Long via telephone on 12/10/2024 at 9:18 AM.     Result Date: 12/10/2024  EXAM: CT HIP LEFT W/O CONTRAST LOCATION: [facility] DATE: 12/9/2024 INDICATION: evaluate for pathologic fracture; Hip pain; Fracture, known or r o, or trauma; No fracture f u or history of osteoarthritis; No hip x ray result available COMPARISON: X-ray 12/08/2024 TECHNIQUE: Noncontrast. Axial, sagittal and coronal thin-section reconstruction. Dose reduction techniques were used. FINDINGS: BONES: -There is an acute comminuted fracture through the left femoral neck with moderate associated angulation convex anteriorly. Moderate degenerative changes left AC joint. Moderate vascular calcification. The exam is otherwise normal. SOFT TISSUES: -Normal.     IMPRESSION: 1.      CT Chest Tube with Cath Placement    Result Date: 12/9/2024  EXAM: 1. PERCUTANEOUS CHEST TUBE PLACEMENT RIGHT PLEURAL SPACE 2. CT GUIDANCE 3. CONSCIOUS SEDATION LOCATION: [facility] DATE: 12/9/2024 INDICATION: Right hydropneumothorax. TECHNIQUE: Dose reduction techniques were used. PROCEDURE: Informed  consent obtained. Site marked. Prior images reviewed. Required items made available. Patient identity confirmed verbally and with arm band. Patient reevaluated immediately before administering sedation. Universal protocol was followed. Time out performed. The site was prepped and draped in sterile fashion. 10 mL of 1% lidocaine was infused into the local soft tissues. Using standard technique and under direct CT guidance, a 10 Hungarian chest tube catheter was inserted into the pleural space. The catheter was fixed in place with sutures and adhesive device, and the tubing was banded. Chest tube placed to Pleur-evac suction. SPECIMEN: None. BLOOD LOSS: Minimal. The patient tolerated the procedure well. No immediate complications. SEDATION: Versed 0.5 mg. Fentanyl 25 mcg. The procedure was performed with administration intravenous conscious sedation with appropriate preoperative, intraoperative, and postoperative evaluation. 15 minutes of supervised face to face conscious sedation time was provided by a radiology nurse under my direct supervision.     IMPRESSION: 1.  Successful CT-guided chest tube placement into right pleural space. Reference CPT Codes: 56809, 51732    CT Chest w/o Contrast    Result Date: 12/8/2024  EXAM: CT CHEST W/O CONTRAST LOCATION: [facility] DATE: 12/8/2024 INDICATION: Possible pneumothorax on ct c spine COMPARISON: Cervical spine CT from prior to this examination. Chest CT from 08/18/2024. TECHNIQUE: CT chest without IV contrast. Multiplanar reformats were obtained. Dose reduction techniques were used. CONTRAST: None. FINDINGS: LUNGS AND PLEURA: *  Small right-sided pneumothorax (less than 10% by volume of the right chest). *  Moderate right and small left simple density nonloculated pleural effusions. *  Diffuse upper lung predominant interstitial micronodules are unchanged from August 2024. This could represent the result of sarcoidosis, silicosis, or coal workers  "pneumoconiosis, among other possibilities. *  Minimal chronic \"platelike\" scarring in both lungs with areas of \"round\" atelectasis in the right middle and right lower lobes. MEDIASTINUM/AXILLAE: Normal heart size without pericardial effusion. 3 vessel coronary artery disease. Previous transcatheter aortic valve replacement. Nonaneurysmal thoracic aorta. Calcified mildly enlarged hilar or mediastinal lymph nodes are unchanged. UPPER ABDOMEN: Metallic CBD stent with expected intrahepatic pneumobilia. No biliary dilatation. MUSCULOSKELETAL: Unremarkable.     IMPRESSION: *  Small right-sided pneumothorax (10% by volume) in the setting of likely underlying chronic lung disease from either sarcoidosis, silicosis, or coal workers pneumoconiosis. *  Three-vessel coronary artery disease. Prior transcatheter aortic valve replacement. *  Well-positioned metallic CBD stent without biliary dilatation (partially imaged). [Critical Result: Pneumothorax] Finding was identified on 12/8/2024 11:06 PM CST. Dr. Larry was contacted by me on 12/8/2024 11:17 PM CST and verbalized understanding of the critical result.     Head CT w/o contrast    Result Date: 12/8/2024  EXAM: CT HEAD W/O CONTRAST LOCATION: [facility] DATE: 12/8/2024 INDICATION: Trauma. COMPARISON: None. TECHNIQUE: Routine CT Head without IV contrast. Multiplanar reformats. Dose reduction techniques were used. FINDINGS: INTRACRANIAL CONTENTS: No intracranial hemorrhage, extraaxial collection, or mass effect. No CT evidence of acute infarct. Moderate presumed chronic small vessel ischemic changes. Moderate generalized volume loss. No hydrocephalus. VISUALIZED ORBITS/SINUSES/MASTOIDS: No intraorbital abnormality. No paranasal sinus mucosal disease. No middle ear or mastoid effusion. BONES/SOFT TISSUES: No acute abnormality.     IMPRESSION: 1.  No CT evidence for acute intracranial process. 2.  Brain atrophy and presumed chronic microvascular ischemic " changes as above.    CT Cervical Spine w/o Contrast    Result Date: 12/8/2024  EXAM: CT CERVICAL SPINE W/O CONTRAST LOCATION: [facility] DATE: 12/8/2024 INDICATION: Trauma. COMPARISON: None. TECHNIQUE: Routine CT Cervical Spine without IV contrast. Multiplanar reformats. Dose reduction techniques were used. FINDINGS: VERTEBRA: Mild rightward curvature centered at C5 and leftward curvature of the upper thoracic spine. Minimal posterior spondylolisthesis at C3-C4. Otherwise, normal alignment. Normal vertebral body heights. Severe disc degeneration C3-C4, C5-C6, and C6-C7. No fracture or posttraumatic subluxation. CANAL/FORAMINA: Moderate spinal canal stenosis at C3-C4 with severe bilateral foraminal stenoses. Moderate to severe left foraminal stenosis C4-C5. Severe right foraminal stenosis C5-C6. Moderate right foraminal stenosis C6-C7. PARASPINAL: Moderate to large right pleural effusion. Small right-sided pneumothorax.     IMPRESSION: 1.  No fracture or posttraumatic subluxation. 2.  No high-grade spinal canal or neural foraminal stenosis. 3.  Small right pneumothorax and moderate pleural effusion. Consider dedicated chest CT for more complete evaluation. The results were communicated to Dr. Larry at 10:33 PM on 12/08/2024.    XR Femur Left 2 Views    Result Date: 12/8/2024  EXAM: PELVIS AND LEFT FEMUR 5 VIEWS LOCATION: [facility] DATE/TIME: 12/8/2024 9:56 PM CST INDICATION: Trauma. COMPARISON: None.     IMPRESSION: 1. Mildly displaced acute transverse fracture of the left femoral neck. The distal portion of the fracture is displaced laterally by 0.7 cm and there is approximately 1.0 cm of override. No significant angulation about the fracture. 2. Mild degenerative changes in bilateral hip joints. 3. Partial visualization of a left knee arthroplasty.    XR Pelvis 1/2 Views    Result Date: 12/8/2024  EXAM: PELVIS AND LEFT FEMUR 5 VIEWS LOCATION: [facility]  [facility] DATE/TIME: 12/8/2024 9:56 PM CST INDICATION: Trauma. COMPARISON: None.     IMPRESSION: 1. Mildly displaced acute transverse fracture of the left femoral neck. The distal portion of the fracture is displaced laterally by 0.7 cm and there is approximately 1.0 cm of override. No significant angulation about the fracture. 2. Mild degenerative changes in bilateral hip joints. 3. Partial visualization of a left knee arthroplasty.

## 2024-12-11 NOTE — PLAN OF CARE
Problem: Adult Inpatient Plan of Care  Goal: Absence of Hospital-Acquired Illness or Injury  Intervention: Identify and Manage Fall Risk  Recent Flowsheet Documentation  Taken 12/11/2024 1300 by Sanjuanita Lancaster RN  Safety Promotion/Fall Prevention: safety round/check completed  Taken 12/11/2024 0800 by Sanjuanita Lancaster RN  Safety Promotion/Fall Prevention: safety round/check completed     Problem: Adult Inpatient Plan of Care  Goal: Absence of Hospital-Acquired Illness or Injury  Intervention: Prevent Skin Injury  Recent Flowsheet Documentation  Taken 12/11/2024 0800 by Sanjuanita Lancaster RN  Body Position: weight shifting     Problem: Adult Inpatient Plan of Care  Goal: Absence of Hospital-Acquired Illness or Injury  Intervention: Prevent Infection  Recent Flowsheet Documentation  Taken 12/11/2024 0800 by Sanjuanita Lancaster RN  Infection Prevention: single patient room provided      Pain plan: PRN tylenol and oxycodone  Activity:  in bed, assist of 2, and walker  Vital Signs Stable except: BP elevated  O2: 1-2L, unable to wean   Alarms/Safety: Bed Alarm  LDA's: Right hand and AC Peripheral, Cooper, and chest tube  Pertinent test results from shift:  Chest XRay  Consults:  PT, OT, and Urology- Cooper placed   Other Cares/Comments: Pt has concerns of cooper and still requests urinal but tolerating well.   Discharge plan/estimated date:  N/A  Reminders for safety checks:   ID band, Fluids, Alarms, Call light, low bed, White board: Completed       Sanjuanita Santos RN

## 2024-12-11 NOTE — PROGRESS NOTES
12/11/24 1115   Appointment Info   Signing Clinician's Name / Credentials (OT) Pallavi Simon, BRENDA/L, CLT   Rehab Comments (OT) OT lo   Quick Adds   Quick Adds Certification   Living Environment   People in Home spouse   Current Living Arrangements condominium   Home Accessibility no concerns   Transportation Anticipated family or friend will provide   Self-Care   Usual Activity Tolerance good   Current Activity Tolerance moderate   Equipment Currently Used at Home walker, rolling  (electric scooter in hallway)   Fall history within last six months no   Activity/Exercise/Self-Care Comment Pt IND w/ most ADLs and wife does most IADLs at baseline   General Information   Onset of Illness/Injury or Date of Surgery 12/10/24   Referring Physician Dr. Salomon   Patient/Family Therapy Goal Statement (OT) open to getting up to chair   Additional Occupational Profile Info/Pertinent History of Current Problem s/p hip fx with pedro hip arthoplasty-bipolar   Existing Precautions/Restrictions no hip IR;no hip ADD past midline;90 degree hip flexion;weight bearing;other (see comments)  (posterior hip precautions)   Left Lower Extremity (Weight-bearing Status) weight-bearing as tolerated (WBAT)   General Observations and Info chest tube on R side currently   Cognitive Status Examination   Orientation Status orientation to person, place and time   Affect/Mental Status (Cognitive) WFL   Visual Perception   Visual Impairment/Limitations WFL   Sensory   Sensory Quick Adds sensation intact   Pain Assessment   Patient Currently in Pain No   Posture   Posture not impaired   Range of Motion Comprehensive   General Range of Motion no range of motion deficits identified   Strength Comprehensive (MMT)   General Manual Muscle Testing (MMT) Assessment other (see comments)  (gen weakness)   Muscle Tone Assessment   Muscle Tone Quick Adds No deficits were identified   Coordination   Upper Extremity Coordination No deficits were identified    Bed Mobility   Bed Mobility supine-sit;sit-supine   Comment (Bed Mobility) mod A per clinical judgement   Transfers   Transfers bed-chair transfer;sit-stand transfer;toilet transfer;shower transfer   Transfer Comments mod A per clinical judgement   Transfer Skill: Bed to Chair/Chair to Bed   Transfer Comments mod A per clinical judgement   Sit-Stand Transfer   Sit/Stand Transfer Comments mod A per clinical judgement   Shower Transfer   Shower Transfer Comments mod A per clinical judgement   Toilet Transfer   Toilet Transfer Comments mod A per clinical judgement   Balance   Balance Comments decreased   Activities of Daily Living   BADL Assessment/Intervention lower body dressing;toileting;bathing   Bathing Assessment/Intervention   [place] Level (Bathing) lower body;maximum assist (25% patient effort)   Comment, (Bathing) max A per clinical judgement   Lower Body Dressing Assessment/Training   Comment, (Lower Body Dressing) max A per clinical judgement   [place] Level (Lower Body Dressing) lower body dressing skills;maximum assist (25% patient effort)   Toileting   Comment, (Toileting) max A per clinical judgement   [place] Level (Toileting) adjust/manage clothing;maximum assist (25% patient effort)   Clinical Impression   Criteria for Skilled Therapeutic Interventions Met (OT) Yes, treatment indicated   OT Diagnosis decreased ADLs   Influenced by the following impairments RUFINO   OT Problem List-Impairments impacting ADL activity tolerance impaired;mobility;pain;post-surgical precautions;flexibility;balance   Assessment of Occupational Performance 3-5 Performance Deficits   Identified Performance Deficits LE dressing/bathing, bed mobility, all transfers, toileting   Planned Therapy Interventions (OT) ADL retraining;bed mobility training;transfer training   Clinical Decision Making Complexity (OT) detailed assessment/moderate complexity   Risk & Benefits of therapy have been explained  "evaluation/treatment results reviewed;patient   OT Total Evaluation Time   OT Eval, Moderate Complexity Minutes (97250) 10   Therapy Certification   Medical Diagnosis RUFINO   Start of Care Date 12/11/24   Certification date from 12/11/24   Certification date to 01/10/25   OT Goals   Therapy Frequency (OT) One time eval and treatment   OT Predicted Duration/Target Date for Goal Attainment 12/11/24   OT Goals Lower Body Dressing;Bed Mobility;Toilet Transfer/Toileting   OT: Lower Body Dressing within precautions;Minimal assist   OT: Bed Mobility supine to/from sitting;Supervision/stand-by assist  (with AE)   OT: Toilet Transfer/Toileting toilet transfer;cleaning and garment management;Supervision/stand-by assist   Interventions   Interventions Quick Adds Self-Care/Home Management   Self-Care/Home Management   Self-Care/Home Mgmt/ADL, Compensatory, Meal Prep Minutes (88886) 23   Symptoms Noted During/After Treatment (Meal Preparation/Planning Training) none   Treatment Detail/Skilled Intervention Pt edu on hip px - demonstrated ability to complete some func transfers w/in px. Donned gown with maxA. Bed mob with AE-leg  and educ in use and pt able to sit EOB with Elina/rail/HOBelevated. STS w/ MinAx2 and FWW. Pt amb. few steps to chair w/ FWW MinAx2.  Pt edu on getting up to chair/mobility-safety with transfers. Pt left with aide for willis cares as \"enema is working\" - pt verbalized understanding.   OT Discharge Planning   OT Plan any LB dress with AE(posterior hip prec);bed mob/short txs   OT Discharge Recommendation (DC Rec) (S)  Transitional Care Facility   OT Rationale for DC Rec Pt weak and needs Ax2 now with tubing/safety.   OT Brief overview of current status minAx2 for func mob and AE for bed mob with HOB elevated/Elina   OT Equipment Needed at Discharge other (see comments)  (leg ?)   Total Session Time   Timed Code Treatment Minutes 23   Total Session Time (sum of timed and untimed services) 33     "

## 2024-12-11 NOTE — PROGRESS NOTES
PALLIATIVE CARE PROGRESS NOTE  [facility]     Patient Name: Per Beasley  Date of Admission: 12/8/2024   Today the patient was seen for: chart review only     Recommendations & Counseling       GOALS OF CARE:   Goals are primarily comfort focused.    Patient recently diagnosed with pancreatic cancer and has opted to not pursue cancer directed treatment.  He values comfort and quality of life over quantity of life.    Given patient's significant pain and immobility due to his hip fracture, patient is willing to pursue surgery as a palliative measure.  He has discussed with family, orthopedics, pulmonary and HMS, weighing risk versus potential benefit.  He verbalizes understanding that he has terminal cancer and is a high risk surgical candidate.    I discussed hospice philosophy and services with patient and family.  They may consider pursuing hospice after surgery, depending on how he progresses clinically through his post operative period.  They are aware of hospice as an option and have also discussed with his oncologist.    Leaning towards TCU versus home with home care.    ADVANCE CARE PLANNING:  No health care directive on file. Per system policy, Surrogate Decision-makers for Patients With Diminished Decision-making Capacity offers guidance on possible decision-makers. Spouse has been identified as a surrogate decision maker.   There is no POLST form on file, defer to patient and/or next of kin for decisions  Code status: No CPR- Do NOT Intubate    MEDICAL MANAGEMENT:   We are not actively managing symptoms at this time.    PSYCHOSOCIAL/SPIRITUAL:  Family wife and 5 sons    Palliative Care will sign off at this time as goals are established and symptoms managed.  Thank you for the consult and allowing us to aid in the care of Per Beasley.    Discussed with Dr. Salomon.    TAN Scherer, CNS  [facility], Palliative Care  Securely message with the Vocera Web Console  (learn more here) or  Text page via [facility] Paging/Directory       Interval History:     Multidisciplinary collaboration:  Notes and chart reviewed.      Assessment          Per Beasley is a 84-year-old male admitted on 12/8/2024 after sustaining mechanical fall and found to have L hip fracture  Denies head trauma or loss of consciousness.  Reports going to the bathroom without his walker when his legs gave out and fell on his left side . PMHx significant for CAD, chronic diastolic heart failure, COPD, type 2 diabetes mellitus, HTN, CKD stage IV, chronic normocytic anemia, and aortic stenosis s/p TAVR.  Recent diagnosis of pancreatic adenocarcinoma 4 months ago and pt not receiving chemotherapy nor other interventions based on [facility] Initial Consultation note, dated 9/2/2024. Patient does have CBD stent. 12/9/2024 right-sided chest tube placement with pleural fluid showing a transudate with pending gram stain, cultures, and cytology.    12/10/2024 s/p right hip hemiarthroplasty.     Review of Systems:     Besides above, ROS was reviewed and is unremarkable     Physical Exam:   Temp:  [97.1  F (36.2  C)-98.1  F (36.7  C)] 98  F (36.7  C)  Pulse:  [53-91] 80  Resp:  [11-29] 18  BP: (126-170)/() 142/64  MAP:  [88 mmHg-124 mmHg] 88 mmHg  Arterial Line BP: (138-161)/() 144/65  SpO2:  [88 %-99 %] 96 %  163 lbs 12.83 oz        Data Reviewed:     Results for orders placed or performed during the hospital encounter of 12/08/24 (from the past 24 hours)   Prepare red blood cells (unit)   Result Value Ref Range    Blood Component Type Red Blood Cells     Product Code [id number]     Unit Status Ready for issue     Unit Number [id number]     CROSSMATCH Compatible     CODING SYSTEM [id number]    Glucose by meter   Result Value Ref Range    GLUCOSE BY METER POCT 211 (H) 70 - 99 mg/dL   XR Pelvis and Hip Portable Left 2 Views    Narrative    EXAM: XR PELVIS AND HIP PORTABLE LEFT 2 VIEWS  LOCATION: [facility]  DATE: 12/10/2024    INDICATION: s p L hip pedro  COMPARISON: 12/08/2024      Impression    IMPRESSION: Left hip hemiarthroplasty. The hardware is in expected alignment. No periprosthetic fracture. Postoperative gas within the soft tissues about the left hip.   XR Chest Port 1 View    Narrative    EXAM: XR CHEST PORT 1 VIEW  LOCATION: [facility]  DATE: 12/10/2024    INDICATION: Pneumothorax, status post chest tube placement.  COMPARISON: Chest CT 12/8/2024; chest radiograph 7/3/2024.      Impression    IMPRESSION: There is a small right basilar pneumothorax, not significantly changed when correlated to chest CT performed on 12/8/2024. There is a new small bore thoracostomy tube overlying the inferior right hemithorax. Additional small right pleural   effusion and right basilar atelectasis and/or infiltrate. Left lung is clear. No left pleural effusion or pneumothorax.    Heart size appears mildly enlarged. Transcatheter aortic valve replacement. Atherosclerotic calcifications of the thoracic aorta. Postsurgical changes of the inferior right neck.    Old, healed fracture deformity of the posterolateral left eighth rib.   Glucose by meter   Result Value Ref Range    GLUCOSE BY METER POCT 290 (H) 70 - 99 mg/dL   Glucose by meter   Result Value Ref Range    GLUCOSE BY METER POCT 266 (H) 70 - 99 mg/dL   Hemoglobin   Result Value Ref Range    Hemoglobin 11.6 (L) 13.3 - 17.7 g/dL   Glucose by meter   Result Value Ref Range    GLUCOSE BY METER POCT 227 (H) 70 - 99 mg/dL   Glucose by meter   Result Value Ref Range    GLUCOSE BY METER POCT 177 (H) 70 - 99 mg/dL   UA with Microscopic reflex to Culture    Specimen: Urine, Helm Catheter   Result Value Ref Range    Color Urine Light Yellow Colorless, Straw, Light Yellow, Yellow    Appearance Urine Clear Clear    Glucose Urine Negative Negative mg/dL    Bilirubin Urine Negative Negative    Ketones Urine Negative Negative mg/dL     Specific Gravity Urine 1.013 1.001 - 1.030    Blood Urine 0.06 mg/dL (A) Negative    pH Urine 5.0 5.0 - 7.0    Protein Albumin Urine 10 (A) Negative mg/dL    Urobilinogen Urine <2.0 <2.0 mg/dL    Nitrite Urine Negative Negative    Leukocyte Esterase Urine Negative Negative    Mucus Urine Present (A) None Seen /LPF    RBC Urine 16 (H) <=2 /HPF    WBC Urine 4 <=5 /HPF    Narrative    Urine Culture not indicated   XR Chest Port 1 View    Narrative    EXAM: XR CHEST PORT 1 VIEW  LOCATION: [facility]  DATE: 12/11/2024    INDICATION: FU pnemothorax s p placement of chest tube  COMPARISON: 12/10/2024      Impression    IMPRESSION: No change position right lower chest tube. Incomplete reexpansion right inferolateral lateral lung. That space now appears filled with fluid. There also may be a small right apical pneumothorax, which in retrospect is similar to yesterday.   These findings may represent trapped lung.    Stable left midlung scar. TAVR. Normal heart size. Old left rib fracture.

## 2024-12-11 NOTE — PROGRESS NOTES
Notified Dr. Hahn about patient having urinary retention, had a lot of hesitancy, voided 50 ml and bladder scanned for 406 ml, writer and ICU charge were unable to straight cath     Dr. Hahn ordered urology consult

## 2024-12-11 NOTE — ANESTHESIA POSTPROCEDURE EVALUATION
Patient: Per Beasley    Procedure: Procedure(s):  LEFT HIP HEMIARTHROPLASTY,  BIPOLAR       Anesthesia Type:  General    Note:  Disposition: Inpatient   Postop Pain Control: Uneventful            Sign Out: Well controlled pain   PONV: No   Neuro/Psych: Uneventful            Sign Out: Acceptable/Baseline neuro status   Airway/Respiratory: Uneventful            Sign Out: Acceptable/Baseline resp. status   CV/Hemodynamics: Uneventful            Sign Out: Acceptable CV status; No obvious hypovolemia; No obvious fluid overload   Other NRE: NONE   DID A NON-ROUTINE EVENT OCCUR? No           Last vitals:  Vitals Value Taken Time   /60 12/10/24 1950   Temp 36.4  C (97.5  F) 12/10/24 1819   Pulse 68 12/10/24 1957   Resp 21 12/10/24 1953   SpO2 97 % 12/10/24 1957   Vitals shown include unfiled device data.    Electronically Signed By: Stuart Myrick MD  December 10, 2024  7:58 PM

## 2024-12-11 NOTE — ANESTHESIA CARE TRANSFER NOTE
Patient: Per Beasley    Procedure: Procedure(s):  LEFT HIP HEMIARTHROPLASTY,  BIPOLAR       Diagnosis: Hip fracture, left, closed, initial encounter (H) [S72.002A]  Diagnosis Additional Information: No value filed.    Anesthesia Type:   General     Note:    Oropharynx: oropharynx clear of all foreign objects and spontaneously breathing  Level of Consciousness: awake  Oxygen Supplementation: face mask  Level of Supplemental Oxygen (L/min / FiO2): 6  Independent Airway: airway patency satisfactory and stable  Dentition: dentition unchanged  Vital Signs Stable: post-procedure vital signs reviewed and stable  Report to RN Given: handoff report given  Patient transferred to: PACU    Handoff Report: Identifed the Patient, Identified the Reponsible Provider, Reviewed the pertinent medical history, Discussed the surgical course, Reviewed Intra-OP anesthesia mangement and issues during anesthesia, Set expectations for post-procedure period and Allowed opportunity for questions and acknowledgement of understanding      Vitals:  Vitals Value Taken Time   /94 12/10/24 1821   Temp     Pulse 67 12/10/24 1824   Resp 23 12/10/24 1824   SpO2 82 % 12/10/24 1824   Vitals shown include unfiled device data.    Electronically Signed By: TAN Reid CRNA  December 10, 2024  6:25 PM

## 2024-12-11 NOTE — PLAN OF CARE
Patient vital signs are at baseline: No, bradycardic while sleeping, able to wean to 1 L of O2, denied SOB  Patient able to ambulate as they were prior to admission or with assist devices provided by therapies during their stay:  No,  Reason:  did not get out of bed this shift, repositioned as tolerated  Patient MUST void prior to discharge:  Yes,  Reason:  however, having retention and hesitancy, MD aware, urology consult placed  Patient able to tolerate oral intake:  Yes  Pain has adequate pain control using Oral analgesics:  Yes, would rate pain 4/10 throughout the shift, denied medications when offered   Does patient have an identified :  Yes  Has goal D/C date and time been discussed with patient:  No, pending mobility    Alert and oriented x 3, disoriented to place, intermittent confusion, wife at bedside for support, able to make needs known, weak left dorsi which getting stronger and weak pulse, otherwise CMS intact, dsg CDI, PIV SL, chest tube in place and patent, on suction per order, yellow drainage, output was 70 ml.

## 2024-12-11 NOTE — PROGRESS NOTES
Major Shift Events: Doing well pacu phase I. Received total of 100 mcg fentanyl and 0.4 mg dilaudid for pain at surgical site. Seen bedside with DANO Myrick prior to transferring to floor.     Plan: To orthopedic floor.     For vital signs and complete assessments, please see documentation flowsheets.      Danis ALCANTARA RN

## 2024-12-11 NOTE — CONSULTS
[facility] CONSULT - URINARY RETENTION     Type of Consult: inpatient   Place of Service:  [facility]   Reason for Consultation: Urinary retention, unable to straight catheterize  Consult Requested by: Dr. Hahn    History of present illness:  Per Beasley is a 84 year old male with hx of DM-II, COPD, aortic stenosis, bradycardia, CAD s/p NUBIA, HTN, Afib, CKD-III, biliary obstruction, nephrolithiasis (CT 8/18/24); Admitted to the hospital 12/8/24 for left hip fracture, s/p left hip hemiarthroplasty 12/10. Urology was consulted for urinary retention. History is obtained from patient and chart review.     Post-op urinary retention noted, with patient voiding 50 ml this AM and  ml. Later voided 100 ml with 416 ml residual. RN and ICU charge were unable to advance a straight catheter.     No recent pertinent imaging. No recent UA/UC. WBC 7.7 on 12/10. Afebrile. Creatinine 1.90 on 12/10 (baseline appears 3.00).     Medications which may be contributing to UR (in addition to anesthesia): dilaudid, fentanyl, metoprolol (rare), Versed, oxycodone, trazodone.     Last BM: prior to admission.    Pt reports history of acute urinary retention years ago, briefly requiring a cooper. Over the last last few years, has had intermittent hesitancy starting stream but feels like he has been emptying well. Pt does not take prostate medications and no hx of prostate surgery. Reports he was circumcised as a baby and revised as an adult.     Past medical history :  Past Medical History:   Diagnosis Date    JONNATHAN (acute kidney injury) (H)     Aortic stenosis     Biliary obstruction (H)     Biliary obstruction (H)     Bradycardia     CAD (coronary artery disease)     CKD (chronic kidney disease) stage 3, GFR 30-59 ml/min (H)     COPD (chronic obstructive pulmonary disease) (H)     Diabetes mellitus, type 2 (H)     Generalized muscle weakness     Heart failure with reduced ejection fraction, NYHA class I (H)     HTN  (hypertension)     Paroxysmal atrial fibrillation (H)     Pleural effusion     right       Past surgical history:   Past Surgical History:   Procedure Laterality Date    ENDOSCOPIC RETROGRADE CHOLANGIOPANCREATOGRAM N/A 8/20/2024    Procedure: ENDOSCOPIC RETROGRADE CHOLANGIOPANCREATOGRAPHY WITH BILIARY SPHINCTEROTOMY AND STENT PLACEMENT;  Surgeon: Oc Magdaleno MD;  [place] [facility] [place]    ESOPHAGOSCOPY, GASTROSCOPY, DUODENOSCOPY (EGD), COMBINED N/A 8/20/2024    Procedure: ESOPHAGOGASTRODUODENOSCOPY, WITH ENDOSCOPIC ULTRASOUND GUIDANCE WITH FINE NEEDLE ASPIRATION OF PANCREATIC HEAD;  Surgeon: Oc Magdaleno MD;  [place] [facility] [place]    HC VALVE (TAVR)      s/p PCI with NUBIA to MID LCx 2021         Social history:  Social History     Socioeconomic History    Marital status:      Spouse name: Not on file    Number of children: Not on file    Years of education: Not on file    Highest education level: Not on file   Occupational History    Not on file   Tobacco Use    Smoking status: Former     Types: Cigarettes    Smokeless tobacco: Never   Substance and Sexual Activity    Alcohol use: Not Currently    Drug use: Not on file    Sexual activity: Not on file   Other Topics Concern    Not on file   Social History Narrative    Not on file     Social Drivers of Health     Financial Resource Strain: Low Risk  (12/9/2024)    Financial Resource Strain     Within the past 12 months, have you or your family members you live with been unable to get utilities (heat, electricity) when it was really needed?: No   Food Insecurity: Low Risk  (12/9/2024)    Food Insecurity     Within the past 12 months, did you worry that your food would run out before you got money to buy more?: No     Within the past 12 months, did the food you bought just not last and you didn t have money to get more?: No   Transportation Needs: Low Risk  (12/9/2024)    Transportation Needs     Within the past 12 months, has lack of  transportation kept you from medical appointments, getting your medicines, non-medical meetings or appointments, work, or from getting things that you need?: No   Physical Activity: Not on file   Stress: Not on file   Social Connections: Socially Integrated (4/22/2024)    Received from [facility]    Social Connections     Do you often feel lonely or isolated from those around you?: 0   Interpersonal Safety: Low Risk  (12/9/2024)    Interpersonal Safety     Do you feel physically and emotionally safe where you currently live?: Yes     Within the past 12 months, have you been hit, slapped, kicked or otherwise physically hurt by someone?: No     Within the past 12 months, have you been humiliated or emotionally abused in other ways by your partner or ex-partner?: No   Housing Stability: Low Risk  (12/9/2024)    Housing Stability     Do you have housing? : Yes     Are you worried about losing your housing?: No       Medications  Current Facility-Administered Medications   Medication Dose Route Frequency Provider Last Rate Last Admin    acetaminophen (TYLENOL) tablet 650 mg  650 mg Oral Q6H PRN Jameson Almonte PA-C   650 mg at 12/10/24 0745    Or    acetaminophen (TYLENOL) Suppository 650 mg  650 mg Rectal Q6H PRN Jameson Almonte PA-C        [Held by provider] amLODIPine (NORVASC) tablet 10 mg  10 mg Oral Daily Jameson Almonte PA-C        apixaban ANTICOAGULANT (ELIQUIS) tablet 2.5 mg  2.5 mg Oral BID Severino Salomon MD   2.5 mg at 12/11/24 0759    aspirin EC tablet 81 mg  81 mg Oral BID Jameson Almonte PA-C   81 mg at 12/11/24 0801    benzocaine-menthol (CEPACOL) 15-3.6 MG lozenge 1 lozenge  1 lozenge Buccal Q1H PRN Jameson Almonte PA-C        [START ON 12/13/2024] bisacodyl (DULCOLAX) suppository 10 mg  10 mg Rectal Daily PRN Jameson Almonte PA-C        glucose gel 15-30 g  15-30 g Oral Q15 Min PRN Jameson Almonte PA-C        Or    dextrose 50 % injection 25-50 mL  25-50  mL Intravenous Q15 Min Jameson Foster PA-C        Or    glucagon injection 1 mg  1 mg Subcutaneous Q15 Min Jameson Foster PA-C        glucose gel 15-30 g  15-30 g Oral Q15 Min PRJameson rOtiz PA-C        Or    dextrose 50 % injection 25-50 mL  25-50 mL Intravenous Q15 Min Jameson Foster PA-C        Or    glucagon injection 1 mg  1 mg Subcutaneous Q15 Min Jameson Foster PA-C        furosemide (LASIX) injection 40 mg  40 mg Intravenous Daily Jameson Almonte PA-C   40 mg at 12/11/24 0801    insulin aspart (NovoLOG) injection (RAPID ACTING)  1-7 Units Subcutaneous Q4H Jameson Almonte PA-C   2 Units at 12/11/24 0630    insulin glargine (LANTUS PEN) injection 12 Units  12 Units Subcutaneous QASeverino Christopher MD   12 Units at 12/11/24 0800    lidocaine (LMX4) cream   Topical Q1H PRJameson Ortiz PA-C        lidocaine (LMX4) cream   Topical Q1H PRJameson Ortiz PA-C        lidocaine 1 % 0.1-1 mL  0.1-1 mL Other Q1H Jameson Foster PA-C        lidocaine 1 % 0.1-1 mL  0.1-1 mL Other Q1H PRJameson Ortiz PA-C        [START ON 12/12/2024] magnesium hydroxide (MILK OF MAGNESIA) suspension 30 mL  30 mL Oral Daily PRJameson Ortiz PA-C        melatonin tablet 1 mg  1 mg Oral At Bedtime PRJameson Otriz PA-C        metoprolol tartrate (LOPRESSOR) tablet 25 mg  25 mg Oral BID Jameson Almonte PA-C   25 mg at 12/11/24 0759    morphine (PF) injection 2 mg  2 mg Intravenous Q2H PRJameson Ortiz PA-C   2 mg at 12/10/24 0305    morphine (PF) injection 4 mg  4 mg Intravenous Q2H PRJameson Ortiz PA-C        ondansetron (ZOFRAN ODT) ODT tab 4 mg  4 mg Oral Q6H PRN Jameson Almonte PA-C        Or    ondansetron (ZOFRAN) injection 4 mg  4 mg Intravenous Q6H PRN Jameson Almonte PA-C        oxyCODONE (ROXICODONE) tablet 5 mg  5 mg Oral Q4H PRN Jameson Almonte PA-C   5 mg at 12/10/24 0745    oxyCODONE IR (ROXICODONE) half-tab 2.5 mg  2.5 mg Oral Q4H PRN Jaemson Almonte  PALMA DICKSON        polyethylene glycol (MIRALAX) Packet 17 g  17 g Oral Daily Jameson Almonte PA-C        polyethylene glycol (MIRALAX) Packet 17 g  17 g Oral BID PRN Jameson Almonte PA-C   17 g at 12/11/24 0802    prochlorperazine (COMPAZINE) injection 5 mg  5 mg Intravenous Q6H PRN Jameson Almonte PA-C        Or    prochlorperazine (COMPAZINE) tablet 5 mg  5 mg Oral Q6H PRN Jameson Almonte PA-C        promethazine (PHENERGAN) 6.25 mg in sodium chloride 0.9 % 55 mL intermittent infusion  6.25 mg Intravenous Q6H PRJameson Ortiz PA-C        senna-docusate (SENOKOT-S/PERICOLACE) 8.6-50 MG per tablet 1 tablet  1 tablet Oral BID Jameson Almonte PA-C   1 tablet at 12/11/24 0801    senna-docusate (SENOKOT-S/PERICOLACE) 8.6-50 MG per tablet 1 tablet  1 tablet Oral BID PRN Jameson Almonte PA-C        Or    senna-docusate (SENOKOT-S/PERICOLACE) 8.6-50 MG per tablet 2 tablet  2 tablet Oral BID PRN Jameson Almonte PA-C        sodium chloride (PF) 0.9% PF flush 3 mL  3 mL Intracatheter Q8H Jameson Almonte PA-C   3 mL at 12/10/24 2143    sodium chloride (PF) 0.9% PF flush 3 mL  3 mL Intracatheter q1 min prn Jameson Almonte PA-C        sodium chloride (PF) 0.9% PF flush 3 mL  3 mL Intracatheter Q8H Jameson Almonte PA-C   3 mL at 12/10/24 0747    sodium chloride (PF) 0.9% PF flush 3 mL  3 mL Intracatheter q1 min prn Jameson Almonte PA-C        traZODone (DESYREL) tablet 50 mg  50 mg Oral At Bedtime Jameson Almonte PA-C   50 mg at 12/09/24 2302       Allergies  Allergies   Allergen Reactions    Blood-Group Specific Substance Other (See Comments)     Patient has a non specific antibody.  Blood product orders may be delayed.  Please draw on red top and 2 purple top tubes for all Type and Screen/ type and Cross match orders.    Hydrochlorothiazide Rash       Review of systems   12 point review of systems is otherwise negative except what is stated in the HPI.     Physical examinations:  BP (!) 142/64 (BP Location: Left  "arm)   Pulse 80   Temp 98  F (36.7  C) (Oral)   Resp 18   Ht 1.727 m (5' 8\")   Wt 74.3 kg (163 lb 12.8 oz)   SpO2 96%   BMI 24.91 kg/m     GENERAL: NAD, alert, cooperative  HEAD: normocephalic/atraumatic   ABDOMEN: soft, non tender, non distended, no suprapubic fulness/tenderness noted.   : Penis and scrotum without erythema or tenderness. Penis is mildly edematous and also apparent scar tissue making it difficult to visualize meatus. Straight Mitesh clamp used to help separate the tissue and then meatus was visualized but noted very small. After sterile prep of penis, 2 ml of Urojet was instilled into the meatus. A sterile straight mitesh was then inserted gently into the meatus and the meatus was dilated to allow for 14 Fr coude tip cooper advancement. Mild resistance was noted initially. The tip of the catheter reached the bladder and >400 ml clear yellow urine drained. The catheter was secured to the right leg. Pt tolerated the procedure well.   SKIN: no rashes or lesions  MUSCULOSKELETAL: moves all four extremities equally.   PSYCHOLOGICAL: alert and oriented, answers questions appropriately      LABS:   Lab Results   Component Value Date    WBC 7.7 12/10/2024    HGB 11.6 (L) 12/11/2024    HCT 34.1 (L) 12/10/2024     (L) 12/10/2024     (H) 08/21/2024     (H) 08/21/2024     (L) 12/10/2024    BUN 24.9 (H) 12/10/2024    CO2 26 12/10/2024    TSH 1.57 01/11/2024    INR 1.50 (H) 12/08/2024     Creatinine   Date Value Ref Range Status   12/10/2024 1.90 (H) 0.67 - 1.17 mg/dL Final       I have personally reviewed these labs.     I have personally reviewed the imaging reports above.     ASSESSMENT/PLAN:  Per Beasley is being seen by [facility] for acute post-op urinary retention.     - 84 yr old male with acute urinary retention s/p left hip arthroplasty 12/10.   - Voiding small amounts with PVR >/=400 ml.   - 14 Fr coude tip catheter placed with some difficulty 2/2 urethral " stenosis. Maintain cooper catheter at discharge. Message sent to [facility] to arrange for outpatient TOV with [facility] in 1-2 weeks. Ocoper discharge instructs and [facility] contact info added to discharge.   - UA/UC ordered, pending. If growth, recommend 7-10 day course of broad spectrum antibiotic.   - Recommend trying to decrease or stop medication that may be contributing to retention  - Ensure the patient is on a bowel regimen  - Recommend starting tamsulosin 0.4 mg daily, if no contraindication.   - [facility] reviewed [facility] [place]  - [facility] will sign off. Please re-consult with any new or worsening urologic concerns.    Thank you for consulting [facility] regarding this patient's care.    Kamaljit Urrutia, APRN, CNP  [facility]  [phone]

## 2024-12-11 NOTE — PROGRESS NOTES
Care Management Follow Up    Length of Stay (days): 3    Expected Discharge Date: 12/12/2024     Concerns to be Addressed:       Patient plan of care discussed at interdisciplinary rounds: Yes    Anticipated Discharge Disposition: Home, Home Care, Transitional Care        Anticipated Discharge Services: Home Care  Anticipated Discharge DME: None    Patient/family educated on Medicare website which has current facility and service quality ratings: no  Education Provided on the Discharge Plan: Yes  Patient/Family in Agreement with the Plan: yes    Referrals Placed by CM/SW:    Private pay costs discussed: Not applicable      Additional Information:  1:26 PM  SW met with patient to discuss TCU recommendation.  Pt asked to talk it over with his wife.  SW left TCU list in room.  SW to follow up tomorrow to discuss further      DIANA Casper

## 2024-12-11 NOTE — PROGRESS NOTES
"Orthopedic Progress Note      Assessment: 1 Day Post-Op  S/P Procedure(s):  LEFT HIP HEMIARTHROPLASTY,  BIPOLAR @    Plan:   - Continue PT/OT.   - Weightbearing status: WBAT, Posterior precautions x3 months  - Anticoagulation: ASA and eliquis in addition to SCDs, miko stockings and early ambulation.  - Weak dorsiflexion this morning, I do believe it is the block still in place will continue to monitor  - Discharge planning: pending therapy and pain     Subjective:  Pain: Well controlled on oral meds  Nausea, Vomiting:  No  Chest pain: No  Lightheadedness, Dizziness:  No  Neuro:  Patient denies new onset numbness or paresthesias     Patient doing well on POD #1. Ambulating, tolerating oral intake, voiding & pain is controlled with oral medications. Hgb 11.6 (11.5 pre-op).     Objective:  BP (!) 142/64 (BP Location: Left arm)   Pulse 80   Temp 98  F (36.7  C) (Oral)   Resp 18   Ht 1.727 m (5' 8\")   Wt 74.3 kg (163 lb 12.8 oz)   SpO2 96%   BMI 24.91 kg/m    The patient is A&Ox3. Appears comfortable, sitting up at bedside.  Calves are soft and non-tender bilaterally  Brisk capillary refill in the toes.   Palpable left dorsalis pedis pulse. Foot warm & well-perfused.  Sensation is intact to light touch & equal bilaterally in the femoral, DP, SP & tibial nerve distributions.  ROM: Flexes at left hip. Appropriately flexes & extends all toes bilaterally.   Motor: +5/5 plantar flexion & EHL bilaterally. Fires quad. Dorsifelxion weak this morning will continue to watch  Leg lengths equal.  left hip dressing C/D/I without strikethrough, no surrounding erythema.       5/5 TA/GSC/EHL.         Pertinent Labs   Lab Results: personally reviewed.   Lab Results   Component Value Date    INR 1.50 (H) 12/08/2024    INR 1.64 (H) 08/20/2024    INR 1.50 (H) 08/18/2024     Lab Results   Component Value Date    WBC 7.7 12/10/2024    HGB 11.6 (L) 12/11/2024    HCT 34.1 (L) 12/10/2024    MCV 83 12/10/2024     (L) 12/10/2024 "     Lab Results   Component Value Date     (L) 12/10/2024    CO2 26 12/10/2024         Report completed by:  Roxanne Lincoln PA-C/Dr. Arriaga  [facility]    Date: 12/11/2024  Time: 9:05 AM

## 2024-12-12 ENCOUNTER — APPOINTMENT (OUTPATIENT)
Dept: RADIOLOGY | Facility: CLINIC | Age: 84
DRG: 521 | End: 2024-12-12
Payer: MEDICARE

## 2024-12-12 ENCOUNTER — APPOINTMENT (OUTPATIENT)
Dept: RADIOLOGY | Facility: CLINIC | Age: 84
DRG: 521 | End: 2024-12-12
Attending: INTERNAL MEDICINE
Payer: MEDICARE

## 2024-12-12 LAB
ANION GAP SERPL CALCULATED.3IONS-SCNC: 11 MMOL/L (ref 7–15)
BACTERIA PLR CULT: NORMAL
BUN SERPL-MCNC: 35.6 MG/DL (ref 8–23)
CALCIUM SERPL-MCNC: 8.9 MG/DL (ref 8.8–10.4)
CHLORIDE SERPL-SCNC: 99 MMOL/L (ref 98–107)
CREAT SERPL-MCNC: 2.1 MG/DL (ref 0.67–1.17)
EGFRCR SERPLBLD CKD-EPI 2021: 30 ML/MIN/1.73M2
GLUCOSE BLDC GLUCOMTR-MCNC: 115 MG/DL (ref 70–99)
GLUCOSE BLDC GLUCOMTR-MCNC: 116 MG/DL (ref 70–99)
GLUCOSE BLDC GLUCOMTR-MCNC: 131 MG/DL (ref 70–99)
GLUCOSE BLDC GLUCOMTR-MCNC: 133 MG/DL (ref 70–99)
GLUCOSE BLDC GLUCOMTR-MCNC: 77 MG/DL (ref 70–99)
GLUCOSE SERPL-MCNC: 134 MG/DL (ref 70–99)
GRAM STAIN RESULT: NORMAL
GRAM STAIN RESULT: NORMAL
HCO3 SERPL-SCNC: 25 MMOL/L (ref 22–29)
HGB BLD-MCNC: 10.5 G/DL (ref 13.3–17.7)
POTASSIUM SERPL-SCNC: 4.5 MMOL/L (ref 3.4–5.3)
SODIUM SERPL-SCNC: 135 MMOL/L (ref 135–145)

## 2024-12-12 PROCEDURE — 99232 SBSQ HOSP IP/OBS MODERATE 35: CPT | Performed by: INTERNAL MEDICINE

## 2024-12-12 PROCEDURE — 250N000013 HC RX MED GY IP 250 OP 250 PS 637

## 2024-12-12 PROCEDURE — 250N000013 HC RX MED GY IP 250 OP 250 PS 637: Performed by: FAMILY MEDICINE

## 2024-12-12 PROCEDURE — 250N000011 HC RX IP 250 OP 636

## 2024-12-12 PROCEDURE — 82565 ASSAY OF CREATININE: CPT | Performed by: FAMILY MEDICINE

## 2024-12-12 PROCEDURE — 85018 HEMOGLOBIN: CPT

## 2024-12-12 PROCEDURE — 99233 SBSQ HOSP IP/OBS HIGH 50: CPT | Performed by: FAMILY MEDICINE

## 2024-12-12 PROCEDURE — 120N000001 HC R&B MED SURG/OB

## 2024-12-12 PROCEDURE — 71045 X-RAY EXAM CHEST 1 VIEW: CPT | Mod: 77

## 2024-12-12 PROCEDURE — 71045 X-RAY EXAM CHEST 1 VIEW: CPT

## 2024-12-12 PROCEDURE — 36415 COLL VENOUS BLD VENIPUNCTURE: CPT | Performed by: FAMILY MEDICINE

## 2024-12-12 RX ORDER — NALOXONE HYDROCHLORIDE 0.4 MG/ML
0.2 INJECTION, SOLUTION INTRAMUSCULAR; INTRAVENOUS; SUBCUTANEOUS
Status: DISCONTINUED | OUTPATIENT
Start: 2024-12-12 | End: 2024-12-17 | Stop reason: HOSPADM

## 2024-12-12 RX ORDER — TAMSULOSIN HYDROCHLORIDE 0.4 MG/1
0.4 CAPSULE ORAL DAILY
Status: DISCONTINUED | OUTPATIENT
Start: 2024-12-12 | End: 2024-12-17 | Stop reason: HOSPADM

## 2024-12-12 RX ORDER — HYDROMORPHONE HYDROCHLORIDE 2 MG/1
2-4 TABLET ORAL
Status: DISCONTINUED | OUTPATIENT
Start: 2024-12-12 | End: 2024-12-17 | Stop reason: HOSPADM

## 2024-12-12 RX ORDER — NALOXONE HYDROCHLORIDE 0.4 MG/ML
0.4 INJECTION, SOLUTION INTRAMUSCULAR; INTRAVENOUS; SUBCUTANEOUS
Status: DISCONTINUED | OUTPATIENT
Start: 2024-12-12 | End: 2024-12-17 | Stop reason: HOSPADM

## 2024-12-12 RX ORDER — METHOCARBAMOL 500 MG/1
500 TABLET, FILM COATED ORAL 4 TIMES DAILY
Status: DISCONTINUED | OUTPATIENT
Start: 2024-12-12 | End: 2024-12-17 | Stop reason: HOSPADM

## 2024-12-12 RX ADMIN — APIXABAN 2.5 MG: 2.5 TABLET, FILM COATED ORAL at 19:26

## 2024-12-12 RX ADMIN — METHOCARBAMOL 500 MG: 500 TABLET ORAL at 17:18

## 2024-12-12 RX ADMIN — FUROSEMIDE 40 MG: 10 INJECTION, SOLUTION INTRAMUSCULAR; INTRAVENOUS at 08:02

## 2024-12-12 RX ADMIN — ASPIRIN 81 MG: 81 TABLET, COATED ORAL at 08:02

## 2024-12-12 RX ADMIN — HYDROMORPHONE HYDROCHLORIDE 2 MG: 2 TABLET ORAL at 11:45

## 2024-12-12 RX ADMIN — ACETAMINOPHEN 650 MG: 325 TABLET ORAL at 08:02

## 2024-12-12 RX ADMIN — ASPIRIN 81 MG: 81 TABLET, COATED ORAL at 21:04

## 2024-12-12 RX ADMIN — METHOCARBAMOL 500 MG: 500 TABLET ORAL at 10:08

## 2024-12-12 RX ADMIN — TRAZODONE HYDROCHLORIDE 50 MG: 50 TABLET ORAL at 21:53

## 2024-12-12 RX ADMIN — METHOCARBAMOL 500 MG: 500 TABLET ORAL at 13:19

## 2024-12-12 RX ADMIN — TAMSULOSIN HYDROCHLORIDE 0.4 MG: 0.4 CAPSULE ORAL at 10:21

## 2024-12-12 RX ADMIN — SENNOSIDES AND DOCUSATE SODIUM 1 TABLET: 50; 8.6 TABLET ORAL at 08:03

## 2024-12-12 RX ADMIN — APIXABAN 2.5 MG: 2.5 TABLET, FILM COATED ORAL at 08:02

## 2024-12-12 RX ADMIN — SENNOSIDES AND DOCUSATE SODIUM 1 TABLET: 50; 8.6 TABLET ORAL at 21:03

## 2024-12-12 RX ADMIN — OXYCODONE 5 MG: 5 TABLET ORAL at 05:46

## 2024-12-12 RX ADMIN — METHOCARBAMOL 500 MG: 500 TABLET ORAL at 21:03

## 2024-12-12 RX ADMIN — POLYETHYLENE GLYCOL 3350 17 G: 17 POWDER, FOR SOLUTION ORAL at 08:03

## 2024-12-12 ASSESSMENT — ACTIVITIES OF DAILY LIVING (ADL)
ADLS_ACUITY_SCORE: 57
ADLS_ACUITY_SCORE: 55
ADLS_ACUITY_SCORE: 57
ADLS_ACUITY_SCORE: 55
ADLS_ACUITY_SCORE: 57
ADLS_ACUITY_SCORE: 55
ADLS_ACUITY_SCORE: 57
ADLS_ACUITY_SCORE: 55
ADLS_ACUITY_SCORE: 57

## 2024-12-12 NOTE — PROGRESS NOTES
[facility] MEDICINE PROGRESS NOTE      Identification/Summary: Per Beasley is a 84-year-old male admitted on 12/8/2024. PMHx significant for CAD, chronic diastolic heart failure, COPD, type 2 diabetes mellitus, HTN, CKD stage IV, chronic normocytic anemia, and aortic stenosis s/p TAVR.  Recent diagnosis of pancreatic adenocarcinoma 4 months ago and pt not receiving chemotherapy nor other interventions based on [facility] Initial Consultation note, dated 9/2/2024.  Presented to the ED for mechanical fall.  Denies head trauma or loss of consciousness.  Reports going to the bathroom without his walker when his legs gave out and fell on his left side.     Hospital course significant for ongoing pain.  Orthopedic surgical repair 12/10 and that went well without any complications.     Also seen by pulmonology to evaluate the pleural effusions and pneumothorax. He underwent thoracentesis with right-sided chest tube placement on 12/9, with pleural fluid studies. Results show a transudate with pending gram stain, cultures, and cytology.       EMR review: [facility] Initial Consultation note, dated 9/3/2024, identified pt as a poor candidate for systemic chemotherapy due to multiple comorbidities and fraility and not interested in chemotherapy. Pt declined referral to hepatobiliary surgery to discuss surgical options.      Patient overall feels like he is doing quite well at this point.  Eating well.  Denies any dyspnea.  Pulmonary is managing the chest tube and clamped it today to see if there was any leak and hopefully that can get removed tomorrow    He had urinary retention yesterday morning and urology had to place Helm catheter which was difficult and they want to keep it for 1 to 2 weeks and then voiding trial in the clinic    Plan is for discharge to TCU for about a week to get stronger before going home.      Assessment and Plan:     # Left hip fracture: 2/2 to fall.  Now postop day  #2.  Reported falling on the way to the bathroom without walker and legs gave out, falling on his left-side. US LE showed no DVT.   Plan: Per orthopedic surgical team     # Right apical pneumothorax: Small  # Moderate right pleural effusion  Thoracentesis with R-sided chest tube placement 12/9/2024  Pulmonary medicine consulted   Monitor for results of gram stain, cultures, and cytology  Suspect this is likely related to his pancreatic cancer  Plan: Per pulmonary and they did water clamped today.  Per pulmonary     #Pancreatic cancer diagnosed in August 2024  Not receiving chemotherapy  See oncologist note from September 2024.  Discussed this with family and patient today.  Initially had jaundice  Now common bile duct stent  Plan: Discussed at length hospice as an option in the future with patient and family and they will contemplate that    Urinary retention  Urology had to place Helm yesterday  Plan: Leave Helm in place and voiding trial in urology clinic in 1 to 2 weeks.  They also wanted him started on tamsulosin     # Type 2 diabetes with hyperglycemia: Blood glucose of 473 initially.  Last glucose was 227  Hemoglobin A1c of 7.8 in June 2024  Correctional scale and basal insulin started 12/9  Also have him on Lantus now  Hypoglycemia protocol  Glipizide has been held     # Paroxysmal atrial fibrillation  # On chronic anticoagulation with Eliquis  Apixaban restarted the morning of 12/11  Continue metoprolol     # CAD, aspirin has been on hold but restarted last night     # Hypertension   Continue metoprolol and amlodipine on hold     # Insomnia  Trazodone     # Chronic anemia: Hemoglobin stable  Monitor CBC     # CKD stage IV: Creatinine of 2.55.  Creatinine was 2.91 in August 2024.  Monitor renal function  Avoid nephrotoxins  Renal dosing of medications  Creatinine 2.1 today  Plan: Recheck in the morning     # Hyponatremia: Sodium of 131 initially; now 135  Monitor sodium levels     # COPD patient on  prophylactic history of COPD.  Not on any meds.  Titrate supplemental oxygen to keep O2 saturation between 88% and 92%      Diet: Diabetic diet   DVT Prophylaxis: DOAC and aspirin are now restarted  Code Status: No CPR- Do NOT Intubate and confirmed with discussion with patient today.        Anticipated possible discharge in a day to TCU once chest tube out     The patient's care was discussed with the Bedside Nurse, care management and patient  Disposition Plan      Expected Discharge Date: 12/13/2024,  3:00 PM    Destination: nursing home (TCU)  Discharge Comments: chest tube,- not medically cleared. [facility] accepted.            Severino Salomon MD  [facility]  Phone: #[phone]  Securely message with the Vocera Web Console (learn more here)  Text page via [employer] Paging/Directory     Interval History/Subjective:  Patient feels he is definitely better.  Only mild pain at hip area.  No dyspnea.  Eating well.  No other concerns or issues.    Physical Exam/Objective:  Temp:  [97.9  F (36.6  C)-98.2  F (36.8  C)] 97.9  F (36.6  C)  Pulse:  [48-55] 50  Resp:  [16-20] 20  BP: (112-141)/(58-69) 127/69  SpO2:  [88 %-96 %] 92 %  Body mass index is 24.91 kg/m .    GENERAL:  Alert, appears comfortable, in no acute distress, appears stated age   HEAD:  Normocephalic, without obvious abnormality, atraumatic   LUNGS:   Mildly decreased breath sounds throughout.  Mild rales and mild to moderate decreased breath sounds in the right lower one third lung field   CHEST WALL:  Chest tube on the right flank   HEART:  Regular rate and rhythm, no murmur   ABDOMEN:   Soft, non-tender   EXTREMITIES: No ankle edema    SKIN: Dry to touch, no exanthems in the visualized areas   NEURO: Alert, peers to be cognitively intact, moves all four extremities freely   PSYCH: Cooperative, behavior is appropriate      Data reviewed today: I personally reviewed all new  medications, labs, imaging/diagnostics reports over the past 24 hours. Pertinent findings include:    Imaging:   Recent Results (from the past 24 hours)   XR Chest Port 1 View    Narrative    EXAM: XR CHEST PORT 1 VIEW  LOCATION: [facility]  DATE: 12/12/2024    INDICATION: FU pnemothorax s p placement of chest tube  COMPARISON: Chest radiograph 12/11/2024      Impression    IMPRESSION: Unchanged position of the right basilar nonlocking pigtail chest tube. No significant change in the small residual right hydropneumothorax. Slightly increased right basilar atelectasis. Remainder of exam is stable.   XR Chest Port 1 View    Narrative    EXAM: XR CHEST PORT 1 VIEW  LOCATION: [facility]  DATE: 12/12/2024 3:30 PM    INDICATION: Clamped chest tube.  COMPARISON: Chest radiograph 12/12/2024 12:23 PM      Impression    IMPRESSION:     Unchanged position of the right basilar chest tube. An adjacent small right pleural effusion and atelectasis are unchanged. No definite pneumothorax is visualized.    Minimal atelectasis at the left lung base. Left lung is otherwise clear.    Stable cardiomediastinal silhouette. Prosthetic aortic valve. Surgical clips along the right neck. Old healed fracture of the left posterolateral seventh rib.       Labs:  Most Recent 3 CBC's:  Recent Labs   Lab Test 12/12/24  0602 12/11/24  0541 12/10/24  0737 12/08/24  2138 08/21/24  0752   WBC  --   --  7.7 6.3 8.1   HGB 10.5* 11.6* 11.5* 11.6* 11.6*   MCV  --   --  83 81 81   PLT  --   --  148* 167 212     Most Recent 3 BMP's:  Recent Labs   Lab Test 12/12/24  1714 12/12/24  1318 12/12/24  0749 12/12/24  0602 12/10/24  0740 12/10/24  0737 12/09/24  0559 12/09/24  0554   NA  --   --   --  135  --  134*  --  133*   POTASSIUM  --   --   --  4.5  --  5.0  --  3.9   CHLORIDE  --   --   --  99  --  98  --  97*   CO2  --   --   --  25  --  26  --  24   BUN  --   --   --  35.6*  --  24.9*  --  26.3*   CR  --    --   --  2.10*  --  1.90*  --  1.98*   ANIONGAP  --   --   --  11  --  10  --  12   SANDRA  --   --   --  8.9  --  9.1  --  9.1   GLC 77 116* 133* 134*   < > 278*   < > 326*    < > = values in this interval not displayed.     Most Recent 2 LFT's:  Recent Labs   Lab Test 08/21/24  0752 08/20/24  0746   * 222*   * 118*   ALKPHOS 818* 881*   BILITOTAL 5.7* 9.9*       Medications:   Personally Reviewed.  Medications   Current Facility-Administered Medications   Medication Dose Route Frequency Provider Last Rate Last Admin     Current Facility-Administered Medications   Medication Dose Route Frequency Provider Last Rate Last Admin    [Held by provider] amLODIPine (NORVASC) tablet 10 mg  10 mg Oral Daily Jameson Almonte PA-C        apixaban ANTICOAGULANT (ELIQUIS) tablet 2.5 mg  2.5 mg Oral BID Severino Salomon MD   2.5 mg at 12/12/24 0802    aspirin EC tablet 81 mg  81 mg Oral BID Jameson Almonte PA-C   81 mg at 12/12/24 0802    furosemide (LASIX) injection 40 mg  40 mg Intravenous Daily Jameson Almonte PA-C   40 mg at 12/12/24 0802    insulin aspart (NovoLOG) injection (RAPID ACTING)  1-7 Units Subcutaneous TID w/meals Severino Salomon MD   1 Units at 12/11/24 1756    insulin glargine (LANTUS PEN) injection 12 Units  12 Units Subcutaneous QAM AC Severino Salomon MD   12 Units at 12/12/24 0802    methocarbamol (ROBAXIN) tablet 500 mg  500 mg Oral 4x Daily Roxanne Lincoln PA-C   500 mg at 12/12/24 1718    metoprolol tartrate (LOPRESSOR) tablet 25 mg  25 mg Oral BID Jameson Almonte PA-C   25 mg at 12/11/24 0759    polyethylene glycol (MIRALAX) Packet 17 g  17 g Oral Daily Jameson Almonte PA-C   17 g at 12/12/24 0803    senna-docusate (SENOKOT-S/PERICOLACE) 8.6-50 MG per tablet 1 tablet  1 tablet Oral BID Jameson Almonte PA-C   1 tablet at 12/12/24 0803    sodium chloride (PF) 0.9% PF flush 3 mL  3 mL Intracatheter Q8H Jameson Almonte PA-C   3 mL at 12/12/24 1147    sodium chloride  (PF) 0.9% PF flush 3 mL  3 mL Intracatheter Q8H Jameson Almonte PA-C   3 mL at 12/12/24 1504    tamsulosin (FLOMAX) capsule 0.4 mg  0.4 mg Oral Daily Severino Salomon MD   0.4 mg at 12/12/24 1021    traZODone (DESYREL) tablet 50 mg  50 mg Oral At Bedtime Jameson Almonte PA-C   50 mg at 12/11/24 2049

## 2024-12-12 NOTE — PROGRESS NOTES
Patient vital signs are at baseline: Yes  Patient able to ambulate as they were prior to admission or with assist devices provided by therapies during their stay:  No,  Reason:  pt not OOB, refusing therapy   Patient MUST void prior to discharge:  No,  Reason:  cooper in place, plan to discharge with cooper and follow up outpatient.   Patient able to tolerate oral intake:  Yes, however decreased appetite  Pain has adequate pain control using Oral analgesics:  Yes  Does patient have an identified :  Yes  Has goal D/C date and time been discussed with patient:  Yes     Pt c/o sore L ankle, and pain in R hip/back area that pt describes as spasms. Varying orientation. Disoriented to time. Sometimes aware of place/ situation. Chest tube was clamped this AM per orders. CXR obtained. Cooper patent. Notified provider of need for cooper orders, and sticky note placed in chart. Pt O2 off face at varying times when checking on patient, for the most part O2 sats ok, at time dipped into high 80's, placed on 1L NC. HR david into the 40's 50's MD notified this AM, metoprolol was held for this reason. Began on flomax today. Plan to discharge to TCU. Accepting bed for tomorrow at [facility], pending medical clearance.     Pt has has decreased appetite. Ate 25% breakfast. Refused lunch. Refused supplements. Ate a fruit cup for dinner.

## 2024-12-12 NOTE — PLAN OF CARE
Patient vital signs are at baseline: No. 1L O2 and can be david to 44  Patient able to ambulate as they were prior to admission or with assist devices provided by therapies during their stay:  No,  Reason:  not oob  Patient MUST void prior to discharge:  No,  Reason:  still have cooper  Patient able to tolerate oral intake:  Yes  Pain has adequate pain control using Oral analgesics:  No,  Reason:  required PRN IV morphine  Does patient have an identified :  Yes  Has goal D/C date and time been discussed with patient:  Yes    CT patent and draining. No major events overnight.

## 2024-12-12 NOTE — PROGRESS NOTES
Pulmonary Progress Note    Admit Date: 12/8/2024  Hospital Day: 4   CODE: No CPR- Do NOT Intubate    Reason for Consult: Right sided pleural effusion and pneumothorax      Interim Events:     He denies any pain at CT insertion site  He says he always has dyspnea  No coughing    Assessment/Plan:   Per Beasley is a 84 year old male with a past medical history significant for pancreatic cancer s/p placement of a CBD stent, aortic stenosis s/p TAVR, bradycardia, CAD, CKD, COPD, DM, HTN, A-fib admitted for a fall.  Imaging at the time presentation demonstrated a right-sided pneumothorax with a moderate right-sided pleural effusion for which she underwent placement of a chest tube.      Recommend:  Right-sided pneumothorax and pleural effusion: Demonstrates a moderate-sized right-sided pleural effusion  which is mostly lymphocytic and likely malignant from his known pancreatic cancer.   Continue chest tube to suction at -20 cm of water.  CXR daily.  10/10: Chest tube with 185cc out  10/11: 160out  Will clamp and repeat CXR      Medications:     Current Facility-Administered Medications   Medication Dose Route Frequency Provider Last Rate Last Admin     Current Facility-Administered Medications   Medication Dose Route Frequency Provider Last Rate Last Admin    [Held by provider] amLODIPine (NORVASC) tablet 10 mg  10 mg Oral Daily Jameson Almonte PA-C        apixaban ANTICOAGULANT (ELIQUIS) tablet 2.5 mg  2.5 mg Oral BID Severino Salomon MD   2.5 mg at 12/12/24 0802    aspirin EC tablet 81 mg  81 mg Oral BID Jameson Almonte PA-C   81 mg at 12/12/24 0802    furosemide (LASIX) injection 40 mg  40 mg Intravenous Daily Jameson Almonte PA-C   40 mg at 12/12/24 0802    insulin aspart (NovoLOG) injection (RAPID ACTING)  1-7 Units Subcutaneous TID w/meals Severino Salomon MD   1 Units at 12/11/24 1756    insulin glargine (LANTUS PEN) injection 12 Units  12 Units Subcutaneous QAM AC Severino Salomon MD   12  "Units at 12/12/24 0802    methocarbamol (ROBAXIN) tablet 500 mg  500 mg Oral 4x Daily Roxanne Lincoln PA-C   500 mg at 12/12/24 1008    metoprolol tartrate (LOPRESSOR) tablet 25 mg  25 mg Oral BID Jameson Almonte PA-C   25 mg at 12/11/24 0759    polyethylene glycol (MIRALAX) Packet 17 g  17 g Oral Daily Jameson Almonte PA-C   17 g at 12/12/24 0803    senna-docusate (SENOKOT-S/PERICOLACE) 8.6-50 MG per tablet 1 tablet  1 tablet Oral BID Jameson Almonte PA-C   1 tablet at 12/12/24 0803    sodium chloride (PF) 0.9% PF flush 3 mL  3 mL Intracatheter Q8H Jameson Almonte PA-C   3 mL at 12/11/24 2051    sodium chloride (PF) 0.9% PF flush 3 mL  3 mL Intracatheter Q8H Jameson Almonte PA-C   3 mL at 12/12/24 0804    tamsulosin (FLOMAX) capsule 0.4 mg  0.4 mg Oral Daily Severino Salomon MD        traZODone (DESYREL) tablet 50 mg  50 mg Oral At Bedtime Jameson Almonte PA-C   50 mg at 12/11/24 2049         Exam/Data:   Vitals  BP (!) 141/66 (BP Location: Left arm, Patient Position: Supine, Cuff Size: Adult Regular)   Pulse (!) 48   Temp 98.1  F (36.7  C) (Oral)   Resp 16   Ht 1.727 m (5' 8\")   Wt 74.3 kg (163 lb 12.8 oz)   SpO2 92%   BMI 24.91 kg/m       I/O last 3 completed shifts:  In: -   Out: 1410 [Urine:1250; Chest Tube:160]  Weight change:   [unfilled]  Resp: 16    EXAM:  Gen: awake and alert, sitting in bed  Chest tube in place with dressing  CV: RRR  Abd: soft, nontender  Ext: leg with bandage in place      DATA:   Latest Reference Range & Units 12/09/24 22:46   Cell Count Fluid Source  Other   Total Nucleated Cells /uL 967   % Neutrophils Fluid % 1   % Lymphocytes Fluid % 82   % Mono/Macro Fluid % 0   % Eosinophils Fluid % 16   % Basophils Fluid % 1   Color Fluid Colorless, Yellow  Orange !   Appearance Fluid Clear  Hazy !   Glucose Fluid Source  Other   Glucose Fluid mg/dL 210   LD Fluid Source  Other   Lactate Dehydrogenase Fluid U/L 339   Protein Fluid Source  Other   Protein Total Fluid " g/dL 3.8       IMAGING:   CT Hip Left w/o Contrast    Addendum Date: 12/10/2024    EXAM: CT HIP LEFT W/O CONTRAST LOCATION: [facility] DATE: 12/9/2024 INDICATION: Evaluate for pathologic fracture; hip pain; fracture, known or rule out, or trauma; no fracture follow-up or history of osteoarthritis; no hip x-ray result available. COMPARISON: X-ray 12/8/2024, CT from 8/18/2024. TECHNIQUE: Noncontrast. Axial, sagittal and coronal thin-section reconstruction. Dose reduction techniques were used. IMPRESSION: 1.  Acute left femoral neck fracture with mild anterosuperior displacement. No intertrochanteric extension. Moderate left hip lipohemarthrosis. There is normal joint alignment. 2.  There is irregular lucency in the area of the fracture site. This is favored to represent osteopenia because this appeared symmetric on the prior CT, however a pathologic fracture cannot be definitively excluded. Discussed with KAT Oliveira by Dr. Walter Long via telephone on 12/10/2024 at 9:18 AM.     Result Date: 12/10/2024  EXAM: CT HIP LEFT W/O CONTRAST LOCATION: [facility] DATE: 12/9/2024 INDICATION: evaluate for pathologic fracture; Hip pain; Fracture, known or r o, or trauma; No fracture f u or history of osteoarthritis; No hip x ray result available COMPARISON: X-ray 12/08/2024 TECHNIQUE: Noncontrast. Axial, sagittal and coronal thin-section reconstruction. Dose reduction techniques were used. FINDINGS: BONES: -There is an acute comminuted fracture through the left femoral neck with moderate associated angulation convex anteriorly. Moderate degenerative changes left AC joint. Moderate vascular calcification. The exam is otherwise normal. SOFT TISSUES: -Normal.     IMPRESSION: 1.      CT Chest Tube with Cath Placement    Result Date: 12/9/2024  EXAM: 1. PERCUTANEOUS CHEST TUBE PLACEMENT RIGHT PLEURAL SPACE 2. CT GUIDANCE 3. CONSCIOUS SEDATION LOCATION: [facility]  HOSPITAL DATE: 12/9/2024 INDICATION: Right hydropneumothorax. TECHNIQUE: Dose reduction techniques were used. PROCEDURE: Informed consent obtained. Site marked. Prior images reviewed. Required items made available. Patient identity confirmed verbally and with arm band. Patient reevaluated immediately before administering sedation. Universal protocol was followed. Time out performed. The site was prepped and draped in sterile fashion. 10 mL of 1% lidocaine was infused into the local soft tissues. Using standard technique and under direct CT guidance, a 10 Bengali chest tube catheter was inserted into the pleural space. The catheter was fixed in place with sutures and adhesive device, and the tubing was banded. Chest tube placed to Pleur-evac suction. SPECIMEN: None. BLOOD LOSS: Minimal. The patient tolerated the procedure well. No immediate complications. SEDATION: Versed 0.5 mg. Fentanyl 25 mcg. The procedure was performed with administration intravenous conscious sedation with appropriate preoperative, intraoperative, and postoperative evaluation. 15 minutes of supervised face to face conscious sedation time was provided by a radiology nurse under my direct supervision.     IMPRESSION: 1.  Successful CT-guided chest tube placement into right pleural space. Reference CPT Codes: 73034, 91874    CT Chest w/o Contrast    Result Date: 12/8/2024  EXAM: CT CHEST W/O CONTRAST LOCATION: [facility] DATE: 12/8/2024 INDICATION: Possible pneumothorax on ct c spine COMPARISON: Cervical spine CT from prior to this examination. Chest CT from 08/18/2024. TECHNIQUE: CT chest without IV contrast. Multiplanar reformats were obtained. Dose reduction techniques were used. CONTRAST: None. FINDINGS: LUNGS AND PLEURA: *  Small right-sided pneumothorax (less than 10% by volume of the right chest). *  Moderate right and small left simple density nonloculated pleural effusions. *  Diffuse upper lung predominant  "interstitial micronodules are unchanged from August 2024. This could represent the result of sarcoidosis, silicosis, or coal workers pneumoconiosis, among other possibilities. *  Minimal chronic \"platelike\" scarring in both lungs with areas of \"round\" atelectasis in the right middle and right lower lobes. MEDIASTINUM/AXILLAE: Normal heart size without pericardial effusion. 3 vessel coronary artery disease. Previous transcatheter aortic valve replacement. Nonaneurysmal thoracic aorta. Calcified mildly enlarged hilar or mediastinal lymph nodes are unchanged. UPPER ABDOMEN: Metallic CBD stent with expected intrahepatic pneumobilia. No biliary dilatation. MUSCULOSKELETAL: Unremarkable.     IMPRESSION: *  Small right-sided pneumothorax (10% by volume) in the setting of likely underlying chronic lung disease from either sarcoidosis, silicosis, or coal workers pneumoconiosis. *  Three-vessel coronary artery disease. Prior transcatheter aortic valve replacement. *  Well-positioned metallic CBD stent without biliary dilatation (partially imaged). [Critical Result: Pneumothorax] Finding was identified on 12/8/2024 11:06 PM CST. Dr. Larry was contacted by me on 12/8/2024 11:17 PM CST and verbalized understanding of the critical result.     Head CT w/o contrast    Result Date: 12/8/2024  EXAM: CT HEAD W/O CONTRAST LOCATION: [facility] DATE: 12/8/2024 INDICATION: Trauma. COMPARISON: None. TECHNIQUE: Routine CT Head without IV contrast. Multiplanar reformats. Dose reduction techniques were used. FINDINGS: INTRACRANIAL CONTENTS: No intracranial hemorrhage, extraaxial collection, or mass effect. No CT evidence of acute infarct. Moderate presumed chronic small vessel ischemic changes. Moderate generalized volume loss. No hydrocephalus. VISUALIZED ORBITS/SINUSES/MASTOIDS: No intraorbital abnormality. No paranasal sinus mucosal disease. No middle ear or mastoid effusion. BONES/SOFT TISSUES: No acute " abnormality.     IMPRESSION: 1.  No CT evidence for acute intracranial process. 2.  Brain atrophy and presumed chronic microvascular ischemic changes as above.    CT Cervical Spine w/o Contrast    Result Date: 12/8/2024  EXAM: CT CERVICAL SPINE W/O CONTRAST LOCATION: [facility] DATE: 12/8/2024 INDICATION: Trauma. COMPARISON: None. TECHNIQUE: Routine CT Cervical Spine without IV contrast. Multiplanar reformats. Dose reduction techniques were used. FINDINGS: VERTEBRA: Mild rightward curvature centered at C5 and leftward curvature of the upper thoracic spine. Minimal posterior spondylolisthesis at C3-C4. Otherwise, normal alignment. Normal vertebral body heights. Severe disc degeneration C3-C4, C5-C6, and C6-C7. No fracture or posttraumatic subluxation. CANAL/FORAMINA: Moderate spinal canal stenosis at C3-C4 with severe bilateral foraminal stenoses. Moderate to severe left foraminal stenosis C4-C5. Severe right foraminal stenosis C5-C6. Moderate right foraminal stenosis C6-C7. PARASPINAL: Moderate to large right pleural effusion. Small right-sided pneumothorax.     IMPRESSION: 1.  No fracture or posttraumatic subluxation. 2.  No high-grade spinal canal or neural foraminal stenosis. 3.  Small right pneumothorax and moderate pleural effusion. Consider dedicated chest CT for more complete evaluation. The results were communicated to Dr. Larry at 10:33 PM on 12/08/2024.    XR Femur Left 2 Views    Result Date: 12/8/2024  EXAM: PELVIS AND LEFT FEMUR 5 VIEWS LOCATION: [facility] DATE/TIME: 12/8/2024 9:56 PM CST INDICATION: Trauma. COMPARISON: None.     IMPRESSION: 1. Mildly displaced acute transverse fracture of the left femoral neck. The distal portion of the fracture is displaced laterally by 0.7 cm and there is approximately 1.0 cm of override. No significant angulation about the fracture. 2. Mild degenerative changes in bilateral hip joints. 3. Partial visualization of a  left knee arthroplasty.    XR Pelvis 1/2 Views    Result Date: 12/8/2024  EXAM: PELVIS AND LEFT FEMUR 5 VIEWS LOCATION: [facility] DATE/TIME: 12/8/2024 9:56 PM CST INDICATION: Trauma. COMPARISON: None.     IMPRESSION: 1. Mildly displaced acute transverse fracture of the left femoral neck. The distal portion of the fracture is displaced laterally by 0.7 cm and there is approximately 1.0 cm of override. No significant angulation about the fracture. 2. Mild degenerative changes in bilateral hip joints. 3. Partial visualization of a left knee arthroplasty.

## 2024-12-12 NOTE — PROGRESS NOTES
"Orthopedic Progress Note      Assessment: 2 Day Post-Op  S/P Procedure(s):  LEFT HIP HEMIARTHROPLASTY,  BIPOLAR @    Plan:   - Continue PT/OT.   - Weightbearing status: WBAT, Posterior precautions x3 months  - Anticoagulation: ASA and eliquis in addition to SCDs, miko stockings and early ambulation.  - Dorsiflexion back to normal  - Discharge planning: pending therapy and pain     Subjective:  Pain: Well controlled on oral meds  Nausea, Vomiting:  No  Chest pain: No  Lightheadedness, Dizziness:  No  Neuro:  Patient denies new onset numbness or paresthesias     Patient doing well on POD #2. Ambulating, tolerating oral intake, voiding & pain is controlled with oral medications. Hgb 11.6 (11.5 pre-op). Pain not well controlled on oxycodone switching to dilaudid     Objective:  BP (!) 141/66 (BP Location: Left arm, Patient Position: Supine, Cuff Size: Adult Regular)   Pulse (!) 48   Temp 98.1  F (36.7  C) (Oral)   Resp 16   Ht 1.727 m (5' 8\")   Wt 74.3 kg (163 lb 12.8 oz)   SpO2 92%   BMI 24.91 kg/m    The patient is A&Ox3. Appears comfortable, sitting up at bedside.  Calves are soft and non-tender bilaterally  Brisk capillary refill in the toes.   Palpable left dorsalis pedis pulse. Foot warm & well-perfused.  Sensation is intact to light touch & equal bilaterally in the femoral, DP, SP & tibial nerve distributions.  ROM: Flexes at left hip. Appropriately flexes & extends all toes bilaterally.   Motor: +5/5 plantar flexion & EHL bilaterally. Fires quad.   Leg lengths equal.  left hip dressing C/D/I without strikethrough, no surrounding erythema.       5/5 TA/GSC/EHL.         Pertinent Labs   Lab Results: personally reviewed.   Lab Results   Component Value Date    INR 1.50 (H) 12/08/2024    INR 1.64 (H) 08/20/2024    INR 1.50 (H) 08/18/2024     Lab Results   Component Value Date    WBC 7.7 12/10/2024    HGB 10.5 (L) 12/12/2024    HCT 34.1 (L) 12/10/2024    MCV 83 12/10/2024     (L) 12/10/2024     Lab " Results   Component Value Date     12/12/2024    CO2 25 12/12/2024         Report completed by:  Roxanne Lincoln PA-C/Dr. Arriaga  [facility]    Date: 12/11/2024  Time: 9:05 AM

## 2024-12-12 NOTE — PROGRESS NOTES
Care Management Follow Up    Length of Stay (days): 4    Expected Discharge Date: 12/13/2024     Concerns to be Addressed: discharge planning     Patient plan of care discussed at interdisciplinary rounds: Yes    Anticipated Discharge Disposition: Transitional Care      Anticipated Discharge Services: Transportation Services  Anticipated Discharge DME: None    Patient/family educated on Medicare website which has current facility and service quality ratings: no  Education Provided on the Discharge Plan: Yes  Patient/Family in Agreement with the Plan: yes    Referrals Placed by CM/SW: Post Acute Facilities  Private pay costs discussed: transportation costs     Discussed  Partnership in Safe Discharge Planning  document with patient/family: Yes:     Handoff Completed: No, handoff not indicated or clinically appropriate    Additional Information:    9:24 AM  CM met with pt.  Pt agreeable to TCU, wants to be placed in a TCU [facility] to [place] [facility].    TCU referrals sent per pt's preferences:  [facility] - 11:20 AM Per Alayna, declined pt; bed not available.  [facility] - 11:25 AM Per Shanon, pt is accepted; bed available Friday.  No weekend admits.  Private room with no private room fee.  Pt must arrive before 1400pm on Friday or after 1500pm.    Pt requested CM call pt's wife Tana for more TCU choices.    Spouse Tana - CM called; left voicemail.    Pt confirms he does not use oxygen at baseline.    Transportation - CM discussed potential out of pocket cost of [facility] wheelchair transport with pt and.  Pt authorizes [facility] wheelchair transport.    10:44 AM  [id number] - PAS done (TCU TBD).  Does not require an OBRA level 2 assessment.    11:26 AM  Update given to pt and spouse Tana - pt has been accepted by [facility] TCU; they are accepting of this TCU placement.    Reviewed potential out of pocket cost of [facility] transport with Spouse Tana; she is also  accepting of [facility] wheelchair transport for pt.    [facility] - update given to Carolynn at [facility] regarding TCU acceptance.    Next Steps:   Transportation - [facility] wheelchair    CM will continue to follow.     Alejandro Santos RN

## 2024-12-13 ENCOUNTER — APPOINTMENT (OUTPATIENT)
Dept: RADIOLOGY | Facility: CLINIC | Age: 84
DRG: 521 | End: 2024-12-13
Payer: MEDICARE

## 2024-12-13 ENCOUNTER — APPOINTMENT (OUTPATIENT)
Dept: OCCUPATIONAL THERAPY | Facility: CLINIC | Age: 84
DRG: 521 | End: 2024-12-13
Payer: MEDICARE

## 2024-12-13 VITALS
OXYGEN SATURATION: 94 % | DIASTOLIC BLOOD PRESSURE: 57 MMHG | HEART RATE: 54 BPM | HEIGHT: 68 IN | TEMPERATURE: 97.9 F | RESPIRATION RATE: 16 BRPM | BODY MASS INDEX: 24.83 KG/M2 | WEIGHT: 163.8 LBS | SYSTOLIC BLOOD PRESSURE: 119 MMHG

## 2024-12-13 LAB
ANION GAP SERPL CALCULATED.3IONS-SCNC: 12 MMOL/L (ref 7–15)
BUN SERPL-MCNC: 33.7 MG/DL (ref 8–23)
CALCIUM SERPL-MCNC: 8.9 MG/DL (ref 8.8–10.4)
CHLORIDE SERPL-SCNC: 99 MMOL/L (ref 98–107)
CREAT SERPL-MCNC: 1.87 MG/DL (ref 0.67–1.17)
EGFRCR SERPLBLD CKD-EPI 2021: 35 ML/MIN/1.73M2
GLUCOSE BLDC GLUCOMTR-MCNC: 118 MG/DL (ref 70–99)
GLUCOSE BLDC GLUCOMTR-MCNC: 164 MG/DL (ref 70–99)
GLUCOSE BLDC GLUCOMTR-MCNC: 195 MG/DL (ref 70–99)
GLUCOSE BLDC GLUCOMTR-MCNC: 216 MG/DL (ref 70–99)
GLUCOSE SERPL-MCNC: 126 MG/DL (ref 70–99)
HCO3 SERPL-SCNC: 23 MMOL/L (ref 22–29)
HOLD SPECIMEN: NORMAL
NT-PROBNP SERPL-MCNC: 5707 PG/ML (ref 0–1800)
POTASSIUM SERPL-SCNC: 4.1 MMOL/L (ref 3.4–5.3)
SODIUM SERPL-SCNC: 134 MMOL/L (ref 135–145)

## 2024-12-13 PROCEDURE — 88305 TISSUE EXAM BY PATHOLOGIST: CPT | Mod: 26 | Performed by: PATHOLOGY

## 2024-12-13 PROCEDURE — 99232 SBSQ HOSP IP/OBS MODERATE 35: CPT | Performed by: INTERNAL MEDICINE

## 2024-12-13 PROCEDURE — 71045 X-RAY EXAM CHEST 1 VIEW: CPT

## 2024-12-13 PROCEDURE — 250N000011 HC RX IP 250 OP 636: Performed by: FAMILY MEDICINE

## 2024-12-13 PROCEDURE — 250N000013 HC RX MED GY IP 250 OP 250 PS 637

## 2024-12-13 PROCEDURE — 250N000009 HC RX 250: Performed by: FAMILY MEDICINE

## 2024-12-13 PROCEDURE — 250N000013 HC RX MED GY IP 250 OP 250 PS 637: Performed by: FAMILY MEDICINE

## 2024-12-13 PROCEDURE — 999N000157 HC STATISTIC RCP TIME EA 10 MIN

## 2024-12-13 PROCEDURE — 36415 COLL VENOUS BLD VENIPUNCTURE: CPT | Performed by: FAMILY MEDICINE

## 2024-12-13 PROCEDURE — 88112 CYTOPATH CELL ENHANCE TECH: CPT | Mod: 26 | Performed by: PATHOLOGY

## 2024-12-13 PROCEDURE — 120N000001 HC R&B MED SURG/OB

## 2024-12-13 PROCEDURE — 82565 ASSAY OF CREATININE: CPT | Performed by: FAMILY MEDICINE

## 2024-12-13 PROCEDURE — 250N000011 HC RX IP 250 OP 636

## 2024-12-13 PROCEDURE — 97535 SELF CARE MNGMENT TRAINING: CPT | Mod: GO

## 2024-12-13 PROCEDURE — 94640 AIRWAY INHALATION TREATMENT: CPT

## 2024-12-13 PROCEDURE — 88341 IMHCHEM/IMCYTCHM EA ADD ANTB: CPT | Mod: 26 | Performed by: PATHOLOGY

## 2024-12-13 PROCEDURE — 83880 ASSAY OF NATRIURETIC PEPTIDE: CPT | Performed by: FAMILY MEDICINE

## 2024-12-13 PROCEDURE — 88342 IMHCHEM/IMCYTCHM 1ST ANTB: CPT | Mod: 26 | Performed by: PATHOLOGY

## 2024-12-13 PROCEDURE — 99233 SBSQ HOSP IP/OBS HIGH 50: CPT | Performed by: FAMILY MEDICINE

## 2024-12-13 PROCEDURE — 94640 AIRWAY INHALATION TREATMENT: CPT | Mod: 76

## 2024-12-13 RX ORDER — GLIPIZIDE 5 MG/1
5 TABLET ORAL
Status: DISCONTINUED | OUTPATIENT
Start: 2024-12-14 | End: 2024-12-13

## 2024-12-13 RX ORDER — GLIPIZIDE 5 MG/1
5 TABLET, FILM COATED, EXTENDED RELEASE ORAL EVERY MORNING
Status: DISCONTINUED | OUTPATIENT
Start: 2024-12-14 | End: 2024-12-16

## 2024-12-13 RX ORDER — IPRATROPIUM BROMIDE AND ALBUTEROL SULFATE 2.5; .5 MG/3ML; MG/3ML
3 SOLUTION RESPIRATORY (INHALATION)
Status: DISCONTINUED | OUTPATIENT
Start: 2024-12-13 | End: 2024-12-15

## 2024-12-13 RX ORDER — FUROSEMIDE 10 MG/ML
40 INJECTION INTRAMUSCULAR; INTRAVENOUS 2 TIMES DAILY
Status: DISCONTINUED | OUTPATIENT
Start: 2024-12-13 | End: 2024-12-13

## 2024-12-13 RX ORDER — ASPIRIN 81 MG/1
81 TABLET ORAL DAILY
Status: DISCONTINUED | OUTPATIENT
Start: 2024-12-14 | End: 2024-12-13

## 2024-12-13 RX ORDER — TORSEMIDE 20 MG/1
80 TABLET ORAL DAILY
Status: DISCONTINUED | OUTPATIENT
Start: 2024-12-14 | End: 2024-12-17 | Stop reason: HOSPADM

## 2024-12-13 RX ORDER — FUROSEMIDE 10 MG/ML
40 INJECTION INTRAMUSCULAR; INTRAVENOUS ONCE
Status: COMPLETED | OUTPATIENT
Start: 2024-12-13 | End: 2024-12-13

## 2024-12-13 RX ADMIN — IPRATROPIUM BROMIDE AND ALBUTEROL SULFATE 3 ML: .5; 3 SOLUTION RESPIRATORY (INHALATION) at 16:12

## 2024-12-13 RX ADMIN — IPRATROPIUM BROMIDE AND ALBUTEROL SULFATE 3 ML: .5; 3 SOLUTION RESPIRATORY (INHALATION) at 12:47

## 2024-12-13 RX ADMIN — SENNOSIDES AND DOCUSATE SODIUM 1 TABLET: 50; 8.6 TABLET ORAL at 08:42

## 2024-12-13 RX ADMIN — METHOCARBAMOL 500 MG: 500 TABLET ORAL at 08:42

## 2024-12-13 RX ADMIN — ASPIRIN 81 MG: 81 TABLET, COATED ORAL at 08:42

## 2024-12-13 RX ADMIN — SENNOSIDES AND DOCUSATE SODIUM 2 TABLET: 50; 8.6 TABLET ORAL at 17:32

## 2024-12-13 RX ADMIN — METHOCARBAMOL 500 MG: 500 TABLET ORAL at 17:05

## 2024-12-13 RX ADMIN — HYDROMORPHONE HYDROCHLORIDE 2 MG: 2 TABLET ORAL at 15:37

## 2024-12-13 RX ADMIN — FUROSEMIDE 40 MG: 10 INJECTION, SOLUTION INTRAMUSCULAR; INTRAVENOUS at 08:42

## 2024-12-13 RX ADMIN — ACETAMINOPHEN 650 MG: 325 TABLET ORAL at 15:37

## 2024-12-13 RX ADMIN — FUROSEMIDE 40 MG: 10 INJECTION, SOLUTION INTRAVENOUS at 15:38

## 2024-12-13 RX ADMIN — POLYETHYLENE GLYCOL 3350 17 G: 17 POWDER, FOR SOLUTION ORAL at 08:51

## 2024-12-13 RX ADMIN — METHOCARBAMOL 500 MG: 500 TABLET ORAL at 12:27

## 2024-12-13 RX ADMIN — APIXABAN 2.5 MG: 2.5 TABLET, FILM COATED ORAL at 08:42

## 2024-12-13 RX ADMIN — TAMSULOSIN HYDROCHLORIDE 0.4 MG: 0.4 CAPSULE ORAL at 08:42

## 2024-12-13 RX ADMIN — APIXABAN 2.5 MG: 2.5 TABLET, FILM COATED ORAL at 20:48

## 2024-12-13 ASSESSMENT — ACTIVITIES OF DAILY LIVING (ADL)
ADLS_ACUITY_SCORE: 55
ADLS_ACUITY_SCORE: 51
ADLS_ACUITY_SCORE: 51
ADLS_ACUITY_SCORE: 55
ADLS_ACUITY_SCORE: 55
ADLS_ACUITY_SCORE: 51
ADLS_ACUITY_SCORE: 55
ADLS_ACUITY_SCORE: 51
ADLS_ACUITY_SCORE: 51
ADLS_ACUITY_SCORE: 55
ADLS_ACUITY_SCORE: 55
ADLS_ACUITY_SCORE: 51
ADLS_ACUITY_SCORE: 55
ADLS_ACUITY_SCORE: 51
ADLS_ACUITY_SCORE: 51
ADLS_ACUITY_SCORE: 55
ADLS_ACUITY_SCORE: 51
ADLS_ACUITY_SCORE: 51
ADLS_ACUITY_SCORE: 55
ADLS_ACUITY_SCORE: 55

## 2024-12-13 NOTE — PROGRESS NOTES
"Orthopedic Progress Note      Assessment: 3 Day Post-Op  S/P Procedure(s):  LEFT HIP HEMIARTHROPLASTY,  BIPOLAR @    Plan:   - Continue PT/OT.   - Weightbearing status: WBAT, Posterior precautions x3 months  - Anticoagulation: ASA and eliquis in addition to SCDs, miko stockings and early ambulation.  - Dorsiflexion back to normal  - Discharge planning: pending therapy and pain     Subjective:  Pain: Well controlled on oral meds  Nausea, Vomiting:  No  Chest pain: No  Lightheadedness, Dizziness:  No  Neuro:  Patient denies new onset numbness or paresthesias     Patient doing well on POD #3. Ambulating, tolerating oral intake, voiding & pain is controlled with oral medications. Pain better controlled today     Objective:  BP (!) 143/65 (BP Location: Left arm)   Pulse 52   Temp 97.5  F (36.4  C) (Oral)   Resp 16   Ht 1.727 m (5' 8\")   Wt 74.3 kg (163 lb 12.8 oz)   SpO2 95%   BMI 24.91 kg/m    The patient is A&Ox3. Appears comfortable, sitting up at bedside.  Calves are soft and non-tender bilaterally  Brisk capillary refill in the toes.   Palpable left dorsalis pedis pulse. Foot warm & well-perfused.  Sensation is intact to light touch & equal bilaterally in the femoral, DP, SP & tibial nerve distributions.  ROM: Flexes at left hip. Appropriately flexes & extends all toes bilaterally.   Motor: +5/5 plantar flexion & EHL bilaterally. Fires quad.   Leg lengths equal.  left hip dressing C/D/I without strikethrough, no surrounding erythema.       5/5 TA/GSC/EHL.         Pertinent Labs   Lab Results: personally reviewed.   Lab Results   Component Value Date    INR 1.50 (H) 12/08/2024    INR 1.64 (H) 08/20/2024    INR 1.50 (H) 08/18/2024     Lab Results   Component Value Date    WBC 7.7 12/10/2024    HGB 10.5 (L) 12/12/2024    HCT 34.1 (L) 12/10/2024    MCV 83 12/10/2024     (L) 12/10/2024     Lab Results   Component Value Date     (L) 12/13/2024    CO2 23 12/13/2024         Report completed by:  Roxanne CLAROS" PALMA Lincoln/Dr. Arriaga  [facility]  12/13/24

## 2024-12-13 NOTE — PROGRESS NOTES
Care Management Follow Up    Length of Stay (days): 5    Expected Discharge Date: 12/13/2024     Concerns to be Addressed: discharge planning     Patient plan of care discussed at interdisciplinary rounds: Yes    Anticipated Discharge Disposition: Transitional Care     Anticipated Discharge Services: Transportation Services  Anticipated Discharge DME: None    Patient/family educated on Medicare website which has current facility and service quality ratings: no  Education Provided on the Discharge Plan: Yes  Patient/Family in Agreement with the Plan: yes    Referrals Placed by CM/SW: Post Acute Facilities  Private pay costs discussed: Not applicable    Discussed  Partnership in Safe Discharge Planning  document with patient/family: No     Handoff Completed: No, handoff not indicated or clinically appropriate    Additional Information:    9:13 AM  Per Dr. Donato pt not medically ready for discharge today.    [facility] TCU - accepted.  This TCU does not accept weekend admits.  Update given to Shanon in admissions.  CM to follow up on Monday.    Spouse Tana - Called.  No answer.  Left voicemail.    1:33 PM  Spouse Tana - Update given to Tana.    PAS done 12/12.    Next Steps:   [facility] wheelchair authorized by pt and spouse Tana    CM will continue to follow.     Alejandro Santos RN

## 2024-12-13 NOTE — PROVIDER NOTIFICATION
12/13/24 1252   RCAT Assessment   Reason for Assessment COPD  (pleural effusion)   Pulmonary Status 3   Surgical Status 1   Chest X-ray 4   Respiratory Pattern 0   Mental Status 0   Breath Sounds 2   Cough Effectiveness 0   Level of Activity 1   O2 Required for SpO2>=92% 1   Acuity Level (points) 12   Acuity Level  3   Re-eval Interval Guideline Every 3 days   Re-evaluation Date 12/16/24   Clinical Indications/Symptoms   Aerosol Therapy RCAT protocol   Volume Expansion Decreased breath sounds  (Recommended to take a deep breath frequently)   Aerosol Therapy Plan   RT Treatment Nebulizer   Anticholinergic/Beta-Andrenergic Agonist Duoneb soln (0.5mg/3mg per 3mL) neb Max 6 doses/24h   Aerosol Treatment Frequency Acuity Level 3: QID/PRN @noc-Mod wheezing/Hx asthma/secretion removal   Aerosol Therapy (SVN)   Patient Position HOB elevated   Posttreatment Assessment (SVN) breath sounds unchanged;patient reports no change in breathing   Signs of Intolerance (SVN) none     Pt is on O2 1 LPM NC, SpO2 95%, BS clear, no changed post neb, reports SOB, no respiratory distress noted, instructed pt to do deep breathing frequently, RCAT done, will continued Duoneb qid. RT following.    Kiran Cabral, RT

## 2024-12-13 NOTE — PLAN OF CARE
Problem: Hip Arthroplasty  Goal: Intact Neurovascular Status  Outcome: Progressing  Goal: Optimal Pain Control and Function  Outcome: Progressing  Goal: Effective Urinary Elimination  Outcome: Progressing    Patient vital signs are at baseline: No,  Reason:  1L O2 via NC  Patient able to ambulate as they were prior to admission or with assist devices provided by therapies during their stay:  No, not out of bed during shift   Patient MUST void prior to discharge:  No,  Reason:  Helm   Patient able to tolerate oral intake:  Yes  Pain has adequate pain control using Oral analgesics:  Yes  Does patient have an identified :  Yes  Has goal D/C date and time been discussed with patient:  Yes    Goal Outcome Evaluation:  Patient A&O x2, intermittent confusion. VSS on 1L O2 via NC. Denied pain throughout shift. Right AC and hand PIV SL. Chest tube clamped all shift. Repeat x-ray to be completed this morning. Chest tube dressing CDI. Denied SOB, HALL, and chest pain. Lung sounds diminished. +2 LLE edema. CMS intact. Left hip surgical dressing CDI. Redness blanchable on bottom. Helm patent and draining. No BM during shift. ACHS sugar checks, insulin coverage per sliding scale. Regular diet, tolerating well. Assist of 2 with walker and gait belt, not out of bed during shift. Discharge pending.

## 2024-12-13 NOTE — PROGRESS NOTES
Shift: [id number]    Patient is A&Ox3, forgetful/intermittent confusion. Family has been visiting this evening. VSS, on 1Lnc, denies chest pain or SOB. Refused to get out of bed this evening. C/O left hip pain, given PRN Dilaudid 2mg and Tylenol x1, has sched Robaxin. Pt ate dinner well and drinking fluids. Helm patent with adequate outputs. Repositioned, and encouraged to increase mobility. Left hip incision CDI, right upper flank site intact.

## 2024-12-13 NOTE — PROGRESS NOTES
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  Underwent left hip hemiarthroplasty.  POD 3.  PT and OT evaluation.  Will be discharging to TCU for more rehabilitation when medically much stable.  Status post right-sided thoracentesis and chest tube placement on 12/9. Right-sided pleural effusion which is mostly lymphocytic and likely malignant from his known pancreatic cancer.  Tube clamped today.  Repeat x-ray today.  Requiring 1 L of oxygen at present.  Patient was on torsemide 80 mg daily at home.  Started on IV Lasix 40 mg twice a day. Echocardiogram with EF 55 to 60%, normal right ventricular size and function.  Patient has multiple comorbidities with recent diagnosis of pancreatic cancer in 8/2024.  Has significant weakness and deconditioning.  Will be discharging to TCU for more rehabilitation in 1 to 2-day.    Assessment and Plan:  Left hip fracture following a mechanical fall  Status post left hip hemiarthroplasty  Resume routine postoperative care  Physical and Occupational Therapy  Use incentive spirometry frequently  DVT prophylaxis per orthopedics,  chronic anticoagulation treatment with Eliquis 2.5 mg twice a day  Pain control with Tylenol 650 mg every 6 hours as needed, p.o. Dilaudid 2 to 4 mg every 3 hours as needed    Right-sided pneumothorax, small  Moderate right-sided pleural effusion secondary to pancreatic cancer  Status post chest tube  placement, now clamped  Repeat chest x-ray today  Acute hypoxic respiratory failure, requiring 1 L of oxygen  Wean off as tolerated    COPD without exacerbation  Bronchodilator as needed    Volume excess  IV Lasix 40 mg twice a day  Was on p.o. torsemide 80 mg daily at home    Pancreatic cancer diagnosed in 8/2024  Not a chemotherapy candidate  CBD duct in place  Seeing [place] [facility]    Acute urinary retention  Helm catheter in place  Void trial at urology clinic in 1 to 2-week    DM2, hemoglobin A1c 7.8  Diabetic diet and insulin sliding scale  Glipizide 5 mg daily    Paroxysmal atrial fibrillation  Heart rate is stable with metoprolol 25 mg twice a day  On chronic anticoagulation treatment with Eliquis    Coronary artery disease  CT-Three-vessel coronary artery disease.   Stable and asymptomatic    AVR, resume anticoagulation treatment    Essential hypertension  Metoprolol 25 mg twice a day    CKD 4, stable at 1.87    Chronic anemia, hemoglobin is stable at 11.5    Code DNR  DVT prophylaxis  On chronic anticoagulation treatment  Medically Ready for Discharge: Anticipated in 2-4 Days        Clinically Significant Risk Factors         # Hyponatremia: Lowest Na = 134 mmol/L in last 2 days, will monitor as appropriate           # Hypertension: Noted on problem list  # Acute heart failure with preserved ejection fraction: heart failure noted on problem list, last echo with EF >50%, and receiving IV diuretics          # DMII: A1C = N/A within past 6 months       # Financial/Environmental Concerns: none         Diet: Advance Diet as Tolerated: Regular Diet Adult  Snacks/Supplements Adult: Glucerna; With Meals  Helm Catheter: PRESENT, indication: Acute retention or obstruction  Lines: None         Janet Donato MD  [facility]  [facility]  [facility]  Phone: #[phone]  Securely message with the Vocera Web Console (learn more here)  Text page via [employer] Paging/Directory  "    Interval History/Subjective:  Resting comfortably.  Has generalized weakness.  On 1 L of oxygen at present.  Chest tube clamped.  Afebrile.  Has dry cough.  Left hip pain is stable.    Physical Exam/Objective:  Temp:  [97.5  F (36.4  C)-97.9  F (36.6  C)] 97.5  F (36.4  C)  Pulse:  [50-84] 52  Resp:  [16-20] 16  BP: (119-143)/(57-69) 143/65  SpO2:  [88 %-95 %] 95 %  Body mass index is 24.91 kg/m .    GENERAL:  Alert, appears comfortable, in no acute distress, appears stated age, sick looking   HEAD:  Normocephalic, without obvious abnormality, atraumatic   EYES:  PERRL, conjunctiva  clear,   EOM's intact   NOSE: Nares normal,   mucosa normal, no drainage   THROAT: Lips, mucosa,   gums normal, mouth moist   NECK: Supple, symmetrical, trachea midline   BACK:   Symmetric, no curvature, ROM normal   LUNGS:   Right-sided chest tube in place, decreased bibasilar breath sounds, no rales, rhonchi, or wheezing, symmetric chest rise on inhalation, respirations unlabored   CHEST WALL:  No tenderness or deformity   HEART:  Regular rate and rhythm, S1 and S2 normal, no murmur, rub, or gallop    ABDOMEN:   Soft, non-tender, bowel sounds active , no masses, no organomegaly, no rebound or guarding   EXTREMITIES: 2+ BLE edema    SKIN: No rashes in the visualized areas   NEURO: Alert, oriented x3    PSYCH: Cooperative, behavior is appropriate      Data reviewed today: I personally reviewed all new medications, labs, imaging/diagnostics reports over the past 24 hours. Pertinent findings include:    Imaging:   Chest CT 12/8  *  Small right-sided pneumothorax (less than 10% by volume of the right chest).  *  Moderate right and small left simple density nonloculated pleural effusions.  *  Diffuse upper lung predominant interstitial micronodules are unchanged from August 2024. This could represent the result of sarcoidosis, silicosis, or coal workers pneumoconiosis, among other possibilities.  *  Minimal chronic \"platelike\" scarring " "in both lungs with areas of \"round\" atelectasis in the right middle and right lower lobes.    CXR 12/12  Unchanged position of the right basilar chest tube. An adjacent small right pleural effusion and atelectasis are unchanged. No definite pneumothorax is visualized.     Minimal atelectasis at the left lung base. Left lung is otherwise clear.     Stable cardiomediastinal silhouette. Prosthetic aortic valve. Surgical clips along the right neck. Old healed fracture of the left posterolateral seventh rib.     Echocardiogram 7/ 2024  1. Left ventricular function is normal.The ejection fraction is 55-60%. The  left ventricle is normal in size. No regional wall motion abnormalities noted.  2. Normal right ventricle size and systolic function.  3. Well seated 26-mm Mooney Catarino 3 Ultra aortic bioprosthetic valve with  normal gradients (PV: 2.8 m/sec, simpson gradient: 12 mm Hg). Acc Time: 70 ms.  DVI: 0.7.  4. IVC diameter and respiratory changes fall into an intermediate range  suggesting an RA pressure of 8 mmHg.    Labs:  Most Recent 3 CBC's:  Recent Labs   Lab Test 12/12/24  0602 12/11/24  0541 12/10/24  0737 12/08/24  2138 08/21/24  0752   WBC  --   --  7.7 6.3 8.1   HGB 10.5* 11.6* 11.5* 11.6* 11.6*   MCV  --   --  83 81 81   PLT  --   --  148* 167 212     Most Recent 3 BMP's:  Recent Labs   Lab Test 12/13/24  0742 12/13/24  0244 12/12/24 2021 12/12/24  0749 12/12/24  0602 12/10/24  0740 12/10/24  0737   *  --   --   --  135  --  134*   POTASSIUM 4.1  --   --   --  4.5  --  5.0   CHLORIDE 99  --   --   --  99  --  98   CO2 23  --   --   --  25  --  26   BUN 33.7*  --   --   --  35.6*  --  24.9*   CR 1.87*  --   --   --  2.10*  --  1.90*   ANIONGAP 12  --   --   --  11  --  10   SANDRA 8.9  --   --   --  8.9  --  9.1   * 118* 115*   < > 134*   < > 278*    < > = values in this interval not displayed.     Most Recent 2 LFT's:  Recent Labs   Lab Test 08/21/24  0752 08/20/24  0746   * 222*   * " 118*   ALKPHOS 818* 881*   BILITOTAL 5.7* 9.9*       Medications:   Personally Reviewed.  Medications   Current Facility-Administered Medications   Medication Dose Route Frequency Provider Last Rate Last Admin     Current Facility-Administered Medications   Medication Dose Route Frequency Provider Last Rate Last Admin    [Held by provider] amLODIPine (NORVASC) tablet 10 mg  10 mg Oral Daily Jameson Almonte PA-C        apixaban ANTICOAGULANT (ELIQUIS) tablet 2.5 mg  2.5 mg Oral BID Severino Salomon MD   2.5 mg at 12/13/24 0842    aspirin EC tablet 81 mg  81 mg Oral BID Jameson Almonte PA-C   81 mg at 12/13/24 0842    furosemide (LASIX) injection 40 mg  40 mg Intravenous BID Janet Donato MD        insulin aspart (NovoLOG) injection (RAPID ACTING)  1-7 Units Subcutaneous TID w/meals Severino Salomon MD   1 Units at 12/11/24 1756    insulin glargine (LANTUS PEN) injection 12 Units  12 Units Subcutaneous QAM AC Severino Salomon MD   12 Units at 12/13/24 0845    methocarbamol (ROBAXIN) tablet 500 mg  500 mg Oral 4x Daily Roxanne Lincoln PA-C   500 mg at 12/13/24 0842    metoprolol tartrate (LOPRESSOR) tablet 25 mg  25 mg Oral BID Mango Franklin MD   25 mg at 12/11/24 0759    polyethylene glycol (MIRALAX) Packet 17 g  17 g Oral Daily Jameson Almonte PA-C   17 g at 12/13/24 0851    senna-docusate (SENOKOT-S/PERICOLACE) 8.6-50 MG per tablet 1 tablet  1 tablet Oral BID Jameson Almonte PA-C   1 tablet at 12/13/24 0842    sodium chloride (PF) 0.9% PF flush 3 mL  3 mL Intracatheter Q8H Jameson Almonte PA-C   3 mL at 12/13/24 0332    sodium chloride (PF) 0.9% PF flush 3 mL  3 mL Intracatheter Q8H Jameson Almonte PA-C   3 mL at 12/13/24 0852    tamsulosin (FLOMAX) capsule 0.4 mg  0.4 mg Oral Daily Severino Salomon MD   0.4 mg at 12/13/24 0842    traZODone (DESYREL) tablet 50 mg  50 mg Oral At Bedtime Jameson Almonte PA-C   50 mg at 12/12/24 5122      Total time spent 50 min

## 2024-12-13 NOTE — PROGRESS NOTES
[facility] ([facility])  SPIRITUAL ASSESSMENT Progress Note  [facility]. [address]     REFERRAL SOURCE: LOS      I attempted a visit but Per was too tired to visit and kept falling asleep.      PLAN: I will attempt a visit when next on site .     Cora Ramirez, Ph.D., [facility]      [facility] available 24/7 for emergency requests/referrals, either by having the on-call  paged or by entering an ASAP/STAT consult in Epic (this will also page the on-call ).

## 2024-12-13 NOTE — PLAN OF CARE
Patient is intermittently confused and was oriented to time, place and person.    B/P: 143/65, T: 97.5, P: 65, R: 16.    NC NC L/minute 1  to maintain saturations > 92 %.    C/O 0/10 pain    Assist of 2, Gait belt, and Walker.    Orders Placed This Encounter      Advance Diet as Tolerated: Regular Diet Adult    Saline Lock.    Alarms on. Call light within reach.    Discharge plan TCU when medically ready .    CT was pulled today at 12:30, repeat CXR completed, results processing    Problem: Skin Injury Risk Increased  Goal: Skin Health and Integrity  Outcome: Progressing  Intervention: Plan: Nurse Driven Intervention: Moisture Management  Recent Flowsheet Documentation  Taken 12/13/2024 0853 by Delmy Garcia RN  Moisture Interventions: Urinary collection device  Intervention: Plan: Nurse Driven Intervention: Friction and Shear  Recent Flowsheet Documentation  Taken 12/13/2024 0853 by Delmy Garcia RN  Friction/Shear Interventions: HOB 30 degrees or less     Problem: Pain Acute  Goal: Optimal Pain Control and Function  Outcome: Progressing  Intervention: Prevent or Manage Pain  Recent Flowsheet Documentation  Taken 12/13/2024 0853 by Delmy Garcia RN  Sensory Stimulation Regulation:   care clustered   lighting decreased   quiet environment promoted  Medication Review/Management: medications reviewed     Problem: Hip Arthroplasty  Goal: Optimal Coping  Outcome: Progressing  Goal: Anesthesia/Sedation Recovery  Intervention: Optimize Anesthesia Recovery  Recent Flowsheet Documentation  Taken 12/13/2024 0853 by Delmy Garcia RN  Safety Promotion/Fall Prevention: safety round/check completed  Reorientation Measures:   calendar in view   clock in view   Goal Outcome Evaluation:  Patient vital signs are at baseline: No,  Reason:  1 LMP NC to maintain SpO2 >92%  Patient able to ambulate as they were prior to admission or with assist devices provided by therapies during their stay:  No,  Reason:   not out of bed this shift  Patient MUST void prior to discharge:  Yes, Void trail at urology clinic in 1-2 weeks outpatient   Patient able to tolerate oral intake:  Yes, little intake this shift  Pain has adequate pain control using Oral analgesics:  Yes  Does patient have an identified :  Yes  Has goal D/C date and time been discussed with patient:  Yes

## 2024-12-13 NOTE — PROGRESS NOTES
CLINICAL NUTRITION SERVICES - REASSESSMENT NOTE     Nutrition Prescription    RECOMMENDATIONS FOR MDs/PROVIDERS TO ORDER:  None    Malnutrition Status:    Patient does not meet two of the above criteria necessary for diagnosing malnutrition, but at risk     Recommendations already ordered by Registered Dietitian (RD):  Glucerna    Future/Additional Recommendations:  Will monitor progress towards goals     EVALUATION OF THE PROGRESS TOWARD GOALS   Diet: Renal  Nutrition Support: Glucerna with meals  Intake: Per Flowsheets diet is poor     NEW FINDINGS   Met with pt in room and he was only able to answer a few questions about eating. He said he doesn't have an appetite at all. Pt did not remember receiving Glucerna and unsure if he is drinking them - [facility] says he is receiving them. We encouraged him to try to order three meals and eat as much as he can at each meal - he may find himself hungrier than he thought.    ANTHROPOMETRICS  Admission wt: 77.1 kg (170 lb)  Most Recent Weight: 74.3 kg (163 lb 12.8 oz)       PHYSICAL FINDINGS  Per flowsheet:   Edema- Left leg, knee, ankle 2+ (mild)  GI- Hypoactive with constipation LBM 12/7/24  Skin- upper flank incision, no skin breakdown noted    LABS  Na 134 (L)  BUN 33.7 (H)  Creatinine 1.87 (H)  GFR 35 (L)  Glucose 126    MEDICATIONS  Eliquis  Lasix  Novolog  Lantus pen  Robaxin  Senokot      MALNUTRITION:  % Weight Loss: No new weight since 12/10  % Intake:  <75% for >/= 1 month (moderate malnutrition)  Subcutaneous Fat Loss:  None observed  Muscle Loss:  None observed  Fluid Retention:  Mild+2 Left LE     Malnutrition Diagnosis:Patient does not meet two of the above criteria necessary for diagnosing malnutrition, but at risk     NUTRITION DIAGNOSIS  Unintended weight loss related to reduced po as evidenced by 6% wt loss in 4 months       Previous Goals:  Diet advancement vs nutrition support within 2-3 days.- met  Patient to consume % of nutritionally  adequate meals three times per day, or the equivalent with supplements/snacks. - not met  Regular Bms - not met    Goals:  Patient to consume % of nutritionally adequate meals three times per day, or the equivalent with supplements/snacks.   Maintain weight  Regular Bms      INTERVENTIONS  Implementation  Encourage PO      Monitoring/Evaluation  Progress toward goals will be monitored and evaluated per protocol.

## 2024-12-13 NOTE — PROGRESS NOTES
Pulmonary Progress Note    Admit Date: 12/8/2024  Hospital Day: 5   CODE: No CPR- Do NOT Intubate    Reason for Consult: Right sided pleural effusion and pneumothorax      Interim Events:     Pulled chest tube this morning  He denies any new complaints    Assessment/Plan:   Per Beasley is a 84 year old male with a past medical history significant for pancreatic cancer s/p placement of a CBD stent, aortic stenosis s/p TAVR, bradycardia, CAD, CKD, COPD, DM, HTN, A-fib admitted for a fall.  Imaging at the time presentation demonstrated a right-sided pneumothorax with a moderate right-sided pleural effusion for which he underwent placement of a chest tube and it was removed 12/13      Recommend:  Right-sided pneumothorax and pleural effusion: Demonstrates a moderate-sized right-sided pleural effusion  which is mostly lymphocytic and likely malignant from his known pancreatic cancer.   Continue chest tube to suction at -20 cm of water.  CXR daily.  10/10: Chest tube with 185cc out  10/11: 160out  Pulled chest tube this morning    Will sign off, please let our service know if any change in clinical condition or questions.        Medications:     Current Facility-Administered Medications   Medication Dose Route Frequency Provider Last Rate Last Admin     Current Facility-Administered Medications   Medication Dose Route Frequency Provider Last Rate Last Admin    [Held by provider] amLODIPine (NORVASC) tablet 10 mg  10 mg Oral Daily Jameson Almonte PA-C        apixaban ANTICOAGULANT (ELIQUIS) tablet 2.5 mg  2.5 mg Oral BID Severino Salomon MD   2.5 mg at 12/13/24 0842    furosemide (LASIX) injection 40 mg  40 mg Intravenous Once Janet Donato MD        [START ON 12/14/2024] glipiZIDE (GLUCOTROL XL) 24 hr tablet 5 mg  5 mg Oral QAM Janet Donato MD        insulin aspart (NovoLOG) injection (RAPID ACTING)  1-7 Units Subcutaneous TID w/meals Severino Salomon MD   1 Units at 12/11/24 1756    ipratropium -  "albuterol 0.5 mg/2.5 mg/3 mL (DUONEB) neb solution 3 mL  3 mL Nebulization 4x daily Janet Donato MD        methocarbamol (ROBAXIN) tablet 500 mg  500 mg Oral 4x Daily Roxanne Lincoln PA-C   500 mg at 12/13/24 0842    metoprolol tartrate (LOPRESSOR) tablet 25 mg  25 mg Oral BID Mango Franklin MD   25 mg at 12/11/24 0759    polyethylene glycol (MIRALAX) Packet 17 g  17 g Oral Daily Jameson Almonte PA-C   17 g at 12/13/24 0851    senna-docusate (SENOKOT-S/PERICOLACE) 8.6-50 MG per tablet 1 tablet  1 tablet Oral BID Jameson Almonte PA-C   1 tablet at 12/13/24 0842    sodium chloride (PF) 0.9% PF flush 3 mL  3 mL Intracatheter Q8H Jameson Almonte PA-C   3 mL at 12/13/24 0332    sodium chloride (PF) 0.9% PF flush 3 mL  3 mL Intracatheter Q8H Jameson Almonte PA-C   3 mL at 12/13/24 0852    tamsulosin (FLOMAX) capsule 0.4 mg  0.4 mg Oral Daily Severino Salomon MD   0.4 mg at 12/13/24 0842    [START ON 12/14/2024] torsemide (DEMADEX) tablet 80 mg  80 mg Oral Daily Janet Donato MD        traZODone (DESYREL) tablet 50 mg  50 mg Oral At Bedtime Jameson Almonte PA-C   50 mg at 12/12/24 2153         Exam/Data:   Vitals  BP (!) 143/65 (BP Location: Left arm)   Pulse 52   Temp 97.5  F (36.4  C) (Oral)   Resp 16   Ht 1.727 m (5' 8\")   Wt 74.3 kg (163 lb 12.8 oz)   SpO2 95%   BMI 24.91 kg/m    BP - Mean:  [85] 85  I/O last 3 completed shifts:  In: 360 [P.O.:360]  Out: 1270 [Urine:1250; Chest Tube:20]  Weight change:   [unfilled]  Resp: 16    EXAM:  Gen: awake and alert, sitting up in bed  Chest tube in place with dressing - pulled and bandage removed  CV: RRR  Abd: soft, nontender  Ext: leg with bandage in place      DATA:   Latest Reference Range & Units 12/09/24 22:46   Cell Count Fluid Source  Other   Total Nucleated Cells /uL 967   % Neutrophils Fluid % 1   % Lymphocytes Fluid % 82   % Mono/Macro Fluid % 0   % Eosinophils Fluid % 16   % Basophils Fluid % 1   Color Fluid Colorless, Yellow  " Orange !   Appearance Fluid Clear  Hazy !   Glucose Fluid Source  Other   Glucose Fluid mg/dL 210   LD Fluid Source  Other   Lactate Dehydrogenase Fluid U/L 339   Protein Fluid Source  Other   Protein Total Fluid g/dL 3.8       IMAGING:   CT Hip Left w/o Contrast    Addendum Date: 12/10/2024    EXAM: CT HIP LEFT W/O CONTRAST LOCATION: [facility] DATE: 12/9/2024 INDICATION: Evaluate for pathologic fracture; hip pain; fracture, known or rule out, or trauma; no fracture follow-up or history of osteoarthritis; no hip x-ray result available. COMPARISON: X-ray 12/8/2024, CT from 8/18/2024. TECHNIQUE: Noncontrast. Axial, sagittal and coronal thin-section reconstruction. Dose reduction techniques were used. IMPRESSION: 1.  Acute left femoral neck fracture with mild anterosuperior displacement. No intertrochanteric extension. Moderate left hip lipohemarthrosis. There is normal joint alignment. 2.  There is irregular lucency in the area of the fracture site. This is favored to represent osteopenia because this appeared symmetric on the prior CT, however a pathologic fracture cannot be definitively excluded. Discussed with KAT Oliveira by Dr. Waltre Long via telephone on 12/10/2024 at 9:18 AM.     Result Date: 12/10/2024  EXAM: CT HIP LEFT W/O CONTRAST LOCATION: [facility] DATE: 12/9/2024 INDICATION: evaluate for pathologic fracture; Hip pain; Fracture, known or r o, or trauma; No fracture f u or history of osteoarthritis; No hip x ray result available COMPARISON: X-ray 12/08/2024 TECHNIQUE: Noncontrast. Axial, sagittal and coronal thin-section reconstruction. Dose reduction techniques were used. FINDINGS: BONES: -There is an acute comminuted fracture through the left femoral neck with moderate associated angulation convex anteriorly. Moderate degenerative changes left AC joint. Moderate vascular calcification. The exam is otherwise normal. SOFT TISSUES: -Normal.      IMPRESSION: 1.      CT Chest Tube with Cath Placement    Result Date: 12/9/2024  EXAM: 1. PERCUTANEOUS CHEST TUBE PLACEMENT RIGHT PLEURAL SPACE 2. CT GUIDANCE 3. CONSCIOUS SEDATION LOCATION: [facility] [place] DATE: 12/9/2024 INDICATION: Right hydropneumothorax. TECHNIQUE: Dose reduction techniques were used. PROCEDURE: Informed consent obtained. Site marked. Prior images reviewed. Required items made available. Patient identity confirmed verbally and with arm band. Patient reevaluated immediately before administering sedation. Universal protocol was followed. Time out performed. The site was prepped and draped in sterile fashion. 10 mL of 1% lidocaine was infused into the local soft tissues. Using standard technique and under direct CT guidance, a 10 Hebrew chest tube catheter was inserted into the pleural space. The catheter was fixed in place with sutures and adhesive device, and the tubing was banded. Chest tube placed to Pleur-evac suction. SPECIMEN: None. BLOOD LOSS: Minimal. The patient tolerated the procedure well. No immediate complications. SEDATION: Versed 0.5 mg. Fentanyl 25 mcg. The procedure was performed with administration intravenous conscious sedation with appropriate preoperative, intraoperative, and postoperative evaluation. 15 minutes of supervised face to face conscious sedation time was provided by a radiology nurse under my direct supervision.     IMPRESSION: 1.  Successful CT-guided chest tube placement into right pleural space. Reference CPT Codes: 04245, 51711    CT Chest w/o Contrast    Result Date: 12/8/2024  EXAM: CT CHEST W/O CONTRAST LOCATION: [facility] [place] DATE: 12/8/2024 INDICATION: Possible pneumothorax on ct c spine COMPARISON: Cervical spine CT from prior to this examination. Chest CT from 08/18/2024. TECHNIQUE: CT chest without IV contrast. Multiplanar reformats were obtained. Dose reduction techniques were used. CONTRAST: None.  "FINDINGS: LUNGS AND PLEURA: *  Small right-sided pneumothorax (less than 10% by volume of the right chest). *  Moderate right and small left simple density nonloculated pleural effusions. *  Diffuse upper lung predominant interstitial micronodules are unchanged from August 2024. This could represent the result of sarcoidosis, silicosis, or coal workers pneumoconiosis, among other possibilities. *  Minimal chronic \"platelike\" scarring in both lungs with areas of \"round\" atelectasis in the right middle and right lower lobes. MEDIASTINUM/AXILLAE: Normal heart size without pericardial effusion. 3 vessel coronary artery disease. Previous transcatheter aortic valve replacement. Nonaneurysmal thoracic aorta. Calcified mildly enlarged hilar or mediastinal lymph nodes are unchanged. UPPER ABDOMEN: Metallic CBD stent with expected intrahepatic pneumobilia. No biliary dilatation. MUSCULOSKELETAL: Unremarkable.     IMPRESSION: *  Small right-sided pneumothorax (10% by volume) in the setting of likely underlying chronic lung disease from either sarcoidosis, silicosis, or coal workers pneumoconiosis. *  Three-vessel coronary artery disease. Prior transcatheter aortic valve replacement. *  Well-positioned metallic CBD stent without biliary dilatation (partially imaged). [Critical Result: Pneumothorax] Finding was identified on 12/8/2024 11:06 PM CST. Dr. Larry was contacted by me on 12/8/2024 11:17 PM CST and verbalized understanding of the critical result.     Head CT w/o contrast    Result Date: 12/8/2024  EXAM: CT HEAD W/O CONTRAST LOCATION: [facility] DATE: 12/8/2024 INDICATION: Trauma. COMPARISON: None. TECHNIQUE: Routine CT Head without IV contrast. Multiplanar reformats. Dose reduction techniques were used. FINDINGS: INTRACRANIAL CONTENTS: No intracranial hemorrhage, extraaxial collection, or mass effect. No CT evidence of acute infarct. Moderate presumed chronic small vessel ischemic changes. " Moderate generalized volume loss. No hydrocephalus. VISUALIZED ORBITS/SINUSES/MASTOIDS: No intraorbital abnormality. No paranasal sinus mucosal disease. No middle ear or mastoid effusion. BONES/SOFT TISSUES: No acute abnormality.     IMPRESSION: 1.  No CT evidence for acute intracranial process. 2.  Brain atrophy and presumed chronic microvascular ischemic changes as above.    CT Cervical Spine w/o Contrast    Result Date: 12/8/2024  EXAM: CT CERVICAL SPINE W/O CONTRAST LOCATION: [facility] DATE: 12/8/2024 INDICATION: Trauma. COMPARISON: None. TECHNIQUE: Routine CT Cervical Spine without IV contrast. Multiplanar reformats. Dose reduction techniques were used. FINDINGS: VERTEBRA: Mild rightward curvature centered at C5 and leftward curvature of the upper thoracic spine. Minimal posterior spondylolisthesis at C3-C4. Otherwise, normal alignment. Normal vertebral body heights. Severe disc degeneration C3-C4, C5-C6, and C6-C7. No fracture or posttraumatic subluxation. CANAL/FORAMINA: Moderate spinal canal stenosis at C3-C4 with severe bilateral foraminal stenoses. Moderate to severe left foraminal stenosis C4-C5. Severe right foraminal stenosis C5-C6. Moderate right foraminal stenosis C6-C7. PARASPINAL: Moderate to large right pleural effusion. Small right-sided pneumothorax.     IMPRESSION: 1.  No fracture or posttraumatic subluxation. 2.  No high-grade spinal canal or neural foraminal stenosis. 3.  Small right pneumothorax and moderate pleural effusion. Consider dedicated chest CT for more complete evaluation. The results were communicated to Dr. Larry at 10:33 PM on 12/08/2024.    XR Femur Left 2 Views    Result Date: 12/8/2024  EXAM: PELVIS AND LEFT FEMUR 5 VIEWS LOCATION: [facility] DATE/TIME: 12/8/2024 9:56 PM CST INDICATION: Trauma. COMPARISON: None.     IMPRESSION: 1. Mildly displaced acute transverse fracture of the left femoral neck. The distal portion of the  fracture is displaced laterally by 0.7 cm and there is approximately 1.0 cm of override. No significant angulation about the fracture. 2. Mild degenerative changes in bilateral hip joints. 3. Partial visualization of a left knee arthroplasty.    XR Pelvis 1/2 Views    Result Date: 12/8/2024  EXAM: PELVIS AND LEFT FEMUR 5 VIEWS LOCATION: [facility] DATE/TIME: 12/8/2024 9:56 PM CST INDICATION: Trauma. COMPARISON: None.     IMPRESSION: 1. Mildly displaced acute transverse fracture of the left femoral neck. The distal portion of the fracture is displaced laterally by 0.7 cm and there is approximately 1.0 cm of override. No significant angulation about the fracture. 2. Mild degenerative changes in bilateral hip joints. 3. Partial visualization of a left knee arthroplasty.

## 2024-12-14 ENCOUNTER — APPOINTMENT (OUTPATIENT)
Dept: PHYSICAL THERAPY | Facility: CLINIC | Age: 84
DRG: 521 | End: 2024-12-14
Payer: MEDICARE

## 2024-12-14 LAB
ANION GAP SERPL CALCULATED.3IONS-SCNC: 12 MMOL/L (ref 7–15)
BUN SERPL-MCNC: 36.7 MG/DL (ref 8–23)
CALCIUM SERPL-MCNC: 8.6 MG/DL (ref 8.8–10.4)
CHLORIDE SERPL-SCNC: 97 MMOL/L (ref 98–107)
CREAT SERPL-MCNC: 1.97 MG/DL (ref 0.67–1.17)
EGFRCR SERPLBLD CKD-EPI 2021: 33 ML/MIN/1.73M2
GLUCOSE BLDC GLUCOMTR-MCNC: 174 MG/DL (ref 70–99)
GLUCOSE BLDC GLUCOMTR-MCNC: 197 MG/DL (ref 70–99)
GLUCOSE BLDC GLUCOMTR-MCNC: 225 MG/DL (ref 70–99)
GLUCOSE BLDC GLUCOMTR-MCNC: 294 MG/DL (ref 70–99)
GLUCOSE BLDC GLUCOMTR-MCNC: 324 MG/DL (ref 70–99)
GLUCOSE BLDC GLUCOMTR-MCNC: 388 MG/DL (ref 70–99)
GLUCOSE SERPL-MCNC: 219 MG/DL (ref 70–99)
HCO3 SERPL-SCNC: 26 MMOL/L (ref 22–29)
POTASSIUM SERPL-SCNC: 4.6 MMOL/L (ref 3.4–5.3)
SODIUM SERPL-SCNC: 135 MMOL/L (ref 135–145)

## 2024-12-14 PROCEDURE — 36415 COLL VENOUS BLD VENIPUNCTURE: CPT | Performed by: FAMILY MEDICINE

## 2024-12-14 PROCEDURE — 250N000009 HC RX 250: Performed by: FAMILY MEDICINE

## 2024-12-14 PROCEDURE — 250N000013 HC RX MED GY IP 250 OP 250 PS 637: Performed by: FAMILY MEDICINE

## 2024-12-14 PROCEDURE — 99232 SBSQ HOSP IP/OBS MODERATE 35: CPT | Performed by: FAMILY MEDICINE

## 2024-12-14 PROCEDURE — 97110 THERAPEUTIC EXERCISES: CPT | Mod: GP | Performed by: PHYSICAL THERAPIST

## 2024-12-14 PROCEDURE — 250N000011 HC RX IP 250 OP 636: Performed by: FAMILY MEDICINE

## 2024-12-14 PROCEDURE — 250N000013 HC RX MED GY IP 250 OP 250 PS 637

## 2024-12-14 PROCEDURE — 82565 ASSAY OF CREATININE: CPT | Performed by: FAMILY MEDICINE

## 2024-12-14 PROCEDURE — 94640 AIRWAY INHALATION TREATMENT: CPT

## 2024-12-14 PROCEDURE — 94640 AIRWAY INHALATION TREATMENT: CPT | Mod: 76

## 2024-12-14 PROCEDURE — 120N000001 HC R&B MED SURG/OB

## 2024-12-14 PROCEDURE — 999N000157 HC STATISTIC RCP TIME EA 10 MIN

## 2024-12-14 RX ORDER — METOPROLOL SUCCINATE 25 MG/1
25 TABLET, EXTENDED RELEASE ORAL DAILY
Status: DISCONTINUED | OUTPATIENT
Start: 2024-12-14 | End: 2024-12-16

## 2024-12-14 RX ORDER — FUROSEMIDE 10 MG/ML
40 INJECTION INTRAMUSCULAR; INTRAVENOUS EVERY 12 HOURS
Status: DISCONTINUED | OUTPATIENT
Start: 2024-12-14 | End: 2024-12-17 | Stop reason: HOSPADM

## 2024-12-14 RX ADMIN — METHOCARBAMOL 500 MG: 500 TABLET ORAL at 09:38

## 2024-12-14 RX ADMIN — METHOCARBAMOL 500 MG: 500 TABLET ORAL at 13:38

## 2024-12-14 RX ADMIN — IPRATROPIUM BROMIDE AND ALBUTEROL SULFATE 3 ML: .5; 3 SOLUTION RESPIRATORY (INHALATION) at 07:49

## 2024-12-14 RX ADMIN — METHOCARBAMOL 500 MG: 500 TABLET ORAL at 01:24

## 2024-12-14 RX ADMIN — ACETAMINOPHEN 650 MG: 325 TABLET ORAL at 01:23

## 2024-12-14 RX ADMIN — FUROSEMIDE 40 MG: 10 INJECTION, SOLUTION INTRAVENOUS at 20:35

## 2024-12-14 RX ADMIN — METHOCARBAMOL 500 MG: 500 TABLET ORAL at 20:35

## 2024-12-14 RX ADMIN — APIXABAN 2.5 MG: 2.5 TABLET, FILM COATED ORAL at 09:38

## 2024-12-14 RX ADMIN — FUROSEMIDE 40 MG: 10 INJECTION, SOLUTION INTRAVENOUS at 10:43

## 2024-12-14 RX ADMIN — BISACODYL 10 MG: 10 SUPPOSITORY RECTAL at 16:04

## 2024-12-14 RX ADMIN — SENNOSIDES AND DOCUSATE SODIUM 1 TABLET: 50; 8.6 TABLET ORAL at 20:35

## 2024-12-14 RX ADMIN — TAMSULOSIN HYDROCHLORIDE 0.4 MG: 0.4 CAPSULE ORAL at 09:37

## 2024-12-14 RX ADMIN — ACETAMINOPHEN 650 MG: 325 TABLET ORAL at 16:37

## 2024-12-14 RX ADMIN — APIXABAN 2.5 MG: 2.5 TABLET, FILM COATED ORAL at 20:35

## 2024-12-14 RX ADMIN — METOPROLOL SUCCINATE 25 MG: 25 TABLET, EXTENDED RELEASE ORAL at 09:38

## 2024-12-14 RX ADMIN — METHOCARBAMOL 500 MG: 500 TABLET ORAL at 16:37

## 2024-12-14 RX ADMIN — SENNOSIDES AND DOCUSATE SODIUM 1 TABLET: 50; 8.6 TABLET ORAL at 09:38

## 2024-12-14 RX ADMIN — GLIPIZIDE 5 MG: 5 TABLET, EXTENDED RELEASE ORAL at 06:47

## 2024-12-14 RX ADMIN — IPRATROPIUM BROMIDE AND ALBUTEROL SULFATE 3 ML: .5; 3 SOLUTION RESPIRATORY (INHALATION) at 12:12

## 2024-12-14 RX ADMIN — TRAZODONE HYDROCHLORIDE 50 MG: 50 TABLET ORAL at 01:23

## 2024-12-14 ASSESSMENT — ACTIVITIES OF DAILY LIVING (ADL)
ADLS_ACUITY_SCORE: 51

## 2024-12-14 NOTE — PROGRESS NOTES
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  Underwent left hip hemiarthroplasty.  POD 4.  PT and OT evaluation.  Will be discharging to TCU for more rehabilitation when medically much stable.  Status post right-sided thoracentesis and chest tube placement on 12/9. Right-sided pleural effusion which is mostly lymphocytic and likely malignant from his known pancreatic cancer.  Chest tube is out on 12/13. Requiring 1 L of oxygen at present.  Patient was on torsemide 80 mg daily at home.  Started on IV Lasix 40 mg twice a day. Echocardiogram with EF 55 to 60%, normal right ventricular size and function.  Patient has multiple comorbidities with recent diagnosis of pancreatic cancer in 8/2024.  Has significant weakness and deconditioning.  Will be discharging to TCU for more rehabilitation in 1 to 2-day.    Assessment and Plan:  Left hip fracture following a mechanical fall  Status post left hip hemiarthroplasty  Resume routine postoperative care  Physical and Occupational Therapy  Use incentive spirometry frequently  DVT prophylaxis per orthopedics,  chronic anticoagulation treatment with Eliquis 2.5 mg twice a day  Pain control with Tylenol 650 mg every 6 hours as needed, p.o. Dilaudid 2 to 4 mg every 3 hours as needed    Right-sided pneumothorax, small  Moderate right-sided pleural effusion secondary to pancreatic cancer  Status post chest tube placement, removed  on 12/13  Acute hypoxic respiratory failure, requiring 1 L of oxygen  Wean off as tolerated    COPD without exacerbation  Bronchodilator as needed    Volume excess  IV Lasix 40 mg twice a day  Was on p.o. torsemide 80 mg daily at home    Pancreatic cancer diagnosed in 8/2024  Not a chemotherapy candidate  Elevated LFTs  CBD duct in place  Seeing [place] [facility]    Acute urinary retention  Helm catheter in place  Void trial at urology clinic in 1 to 2-week    DM2, hemoglobin A1c 7.8  Diabetic diet and insulin sliding scale  Glipizide 5 mg daily    Paroxysmal atrial fibrillation  Heart rate is stable with metoprolol 25 mg twice a day  On chronic anticoagulation treatment with Eliquis    Coronary artery disease  CT-Three-vessel coronary artery disease.   Stable and asymptomatic  Resume beta-blocker, anticoagulation treatment.  Not on statin    AVR, resume anticoagulation treatment    Essential hypertension  Metoprolol 25 mg twice a day    CKD 4, stable at 1.97    Chronic anemia, hemoglobin is stable at 10.5    Code DNR  DVT prophylaxis  On chronic anticoagulation treatment  Medically Ready for Discharge: Anticipated in 2-4 Days        Clinically Significant Risk Factors         # Hyponatremia: Lowest Na = 134 mmol/L in last 2 days, will monitor as appropriate  # Hypochloremia: Lowest Cl = 97 mmol/L in last 2 days, will monitor as appropriate          # Hypertension: Noted on problem list    # Acute heart failure with preserved ejection fraction: heart failure noted on problem list, last echo with EF >50%, and receiving IV diuretics          # DMII: A1C = N/A within past 6 months         # Financial/Environmental Concerns: none         Diet: Advance Diet as Tolerated: Regular Diet Adult  Snacks/Supplements Adult: Glucerna; With Meals  Helm Catheter: PRESENT, indication: Acute retention or obstruction  Lines: None         Janet Donato MD  Hospitalist  [facility] Medicine  [facility] [place]  Phone:  #[phone]  Securely message with the Vocera Web Console (learn more here)  Text page via [employer] Paging/Directory     Interval History/Subjective:  Resting comfortably.  Has generalized weakness and deconditioning.  On 1 L of oxygen at present.  Chest tube is out.  Afebrile.  Has dry cough.  Left hip pain is stable.  Will be discharging to TCU for more rehabilitation on 12/16.      Physical Exam/Objective:  Temp:  [97.1  F (36.2  C)-97.5  F (36.4  C)] 97.5  F (36.4  C)  Pulse:  [50-96] 69  Resp:  [16] 16  BP: (106-135)/(55-63) 135/63  SpO2:  [92 %-99 %] 98 %  Body mass index is 24.91 kg/m .    GENERAL:  Alert, appears comfortable, in no acute distress, appears stated age, sick looking   HEAD:  Normocephalic, without obvious abnormality, atraumatic   EYES:  PERRL, conjunctiva  clear,   EOM's intact   NOSE: Nares normal,   mucosa normal, no drainage   THROAT: Lips, mucosa,   gums normal, mouth moist   NECK: Supple, symmetrical, trachea midline   BACK:   Symmetric, no curvature, ROM normal   LUNGS:   Right-sided chest tube is out, decreased bibasilar breath sounds, no rales, rhonchi, or wheezing, symmetric chest rise on inhalation, respirations unlabored   CHEST WALL:  No tenderness or deformity   HEART:  Regular rate and rhythm, S1 and S2 normal, no murmur, rub, or gallop    ABDOMEN:   Soft, non-tender, bowel sounds active , no masses, no organomegaly, no rebound or guarding   EXTREMITIES: BLE edema    SKIN: No rashes in the visualized areas   NEURO: Alert, oriented x3    PSYCH: Cooperative, behavior is appropriate      Data reviewed today: I personally reviewed all new medications, labs, imaging/diagnostics reports over the past 24 hours. Pertinent findings include:    Imaging:   Chest CT 12/8  *  Small right-sided pneumothorax (less than 10% by volume of the right chest).  *  Moderate right and small left simple density nonloculated pleural effusions.  *  Diffuse upper lung predominant interstitial micronodules  "are unchanged from August 2024. This could represent the result of sarcoidosis, silicosis, or coal workers pneumoconiosis, among other possibilities.  *  Minimal chronic \"platelike\" scarring in both lungs with areas of \"round\" atelectasis in the right middle and right lower lobes.    CXR 12/13  Right basilar chest tube has been removed. Possible trace right apical pneumothorax with 3 mm craniocaudal separation between the parietal and visceral pleura. Mild right basilar pleural fluid and/or thickening and atelectasis throughout the basilar   segments right lower lobe stable. Transcatheter aortic valve replacement. Heart size and pulmonary vascularity stable. Shallow inspiration. Old left rib fracture.     Echocardiogram 7/ 2024  1. Left ventricular function is normal.The ejection fraction is 55-60%. The  left ventricle is normal in size. No regional wall motion abnormalities noted.  2. Normal right ventricle size and systolic function.  3. Well seated 26-mm Mooney Catarino 3 Ultra aortic bioprosthetic valve with  normal gradients (PV: 2.8 m/sec, simpson gradient: 12 mm Hg). Acc Time: 70 ms.  DVI: 0.7.  4. IVC diameter and respiratory changes fall into an intermediate range  suggesting an RA pressure of 8 mmHg.    Labs:  Most Recent 3 CBC's:  Recent Labs   Lab Test 12/12/24  0602 12/11/24  0541 12/10/24  0737 12/08/24  2138 08/21/24  0752   WBC  --   --  7.7 6.3 8.1   HGB 10.5* 11.6* 11.5* 11.6* 11.6*   MCV  --   --  83 81 81   PLT  --   --  148* 167 212     Most Recent 3 BMP's:  Recent Labs   Lab Test 12/14/24  0937 12/14/24  0647 12/14/24  0627 12/13/24  1230 12/13/24  0742 12/12/24  0749 12/12/24  0602   NA  --   --  135  --  134*  --  135   POTASSIUM  --   --  4.6  --  4.1  --  4.5   CHLORIDE  --   --  97*  --  99  --  99   CO2  --   --  26  --  23  --  25   BUN  --   --  36.7*  --  33.7*  --  35.6*   CR  --   --  1.97*  --  1.87*  --  2.10*   ANIONGAP  --   --  12  --  12  --  11   SANDRA  --   --  8.6*  --  8.9  --  " 8.9   * 197* 219*   < > 126*   < > 134*    < > = values in this interval not displayed.     Most Recent 2 LFT's:  Recent Labs   Lab Test 08/21/24  0752 08/20/24  0746   * 222*   * 118*   ALKPHOS 818* 881*   BILITOTAL 5.7* 9.9*       Medications:   Personally Reviewed.  Medications   Current Facility-Administered Medications   Medication Dose Route Frequency Provider Last Rate Last Admin     Current Facility-Administered Medications   Medication Dose Route Frequency Provider Last Rate Last Admin    apixaban ANTICOAGULANT (ELIQUIS) tablet 2.5 mg  2.5 mg Oral BID Severino Salomon MD   2.5 mg at 12/14/24 0938    furosemide (LASIX) injection 40 mg  40 mg Intravenous Q12H Janet Donato MD   40 mg at 12/14/24 1043    glipiZIDE (GLUCOTROL XL) 24 hr tablet 5 mg  5 mg Oral QAM Janet Donato MD   5 mg at 12/14/24 0647    insulin aspart (NovoLOG) injection (RAPID ACTING)  1-7 Units Subcutaneous TID w/meals Severino Salomon MD   1 Units at 12/14/24 0938    ipratropium - albuterol 0.5 mg/2.5 mg/3 mL (DUONEB) neb solution 3 mL  3 mL Nebulization 4x daily Janet Donato MD   3 mL at 12/14/24 0749    methocarbamol (ROBAXIN) tablet 500 mg  500 mg Oral 4x Daily Roxanne Lincoln PA-C   500 mg at 12/14/24 0938    metoprolol succinate ER (TOPROL XL) 24 hr tablet 25 mg  25 mg Oral Daily Janet Donato MD   25 mg at 12/14/24 0938    polyethylene glycol (MIRALAX) Packet 17 g  17 g Oral Daily Jameson Almonte PA-C   17 g at 12/14/24 0942    senna-docusate (SENOKOT-S/PERICOLACE) 8.6-50 MG per tablet 1 tablet  1 tablet Oral BID Jameson Almonte PA-C   1 tablet at 12/14/24 0938    sodium chloride (PF) 0.9% PF flush 3 mL  3 mL Intracatheter Q8H Jameson Almonte PA-C   3 mL at 12/14/24 0126    sodium chloride (PF) 0.9% PF flush 3 mL  3 mL Intracatheter Q8H Jameson Almonte PA-C   3 mL at 12/13/24 1539    tamsulosin (FLOMAX) capsule 0.4 mg  0.4 mg Oral Daily Severino Salomon MD   0.4 mg at 12/14/24  0937    [Held by provider] torsemide (DEMADEX) tablet 80 mg  80 mg Oral Daily Janet Donato MD        traZODone (DESYREL) tablet 50 mg  50 mg Oral At Bedtime Jameson Almonte PA-C   50 mg at 12/14/24 0123      Total time spent 50 min

## 2024-12-14 NOTE — PROGRESS NOTES
"Orthopedic Progress Note      Assessment: 4 Days Post-Op  S/P Procedure(s):  LEFT HIP HEMIARTHROPLASTY,  BIPOLAR    Plan:   - Continue PT/OT.   - Weightbearing status: WBAT, Posterior precautions x3 months  - Anticoagulation: ASA and eliquis in addition to SCDs, miko stockings and early ambulation.  - Dorsiflexion back to normal  - Discharge planning: per medicine, pending therapy and pain      Subjective:  Pain: minimal  Chest pain, SOB: no  Nausea, Vomiting:  no  Lightheadedness, Dizziness:  no  Neuro:  Patient denies new onset numbness or paresthesias    Patient doing well with the hip on POD#4. Denies any current hip pain. Has ambulated. Tolerating oral intake. No new concerns with hip.    Objective:  /61 (BP Location: Left arm)   Pulse 57   Temp 97.5  F (36.4  C) (Oral)   Resp 16   Ht 1.727 m (5' 8\")   Wt 74.3 kg (163 lb 12.8 oz)   SpO2 98%   BMI 24.91 kg/m    The patient is A&Ox3. Appears comfortable, sitting up at bedside.  Calves are soft and non-tender bilaterally  Brisk capillary refill in the toes.   Palpable left dorsalis pedis pulse. Foot warm & well-perfused.  Sensation is intact to light touch & equal bilaterally in the femoral, DP, SP & tibial nerve distributions.  ROM: Flexes at left hip. Appropriately flexes & extends all toes bilaterally.   Motor: +5/5 plantar flexion & EHL bilaterally. Fires quad.   5/5 TA/GSC/EHL.   Baseline decreased left dorsiflexion.  Leg lengths equal.  left hip dressing C/D/I without strikethrough, no surrounding erythema.     Pertinent Labs   Lab Results: personally reviewed.   Lab Results   Component Value Date    INR 1.50 (H) 12/08/2024    INR 1.64 (H) 08/20/2024    INR 1.50 (H) 08/18/2024     Lab Results   Component Value Date    WBC 7.7 12/10/2024    HGB 10.5 (L) 12/12/2024    HCT 34.1 (L) 12/10/2024    MCV 83 12/10/2024     (L) 12/10/2024     Lab Results   Component Value Date     12/14/2024    CO2 26 12/14/2024         Report completed by:  " HEATHER MOLINA PA-C  [facility]    Date: 12/14/2024  Time: 8:33 AM

## 2024-12-14 NOTE — PLAN OF CARE
"  Problem: Adult Inpatient Plan of Care  Goal: Optimal Comfort and Wellbeing  Outcome: Progressing   Goal Outcome Evaluation:    Patient vital signs are at baseline: No,  Reason:  Required at least 1 L NC  Patient able to ambulate as they were prior to admission or with assist devices provided by therapies during their stay:  Ax2 gb/w  Patient MUST void prior to discharge:  Yes, Helm bag in place   Patient able to tolerate oral intake:  Yes  Pain has adequate pain control using Oral analgesics:  Yes  Does patient have an identified :  Yes  Has goal D/C date and time been discussed with patient:  Yes    Pt had been sleepy most of this shift. Pt is aroused by voice. Pt was able to rang call light and report need for pain meds. Pt refused skin assessment and most of HS med d/t \"being tired and wanting to left alone\" .     Refused ice pack.                       "

## 2024-12-14 NOTE — PLAN OF CARE
Patient vital signs are at baseline: Yes  Patient able to ambulate as they were prior to admission or with assist devices provided by therapies during their stay:  No,  Reason:  AX2 with gait belt and walker  Patient MUST void prior to discharge:  Yes  Patient able to tolerate oral intake:  Yes  Pain has adequate pain control using Oral analgesics:  Yes  Does patient have an identified :  Yes  Has goal D/C date and time been discussed with patient:  Yes   Problem: Adult Inpatient Plan of Care  Goal: Absence of Hospital-Acquired Illness or Injury  Intervention: Identify and Manage Fall Risk  Recent Flowsheet Documentation  Taken 12/14/2024 1013 by Delmy Garcia RN  Safety Promotion/Fall Prevention: safety round/check completed  Intervention: Prevent Infection  Recent Flowsheet Documentation  Taken 12/14/2024 1013 by Delmy Garcia RN  Infection Prevention:   single patient room provided   rest/sleep promoted   personal protective equipment utilized   hand hygiene promoted   equipment surfaces disinfected   environmental surveillance performed     Problem: Pain Acute  Goal: Optimal Pain Control and Function  Outcome: Progressing  Intervention: Prevent or Manage Pain  Recent Flowsheet Documentation  Taken 12/14/2024 1013 by Delmy Garcia RN  Sensory Stimulation Regulation:   care clustered   lighting decreased   quiet environment promoted  Medication Review/Management: medications reviewed     Problem: Hip Arthroplasty  Goal: Optimal Coping  Outcome: Progressing  Goal: Anesthesia/Sedation Recovery  Intervention: Optimize Anesthesia Recovery  Recent Flowsheet Documentation  Taken 12/14/2024 1013 by Delmy Garcia RN  Safety Promotion/Fall Prevention: safety round/check completed  Reorientation Measures:   calendar in view   clock in view   Goal Outcome Evaluation:  Patient is lethargic and oriented to time, place and person.    B/P: 135/63, T: 97.5, P: 59, R: 16.     RA none to maintain  saturations > 92%.    C/O 0/10 pain controlled with scheduled Robaxin    Assist of 2, Gait belt, and Walker.    Orders Placed This Encounter      Advance Diet as Tolerated: Regular Diet Adult    Saline Lock.    Alarms on. Call light within reach.    Discharge plan [facility] .

## 2024-12-14 NOTE — PROGRESS NOTES
Patient vital signs are at baseline: Yes  Patient able to ambulate as they were prior to admission or with assist devices provided by therapies during their stay:  No,  Reason:  Requires assist of 2, gait belt and walker  Patient MUST void prior to discharge:  No,  Reason:  Helm in place  Patient able to tolerate oral intake:  Yes  Pain has adequate pain control using Oral analgesics:  Yes  Does patient have an identified :  Yes  Has goal D/C date and time been discussed with patient:  Yes    Goal Outcome Evaluation:     Patient is A&Ox3, forgetful, calls appropriately. Left hip surgical pain managed with PRN Tylenol and sched Robaxin. Provided pt with position changes. Helm patent with adequate outputs. PRN Dulcolax given for constipation and no result yet, but started passing a lot of gas per pt. Left hip incision and R upper flank CDI. Call light within reach and bed alarm on.

## 2024-12-14 NOTE — PROGRESS NOTES
Patient is now on room air, breath sounds clear;diminished. Duoneb QID. Pt alert and no sob currently  RT will continue to monitor    Dinah Mejia, RRT

## 2024-12-15 ENCOUNTER — APPOINTMENT (OUTPATIENT)
Dept: PHYSICAL THERAPY | Facility: CLINIC | Age: 84
DRG: 521 | End: 2024-12-15
Payer: MEDICARE

## 2024-12-15 LAB
ANION GAP SERPL CALCULATED.3IONS-SCNC: 12 MMOL/L (ref 7–15)
BACTERIA PLR CULT: NO GROWTH
BUN SERPL-MCNC: 34.5 MG/DL (ref 8–23)
CALCIUM SERPL-MCNC: 8.9 MG/DL (ref 8.8–10.4)
CHLORIDE SERPL-SCNC: 96 MMOL/L (ref 98–107)
CREAT SERPL-MCNC: 1.86 MG/DL (ref 0.67–1.17)
EGFRCR SERPLBLD CKD-EPI 2021: 35 ML/MIN/1.73M2
GLUCOSE BLDC GLUCOMTR-MCNC: 217 MG/DL (ref 70–99)
GLUCOSE BLDC GLUCOMTR-MCNC: 226 MG/DL (ref 70–99)
GLUCOSE BLDC GLUCOMTR-MCNC: 242 MG/DL (ref 70–99)
GLUCOSE BLDC GLUCOMTR-MCNC: 244 MG/DL (ref 70–99)
GLUCOSE BLDC GLUCOMTR-MCNC: 262 MG/DL (ref 70–99)
GLUCOSE BLDC GLUCOMTR-MCNC: 263 MG/DL (ref 70–99)
GLUCOSE SERPL-MCNC: 227 MG/DL (ref 70–99)
GRAM STAIN RESULT: NORMAL
GRAM STAIN RESULT: NORMAL
HCO3 SERPL-SCNC: 26 MMOL/L (ref 22–29)
HOLD SPECIMEN: NORMAL
POTASSIUM SERPL-SCNC: 4.2 MMOL/L (ref 3.4–5.3)
SODIUM SERPL-SCNC: 134 MMOL/L (ref 135–145)

## 2024-12-15 PROCEDURE — 250N000013 HC RX MED GY IP 250 OP 250 PS 637: Performed by: FAMILY MEDICINE

## 2024-12-15 PROCEDURE — 97530 THERAPEUTIC ACTIVITIES: CPT | Mod: GP | Performed by: PHYSICAL THERAPIST

## 2024-12-15 PROCEDURE — 97110 THERAPEUTIC EXERCISES: CPT | Mod: GP | Performed by: PHYSICAL THERAPIST

## 2024-12-15 PROCEDURE — 36415 COLL VENOUS BLD VENIPUNCTURE: CPT | Performed by: FAMILY MEDICINE

## 2024-12-15 PROCEDURE — 99232 SBSQ HOSP IP/OBS MODERATE 35: CPT | Performed by: FAMILY MEDICINE

## 2024-12-15 PROCEDURE — 250N000013 HC RX MED GY IP 250 OP 250 PS 637

## 2024-12-15 PROCEDURE — 80048 BASIC METABOLIC PNL TOTAL CA: CPT | Performed by: FAMILY MEDICINE

## 2024-12-15 PROCEDURE — 120N000001 HC R&B MED SURG/OB

## 2024-12-15 PROCEDURE — 250N000011 HC RX IP 250 OP 636: Performed by: FAMILY MEDICINE

## 2024-12-15 PROCEDURE — 250N000012 HC RX MED GY IP 250 OP 636 PS 637: Performed by: FAMILY MEDICINE

## 2024-12-15 RX ORDER — IPRATROPIUM BROMIDE AND ALBUTEROL SULFATE 2.5; .5 MG/3ML; MG/3ML
3 SOLUTION RESPIRATORY (INHALATION) EVERY 4 HOURS PRN
Status: DISCONTINUED | OUTPATIENT
Start: 2024-12-15 | End: 2024-12-17 | Stop reason: HOSPADM

## 2024-12-15 RX ADMIN — METHOCARBAMOL 500 MG: 500 TABLET ORAL at 12:43

## 2024-12-15 RX ADMIN — POLYETHYLENE GLYCOL 3350 17 G: 17 POWDER, FOR SOLUTION ORAL at 08:36

## 2024-12-15 RX ADMIN — FUROSEMIDE 40 MG: 10 INJECTION, SOLUTION INTRAVENOUS at 08:21

## 2024-12-15 RX ADMIN — METHOCARBAMOL 500 MG: 500 TABLET ORAL at 21:01

## 2024-12-15 RX ADMIN — SENNOSIDES AND DOCUSATE SODIUM 1 TABLET: 50; 8.6 TABLET ORAL at 08:24

## 2024-12-15 RX ADMIN — APIXABAN 2.5 MG: 2.5 TABLET, FILM COATED ORAL at 21:00

## 2024-12-15 RX ADMIN — ACETAMINOPHEN 650 MG: 325 TABLET ORAL at 08:23

## 2024-12-15 RX ADMIN — FUROSEMIDE 40 MG: 10 INJECTION, SOLUTION INTRAVENOUS at 21:01

## 2024-12-15 RX ADMIN — APIXABAN 2.5 MG: 2.5 TABLET, FILM COATED ORAL at 08:24

## 2024-12-15 RX ADMIN — GLIPIZIDE 5 MG: 5 TABLET, EXTENDED RELEASE ORAL at 06:15

## 2024-12-15 RX ADMIN — TRAZODONE HYDROCHLORIDE 50 MG: 50 TABLET ORAL at 21:01

## 2024-12-15 RX ADMIN — INSULIN GLARGINE 10 UNITS: 100 INJECTION, SOLUTION SUBCUTANEOUS at 14:53

## 2024-12-15 RX ADMIN — TAMSULOSIN HYDROCHLORIDE 0.4 MG: 0.4 CAPSULE ORAL at 08:23

## 2024-12-15 RX ADMIN — METHOCARBAMOL 500 MG: 500 TABLET ORAL at 08:24

## 2024-12-15 RX ADMIN — METHOCARBAMOL 500 MG: 500 TABLET ORAL at 17:25

## 2024-12-15 ASSESSMENT — ACTIVITIES OF DAILY LIVING (ADL)
ADLS_ACUITY_SCORE: 44
ADLS_ACUITY_SCORE: 45
ADLS_ACUITY_SCORE: 44
ADLS_ACUITY_SCORE: 45
ADLS_ACUITY_SCORE: 51
ADLS_ACUITY_SCORE: 46
ADLS_ACUITY_SCORE: 44
ADLS_ACUITY_SCORE: 45
ADLS_ACUITY_SCORE: 51
ADLS_ACUITY_SCORE: 44
ADLS_ACUITY_SCORE: 51
ADLS_ACUITY_SCORE: 44
ADLS_ACUITY_SCORE: 51
ADLS_ACUITY_SCORE: 44
ADLS_ACUITY_SCORE: 45
ADLS_ACUITY_SCORE: 46
ADLS_ACUITY_SCORE: 46
ADLS_ACUITY_SCORE: 44
ADLS_ACUITY_SCORE: 44
ADLS_ACUITY_SCORE: 51

## 2024-12-15 NOTE — PROGRESS NOTES
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  Underwent left hip hemiarthroplasty.  POD 4.  PT and OT evaluation.  Will be discharging to TCU for more rehabilitation when medically much stable.  Status post right-sided thoracentesis and chest tube placement on 12/9. Right-sided pleural effusion which is mostly lymphocytic and likely malignant from his known pancreatic cancer.  Chest tube is out on 12/13. Was requiring 1-2 L of oxygen, now able to wean off . Patient was on torsemide 80 mg daily at home.  Started on IV Lasix 40 mg twice a day. Echocardiogram with EF 55 to 60%, normal right ventricular size and function.  Patient has multiple comorbidities with recent diagnosis of pancreatic cancer in 8/2024.  Has significant weakness and deconditioning.  Will be discharging to TCU for more rehabilitation in 1 day.    Assessment and Plan:  Left hip fracture following up mechanical fall  Status post left hip hemiarthroplasty  Resume routine postoperative care  Physical and Occupational Therapy  Use incentive spirometry frequently  DVT prophylaxis per orthopedics,  chronic anticoagulation treatment with Eliquis 2.5 mg twice a day  Pain control with Tylenol 650 mg every 6 hours as needed, p.o. Dilaudid 2 to 4 mg every 3 hours as needed    Right-sided pneumothorax, small  Moderate right-sided pleural effusion secondary to pancreatic cancer  Status post chest tube  placement, removed on 12/13  Acute hypoxic respiratory failure  Was requiring 1 L of oxygen, now able to wean off    COPD without exacerbation  Bronchodilator as needed    Volume excess  IV Lasix 40 mg twice a day  Was on p.o. torsemide 80 mg daily at home    Pancreatic cancer diagnosed in 8/2024  Not a chemotherapy candidate  Elevated LFTs  CBD duct in place  Seeing [place] [facility]    Acute urinary retention  Helm catheter in place  Void trial at urology clinic in 1 to 2-week    DM2, hemoglobin A1c 7.8  Diabetic diet and insulin sliding scale  Glipizide 5 mg daily  Lantus 10 unit once today for hyperglycemia    Paroxysmal atrial fibrillation  Heart rate is stable with metoprolol 25 mg twice a day  On chronic anticoagulation treatment with Eliquis    Coronary artery disease  CT-Three-vessel coronary artery disease.   Stable and asymptomatic  Resume beta-blocker, anticoagulation treatment.  Not on statin    AVR, resume anticoagulation treatment    Essential hypertension  Metoprolol 25 mg twice a day    CKD 4, stable at 1.87    Chronic anemia, hemoglobin is stable at 10.5    Code DNR  DVT prophylaxis  On chronic anticoagulation treatment  Medically Ready for Discharge: Anticipated Tomorrow        Clinically Significant Risk Factors         # Hyponatremia: Lowest Na = 134 mmol/L in last 2 days, will monitor as appropriate  # Hypochloremia: Lowest Cl = 96 mmol/L in last 2 days, will monitor as appropriate          # Hypertension: Noted on problem list    # Acute heart failure with preserved ejection fraction: heart failure noted on problem list, last echo with EF >50%, and receiving IV diuretics          # DMII: A1C = N/A within past 6 months         # Financial/Environmental Concerns: none         Diet: Advance Diet as Tolerated: Regular Diet Adult  Snacks/Supplements Adult: Glucerna; With Meals  Helm Catheter: PRESENT, indication: Acute retention or obstruction  Lines: None         Janet Donato  MD  [facility]  Phone: #[phone]  Securely message with the [employer] Web Console (learn more here)  Text page via [employer] Paging/Directory     Interval History/Subjective:  Resting in a recliner comfortably.  Oxygen is able to wean off.  Appetite improved.  Blood glucose numbers are elevated.  Will be discharging to TCU tomorrow      Physical Exam/Objective:  Temp:  [97.2  F (36.2  C)-98.8  F (37.1  C)] 98.3  F (36.8  C)  Pulse:  [56-74] 56  Resp:  [15-17] 16  BP: (107-127)/(52-60) 124/60  SpO2:  [91 %-95 %] 92 %  Body mass index is 24.91 kg/m .    GENERAL:  Alert, appears comfortable, in no acute distress, appears stated age, sick looking   HEAD:  Normocephalic, without obvious abnormality, atraumatic   EYES:  PERRL, conjunctiva  clear,   EOM's intact   NOSE: Nares normal,   mucosa normal, no drainage   THROAT: Lips, mucosa,   gums normal, mouth moist   NECK: Supple, symmetrical, trachea midline   BACK:   Symmetric, no curvature, ROM normal   LUNGS:   Decreased bibasilar breath sounds, no rales, rhonchi, or wheezing, symmetric chest rise on inhalation, respirations unlabored, chest tube is out   CHEST WALL:  No tenderness or deformity   HEART:  Regular rate and rhythm, S1 and S2 normal, no murmur, rub, or gallop    ABDOMEN:   Soft, non-tender, bowel sounds active , no masses, no organomegaly, no rebound or guarding   EXTREMITIES: BLE edema L>R, improved   SKIN: No rashes in the visualized areas   NEURO: Alert, oriented x3    PSYCH: Cooperative, behavior is appropriate      Data reviewed today: I personally reviewed all new medications, labs, imaging/diagnostics reports over the past 24 hours. Pertinent findings include:    Imaging:   Chest CT 12/8  *  Small right-sided pneumothorax (less than 10% by volume of the right chest).  *  Moderate right and small left simple density nonloculated pleural effusions.  *  Diffuse upper lung predominant interstitial  "micronodules are unchanged from August 2024. This could represent the result of sarcoidosis, silicosis, or coal workers pneumoconiosis, among other possibilities.  *  Minimal chronic \"platelike\" scarring in both lungs with areas of \"round\" atelectasis in the right middle and right lower lobes.    CXR 12/13  Right basilar chest tube has been removed. Possible trace right apical pneumothorax with 3 mm craniocaudal separation between the parietal and visceral pleura. Mild right basilar pleural fluid and/or thickening and atelectasis throughout the basilar   segments right lower lobe stable. Transcatheter aortic valve replacement. Heart size and pulmonary vascularity stable. Shallow inspiration. Old left rib fracture.     Echocardiogram 7/ 2024  1. Left ventricular function is normal.The ejection fraction is 55-60%. The  left ventricle is normal in size. No regional wall motion abnormalities noted.  2. Normal right ventricle size and systolic function.  3. Well seated 26-mm Mooney Catarino 3 Ultra aortic bioprosthetic valve with  normal gradients (PV: 2.8 m/sec, simpson gradient: 12 mm Hg). Acc Time: 70 ms.  DVI: 0.7.  4. IVC diameter and respiratory changes fall into an intermediate range  suggesting an RA pressure of 8 mmHg.    Labs:  Most Recent 3 CBC's:  Recent Labs   Lab Test 12/12/24  0602 12/11/24  0541 12/10/24  0737 12/08/24  2138 08/21/24  0752   WBC  --   --  7.7 6.3 8.1   HGB 10.5* 11.6* 11.5* 11.6* 11.6*   MCV  --   --  83 81 81   PLT  --   --  148* 167 212     Most Recent 3 BMP's:  Recent Labs   Lab Test 12/15/24  1242 12/15/24  0913 12/15/24  0555 12/14/24  0647 12/14/24  0627 12/13/24  1230 12/13/24  0742   NA  --  134*  --   --  135  --  134*   POTASSIUM  --  4.2  --   --  4.6  --  4.1   CHLORIDE  --  96*  --   --  97*  --  99   CO2  --  26  --   --  26  --  23   BUN  --  34.5*  --   --  36.7*  --  33.7*   CR  --  1.86*  --   --  1.97*  --  1.87*   ANIONGAP  --  12  --   --  12  --  12   SANDRA  --  8.9  --   " --  8.6*  --  8.9   * 227* 262*   < > 219*   < > 126*    < > = values in this interval not displayed.     Most Recent 2 LFT's:  Recent Labs   Lab Test 08/21/24  0752 08/20/24  0746   * 222*   * 118*   ALKPHOS 818* 881*   BILITOTAL 5.7* 9.9*       Medications:   Personally Reviewed.  Medications   Current Facility-Administered Medications   Medication Dose Route Frequency Provider Last Rate Last Admin     Current Facility-Administered Medications   Medication Dose Route Frequency Provider Last Rate Last Admin    apixaban ANTICOAGULANT (ELIQUIS) tablet 2.5 mg  2.5 mg Oral BID Severino Salomon MD   2.5 mg at 12/15/24 0824    furosemide (LASIX) injection 40 mg  40 mg Intravenous Q12H Janet Donato MD   40 mg at 12/15/24 0821    glipiZIDE (GLUCOTROL XL) 24 hr tablet 5 mg  5 mg Oral QAM Janet Donato MD   5 mg at 12/15/24 0615    insulin aspart (NovoLOG) injection (RAPID ACTING)  1-7 Units Subcutaneous TID w/meals Severino Salomon MD   3 Units at 12/15/24 1243    methocarbamol (ROBAXIN) tablet 500 mg  500 mg Oral 4x Daily Roxanne Lincoln PA-C   500 mg at 12/15/24 1243    metoprolol succinate ER (TOPROL XL) 24 hr tablet 25 mg  25 mg Oral Daily Janet Donato MD   25 mg at 12/14/24 0938    polyethylene glycol (MIRALAX) Packet 17 g  17 g Oral Daily Jameson Almonte PA-C   17 g at 12/15/24 0836    senna-docusate (SENOKOT-S/PERICOLACE) 8.6-50 MG per tablet 1 tablet  1 tablet Oral BID Jameson Almonte PA-C   1 tablet at 12/15/24 0824    sodium chloride (PF) 0.9% PF flush 3 mL  3 mL Intracatheter Q8H Jameson Almonte PA-C   3 mL at 12/15/24 0616    sodium chloride (PF) 0.9% PF flush 3 mL  3 mL Intracatheter Q8H Jameson Almonte PA-C   3 mL at 12/15/24 0823    tamsulosin (FLOMAX) capsule 0.4 mg  0.4 mg Oral Daily Severino Salomon MD   0.4 mg at 12/15/24 0823    [Held by provider] torsemide (DEMADEX) tablet 80 mg  80 mg Oral Daily Janet Donato MD        traZODone (DESYREL) tablet  50 mg  50 mg Oral At Bedtime Jameson Almonte PA-C   50 mg at 12/14/24 0123      Total time spent 50 min

## 2024-12-15 NOTE — PLAN OF CARE
Patient vital signs are at baseline: Yes  Patient able to ambulate as they were prior to admission or with assist devices provided by therapies during their stay:  No,  Reason:  AX1-2 with gaitbelt and walker  Patient MUST void prior to discharge:  Yes, has urethral catheter placed, will do voiding trail OP at urology clinic.  Patient able to tolerate oral intake:  Yes  Pain has adequate pain control using Oral analgesics:  Yes  Does patient have an identified :  Yes  Has goal D/C date and time been discussed with patient:  Yes   Problem: Adult Inpatient Plan of Care  Goal: Absence of Hospital-Acquired Illness or Injury  Intervention: Identify and Manage Fall Risk  Recent Flowsheet Documentation  Taken 12/15/2024 0800 by Delmy Garcia RN  Safety Promotion/Fall Prevention:   safety round/check completed   activity supervised   clutter free environment maintained   increased rounding and observation   mobility aid in reach   nonskid shoes/slippers when out of bed   room door open  Intervention: Prevent and Manage VTE (Venous Thromboembolism) Risk  Recent Flowsheet Documentation  Taken 12/15/2024 0800 by Delmy Garcia RN  VTE Prevention/Management: SCDs on (sequential compression devices)  Intervention: Prevent Infection  Recent Flowsheet Documentation  Taken 12/15/2024 0800 by Delmy Garcia RN  Infection Prevention:   single patient room provided   hand hygiene promoted   rest/sleep promoted     Problem: Hip Arthroplasty  Goal: Optimal Coping  Outcome: Progressing  Intervention: Support Psychosocial Response to Surgery and Mobility Changes  Recent Flowsheet Documentation  Taken 12/15/2024 0800 by Delmy Garcia RN  Supportive Measures: active listening utilized  Goal: Intact Neurovascular Status  Outcome: Progressing  Goal: Anesthesia/Sedation Recovery  Intervention: Optimize Anesthesia Recovery  Recent Flowsheet Documentation  Taken 12/15/2024 0800 by Delmy Garcia RN  Safety  Promotion/Fall Prevention:   safety round/check completed   activity supervised   clutter free environment maintained   increased rounding and observation   mobility aid in reach   nonskid shoes/slippers when out of bed   room door open  Reorientation Measures:   clock in view   calendar in view   glasses use encouraged   reorientation provided  Goal: Optimal Pain Control and Function  Outcome: Progressing  Goal: Effective Oxygenation and Ventilation  Intervention: Optimize Oxygenation and Ventilation  Recent Flowsheet Documentation  Taken 12/15/2024 0800 by Delmy Garcia RN  Cough And Deep Breathing: done independently per patient   Goal Outcome Evaluation:  Patient is awake and alert and oriented to time, place and person.    B/P: 124/60, T: 98.3, P: 56, R: 16.    RA none to maintain saturations > 92 %.    C/O 6/10 pain, controlled with scheduled Robaxcin and PRN's administered: Tylenol X1 this AM.    Assist of 1, Assist of 2, Gait belt, and Walker.    Renal Diet     Saline Lock.    Alarms on. Call light within reach.    Discharge plan [facility] on Monday .

## 2024-12-15 NOTE — PROGRESS NOTES
"Orthopedic Progress Note      Assessment: 5 Days Post-Op  S/P Procedure(s):  LEFT HIP HEMIARTHROPLASTY,  BIPOLAR     Plan:   - Continue PT/OT  - Continue pain management  - Weightbearing status: WBAT, posterior hip precautions  - Anticoagulation:  ASA and eliquis  in addition to SCDs, miko stockings and early ambulation.  - Discharge planning: TCU hopefully tomorrow      Subjective:  Pain: mild  Nausea, Vomiting:  No  Lightheadedness, Dizziness:  No  Neuro:  Patient denies new onset numbness or paresthesias    Patient resting comfortably this morning. Reports pain has been manageable. No acute events overnight.    Objective:  /60 (BP Location: Right arm)   Pulse 56   Temp 98.3  F (36.8  C) (Oral)   Resp 16   Ht 1.727 m (5' 8\")   Wt 74.3 kg (163 lb 12.8 oz)   SpO2 92%   BMI 24.91 kg/m    The patient is A&Ox3. Appears comfortable.   Sensation is intact.  Dorsiflexion and plantar flexion is intact.  Dorsalis pedis pulse intact.  Calves are soft and non-tender. Negative Lauren's.  The incision is covered. Dressing C/D/I.    Pertinent Labs   Lab Results: personally reviewed.   Lab Results   Component Value Date    INR 1.50 (H) 12/08/2024    INR 1.64 (H) 08/20/2024    INR 1.50 (H) 08/18/2024     Lab Results   Component Value Date    WBC 7.7 12/10/2024    HGB 10.5 (L) 12/12/2024    HCT 34.1 (L) 12/10/2024    MCV 83 12/10/2024     (L) 12/10/2024     Lab Results   Component Value Date     (L) 12/15/2024    CO2 26 12/15/2024         Report completed by:  Pepe Calderon PA-C, [facility]  Date: 12/15/2024  Time: 11:19 AM    "

## 2024-12-15 NOTE — PLAN OF CARE
Problem: Gas Exchange Impaired  Goal: Optimal Gas Exchange  Outcome: Progressing   Goal Outcome Evaluation:      Patient vital signs are at baseline: VSS on RA. Pt's had O2 at 90% or greater during the NOC shift.   Patient able to ambulate as they were prior to admission or with assist devices provided by therapies during their stay:  No,  Reason:  requires Ax1-2 walker and gaitbelt   Patient MUST void prior to discharge:  No,  Reason:  Helm in place and will discharge with in place    Patient able to tolerate oral intake:  Yes  Pain has adequate pain control using Oral analgesics:  Yes  Does patient have an identified :  Yes  Has goal D/C date and time been discussed with patient:  Yes  plans are to be discharge to [facility] on Monday d/t the center not able to have weekend admits.     Pt refused ice.    RT stated that pt refused his nebs.     BS were in the 200's for this shift.     Pt had a small loose brown BM during this shift.

## 2024-12-16 LAB
CREAT SERPL-MCNC: 1.74 MG/DL (ref 0.67–1.17)
EGFRCR SERPLBLD CKD-EPI 2021: 38 ML/MIN/1.73M2
GLUCOSE BLDC GLUCOMTR-MCNC: 219 MG/DL (ref 70–99)
GLUCOSE BLDC GLUCOMTR-MCNC: 223 MG/DL (ref 70–99)
GLUCOSE BLDC GLUCOMTR-MCNC: 248 MG/DL (ref 70–99)
GLUCOSE BLDC GLUCOMTR-MCNC: 330 MG/DL (ref 70–99)

## 2024-12-16 PROCEDURE — 250N000013 HC RX MED GY IP 250 OP 250 PS 637

## 2024-12-16 PROCEDURE — 250N000011 HC RX IP 250 OP 636: Performed by: FAMILY MEDICINE

## 2024-12-16 PROCEDURE — 36415 COLL VENOUS BLD VENIPUNCTURE: CPT | Performed by: FAMILY MEDICINE

## 2024-12-16 PROCEDURE — 82565 ASSAY OF CREATININE: CPT | Performed by: FAMILY MEDICINE

## 2024-12-16 PROCEDURE — G0463 HOSPITAL OUTPT CLINIC VISIT: HCPCS

## 2024-12-16 PROCEDURE — 250N000013 HC RX MED GY IP 250 OP 250 PS 637: Performed by: FAMILY MEDICINE

## 2024-12-16 PROCEDURE — 120N000001 HC R&B MED SURG/OB

## 2024-12-16 PROCEDURE — 99233 SBSQ HOSP IP/OBS HIGH 50: CPT | Performed by: FAMILY MEDICINE

## 2024-12-16 RX ORDER — TAMSULOSIN HYDROCHLORIDE 0.4 MG/1
0.4 CAPSULE ORAL DAILY
DISCHARGE
Start: 2024-12-17

## 2024-12-16 RX ORDER — GLIPIZIDE 2.5 MG/1
2.5 TABLET, EXTENDED RELEASE ORAL ONCE
Status: COMPLETED | OUTPATIENT
Start: 2024-12-16 | End: 2024-12-16

## 2024-12-16 RX ORDER — POLYETHYLENE GLYCOL 3350 17 G/17G
17 POWDER, FOR SOLUTION ORAL DAILY
DISCHARGE
Start: 2024-12-17

## 2024-12-16 RX ORDER — AMOXICILLIN 250 MG
1 CAPSULE ORAL 2 TIMES DAILY PRN
DISCHARGE
Start: 2024-12-16

## 2024-12-16 RX ORDER — METOPROLOL SUCCINATE 25 MG/1
25 TABLET, EXTENDED RELEASE ORAL 2 TIMES DAILY
Status: DISCONTINUED | OUTPATIENT
Start: 2024-12-16 | End: 2024-12-17 | Stop reason: HOSPADM

## 2024-12-16 RX ORDER — ACETAMINOPHEN 325 MG/1
650 TABLET ORAL EVERY 6 HOURS PRN
DISCHARGE
Start: 2024-12-16

## 2024-12-16 RX ADMIN — GLIPIZIDE 2.5 MG: 2.5 TABLET, FILM COATED, EXTENDED RELEASE ORAL at 12:57

## 2024-12-16 RX ADMIN — METOPROLOL SUCCINATE 25 MG: 25 TABLET, EXTENDED RELEASE ORAL at 21:12

## 2024-12-16 RX ADMIN — METHOCARBAMOL 500 MG: 500 TABLET ORAL at 16:44

## 2024-12-16 RX ADMIN — SENNOSIDES AND DOCUSATE SODIUM 1 TABLET: 50; 8.6 TABLET ORAL at 09:06

## 2024-12-16 RX ADMIN — SENNOSIDES AND DOCUSATE SODIUM 1 TABLET: 50; 8.6 TABLET ORAL at 21:11

## 2024-12-16 RX ADMIN — FUROSEMIDE 40 MG: 10 INJECTION, SOLUTION INTRAVENOUS at 09:07

## 2024-12-16 RX ADMIN — METHOCARBAMOL 500 MG: 500 TABLET ORAL at 12:57

## 2024-12-16 RX ADMIN — FUROSEMIDE 40 MG: 10 INJECTION, SOLUTION INTRAVENOUS at 21:13

## 2024-12-16 RX ADMIN — TAMSULOSIN HYDROCHLORIDE 0.4 MG: 0.4 CAPSULE ORAL at 09:06

## 2024-12-16 RX ADMIN — TRAZODONE HYDROCHLORIDE 50 MG: 50 TABLET ORAL at 21:12

## 2024-12-16 RX ADMIN — GLIPIZIDE 5 MG: 5 TABLET, EXTENDED RELEASE ORAL at 09:06

## 2024-12-16 RX ADMIN — POLYETHYLENE GLYCOL 3350 17 G: 17 POWDER, FOR SOLUTION ORAL at 09:06

## 2024-12-16 RX ADMIN — APIXABAN 2.5 MG: 2.5 TABLET, FILM COATED ORAL at 09:06

## 2024-12-16 RX ADMIN — METHOCARBAMOL 500 MG: 500 TABLET ORAL at 21:12

## 2024-12-16 RX ADMIN — APIXABAN 2.5 MG: 2.5 TABLET, FILM COATED ORAL at 21:12

## 2024-12-16 RX ADMIN — QUETIAPINE FUMARATE 12.5 MG: 25 TABLET ORAL at 21:12

## 2024-12-16 RX ADMIN — METHOCARBAMOL 500 MG: 500 TABLET ORAL at 09:06

## 2024-12-16 RX ADMIN — METOPROLOL SUCCINATE 25 MG: 25 TABLET, EXTENDED RELEASE ORAL at 09:06

## 2024-12-16 ASSESSMENT — ACTIVITIES OF DAILY LIVING (ADL)
ADLS_ACUITY_SCORE: 44
ADLS_ACUITY_SCORE: 45
ADLS_ACUITY_SCORE: 44
ADLS_ACUITY_SCORE: 45
ADLS_ACUITY_SCORE: 44
ADLS_ACUITY_SCORE: 45
ADLS_ACUITY_SCORE: 44
ADLS_ACUITY_SCORE: 44
ADLS_ACUITY_SCORE: 45
ADLS_ACUITY_SCORE: 44
ADLS_ACUITY_SCORE: 45
ADLS_ACUITY_SCORE: 44
ADLS_ACUITY_SCORE: 45

## 2024-12-16 NOTE — PLAN OF CARE
Pt A&O x4. Sating mid 90s on RA. Bradycardic. Denies pain. 1900 attempted to get up to commode, was able to stand at bedside but felt too weak to pivot to commode. BP and O2 sats stable. Cooepr patent and draining yellow urine. Regular diet but little appetite. Plans to discharge to TCU tomorrow. Will discharge with [facility] and follow up for voiding trial outpatient.     HLM Admission: 6- Walk 10 steps or more  HLM Daily5- Standing (1 or more minutes)        Problem: Adult Inpatient Plan of Care  Goal: Optimal Comfort and Wellbeing  Outcome: Progressing     Problem: Gas Exchange Impaired  Goal: Optimal Gas Exchange  Outcome: Progressing   Goal Outcome Evaluation:

## 2024-12-16 NOTE — DISCHARGE INSTRUCTIONS
"[facility] DISCHARGE INSTRUCTIONS:  Pressure Injury Prevention (PIP) Plan:  If patient is declining pressure injury prevention interventions: Explore reason why and address patient's concerns, Educate on pressure injury risk and prevention intervention(s), If patient is still declining, document \"informed refusal\" , and Ensure Care team is aware ( provider, charge nurse, etc)  Mattress: Follow bed algorithm, add Low Air Loss (Air+) mattress pump if skin is very moist or constantly moist.   HOB: Maintain at or below 30 degrees, unless contraindicated  Repositioning in bed: Every 1-2 hours , Raise foot of bed prior to raising head of bed, to reduce patient sliding down (shear), and Frequent microturns using positioning wedges, as patient tolerates  Heels: Keep elevated off mattress and Heel lift boots  Protective Dressing: Sacral Mepilex for prevention (#[id number]),  especially for the agitated patient   Positioning Equipment:Positioning wedges (#[id number]) to help maintain 30 degree side lying position   Chair positioning: Chair cushion (#[id number])    If patient has a buttock pressure injury, or high risk for PI use chair cushion or SPS.  Moisture Management: Perineal cleansing /protection: Follow Incontinence Protocol, Avoid brief in bed, Clean and dry skin folds with bathing , Consider InterDry (#[id number]) between folds, and Moisturize dry skin  Under Devices: Inspect skin under all medical devices during skin inspection , Ensure tubes are stabilized without tension, and Ensure patient is not lying on medical devices or equipment when repositioned  Ask provider to discontinue device when no longer needed.  "

## 2024-12-16 NOTE — PLAN OF CARE
"  Problem: Pain Acute  Goal: Optimal Pain Control and Function  Outcome: Progressing  Intervention: Develop Pain Management Plan  Recent Flowsheet Documentation  Taken 12/15/2024 8148 by Kaylee Baca RN  Pain Management Interventions: declines   Goal Outcome Evaluation:    Patient vital signs are at baseline: No,  Reason:  required 1 L NC for comfort   Patient able to ambulate as they were prior to admission or with assist devices provided by therapies during their stay:  Pt required Ax1-2.   Patient MUST void prior to discharge:  No,  Reason:  Helm bag in place and will discharge with bag. F/U 1-2 weeks outpatient.   Patient able to tolerate oral intake:  Yes  Pain has adequate pain control using Oral analgesics:  Yes  Does patient have an identified :  Yes  Has goal D/C date and time been discussed with patient:  Discharge plans are to [facility]. Transport is needed to be scheduled.    Pt was observed attempting to self transfer stated \" I in a taxi and I am getting off the taxi and walking the rest of the way home.\" Pt was reoriented. Pt stated that he \"must have been sleeping.\" At 0203 hours pt's wife had called stated that her  was confused and called home. MD was on floor and visited pt. At that time pt was at his baseline ability to self, , knew he was in the hospital (not the name), and situation. VSS were at baseline. Dr. Naman Peterson stated that it was possible for hospital delirium or UTI and will follow up with day shift.                       "

## 2024-12-16 NOTE — PROGRESS NOTES
Care Management Follow Up    Length of Stay (days): 8    Expected Discharge Date: 12/16/2024     Concerns to be Addressed: discharge planning     Patient plan of care discussed at interdisciplinary rounds: Yes    Anticipated Discharge Disposition: Transitional Care      Anticipated Discharge Services: Transportation Services  Anticipated Discharge DME: None    Patient/family educated on Medicare website which has current facility and service quality ratings: no  Education Provided on the Discharge Plan: Yes  Patient/Family in Agreement with the Plan: yes    Referrals Placed by CM/SW: Post Acute Facilities  Private pay costs discussed: Not applicable    Discussed  Partnership in Safe Discharge Planning  document with patient/family: Yes:     Handoff Completed: No, handoff not indicated or clinically appropriate    Additional Information:    9:07 AM  Chart reviewed.  Seeking TCU.    [facility] - had accepted pt last week; but now declines d/t no beds available until Thursday.  [facility] - CM resent referral.  Called; Per Ray, no beds until tomorrow.    Spouse Tana - Update given to Tana; she will review Medicare.gov for more TCU choices.    9:25 AM  [facility] - accepted for Tuesday 12/17.  Pt and Spouse Tana also accepting of this TCU placement.    10:52 AM  PAS was done 12/12 - updated SLL of new accepting TCU    Next Steps:   [facility] wheelchair authorized by pt and wife - even if pt no longer requires O2.    CM will continue to follow.     Alejandro Santos RN

## 2024-12-16 NOTE — CONSULTS
"[facility] Nurse Inpatient Assessment     Consulted for: bilateral heels    Summary:     Patient History (according to provider note(s):      Per Beasley is a 84 year old male admitted on 12/8/2024. He has medical history significant for CAD, chronic diastolic heart failure, COPD, type 2 diabetes mellitus, hypertension, CKD stage IV, chronic normocytic anemia, and aortic stenosis status post TAVR.  He presented to the ED with complaints of fall.  Denies head trauma or loss of consciousness.  Reports that he was going to the bathroom without using his walker when his legs gave out and he fell on his left side.     In the ED, CT brain was negative for any acute intracranial process.  CT cervical spine did not show any fracture or posttraumatic subluxation.  No high-grade spinal canal or neuroforaminal stenosis.  Incidental small right pneumothorax and moderate right pleural effusion.  CT of the chest confirms 10% right pneumothorax.  Reports concurrent moderate right and small left pleural effusion.  X-ray of the pelvis and XR of the left femur showed \"Mildly displaced acute transverse fracture of the left femoral neck. The distal portion of the fracture is displaced laterally by 0.7 cm and there is approximately 1.0 cm of override. No significant angulation about the fracture.\"     Assessment:      Pressure Injury Location: L heel    12/16    Last photo: 12/16  Wound type: Pressure Injury     Pressure Injury Stage: Deep Tissue Pressure Injury (DTPI), hospital acquired   Wound history/plan of care:   L femur fracture fixture 12/10    Wound base: 100 % Maroon and Purple     Palpation of the wound bed: boggy      Drainage: none     Description of drainage: none     Measurements (length x width x depth, in cm) 4.5  x 3.5  x  0 cm      Tunneling N/A     Undermining N/A  Periwound skin: Intact      Color: pink      Temperature: cool  Odor: none  Pain: mild and during dressing change, " "tender  Pain intervention prior to dressing change: slow and gentle cares   Treatment goal: Heal , Protection, and offload pressure  STATUS: initial assessment  Supplies ordered: at bedside    My PI Risk Assessment     Sensory Perception: 3 - Slightly Limited     Moisture: 3 - Occasionally moist      Activity: 1 - Bedfast      Mobility: 2 - Very limited     Nutrition: 3 - Adequate     Friction/Shear: 2 - Potential problem      TOTAL: 14      Right heel with blanchable erythema and an area to the L dorsal foot that soul be a vascular issue vs a pressure injury from the straps of the offloading boot. Patient has petite boots in place and needs regular - woc will order           Treatment Plan:     Pressure Injury Prevention (PIP) Plan:  If patient is declining pressure injury prevention interventions: Explore reason why and address patient's concerns, Educate on pressure injury risk and prevention intervention(s), If patient is still declining, document \"informed refusal\" , and Ensure Care team is aware ( provider, charge nurse, etc)  Mattress: Follow bed algorithm, add Low Air Loss (Air+) mattress pump if skin is very moist or constantly moist.   HOB: Maintain at or below 30 degrees, unless contraindicated  Repositioning in bed: Every 1-2 hours , Raise foot of bed prior to raising head of bed, to reduce patient sliding down (shear), and Frequent microturns using positioning wedges, as patient tolerates  Heels: Keep elevated off mattress and Heel lift boots  Protective Dressing: Sacral Mepilex for prevention (#[id number]),  especially for the agitated patient   Positioning Equipment:Positioning wedges (#[id number]) to help maintain 30 degree side lying position   Chair positioning: Chair cushion (#[id number])    If patient has a buttock pressure injury, or high risk for PI use chair cushion or SPS.  Moisture Management: Perineal cleansing /protection: Follow Incontinence Protocol, Avoid brief in bed, Clean and dry skin folds with " bathing , Consider InterDry (#[id number]) between folds, and Moisturize dry skin  Under Devices: Inspect skin under all medical devices during skin inspection , Ensure tubes are stabilized without tension, and Ensure patient is not lying on medical devices or equipment when repositioned  Ask provider to discontinue device when no longer needed.      Orders: Written    RECOMMEND PRIMARY TEAM ORDER: None, at this time  Education provided: importance of repositioning, plan of care, and Off-loading pressure  Discussed plan of care with: Patient and Nurse  [facility] nurse follow-up plan: twice weekly  Notify WOC if wound(s) deteriorate.  Nursing to notify the Provider(s) and re-consult the WO Nurse if new skin concern.    DATA:     Current support surface: Standard  Standard gel mattress (Isoflex)  Containment of urine/stool: Incontinence Protocol  BMI: Body mass index is 24.91 kg/m .   Active diet order: Orders Placed This Encounter      Advance Diet as Tolerated: Regular Diet Adult      Diet     Output: I/O last 3 completed shifts:  In: 480 [P.O.:480]  Out: 1275 [Urine:1275]     Labs:   Recent Labs   Lab 12/12/24  0602 12/11/24  0541 12/10/24  0737   HGB 10.5*   < > 11.5*   WBC  --   --  7.7    < > = values in this interval not displayed.     Pressure injury risk assessment:   Sensory Perception: 3-->slightly limited  Moisture: 3-->occasionally moist  Activity: 2-->chairfast  Mobility: 3-->slightly limited  Nutrition: 2-->probably inadequate  Friction and Shear: 2-->potential problem  Rasheed Score: 15    BOGDAN Orozco RN CWOCN  Pager no longer in use, please contact through [employer]: [facility] [employer] [facility]

## 2024-12-16 NOTE — PROGRESS NOTES
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  Underwent left hip hemiarthroplasty.  POD 5.  PT and OT evaluation.  Will be discharging to TCU for more rehabilitation.     He underwent right-sided thoracentesis and chest tube placement on 12/9, then removed on 12/13. Right-sided pleural effusion which is mostly lymphocytic and likely malignant from his known pancreatic cancer. Requiring 1  L of oxygen at night.    Wean off as tolerated at TCU.  Has acute on chronic diastolic CHF.  Status post diuresis with IV Lasix 40 mg twice a day--> resume torsemide 80 mg daily (home dose).  Echocardiogram with EF 55 to 60%, normal right ventricular size and function.        Patient has multiple comorbidities with recent diagnosis of pancreatic cancer in 8/2024.  Not a chemotherapy candidate.  CBD duct in place.  Has significant weakness and deconditioning.  Will be discharging to TCU for more rehabilitation.    Has acute urinary retention.  Helm catheter placed.  Void trial in 2 weeks at TCU.  Follow-up with urology if fails.    Hemoglobin A1c 7.8.  Glipizide dose increased to 7.5 mg daily.    Assessment and Plan:  Left hip fracture following a mechanical fall  Status post left hip hemiarthroplasty  Resume routine postoperative care  Physical and Occupational Therapy  Use incentive spirometry frequently  DVT prophylaxis per orthopedics,   chronic anticoagulation treatment with Eliquis 2.5 mg twice a day  Pain control with Tylenol 650 mg every 6 hours as needed, p.o. Dilaudid 2 to 4 mg every 3 hours as needed    Right-sided pneumothorax, small  Moderate right-sided pleural effusion secondary to pancreatic cancer  Status post chest tube placement, removed on 12/13  Acute hypoxic respiratory failure  Requiring 1 L of oxygen, at night    COPD without exacerbation  Bronchodilator as needed    Acute on chronic diastolic CHF  Volume excess  IV Lasix 40 mg twice a day with good diuresis  Was on p.o. torsemide 80 mg daily at home  Echocardiogram revealed EF 55 to 60%    Aortic stenosis  Bioprosthetic aortic valve in place    Pancreatic cancer diagnosed in 8/2024  Not a chemotherapy candidate  Elevated LFTs  CBD duct in place  Seeing [place] [facility]    Acute urinary retention  Helm catheter in place  Void trial in 2-week, if fails void trial at urology clinic  Flomax 0.4 mg daily    DM2, hemoglobin A1c 7.8, uncontrolled  Diabetic diet and insulin sliding scale  Glipizide 7.5 mg daily, dose increased    Paroxysmal atrial fibrillation  Heart rate is stable with metoprolol 25 mg twice a day  On chronic anticoagulation treatment with Eliquis    Coronary artery disease  CT-Three-vessel coronary artery disease.   Stable and asymptomatic  Resume beta-blocker, anticoagulation treatment.  Not on statin    AVR, resume anticoagulation treatment    Essential hypertension  Metoprolol 25 mg twice a day    CKD 4, stable at 1.86    Chronic anemia, hemoglobin is stable at 10.5    Bilateral heel wound left>right  Wound care consult    Code DNR  DVT prophylaxis  On chronic anticoagulation treatment  Medically Ready for Discharge: Anticipated Tomorrow        Clinically Significant Risk Factors         # Hyponatremia: Lowest Na = 134 mmol/L in last 2 days, will monitor as appropriate  # Hypochloremia: Lowest Cl = 96 mmol/L in last 2 days, will monitor as appropriate          #  Hypertension: Noted on problem list    # Acute heart failure with preserved ejection fraction: heart failure noted on problem list, last echo with EF >50%, and receiving IV diuretics          # DMII: A1C = N/A within past 6 months         # Financial/Environmental Concerns: none         Diet: Advance Diet as Tolerated: Regular Diet Adult  Snacks/Supplements Adult: Glucerna; With Meals  Diet  Helm Catheter: PRESENT, indication: Acute retention or obstruction  Lines: None         Janet Donato MD  [facility]  Phone: #[phone]  Securely message with the Voc[employer] Web Console (learn more here)  Text page via [employer] Paging/Directory     Interval History/Subjective:  Resting.  Requiring 1 L of oxygen at night.  Appetite is stable.  Blood glucose numbers are elevated.  Has bilateral heel wound, complains of left heel pain.  Will be discharging to TCU in AM.      Physical Exam/Objective:  Temp:  [97.3  F (36.3  C)-98.7  F (37.1  C)] 98.7  F (37.1  C)  Pulse:  [57-66] 66  Resp:  [16-20] 18  BP: (102-159)/(56-70) 159/70  SpO2:  [92 %-98 %] 98 %  Body mass index is 24.91 kg/m .    GENERAL:  Alert, appears comfortable, in no acute distress, appears stated age, sick looking   HEAD:  Normocephalic, without obvious abnormality, atraumatic   EYES:  PERRL, conjunctiva  clear,   EOM's intact   NOSE: Nares normal,   mucosa normal, no drainage   THROAT: Lips, mucosa,   gums normal, mouth moist   NECK: Supple, symmetrical, trachea midline   BACK:   Symmetric, no curvature, ROM normal   LUNGS:   Decreased bibasilar breath sounds, no rales, rhonchi, or wheezing, symmetric chest rise on inhalation, respirations unlabored, chest tube is out   CHEST WALL:  No tenderness or deformity   HEART:  Regular rate and rhythm, S1 and S2 normal, no murmur    ABDOMEN:   Soft, non-tender, bowel sounds active , no masses, no organomegaly, no rebound or guarding   EXTREMITIES: BLE edema L>R,  "improved   SKIN: Bilateral heel wound left more than right, dressing in place, sacral wound is healing   NEURO: Alert, oriented x3    PSYCH: Cooperative, behavior is appropriate      Data reviewed today: I personally reviewed all new medications, labs, imaging/diagnostics reports over the past 24 hours. Pertinent findings include:    Imaging:   Chest CT 12/8  *  Small right-sided pneumothorax (less than 10% by volume of the right chest).  *  Moderate right and small left simple density nonloculated pleural effusions.  *  Diffuse upper lung predominant interstitial micronodules are unchanged from August 2024. This could represent the result of sarcoidosis, silicosis, or coal workers pneumoconiosis, among other possibilities.  *  Minimal chronic \"platelike\" scarring in both lungs with areas of \"round\" atelectasis in the right middle and right lower lobes.    CXR 12/13  Right basilar chest tube has been removed. Possible trace right apical pneumothorax with 3 mm craniocaudal separation between the parietal and visceral pleura. Mild right basilar pleural fluid and/or thickening and atelectasis throughout the basilar   segments right lower lobe stable. Transcatheter aortic valve replacement. Heart size and pulmonary vascularity stable. Shallow inspiration. Old left rib fracture.     Echocardiogram 7/ 2024  1. Left ventricular function is normal.The ejection fraction is 55-60%. The  left ventricle is normal in size. No regional wall motion abnormalities noted.  2. Normal right ventricle size and systolic function.  3. Well seated 26-mm Mooney Catarino 3 Ultra aortic bioprosthetic valve with  normal gradients (PV: 2.8 m/sec, simpson gradient: 12 mm Hg). Acc Time: 70 ms.  DVI: 0.7.  4. IVC diameter and respiratory changes fall into an intermediate range  suggesting an RA pressure of 8 mmHg.    Labs:  Most Recent 3 CBC's:  Recent Labs   Lab Test 12/12/24  0602 12/11/24  0541 12/10/24  0737 12/08/24  2138 08/21/24  0752   WBC  -- "   --  7.7 6.3 8.1   HGB 10.5* 11.6* 11.5* 11.6* 11.6*   MCV  --   --  83 81 81   PLT  --   --  148* 167 212     Most Recent 3 BMP's:  Recent Labs   Lab Test 12/16/24  0852 12/16/24  0220 12/15/24  2347 12/15/24  1242 12/15/24  0913 12/14/24  0647 12/14/24  0627 12/13/24  1230 12/13/24  0742   NA  --   --   --   --  134*  --  135  --  134*   POTASSIUM  --   --   --   --  4.2  --  4.6  --  4.1   CHLORIDE  --   --   --   --  96*  --  97*  --  99   CO2  --   --   --   --  26  --  26  --  23   BUN  --   --   --   --  34.5*  --  36.7*  --  33.7*   CR  --   --   --   --  1.86*  --  1.97*  --  1.87*   ANIONGAP  --   --   --   --  12  --  12  --  12   SANDRA  --   --   --   --  8.9  --  8.6*  --  8.9   * 223* 217*   < > 227*   < > 219*   < > 126*    < > = values in this interval not displayed.     Most Recent 2 LFT's:  Recent Labs   Lab Test 08/21/24  0752 08/20/24  0746   * 222*   * 118*   ALKPHOS 818* 881*   BILITOTAL 5.7* 9.9*       Medications:   Personally Reviewed.  Medications   Current Facility-Administered Medications   Medication Dose Route Frequency Provider Last Rate Last Admin     Current Facility-Administered Medications   Medication Dose Route Frequency Provider Last Rate Last Admin    apixaban ANTICOAGULANT (ELIQUIS) tablet 2.5 mg  2.5 mg Oral BID Severino Salomon MD   2.5 mg at 12/16/24 0906    furosemide (LASIX) injection 40 mg  40 mg Intravenous Q12H Janet Donato MD   40 mg at 12/16/24 0907    glipiZIDE (GLUCOTROL XL) 24 hr tablet 2.5 mg  2.5 mg Oral Once Janet Donato MD        [START ON 12/17/2024] glipiZIDE (GLUCOTROL XL) 24 hr tablet 7.5 mg  7.5 mg Oral QAM Janet Donato MD        insulin aspart (NovoLOG) injection (RAPID ACTING)  1-7 Units Subcutaneous TID w/meals Severino Salomon MD   3 Units at 12/16/24 0913    methocarbamol (ROBAXIN) tablet 500 mg  500 mg Oral 4x Daily Roxanne Lincoln PA-C   500 mg at 12/16/24 0906    metoprolol succinate ER (TOPROL XL) 24  hr tablet 25 mg  25 mg Oral BID Janet Donato MD        polyethylene glycol (MIRALAX) Packet 17 g  17 g Oral Daily Jameson Almonte PA-C   17 g at 12/16/24 0906    QUEtiapine (SEROquel) half-tab 12.5 mg  12.5 mg Oral At Bedtime Janet Donato MD        senna-docusate (SENOKOT-S/PERICOLACE) 8.6-50 MG per tablet 1 tablet  1 tablet Oral BID Jameson Almonte PA-C   1 tablet at 12/16/24 0906    sodium chloride (PF) 0.9% PF flush 3 mL  3 mL Intracatheter Q8H Jameson Almonte PA-C   3 mL at 12/15/24 2101    sodium chloride (PF) 0.9% PF flush 3 mL  3 mL Intracatheter Q8H Jameson Almonte PA-C   3 mL at 12/16/24 0907    tamsulosin (FLOMAX) capsule 0.4 mg  0.4 mg Oral Daily Severino Salomon MD   0.4 mg at 12/16/24 0906    [Held by provider] torsemide (DEMADEX) tablet 80 mg  80 mg Oral Daily Janet Donato MD        traZODone (DESYREL) tablet 50 mg  50 mg Oral At Bedtime Jameson Almonte PA-C   50 mg at 12/15/24 2101      Total time spent 50 min

## 2024-12-16 NOTE — PROGRESS NOTES
"Orthopedic Progress Note      Assessment: 6 Days Post-Op  S/P Procedure(s):  LEFT HIP HEMIARTHROPLASTY,  BIPOLAR     Plan:   - Continue PT/OT  - Continue pain management  - Weightbearing status: WBAT, posterior hip precautions  - Anticoagulation:  ASA and eliquis  in addition to SCDs, miko stockings and early ambulation.  - New bilateral heal wounds, left greater than right. WOC Consult  - Discharge planning: TCU hopefully tomorrow      Subjective:  Pain: mild  Nausea, Vomiting:  No  Lightheadedness, Dizziness:  No  Neuro:  Patient denies new onset numbness or paresthesias    Patient resting comfortably this morning. Reports pain has been manageable. No acute events overnight.    Objective:  BP (!) 159/70 (BP Location: Left arm)   Pulse 66   Temp 98.7  F (37.1  C) (Oral)   Resp 18   Ht 1.727 m (5' 8\")   Wt 74.3 kg (163 lb 12.8 oz)   SpO2 98%   BMI 24.91 kg/m    The patient is A&Ox3. Appears comfortable.   Sensation is intact.  Dorsiflexion and plantar flexion is intact.  Dorsalis pedis pulse intact.  Calves are soft and non-tender. Negative Lauren's.  New heal wounds over the left and right heel. Left heel with half dollar sized purple wound with fluctuance. Right heel with pea sized purple wound  The incision is covered. Dressing C/D/I.    Pertinent Labs   Lab Results: personally reviewed.   Lab Results   Component Value Date    INR 1.50 (H) 12/08/2024    INR 1.64 (H) 08/20/2024    INR 1.50 (H) 08/18/2024     Lab Results   Component Value Date    WBC 7.7 12/10/2024    HGB 10.5 (L) 12/12/2024    HCT 34.1 (L) 12/10/2024    MCV 83 12/10/2024     (L) 12/10/2024     Lab Results   Component Value Date     (L) 12/15/2024    CO2 26 12/15/2024         Report completed by:  Roxanne Lincoln PA-C   12/16/24     "

## 2024-12-16 NOTE — SIGNIFICANT EVENT
Significant Event Note    Time of event: 2:23 AM December 16, 2024    Description of event:  - paged about pt being confused  - evaluated pt along with night nurse  - pt is now more oriented, just does not know location; focused exam: stable no new concerning cardiopulmonary sounds  - earlier around 11-11:30pm he was confused and thought he was coming back from a bar  - he called his wife around 2am and she was worried    Plan:  - episodic confusion overnight could be consistent with sundowning or delirium  - however now appears improving, so continue to monitor  - if symptoms persist, dayteam can consider pursuing further workup- ie urinalysis but noted he has a cooper in for retention so would test only if persistent AMS or encephalopathic but for now appeared episodic    Discussed with: bedside nurse  Naman Peterson MD on 12/16/2024 at 2:25 AM

## 2024-12-17 ENCOUNTER — APPOINTMENT (OUTPATIENT)
Dept: OCCUPATIONAL THERAPY | Facility: CLINIC | Age: 84
DRG: 521 | End: 2024-12-17
Payer: MEDICARE

## 2024-12-17 VITALS
WEIGHT: 163.8 LBS | BODY MASS INDEX: 24.83 KG/M2 | DIASTOLIC BLOOD PRESSURE: 67 MMHG | OXYGEN SATURATION: 94 % | RESPIRATION RATE: 18 BRPM | TEMPERATURE: 98.4 F | HEIGHT: 68 IN | SYSTOLIC BLOOD PRESSURE: 143 MMHG | HEART RATE: 63 BPM

## 2024-12-17 LAB
GLUCOSE BLDC GLUCOMTR-MCNC: 233 MG/DL (ref 70–99)
GLUCOSE BLDC GLUCOMTR-MCNC: 253 MG/DL (ref 70–99)
GLUCOSE BLDC GLUCOMTR-MCNC: 292 MG/DL (ref 70–99)

## 2024-12-17 PROCEDURE — 250N000011 HC RX IP 250 OP 636: Performed by: FAMILY MEDICINE

## 2024-12-17 PROCEDURE — 97535 SELF CARE MNGMENT TRAINING: CPT | Mod: GO | Performed by: OCCUPATIONAL THERAPIST

## 2024-12-17 PROCEDURE — 250N000013 HC RX MED GY IP 250 OP 250 PS 637: Performed by: FAMILY MEDICINE

## 2024-12-17 PROCEDURE — 250N000013 HC RX MED GY IP 250 OP 250 PS 637

## 2024-12-17 PROCEDURE — 99238 HOSP IP/OBS DSCHRG MGMT 30/<: CPT | Performed by: INTERNAL MEDICINE

## 2024-12-17 RX ORDER — QUETIAPINE FUMARATE 25 MG/1
12.5 TABLET, FILM COATED ORAL
DISCHARGE
Start: 2024-12-17

## 2024-12-17 RX ORDER — GLIPIZIDE 2.5 MG/1
7.5 TABLET, EXTENDED RELEASE ORAL EVERY MORNING
DISCHARGE
Start: 2024-12-18

## 2024-12-17 RX ORDER — HYDROMORPHONE HYDROCHLORIDE 2 MG/1
2-4 TABLET ORAL
Qty: 10 TABLET | Refills: 0 | Status: SHIPPED | OUTPATIENT
Start: 2024-12-17 | End: 2024-12-19

## 2024-12-17 RX ADMIN — APIXABAN 2.5 MG: 2.5 TABLET, FILM COATED ORAL at 09:03

## 2024-12-17 RX ADMIN — METHOCARBAMOL 500 MG: 500 TABLET ORAL at 13:58

## 2024-12-17 RX ADMIN — METHOCARBAMOL 500 MG: 500 TABLET ORAL at 09:03

## 2024-12-17 RX ADMIN — METOPROLOL SUCCINATE 25 MG: 25 TABLET, EXTENDED RELEASE ORAL at 09:03

## 2024-12-17 RX ADMIN — FUROSEMIDE 40 MG: 10 INJECTION, SOLUTION INTRAVENOUS at 09:03

## 2024-12-17 RX ADMIN — GLIPIZIDE 7.5 MG: 5 TABLET, EXTENDED RELEASE ORAL at 10:27

## 2024-12-17 RX ADMIN — TAMSULOSIN HYDROCHLORIDE 0.4 MG: 0.4 CAPSULE ORAL at 09:03

## 2024-12-17 ASSESSMENT — ACTIVITIES OF DAILY LIVING (ADL)
ADLS_ACUITY_SCORE: 44
ADLS_ACUITY_SCORE: 46
ADLS_ACUITY_SCORE: 44
ADLS_ACUITY_SCORE: 46
ADLS_ACUITY_SCORE: 44
ADLS_ACUITY_SCORE: 44
ADLS_ACUITY_SCORE: 46
ADLS_ACUITY_SCORE: 44
ADLS_ACUITY_SCORE: 44
ADLS_ACUITY_SCORE: 46

## 2024-12-17 NOTE — PROGRESS NOTES
Physical Therapy Discharge Summary    Reason for therapy discharge:    Discharged to transitional care facility.    Progress towards therapy goal(s). See goals on Care Plan in [facility] electronic health record for goal details.  Goals not met.  Barriers to achieving goals:   limited tolerance for therapy.    Therapy recommendation(s):    Continued therapy is recommended.  Rationale/Recommendations:   .  Patient would benefit from further rehab to improve functional mobility and safety prior to returning to home

## 2024-12-17 NOTE — PROGRESS NOTES
Care Management Discharge Note    Discharge Date: 12/17/2024       Discharge Disposition: Transitional Care    Discharge Services: Transportation Services    Discharge DME: None    Discharge Transportation: agency    Private pay costs discussed: transportation costs    Does the patient's insurance plan have a 3 day qualifying hospital stay waiver?  No    PAS Confirmation Code: [id number]  Patient/family educated on Medicare website which has current facility and service quality ratings: no    Education Provided on the Discharge Plan: Yes  Persons Notified of Discharge Plans:  Pt, wife and admissions at [facility]  Patient/Family in Agreement with the Plan: yes    Handoff Referral Completed: No, handoff not indicated or clinically appropriate    Additional Information:  Confirmed bed available for pt today at [facility].  [facility] wheelchair arranged for  between 2pm-3pm.  Pt and wife aware and in agreement with discharge plan.    AMARA Smith

## 2024-12-17 NOTE — PLAN OF CARE
Pt ready for discharge. All belongings sent with patient. IV removed. Pt to transport to  via wheelchair ride.

## 2024-12-17 NOTE — PROGRESS NOTES
"Orthopedic Progress Note      Assessment: 7 Days Post-Op  S/P Procedure(s):  LEFT HIP HEMIARTHROPLASTY,  BIPOLAR     Plan:   - Continue PT/OT  - Continue pain management  - Weightbearing status: WBAT, posterior hip precautions  - Anticoagulation:  ASA and eliquis  in addition to SCDs, miko stockings and early ambulation.  - New bilateral heal wounds, left greater than right. WOC Consult, appreciate recs  - Discharge planning: TCU hopefully tomorrow      Subjective:  Pain: mild  Nausea, Vomiting:  No  Lightheadedness, Dizziness:  No  Neuro:  Patient denies new onset numbness or paresthesias    Patient resting comfortably this morning. Reports pain has been manageable. No acute events overnight.    Objective:  /56 (BP Location: Left arm)   Pulse 57   Temp 98.4  F (36.9  C) (Oral)   Resp 18   Ht 1.727 m (5' 8\")   Wt 74.3 kg (163 lb 12.8 oz)   SpO2 94%   BMI 24.91 kg/m    The patient is A&Ox3. Appears comfortable.   Sensation is intact.  Dorsiflexion and plantar flexion is intact.  Dorsalis pedis pulse intact.  Calves are soft and non-tender. Negative Lauren's.  New heal wounds over the left and right heel. Left heel with half dollar sized purple wound with fluctuance. Right heel with pea sized purple wound  The incision is covered. Dressing C/D/I.    Pertinent Labs   Lab Results: personally reviewed.   Lab Results   Component Value Date    INR 1.50 (H) 12/08/2024    INR 1.64 (H) 08/20/2024    INR 1.50 (H) 08/18/2024     Lab Results   Component Value Date    WBC 7.7 12/10/2024    HGB 10.5 (L) 12/12/2024    HCT 34.1 (L) 12/10/2024    MCV 83 12/10/2024     (L) 12/10/2024     Lab Results   Component Value Date     (L) 12/15/2024    CO2 26 12/15/2024         Report completed by:  Roxanne Lincoln PA-C   12/17/24     "

## 2024-12-17 NOTE — DISCHARGE SUMMARY
[facility]  Hospitalist Discharge Summary      Date of Admission:  12/8/2024  Date of Discharge:  12/17/2024  Discharging Provider: Adarsh Limon MD  Discharge Service: Hospitalist Service    Discharge Diagnoses   Left hip fracture following a mechanical fall status post    Clinically Significant Risk Factors     # DMII: A1C = N/A within past 6 months       Follow-ups Needed After Discharge   Follow-up Appointments       Follow Up and recommended labs and tests      Follow-up with TCU physician in next rounding  Follow-up with primary MD in 1 week after discharge from TCU  Follow-up with orthopedic surgery in 2 weeks  Local wound care  Follow-up with urology in 2 weeks if failed void trial  Resume Cooper catheter care and void trial in 2 weeks  Follow-up with oncology in 2 to 3-week  CBC and BMP in 1 week        Follow-up and recommended labs and tests       [facility] will call you to arrange outpatient trial void (cooper removal attempt) and clinic follow up in 1-2 weeks. You may call to confirm appointment(s) as needed: [phone]                Unresulted Labs Ordered in the Past 30 Days of this Admission       Date and Time Order Name Status Description    12/10/2024  5:04 PM Surgical Pathology Exam In process     12/10/2024  3:29 PM Prepare red blood cells (unit) Preliminary         These results will be followed up by PCP     Discharge Disposition   Discharged to short-term care facility  Condition at discharge: Stable    Hospital Course    Per Beasley is a 84 year old male with a past medical history of aortic stenosis, TAVR on chronic anticoagulation treatment, sinus bradycardia, coronary artery disease, CKD 4, COPD, DM2, essential hypertension, atrial fibrillation, 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  Underwent left hip hemiarthroplasty.  POD 5.  PT and OT evaluation.  Will be discharging to TCU for more rehabilitation.      He underwent right-sided thoracentesis and chest tube placement on 12/9, then removed on 12/13. Right-sided pleural effusion which is mostly lymphocytic and likely malignant from his known pancreatic cancer. Requiring 1  L of oxygen at night.    Wean off as tolerated at TCU.  Has acute on chronic diastolic CHF.  Status post diuresis with IV Lasix 40 mg twice a day--> resume torsemide 80 mg daily (home dose).  Echocardiogram with EF 55 to 60%, normal right ventricular size and function.          Patient has multiple comorbidities with recent diagnosis of pancreatic cancer in 8/2024.  Not a chemotherapy candidate.  CBD duct in place.  Has significant weakness and deconditioning.  Will be discharging to TCU for more rehabilitation.     Has acute urinary retention.  Helm catheter placed.  Void trial in 2 weeks at TCU.  Follow-up with urology if fails.     Hemoglobin A1c 7.8.  Glipizide dose increased to 7.5 mg daily.         Consultations This Hospital Stay   ORTHOPEDIC SURGERY IP CONSULT  INTERVENTIONAL RADIOLOGY ADULT/PEDS IP CONSULT  CARE MANAGEMENT / SOCIAL WORK IP CONSULT  PULMONARY IP CONSULT  CARE MANAGEMENT / SOCIAL WORK IP CONSULT  PALLIATIVE CARE ADULT IP CONSULT  PHYSICAL THERAPY ADULT IP CONSULT  OCCUPATIONAL THERAPY ADULT IP CONSULT  UROLOGY IP CONSULT  WOUND OSTOMY CONTINENCE NURSE  IP CONSULT  PHYSICAL THERAPY ADULT IP CONSULT  OCCUPATIONAL THERAPY ADULT IP CONSULT    Code Status   No CPR- Do NOT Intubate    Time Spent on this Encounter   I, Adarsh Limon MD, personally saw the patient today and spent less than or equal to 30 minutes discharging this patient.       Adarsh Limon MD  [facility] [address]  [facility]  [address]  Phone: [phone]  Fax: [phone]  ______________________________________________________________________    Physical Exam   Vital Signs: Temp: 98.4  F (36.9  C) Temp src: Oral BP: (!) 143/67 Pulse: 63   Resp: 18 SpO2: 94 % O2 Device: None (Room air)    Weight: 163 lbs 12.83 oz    General: Awake alert and comfortable  Lungs: CTA without rales or rhonchi  Heart: S1, S2 without murmurs  Abdomen: Soft nontender, bowel sounds positive  CNS: Nonfocal  Extremities: Trace edema left lower extremity             Primary Care Physician   RADHA SR    Discharge Orders      Follow-Up with Cardiology      Reason for your hospital stay    You had a cooper catheter placed for acute urinary retention, urethral stenosis.     Follow-up and recommended labs and tests     [facility] will call you to arrange outpatient trial void (cooper removal attempt) and clinic follow up in 1-2 weeks. You may call to confirm appointment(s) as needed: [phone]     Activity    Your activity upon discharge: Having a cooper catheter does not limit your activity, however, it is recommended you do not drive with a cooper catheter in place.     Tubes and drains    You are going home with the following tubes or drains: Cooper catheter    CATHETER CARE   You are being discharged with a cooper catheter. You may choose to alternate between a leg (day) bag and large (night) bag. Drain urine from the bag when it is about 2/3rds full. Use plain soap and water to wash urethral/groin area daily. Males - you may apply a small amount of Bacitracin or Neosporin ointment to the tip of the penis three times a day for comfort (decreases friction).     ACTIVITY   Having a cooper catheter does not limit your activities, however, it is recommended you do not drive with a cooper catheter in place.     Seek medial evaluation if:  - You are feeling chilled or feverish, temperature >100.5.    - You develop thick cherry colored urine, clots or cooper catheter is not  draining well.   - You develop purulent drainage from the urethra.     FOLLOW UP:   [facility] will call you to arrange for your catheter removed in our office, typically 1-2 weeks after discharge from the hospital. Alternatively, this may be completed at a transitional care unit if a bladder scanner is available.     General info for SNF    Length of Stay Estimate: Short Term Care: Estimated # of Days <30  Condition at Discharge: Stable  Level of care:skilled   Rehabilitation Potential: Good  Admission H&P remains valid and up-to-date: Yes  Recent Chemotherapy: N/A  Use Nursing Home Standing Orders: Yes     Mantoux instructions    Give two-step Mantoux (PPD) Per Facility Policy Yes     Follow Up and recommended labs and tests    Follow-up with TCU physician in next rounding  Follow-up with primary MD in 1 week after discharge from TCU  Follow-up with orthopedic surgery in 2 weeks  Local wound care  Follow-up with urology in 2 weeks if failed void trial  Resume Helm catheter care and void trial in 2 weeks  Follow-up with oncology in 2 to 3-week  CBC and BMP in 1 week     Reason for your hospital stay    Fall and hip fracture     Wound care (specify)    Site:   heel wound  Instructions: per wound care     Helm catheter    To straight gravity drainage. Change catheter every 2 weeks and PRN for leaking or decreased urine output with signs of bladder distention. DO NOT change catheter without a specific Provider order IF diagnosis of benign prostatic hypertrophy (BPH), neurogenic bladder, or other urological conditions     Activity - Up ad sandra     Glucose monitor nursing POCT    Before meals and at bedtime     Glucose monitor nursing POCT    Before meals and at bedtime     No CPR- Do NOT Intubate     Physical Therapy Adult Consult    Evaluate and treat as clinically indicated.    Reason:  weakness     Occupational Therapy Adult Consult    Evaluate and treat as clinically indicated.    Reason:  weakness     Oxygen  (SNF/TCU) Discharge     Fall precautions     Walker DME    DME Documentation: Describe the reason for need to support medical necessity: Impaired gait status post hip surgery. I, the undersigned, certify that the above prescribed supplies are medically necessary for this patient and is both reasonable and necessary in reference to accepted standards of medical practice in the treatment of this patient's condition and is not prescribed as a convenience.     Diet    Follow this diet upon discharge: low salt and diabetic diet       Significant Results and Procedures   Most Recent 3 CBC's:  Recent Labs   Lab Test 12/12/24  0602 12/11/24  0541 12/10/24  0737 12/08/24  2138 08/21/24  0752   WBC  --   --  7.7 6.3 8.1   HGB 10.5* 11.6* 11.5* 11.6* 11.6*   MCV  --   --  83 81 81   PLT  --   --  148* 167 212     Most Recent 3 BMP's:  Recent Labs   Lab Test 12/17/24  0854 12/17/24  0200 12/16/24  2110 12/16/24  1633 12/16/24  1209 12/15/24  1242 12/15/24  0913 12/14/24  0647 12/14/24  0627 12/13/24  1230 12/13/24  0742   NA  --   --   --   --   --   --  134*  --  135  --  134*   POTASSIUM  --   --   --   --   --   --  4.2  --  4.6  --  4.1   CHLORIDE  --   --   --   --   --   --  96*  --  97*  --  99   CO2  --   --   --   --   --   --  26  --  26  --  23   BUN  --   --   --   --   --   --  34.5*  --  36.7*  --  33.7*   CR  --   --   --   --  1.74*  --  1.86*  --  1.97*  --  1.87*   ANIONGAP  --   --   --   --   --   --  12  --  12  --  12   SANDRA  --   --   --   --   --   --  8.9  --  8.6*  --  8.9   * 233* 219*   < >  --    < > 227*   < > 219*   < > 126*    < > = values in this interval not displayed.   ,   Results for orders placed or performed during the hospital encounter of 12/08/24   Head CT w/o contrast    Narrative    EXAM: CT HEAD W/O CONTRAST  LOCATION: [facility]  DATE: 12/8/2024    INDICATION: Trauma.  COMPARISON: None.  TECHNIQUE: Routine CT Head without IV contrast. Multiplanar  reformats. Dose reduction techniques were used.    FINDINGS:  INTRACRANIAL CONTENTS: No intracranial hemorrhage, extraaxial collection, or mass effect. No CT evidence of acute infarct. Moderate presumed chronic small vessel ischemic changes. Moderate generalized volume loss. No hydrocephalus.     VISUALIZED ORBITS/SINUSES/MASTOIDS: No intraorbital abnormality. No paranasal sinus mucosal disease. No middle ear or mastoid effusion.    BONES/SOFT TISSUES: No acute abnormality.      Impression    IMPRESSION:  1.  No CT evidence for acute intracranial process.  2.  Brain atrophy and presumed chronic microvascular ischemic changes as above.   CT Cervical Spine w/o Contrast    Narrative    EXAM: CT CERVICAL SPINE W/O CONTRAST  LOCATION: [facility]  DATE: 12/8/2024    INDICATION: Trauma.  COMPARISON: None.  TECHNIQUE: Routine CT Cervical Spine without IV contrast. Multiplanar reformats. Dose reduction techniques were used.    FINDINGS:  VERTEBRA: Mild rightward curvature centered at C5 and leftward curvature of the upper thoracic spine. Minimal posterior spondylolisthesis at C3-C4. Otherwise, normal alignment. Normal vertebral body heights. Severe disc degeneration C3-C4, C5-C6, and   C6-C7. No fracture or posttraumatic subluxation.     CANAL/FORAMINA: Moderate spinal canal stenosis at C3-C4 with severe bilateral foraminal stenoses. Moderate to severe left foraminal stenosis C4-C5. Severe right foraminal stenosis C5-C6. Moderate right foraminal stenosis C6-C7.    PARASPINAL: Moderate to large right pleural effusion. Small right-sided pneumothorax.      Impression    IMPRESSION:  1.  No fracture or posttraumatic subluxation.  2.  No high-grade spinal canal or neural foraminal stenosis.  3.  Small right pneumothorax and moderate pleural effusion. Consider dedicated chest CT for more complete evaluation.    The results were communicated to Dr. Larry at 10:33 PM on 12/08/2024.   XR Femur Left 2 Views     Narrative    EXAM: PELVIS AND LEFT FEMUR 5 VIEWS  LOCATION: [facility]  DATE/TIME: 12/8/2024 9:56 PM CST    INDICATION: Trauma.  COMPARISON: None.      Impression    IMPRESSION:   1. Mildly displaced acute transverse fracture of the left femoral neck. The distal portion of the fracture is displaced laterally by 0.7 cm and there is approximately 1.0 cm of override. No significant angulation about the fracture.  2. Mild degenerative changes in bilateral hip joints.  3. Partial visualization of a left knee arthroplasty.   XR Pelvis 1/2 Views    Narrative    EXAM: PELVIS AND LEFT FEMUR 5 VIEWS  LOCATION: [facility]  DATE/TIME: 12/8/2024 9:56 PM CST    INDICATION: Trauma.  COMPARISON: None.      Impression    IMPRESSION:   1. Mildly displaced acute transverse fracture of the left femoral neck. The distal portion of the fracture is displaced laterally by 0.7 cm and there is approximately 1.0 cm of override. No significant angulation about the fracture.  2. Mild degenerative changes in bilateral hip joints.  3. Partial visualization of a left knee arthroplasty.   CT Chest w/o Contrast     Value    Radiologist flags Pneumothorax (AA)    Narrative    EXAM: CT CHEST W/O CONTRAST  LOCATION: [facility]  DATE: 12/8/2024    INDICATION: Possible pneumothorax on ct c spine  COMPARISON: Cervical spine CT from prior to this examination. Chest CT from 08/18/2024.  TECHNIQUE: CT chest without IV contrast. Multiplanar reformats were obtained. Dose reduction techniques were used.  CONTRAST: None.    FINDINGS:   LUNGS AND PLEURA:   *  Small right-sided pneumothorax (less than 10% by volume of the right chest).  *  Moderate right and small left simple density nonloculated pleural effusions.  *  Diffuse upper lung predominant interstitial micronodules are unchanged from August 2024. This could represent the result of sarcoidosis, silicosis, or coal workers  "pneumoconiosis, among other possibilities.  *  Minimal chronic \"platelike\" scarring in both lungs with areas of \"round\" atelectasis in the right middle and right lower lobes.    MEDIASTINUM/AXILLAE: Normal heart size without pericardial effusion. 3 vessel coronary artery disease. Previous transcatheter aortic valve replacement. Nonaneurysmal thoracic aorta. Calcified mildly enlarged hilar or mediastinal lymph nodes are   unchanged.    UPPER ABDOMEN: Metallic CBD stent with expected intrahepatic pneumobilia. No biliary dilatation.    MUSCULOSKELETAL: Unremarkable.      Impression    IMPRESSION:   *  Small right-sided pneumothorax (10% by volume) in the setting of likely underlying chronic lung disease from either sarcoidosis, silicosis, or coal workers pneumoconiosis.  *  Three-vessel coronary artery disease. Prior transcatheter aortic valve replacement.  *  Well-positioned metallic CBD stent without biliary dilatation (partially imaged).      [Critical Result: Pneumothorax]    Finding was identified on 12/8/2024 11:06 PM CST.     Dr. Larry was contacted by me on 12/8/2024 11:17 PM CST and verbalized understanding of the critical result.      CT Hip Left w/o Contrast    Addendum: 12/10/2024    EXAM: CT HIP LEFT W/O CONTRAST  LOCATION: [facility]  DATE: 12/9/2024    INDICATION: Evaluate for pathologic fracture; hip pain; fracture, known or rule out, or trauma; no fracture follow-up or history of osteoarthritis; no hip x-ray result available.    COMPARISON: X-ray 12/8/2024, CT from 8/18/2024.    TECHNIQUE: Noncontrast. Axial, sagittal and coronal thin-section reconstruction. Dose reduction techniques were used.    IMPRESSION:  1.  Acute left femoral neck fracture with mild anterosuperior displacement. No intertrochanteric extension. Moderate left hip lipohemarthrosis. There is normal joint alignment.  2.  There is irregular lucency in the area of the fracture site. This is favored to represent " osteopenia because this appeared symmetric on the prior CT, however a pathologic fracture cannot be definitively excluded.    Discussed with KAT Oliveira by Dr. Walter Long via telephone on 12/10/2024 at 9:18 AM.        Narrative    EXAM: CT HIP LEFT W/O CONTRAST  LOCATION: [facility]  DATE: 12/9/2024    INDICATION: evaluate for pathologic fracture; Hip pain; Fracture, known or r o, or trauma; No fracture f u or history of osteoarthritis; No hip x ray result available  COMPARISON: X-ray 12/08/2024  TECHNIQUE: Noncontrast. Axial, sagittal and coronal thin-section reconstruction. Dose reduction techniques were used.     FINDINGS:     BONES:  -There is an acute comminuted fracture through the left femoral neck with moderate associated angulation convex anteriorly. Moderate degenerative changes left AC joint. Moderate vascular calcification. The exam is otherwise normal.    SOFT TISSUES:  -Normal.      Impression    IMPRESSION:  1.       CT Chest Tube with Cath Placement    Narrative    EXAM:  1. PERCUTANEOUS CHEST TUBE PLACEMENT RIGHT PLEURAL SPACE  2. CT GUIDANCE  3. CONSCIOUS SEDATION    LOCATION: [facility]  DATE: 12/9/2024    INDICATION: Right hydropneumothorax.  TECHNIQUE: Dose reduction techniques were used.    PROCEDURE: Informed consent obtained. Site marked. Prior images reviewed. Required items made available. Patient identity confirmed verbally and with arm band. Patient reevaluated immediately before administering sedation. Universal protocol was   followed. Time out performed. The site was prepped and draped in sterile fashion. 10 mL of 1% lidocaine was infused into the local soft tissues. Using standard technique and under direct CT guidance, a 10 Mauritian chest tube catheter was inserted into the   pleural space. The catheter was fixed in place with sutures and adhesive device, and the tubing was banded. Chest tube placed to Pleur-evac  suction.    SPECIMEN: None.    BLOOD LOSS: Minimal.    The patient tolerated the procedure well. No immediate complications.    SEDATION: Versed 0.5 mg. Fentanyl 25 mcg. The procedure was performed with administration intravenous conscious sedation with appropriate preoperative, intraoperative, and postoperative evaluation.    15 minutes of supervised face to face conscious sedation time was provided by a radiology nurse under my direct supervision.      Impression    IMPRESSION:  1.  Successful CT-guided chest tube placement into right pleural space.    Reference CPT Codes: 46757, 98083   US Lower Extremity Venous Duplex Left    Narrative    EXAM: US LOWER EXTREMITY VENOUS DUPLEX LEFT  LOCATION: [facility]  DATE: 12/10/2024    INDICATION: Erythema, swelling,  COMPARISON: None.  TECHNIQUE: Venous Duplex ultrasound of the left lower extremity with and without compression, augmentation and duplex. Color flow and spectral Doppler with waveform analysis performed.    FINDINGS: Exam includes the common femoral, femoral, popliteal, and contralateral common femoral veins as well as segmentally visualized deep calf veins and greater saphenous vein.     LEFT: No deep vein thrombosis. No superficial thrombophlebitis. No popliteal cyst.      Impression    IMPRESSION:  No deep venous thrombosis in the left lower extremity.   XR Chest Port 1 View    Narrative    EXAM: XR CHEST PORT 1 VIEW  LOCATION: [facility]  DATE: 12/10/2024    INDICATION: Pneumothorax, status post chest tube placement.  COMPARISON: Chest CT 12/8/2024; chest radiograph 7/3/2024.      Impression    IMPRESSION: There is a small right basilar pneumothorax, not significantly changed when correlated to chest CT performed on 12/8/2024. There is a new small bore thoracostomy tube overlying the inferior right hemithorax. Additional small right pleural   effusion and right basilar atelectasis and/or infiltrate. Left  "lung is clear. No left pleural effusion or pneumothorax.    Heart size appears mildly enlarged. Transcatheter aortic valve replacement. Atherosclerotic calcifications of the thoracic aorta. Postsurgical changes of the inferior right neck.    Old, healed fracture deformity of the posterolateral left eighth rib.   POC US Guidance Needle Placement    Narrative    Ultrasound was performed as guidance to an anesthesia procedure.  Click   \"PACS images\" hyperlink below to view any stored images.  For specific   procedure details, view procedure note authored by anesthesia.   XR Pelvis and Hip Portable Left 2 Views    Narrative    EXAM: XR PELVIS AND HIP PORTABLE LEFT 2 VIEWS  LOCATION: [facility]  DATE: 12/10/2024    INDICATION: s p L hip pedro  COMPARISON: 12/08/2024      Impression    IMPRESSION: Left hip hemiarthroplasty. The hardware is in expected alignment. No periprosthetic fracture. Postoperative gas within the soft tissues about the left hip.   XR Chest Port 1 View    Narrative    EXAM: XR CHEST PORT 1 VIEW  LOCATION: [facility]  DATE: 12/11/2024    INDICATION: FU pnemothorax s p placement of chest tube  COMPARISON: 12/10/2024      Impression    IMPRESSION: No change position right lower chest tube. Incomplete reexpansion right inferolateral lateral lung. That space now appears filled with fluid. There also may be a small right apical pneumothorax, which in retrospect is similar to yesterday.   These findings may represent trapped lung.    Stable left midlung scar. TAVR. Normal heart size. Old left rib fracture.   XR Chest Port 1 View    Narrative    EXAM: XR CHEST PORT 1 VIEW  LOCATION: [facility]  DATE: 12/12/2024    INDICATION: FU pnemothorax s p placement of chest tube  COMPARISON: Chest radiograph 12/11/2024      Impression    IMPRESSION: Unchanged position of the right basilar nonlocking pigtail chest tube. No significant change in " the small residual right hydropneumothorax. Slightly increased right basilar atelectasis. Remainder of exam is stable.   XR Chest Port 1 View    Narrative    EXAM: XR CHEST PORT 1 VIEW  LOCATION: [facility]  DATE: 12/12/2024 3:30 PM    INDICATION: Clamped chest tube.  COMPARISON: Chest radiograph 12/12/2024 12:23 PM      Impression    IMPRESSION:     Unchanged position of the right basilar chest tube. An adjacent small right pleural effusion and atelectasis are unchanged. No definite pneumothorax is visualized.    Minimal atelectasis at the left lung base. Left lung is otherwise clear.    Stable cardiomediastinal silhouette. Prosthetic aortic valve. Surgical clips along the right neck. Old healed fracture of the left posterolateral seventh rib.   XR Chest Port 1 View    Narrative    EXAM: XR CHEST PORT 1 VIEW  LOCATION: [facility]  DATE: 12/13/2024    INDICATION: FU pneumothorax s/p removal of chest tube.  COMPARISON: 12/12/2024.      Impression    IMPRESSION:   Right basilar chest tube has been removed. Possible trace right apical pneumothorax with 3 mm craniocaudal separation between the parietal and visceral pleura. Mild right basilar pleural fluid and/or thickening and atelectasis throughout the basilar   segments right lower lobe stable. Transcatheter aortic valve replacement. Heart size and pulmonary vascularity stable. Shallow inspiration. Old left rib fracture.       Discharge Medications   Current Discharge Medication List        START taking these medications    Details   acetaminophen (TYLENOL) 325 MG tablet Take 2 tablets (650 mg) by mouth every 6 hours as needed for mild pain or other (and adjunct with moderate or severe pain or per patient request).    Associated Diagnoses: Hip fracture, left, closed, initial encounter (H)      HYDROmorphone (DILAUDID) 2 MG tablet Take 1-2 tablets (2-4 mg) by mouth every 3 hours as needed for moderate pain or severe  pain.  Qty: 10 tablet, Refills: 0    Associated Diagnoses: Hip fracture, left, closed, initial encounter (H)      melatonin 1 MG TABS tablet Take 1 tablet (1 mg) by mouth nightly as needed for sleep.    Associated Diagnoses: Hip fracture, left, closed, initial encounter (H)      polyethylene glycol (MIRALAX) 17 g packet Take 17 g by mouth daily.    Associated Diagnoses: Hip fracture, left, closed, initial encounter (H)      QUEtiapine (SEROQUEL) 25 MG tablet Take 0.5 tablets (12.5 mg) by mouth nightly as needed (sleep, agitation).    Associated Diagnoses: Delirium      senna-docusate (SENOKOT-S/PERICOLACE) 8.6-50 MG tablet Take 1 tablet by mouth 2 times daily as needed for constipation.    Associated Diagnoses: Hip fracture, left, closed, initial encounter (H)      tamsulosin (FLOMAX) 0.4 MG capsule Take 1 capsule (0.4 mg) by mouth daily.    Associated Diagnoses: Benign prostatic hyperplasia with urinary retention           CONTINUE these medications which have CHANGED    Details   glipiZIDE (GLUCOTROL XL) 2.5 MG 24 hr tablet Take 3 tablets (7.5 mg) by mouth every morning.    Associated Diagnoses: Type 2 diabetes mellitus with other circulatory complications (H)           CONTINUE these medications which have NOT CHANGED    Details   amLODIPine (NORVASC) 10 MG tablet Take 1 tablet (10 mg) by mouth daily  Qty: 30 tablet, Refills: 0    Associated Diagnoses: Essential hypertension      apixaban ANTICOAGULANT (ELIQUIS ANTICOAGULANT) 2.5 MG tablet Take 1 tablet (2.5 mg) by mouth 2 times daily. Do not start before August 23, 2024.  Qty: 60 tablet, Refills: 0    Associated Diagnoses: Persistent atrial fibrillation (H)      metoprolol tartrate (LOPRESSOR) 25 MG tablet Take 1 tablet (25 mg) by mouth 2 times daily.  Qty: 60 tablet, Refills: 0    Comments: Hold for sBP < 120 or HR < 65  Associated Diagnoses: Persistent atrial fibrillation (H)      Torsemide 40 MG TABS Take 80 mg by mouth daily  Qty: 60 tablet, Refills: 0     Associated Diagnoses: Acute on chronic congestive heart failure, unspecified heart failure type (H)      traZODone (DESYREL) 50 MG tablet Take 50 mg by mouth at bedtime      Alcohol Swabs PADS Use to swab the area of the injection or rosenda as directed Per insurance coverage  Qty: 100 each, Refills: 0    Associated Diagnoses: Type 2 diabetes mellitus without complication, without long-term current use of insulin (H)      blood glucose (NO BRAND SPECIFIED) lancets standard To use to test glucose level in the blood Use to test blood sugar  2  times daily as directed. To accompany glucose monitor brands per insurance coverage.  Qty: 100 each, Refills: 0    Associated Diagnoses: Type 2 diabetes mellitus without complication, without long-term current use of insulin (H)      blood glucose (NO BRAND SPECIFIED) test strip To use to test glucose level in the blood Use to test blood sugar  2 times daily as directed. To accompany glucose monitor brands per insurance coverage.  Qty: 100 strip, Refills: 0    Associated Diagnoses: Type 2 diabetes mellitus without complication, without long-term current use of insulin (H)      blood glucose calibration (NO BRAND SPECIFIED) solution Used to calibrate the blood glucose monitor as needed and as directed.  To accompany  blood glucose brands per insurance coverage  Qty: 1 each, Refills: 0    Associated Diagnoses: Type 2 diabetes mellitus without complication, without long-term current use of insulin (H)      blood glucose monitoring (NO BRAND SPECIFIED) meter device kit Use as directed , Per insurance coverage  Qty: 1 kit, Refills: 0    Associated Diagnoses: Type 2 diabetes mellitus without complication, without long-term current use of insulin (H)      glucose (BD GLUCOSE) 4 g chewable tablet Take 4 tablets by mouth every 15 minutes as needed for low blood sugar  Qty: 50 tablet, Refills: 0    Associated Diagnoses: Type 2 diabetes mellitus without complication, without long-term  current use of insulin (H)      insulin pen needle (32G X 4 MM) 32G X 4 MM miscellaneous Use as directed by provider Per insurance coverage  Qty: 100 each, Refills: 0    Associated Diagnoses: Type 2 diabetes mellitus without complication, without long-term current use of insulin (H)      Sharps Container MISC Use as directed to dispose of needles, lancets and other sharps Per Insurance coverage  Qty: 1 each, Refills: 0    Associated Diagnoses: Type 2 diabetes mellitus without complication, without long-term current use of insulin (H)           Allergies   Allergies   Allergen Reactions    Blood-Group Specific Substance Other (See Comments)     Patient has a non specific antibody.  Blood product orders may be delayed.  Please draw on red top and 2 purple top tubes for all Type and Screen/ type and Cross match orders.    Hydrochlorothiazide Rash

## 2024-12-17 NOTE — PLAN OF CARE
Problem: Adult Inpatient Plan of Care  Goal: Optimal Comfort and Wellbeing  Outcome: Progressing     Problem: Skin Injury Risk Increased  Goal: Skin Health and Integrity  Outcome: Progressing  Intervention: Plan: Nurse Driven Intervention: Moisture Management  Recent Flowsheet Documentation  Taken 12/17/2024 0015 by Britni Garcia RN  Moisture Interventions: Urinary collection device  Intervention: Plan: Nurse Driven Intervention: Friction and Shear  Recent Flowsheet Documentation  Taken 12/17/2024 0015 by Britni Garcia RN  Friction/Shear Interventions: HOB 30 degrees or less  Intervention: Optimize Skin Protection  Recent Flowsheet Documentation  Taken 12/17/2024 0015 by Britni Garcia RN  Activity Management: activity adjusted per tolerance  Head of Bed (HOB) Positioning: HOB at 20-30 degrees     Problem: Pain Acute  Goal: Optimal Pain Control and Function  Outcome: Progressing  Intervention: Prevent or Manage Pain  Recent Flowsheet Documentation  Taken 12/17/2024 0015 by Britni Garcia RN  Medication Review/Management: medications reviewed     Problem: Hip Arthroplasty  Goal: Optimal Coping  Outcome: Progressing     Goal Outcome Evaluation:    Pt is A&O, calls appropriately for needs and is an assist 2 with for ambulation. Vitals signs are stable, afebrile, oxygen is on room air. Pt complains of pain 0/10. Pt is tolerating regular diet plus snacks. Pt is voiding spontaneously via cooper. Glucose checks ACHS, insulin coverage per sliding scale. Alarms in place.       Britni Garcia RN  December 17, 2024, 7:55 AM

## 2024-12-18 ENCOUNTER — DOCUMENTATION ONLY (OUTPATIENT)
Dept: GERIATRICS | Facility: CLINIC | Age: 84
End: 2024-12-18
Payer: MEDICARE

## 2024-12-18 ENCOUNTER — PATIENT OUTREACH (OUTPATIENT)
Dept: CARE COORDINATION | Facility: CLINIC | Age: 84
End: 2024-12-18
Payer: MEDICARE

## 2024-12-18 LAB
PATH REPORT.ADDENDUM SPEC: ABNORMAL
PATH REPORT.COMMENTS IMP SPEC: ABNORMAL
PATH REPORT.COMMENTS IMP SPEC: YES
PATH REPORT.FINAL DX SPEC: ABNORMAL
PATH REPORT.GROSS SPEC: ABNORMAL
PATH REPORT.MICROSCOPIC SPEC OTHER STN: ABNORMAL

## 2024-12-18 NOTE — PROGRESS NOTES
Connected Care Resource Center: Connected [facility] [place]    Background: Transitional Care Management program identified per system criteria and reviewed by Connected Care Resource Center team for possible outreach.    Assessment: Upon chart review, CCRC Team member will not proceed with patient outreach related to this episode of Transitional Care Management program due to reason below:    Non-[facility] TCU: CCRC team member noted patient discharged to TCU/ARU/LTACH. Patient is not established with a [facility] currently supported by Primary Care-Care Coordination therefore handoff to Primary Care-Care Coordination is not appropriate at this time.    Plan: Transitional Care Management episode addressed appropriately per reason noted above.      Pallavi Casillas MA  [facility] [place], [facility]    *Connected Care Resource Team does NOT follow patient ongoing. Referrals are identified based on internal discharge reports and the outreach is to ensure patient has an understanding of their discharge instructions.

## 2024-12-18 NOTE — PROGRESS NOTES
Occupational Therapy Discharge Summary    Reason for therapy discharge:    Discharged to transitional care facility.    Progress towards therapy goal(s). See goals on Care Plan in [facility] electronic health record for goal details.  Goals partially met.  Barriers to achieving goals:   discharge from facility.    Therapy recommendation(s):    Continued therapy is recommended.  Rationale/Recommendations:  OT at TCU is indicated.

## 2024-12-19 ENCOUNTER — TRANSITIONAL CARE UNIT VISIT (OUTPATIENT)
Dept: GERIATRICS | Facility: CLINIC | Age: 84
End: 2024-12-19
Payer: MEDICARE

## 2024-12-19 VITALS
HEIGHT: 68 IN | WEIGHT: 165 LBS | TEMPERATURE: 98.3 F | RESPIRATION RATE: 20 BRPM | SYSTOLIC BLOOD PRESSURE: 86 MMHG | DIASTOLIC BLOOD PRESSURE: 30 MMHG | BODY MASS INDEX: 25.01 KG/M2 | HEART RATE: 61 BPM | OXYGEN SATURATION: 98 %

## 2024-12-19 DIAGNOSIS — J93.9 PNEUMOTHORAX ON RIGHT: ICD-10-CM

## 2024-12-19 DIAGNOSIS — S72.002A HIP FRACTURE, LEFT, CLOSED, INITIAL ENCOUNTER (H): ICD-10-CM

## 2024-12-19 DIAGNOSIS — C25.9 MALIGNANT NEOPLASM OF PANCREAS, UNSPECIFIED LOCATION OF MALIGNANCY (H): Primary | ICD-10-CM

## 2024-12-19 DIAGNOSIS — R33.9 URINARY RETENTION: ICD-10-CM

## 2024-12-19 DIAGNOSIS — I50.9 CONGESTIVE HEART FAILURE, UNSPECIFIED HF CHRONICITY, UNSPECIFIED HEART FAILURE TYPE (H): ICD-10-CM

## 2024-12-19 DIAGNOSIS — E11.59 TYPE 2 DIABETES MELLITUS WITH OTHER CIRCULATORY COMPLICATIONS (H): ICD-10-CM

## 2024-12-19 PROBLEM — N18.9 CHRONIC KIDNEY DISEASE: Status: ACTIVE | Noted: 2024-12-19

## 2024-12-19 PROBLEM — K83.1 OBSTRUCTIVE HYPERBILIRUBINEMIA (H): Status: ACTIVE | Noted: 2024-12-19

## 2024-12-19 PROBLEM — D71: Status: ACTIVE | Noted: 2023-08-16

## 2024-12-19 PROBLEM — N18.32 STAGE 3B CHRONIC KIDNEY DISEASE (H): Status: ACTIVE | Noted: 2024-06-05

## 2024-12-19 PROBLEM — I50.32 CHRONIC DIASTOLIC HEART FAILURE (H): Status: ACTIVE | Noted: 2022-02-28

## 2024-12-19 PROCEDURE — 99309 SBSQ NF CARE MODERATE MDM 30: CPT | Performed by: NURSE PRACTITIONER

## 2024-12-19 RX ORDER — HYDROMORPHONE HYDROCHLORIDE 2 MG/1
2-4 TABLET ORAL
Qty: 60 TABLET | Refills: 0 | Status: SHIPPED | OUTPATIENT
Start: 2024-12-19

## 2024-12-19 NOTE — LETTER
12/19/2024      Per Beasley  [address]  [address]  [place] [address]        No notes on file      Sincerely,        Elham Cotto, CNP       Do NOT Intubate  DNR / DNI  ALLERGIES:   Allergies   Allergen Reactions     Blood-Group Specific Substance Other (See Comments)     Patient has a non specific antibody.  Blood product orders may be delayed.  Please draw on red top and 2 purple top tubes for all Type and Screen/ type and Cross match orders.     Hydrochlorothiazide Rash      PAST MEDICAL HISTORY:   Past Medical History:   Diagnosis Date     JONNATHAN (acute kidney injury) (H)      Aortic stenosis      Biliary obstruction (H)      Biliary obstruction (H)      Bradycardia      CAD (coronary artery disease)      CKD (chronic kidney disease) stage 3, GFR 30-59 ml/min (H)      COPD (chronic obstructive pulmonary disease) (H)      Diabetes mellitus, type 2 (H)      Generalized muscle weakness      Heart failure with reduced ejection fraction, NYHA class I (H)      HTN (hypertension)      Paroxysmal atrial fibrillation (H)      Pleural effusion     right      PAST SURGICAL HISTORY:   has a past surgical history that includes hc valve (tavr); s/p PCI with NUBIA to MID LCx 2021; Endoscopic retrograde cholangiopancreatogram (N/A, 8/20/2024); Esophagoscopy, gastroscopy, duodenoscopy (EGD), combined (N/A, 8/20/2024); and Open reduction internal fixation hip bipolar (Left, 12/10/2024).  FAMILY HISTORY: family history is not on file.  SOCIAL HISTORY:   reports that he has quit smoking. His smoking use included cigarettes. He has never used smokeless tobacco. He reports that he does not currently use alcohol.  Patient's living condition: lives with spouse    Post Discharge Medication Reconciliation Status:   MED REC REQUIRED  Post Medication Reconciliation Status:  Discharge medications reconciled and changed, see notes/orders       Current Outpatient Medications   Medication Sig Dispense Refill     acetaminophen (TYLENOL) 325 MG tablet Take 2 tablets (650 mg) by mouth every 6 hours as needed for mild pain or other (and adjunct with moderate or severe pain or per patient  request).       Alcohol Swabs PADS Use to swab the area of the injection or rosenda as directed Per insurance coverage 100 each 0     amLODIPine (NORVASC) 10 MG tablet Take 1 tablet (10 mg) by mouth daily 30 tablet 0     apixaban ANTICOAGULANT (ELIQUIS ANTICOAGULANT) 2.5 MG tablet Take 1 tablet (2.5 mg) by mouth 2 times daily. Do not start before August 23, 2024. 60 tablet 0     blood glucose (NO BRAND SPECIFIED) lancets standard To use to test glucose level in the blood Use to test blood sugar  2  times daily as directed. To accompany glucose monitor brands per insurance coverage. 100 each 0     blood glucose (NO BRAND SPECIFIED) test strip To use to test glucose level in the blood Use to test blood sugar  2 times daily as directed. To accompany glucose monitor brands per insurance coverage. 100 strip 0     blood glucose calibration (NO BRAND SPECIFIED) solution Used to calibrate the blood glucose monitor as needed and as directed.  To accompany  blood glucose brands per insurance coverage 1 each 0     blood glucose monitoring (NO BRAND SPECIFIED) meter device kit Use as directed , Per insurance coverage 1 kit 0     glipiZIDE (GLUCOTROL XL) 2.5 MG 24 hr tablet Take 3 tablets (7.5 mg) by mouth every morning.       glucose (BD GLUCOSE) 4 g chewable tablet Take 4 tablets by mouth every 15 minutes as needed for low blood sugar 50 tablet 0     HYDROmorphone (DILAUDID) 2 MG tablet Take 1-2 tablets (2-4 mg) by mouth every 3 hours as needed for moderate pain or severe pain. 60 tablet 0     insulin pen needle (32G X 4 MM) 32G X 4 MM miscellaneous Use as directed by provider Per insurance coverage 100 each 0     melatonin 1 MG TABS tablet Take 1 tablet (1 mg) by mouth nightly as needed for sleep.       metoprolol tartrate (LOPRESSOR) 25 MG tablet Take 1 tablet (25 mg) by mouth 2 times daily. 60 tablet 0     polyethylene glycol (MIRALAX) 17 g packet Take 17 g by mouth daily.       QUEtiapine (SEROQUEL) 25 MG tablet Take 0.5  "tablets (12.5 mg) by mouth nightly as needed (sleep, agitation).       senna-docusate (SENOKOT-S/PERICOLACE) 8.6-50 MG tablet Take 1 tablet by mouth 2 times daily as needed for constipation.       Sharps Container MISC Use as directed to dispose of needles, lancets and other sharps Per Insurance coverage 1 each 0     tamsulosin (FLOMAX) 0.4 MG capsule Take 1 capsule (0.4 mg) by mouth daily.       Torsemide 40 MG TABS Take 80 mg by mouth daily 60 tablet 0     traZODone (DESYREL) 50 MG tablet Take 50 mg by mouth at bedtime       No current facility-administered medications for this visit.       ROS:  4 point ROS including Respiratory, CV, GI and , other than that noted in the HPI,  is negative    Vitals:  BP (!) 86/30   Pulse 61   Temp 98.3  F (36.8  C)   Resp 20   Ht 1.727 m (5' 8\")   Wt 74.8 kg (165 lb)   SpO2 98%   BMI 25.09 kg/m    Exam:  General appearance: alert, cooperative.   Lungs: respirations unlabored,no wheezing or rales.   Cardiovascular: Regular rate and rhythm.   ABDOMEN: Globular and soft, non tender.    Extremities: extremities normal, atraumatic, no cyanosis or edema  Skin: No rashes or lesions  Neurologic: oriented. No focal deficits.   Psych: interacts well with caregivers,  pleasant    Lab/Diagnostic data:  Recent labs in [facility] reviewed by me today.     ASSESSMENT/PLAN:    1. Malignant neoplasm of pancreas, unspecified location of malignancy (H)    2. Pneumothorax on right    3. Type 2 diabetes mellitus with other circulatory complications (H)    4. Hip fracture, left, closed, initial encounter (H)    5. Congestive heart failure, unspecified HF chronicity, unspecified heart failure type (H)    6. Urinary retention      Malignant neoplasm of pancreas  Pneumothorax and pleural effusion on the right  Suspected pleural effusion was malignancy related.  He tells me he does not have current goals of care regarding his cancer diagnosis.  He was not a candidate for chemotherapy.  He lives with " his wife at baseline.  -Follow-up with oncology outpatient.  Consider hospice referral in the future.    Type 2 diabetes-A1c 7.8  Glipizide was increased in the hospital to 7.5 mg.  Continues to have elevated blood sugars ranging 266-403.  Given frailty and risk for hypoglycemia, will start low and try to avoid sliding scale insulin.  -Start NovoLog 2 units 3 times daily with meals.  -Continue glipizide 7.5 daily.    Mechanical fall  Left hip fracture status post left hemiarthroplasty  -Continue PT and OT  -Continue Dilaudid 2 mg every 3 hours as needed.    Congestive heart failure EF 55 to 60%  Did require some diuresis in the hospital with IV Lasix.  Transition back to PTA torsemide.  -Follow daily weights-admitted around 165 pounds.  -Continue torsemide 80 mg daily.  -Monitor for symptoms, periodic BMPs.    Urinary retention-Helm in place  -Consider trial of void next week, if fails this then refer to urology.    Electronically signed by:  Elham Cotto CNP                   Sincerely,        Elham Cotto CNP

## 2024-12-22 ENCOUNTER — TELEPHONE (OUTPATIENT)
Dept: GERIATRICS | Facility: CLINIC | Age: 84
End: 2024-12-22
Payer: MEDICARE

## 2024-12-23 ENCOUNTER — TRANSITIONAL CARE UNIT VISIT (OUTPATIENT)
Dept: GERIATRICS | Facility: CLINIC | Age: 84
End: 2024-12-23
Payer: MEDICARE

## 2024-12-23 VITALS
OXYGEN SATURATION: 98 % | HEIGHT: 68 IN | SYSTOLIC BLOOD PRESSURE: 117 MMHG | WEIGHT: 165 LBS | BODY MASS INDEX: 25.01 KG/M2 | DIASTOLIC BLOOD PRESSURE: 58 MMHG | RESPIRATION RATE: 18 BRPM | HEART RATE: 66 BPM | TEMPERATURE: 98.3 F

## 2024-12-23 DIAGNOSIS — E11.59 TYPE 2 DIABETES MELLITUS WITH OTHER CIRCULATORY COMPLICATIONS (H): ICD-10-CM

## 2024-12-23 DIAGNOSIS — D64.9 ANEMIA, UNSPECIFIED TYPE: ICD-10-CM

## 2024-12-23 DIAGNOSIS — C25.9 MALIGNANT NEOPLASM OF PANCREAS, UNSPECIFIED LOCATION OF MALIGNANCY (H): ICD-10-CM

## 2024-12-23 DIAGNOSIS — N18.32 STAGE 3B CHRONIC KIDNEY DISEASE (H): ICD-10-CM

## 2024-12-23 DIAGNOSIS — S72.002A HIP FRACTURE, LEFT, CLOSED, INITIAL ENCOUNTER (H): Primary | ICD-10-CM

## 2024-12-23 DIAGNOSIS — J43.1 PANLOBULAR EMPHYSEMA (H): ICD-10-CM

## 2024-12-23 PROCEDURE — 99310 SBSQ NF CARE HIGH MDM 45: CPT | Performed by: NURSE PRACTITIONER

## 2024-12-23 NOTE — LETTER
12/23/2024      Per Beasley  [address]  [address]  [place] [address]        [facility] GERIATRICS    PRIMARY CARE PROVIDER AND CLINIC:  RADHA SR MD, [address] [facility] [address] / [place] [address]  Chief Complaint   Patient presents with     MODESTO      [place] Medical Record Number:  [id number]  Place of Service where encounter took place:  [facility] ([facility]) [[id number]]    Per Beasley  is a 84 year old  (1940), admitted to the above facility from  [facility]. Hospital stay 12/8/24 through 12/17/24..     Patient is seen today for follow up TCU visit:   1. Hip fracture, left, closed, initial encounter (H)    2. Malignant neoplasm of pancreas, unspecified location of malignancy (H)    3. Anemia, unspecified type    4. Panlobular emphysema (H)    5. Stage 3b chronic kidney disease (H)    6. Type 2 diabetes mellitus with other circulatory complications (H)            HPI:    Continues with elevated blood sugars. 300-400. Denies symptoms. Glipizide was increased in the hospital. States he was drinking pop for lunch. Has mealtime insulin but nurse reports it was on hold. He also has sliding scale insulin.   Denies HA, chest pain, palpitations, dizziness. HR controlled. BP stable so far in TCU. No troubles breathing, no SOB, he was weaned off oxygen. Had thoracentesis inpatient. He is to follow up with Oncology.  no Concerns with urination-cooper in place with removal order on 12/29, no constipation. Eating and drinking okay. Sleeping okay. Denies pain.   He is to follow up with Ortho but this has not been placed. Dressing covered.       CODE STATUS/ADVANCE DIRECTIVES DISCUSSION:  No CPR- Do NOT Intubate  DNR / DNI  ALLERGIES:   Allergies   Allergen Reactions     Blood-Group Specific Substance Other (See Comments)     Patient has a non specific antibody.  Blood product orders may be delayed.  Please draw on red top and 2 purple top tubes for all Type and  Screen/ type and Cross match orders.     Hydrochlorothiazide Rash      PAST MEDICAL HISTORY:   Past Medical History:   Diagnosis Date     JONNATHAN (acute kidney injury) (H)      Aortic stenosis      Biliary obstruction (H)      Biliary obstruction (H)      Bradycardia      CAD (coronary artery disease)      CKD (chronic kidney disease) stage 3, GFR 30-59 ml/min (H)      COPD (chronic obstructive pulmonary disease) (H)      Diabetes mellitus, type 2 (H)      Generalized muscle weakness      Heart failure with reduced ejection fraction, NYHA class I (H)      HTN (hypertension)      Paroxysmal atrial fibrillation (H)      Pleural effusion     right      PAST SURGICAL HISTORY:   has a past surgical history that includes hc valve (tavr); s/p PCI with NUBIA to MID LCx 2021; Endoscopic retrograde cholangiopancreatogram (N/A, 8/20/2024); Esophagoscopy, gastroscopy, duodenoscopy (EGD), combined (N/A, 8/20/2024); and Open reduction internal fixation hip bipolar (Left, 12/10/2024).  FAMILY HISTORY: family history is not on file.  SOCIAL HISTORY:   reports that he has quit smoking. His smoking use included cigarettes. He has never used smokeless tobacco. He reports that he does not currently use alcohol.  Patient's living condition: lives with spouse    Post Discharge Medication Reconciliation Status:   MED REC REQUIRED  Post Medication Reconciliation Status: discharge medications reconciled and changed, per note/orders       Current Outpatient Medications   Medication Sig Dispense Refill     acetaminophen (TYLENOL) 325 MG tablet Take 2 tablets (650 mg) by mouth every 6 hours as needed for mild pain or other (and adjunct with moderate or severe pain or per patient request).       Alcohol Swabs PADS Use to swab the area of the injection or rosenda as directed Per insurance coverage 100 each 0     amLODIPine (NORVASC) 10 MG tablet Take 1 tablet (10 mg) by mouth daily 30 tablet 0     apixaban ANTICOAGULANT (ELIQUIS ANTICOAGULANT) 2.5 MG  tablet Take 1 tablet (2.5 mg) by mouth 2 times daily. Do not start before August 23, 2024. 60 tablet 0     blood glucose (NO BRAND SPECIFIED) lancets standard To use to test glucose level in the blood Use to test blood sugar  2  times daily as directed. To accompany glucose monitor brands per insurance coverage. 100 each 0     blood glucose (NO BRAND SPECIFIED) test strip To use to test glucose level in the blood Use to test blood sugar  2 times daily as directed. To accompany glucose monitor brands per insurance coverage. 100 strip 0     blood glucose calibration (NO BRAND SPECIFIED) solution Used to calibrate the blood glucose monitor as needed and as directed.  To accompany  blood glucose brands per insurance coverage 1 each 0     blood glucose monitoring (NO BRAND SPECIFIED) meter device kit Use as directed , Per insurance coverage 1 kit 0     glipiZIDE (GLUCOTROL XL) 2.5 MG 24 hr tablet Take 3 tablets (7.5 mg) by mouth every morning.       glucose (BD GLUCOSE) 4 g chewable tablet Take 4 tablets by mouth every 15 minutes as needed for low blood sugar 50 tablet 0     HYDROmorphone (DILAUDID) 2 MG tablet Take 1-2 tablets (2-4 mg) by mouth every 3 hours as needed for moderate pain or severe pain. 60 tablet 0     insulin pen needle (32G X 4 MM) 32G X 4 MM miscellaneous Use as directed by provider Per insurance coverage 100 each 0     melatonin 1 MG TABS tablet Take 1 tablet (1 mg) by mouth nightly as needed for sleep.       metoprolol tartrate (LOPRESSOR) 25 MG tablet Take 1 tablet (25 mg) by mouth 2 times daily. 60 tablet 0     polyethylene glycol (MIRALAX) 17 g packet Take 17 g by mouth daily.       QUEtiapine (SEROQUEL) 25 MG tablet Take 0.5 tablets (12.5 mg) by mouth nightly as needed (sleep, agitation).       senna-docusate (SENOKOT-S/PERICOLACE) 8.6-50 MG tablet Take 1 tablet by mouth 2 times daily as needed for constipation.       Sharps Container MISC Use as directed to dispose of needles, lancets and other  "sharps Per Insurance coverage 1 each 0     tamsulosin (FLOMAX) 0.4 MG capsule Take 1 capsule (0.4 mg) by mouth daily.       Torsemide 40 MG TABS Take 80 mg by mouth daily 60 tablet 0     traZODone (DESYREL) 50 MG tablet Take 50 mg by mouth at bedtime       No current facility-administered medications for this visit.       ROS:  10 point ROS of systems including Constitutional, Eyes, Respiratory, Cardiovascular, Gastroenterology, Genitourinary, Integumentary, Musculoskeletal, Psychiatric were all negative except for pertinent positives noted in my HPI.    Vitals:  /58   Pulse 66   Temp 98.3  F (36.8  C)   Resp 18   Ht 1.727 m (5' 8\")   Wt 74.8 kg (165 lb)   SpO2 98%   BMI 25.09 kg/m    Exam:  GENERAL APPEARANCE:  Alert, in no distress, oriented, cooperative. Laying in bed   ENT:  Mouth and posterior oropharynx normal, moist mucous membranes  EYES:  EOM, conjunctivae, lids, pupils and irises normal  NECK:  No adenopathy,masses or thyromegaly  RESP:  respiratory effort and palpation of chest normal, lungs clear to auscultation , no respiratory distress  CV:  Palpation and auscultation of heart done , regular rate and rhythm, no murmur, rub, or gallop, no edema to LE  GI/ABDOMEN:  normal bowel sounds, soft, nontender, no hepatosplenomegaly or other masses  M/S:   moves extremities spontaneously. Ambulation not tested   SKIN:  no rashes to exposed skin. No redness, warmth or drainage to incision site, dressing in place   NEURO:   intact   PSYCH:  oriented X 3, normal insight, judgement and memory, affect and mood normal, ? Cognitive decline?      Lab/Diagnostic data:  Recent labs in [facility] reviewed by me today.       Last Comprehensive Metabolic Panel:  Lab Results   Component Value Date     (L) 12/15/2024    POTASSIUM 4.2 12/15/2024    CHLORIDE 96 (L) 12/15/2024    CO2 26 12/15/2024    ANIONGAP 12 12/15/2024     (H) 12/17/2024    BUN 34.5 (H) 12/15/2024    CR 1.74 (H) 12/16/2024    GFRESTIMATED " 38 (L) 12/16/2024    SANDRA 8.9 12/15/2024       Lab Results   Component Value Date    WBC 7.7 12/10/2024     Lab Results   Component Value Date    RBC 4.13 12/10/2024     Lab Results   Component Value Date    HGB 10.5 12/12/2024     Lab Results   Component Value Date    HCT 34.1 12/10/2024     Lab Results   Component Value Date    MCV 83 12/10/2024     Lab Results   Component Value Date    MCH 27.8 12/10/2024     Lab Results   Component Value Date    MCHC 33.7 12/10/2024     Lab Results   Component Value Date    RDW 12.5 12/10/2024     Lab Results   Component Value Date     12/10/2024       ASSESSMENT/PLAN:    (S72.002A) Hip fracture, left, closed, initial encounter (H)  (primary encounter diagnosis)  Comment: s/p left hip hemiarthroplasty , pain controlled. He is on tylenol PRN, dilaudid, Eliquis. Nursing reports limited movement but he does get up into his chair.   Plan: PT/OT, follow up with Ortho, may need hospice referral.   Plan to recheck labs.     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 Right-sided pleural effusion which is mostly lymphocytic and likely malignant from his known pancreatic cancer. Requiring 1  L of oxygen at night. He is now weaned off oxygen.    Plan: Follow up with Oncology , recheck labs     (D64.9) Anemia, unspecified type   hgb stable as above     (J43.1) Panlobular emphysema (H)  Comment: No concerns with breathing , has oxygen at bedside but reports not using.   Plan: Monitor     (N18.32) Stage 3b chronic kidney disease (H)  Comment: Creat stable as above   Plan: Monitor , recheck labs     (E11.59) Type 2 diabetes mellitus with other circulatory complications (H)  Comment: Elevated BS in TCU.  Glipizide increased inpatient. He reports he is drinking pop with meals. Review of records show on Metformin in the past.   Plan: Start Lantus 5 units BID, sliding scale insulin, continue meal time insulin. Nursing reports mealtime insulin was on hold but not clear why.   Recheck labs    CHF  Has acute on chronic diastolic CHF.  Status post diuresis with IV Lasix 40 mg twice a day--> resume torsemide 80 mg daily (home dose).  Echocardiogram with EF 55 to 60%, normal right ventricular size and function.  Weights are stable, down 2 pounds. 164.1 pounds today. Recheck labs     Helm in place  Orders for removal 12/29. PVR's in place  Replace if unable to void and schedule Urology follow up          Electronically signed by:  Goldie Estes CNP       Total time greater than 55 minutes reviewing chart in EPIC and PCC, medications, labs, vitals. Discussing with social work, physical therapy, occupational therapy and nursing.                     Sincerely,        Goldie Estes CNP

## 2024-12-23 NOTE — PROGRESS NOTES
[facility] GERIATRICS    PRIMARY CARE PROVIDER AND CLINIC:  RADHA SR MD, [address] [facility] [address] / [facility] [address]  Chief Complaint   [facility]      [place] Medical Record Number:  [id number]  Place of Service where encounter took place:  [facility] ([facility]) [[id number]]    Per Beasley  is a 84 year old  (1940), admitted to the above facility from  [facility]. Hospital stay 12/8/24 through 12/17/24..     Patient is seen today for follow up TCU visit:   1. Hip fracture, left, closed, initial encounter (H)    2. Malignant neoplasm of pancreas, unspecified location of malignancy (H)    3. Anemia, unspecified type    4. Panlobular emphysema (H)    5. Stage 3b chronic kidney disease (H)    6. Type 2 diabetes mellitus with other circulatory complications (H)            HPI:    Continues with elevated blood sugars. 300-400. Denies symptoms. Glipizide was increased in the hospital. States he was drinking pop for lunch. Has mealtime insulin but nurse reports it was on hold. He also has sliding scale insulin.   Denies HA, chest pain, palpitations, dizziness. HR controlled. BP stable so far in TCU. No troubles breathing, no SOB, he was weaned off oxygen. Had thoracentesis inpatient. He is to follow up with Oncology.  no Concerns with urination-cooper in place with removal order on 12/29, no constipation. Eating and drinking okay. Sleeping okay. Denies pain.   He is to follow up with Ortho but this has not been placed. Dressing covered.       CODE STATUS/ADVANCE DIRECTIVES DISCUSSION:  No CPR- Do NOT Intubate  DNR / DNI  ALLERGIES:   Allergies   Allergen Reactions    Blood-Group Specific Substance Other (See Comments)     Patient has a non specific antibody.  Blood product orders may be delayed.  Please draw on red top and 2 purple top tubes for all Type and Screen/ type and Cross match orders.    Hydrochlorothiazide Rash      PAST MEDICAL HISTORY:    Past Medical History:   Diagnosis Date    JONNATHAN (acute kidney injury) (H)     Aortic stenosis     Biliary obstruction (H)     Biliary obstruction (H)     Bradycardia     CAD (coronary artery disease)     CKD (chronic kidney disease) stage 3, GFR 30-59 ml/min (H)     COPD (chronic obstructive pulmonary disease) (H)     Diabetes mellitus, type 2 (H)     Generalized muscle weakness     Heart failure with reduced ejection fraction, NYHA class I (H)     HTN (hypertension)     Paroxysmal atrial fibrillation (H)     Pleural effusion     right      PAST SURGICAL HISTORY:   has a past surgical history that includes hc valve (tavr); s/p PCI with NUBIA to MID LCx 2021; Endoscopic retrograde cholangiopancreatogram (N/A, 8/20/2024); Esophagoscopy, gastroscopy, duodenoscopy (EGD), combined (N/A, 8/20/2024); and Open reduction internal fixation hip bipolar (Left, 12/10/2024).  FAMILY HISTORY: family history is not on file.  SOCIAL HISTORY:   reports that he has quit smoking. His smoking use included cigarettes. He has never used smokeless tobacco. He reports that he does not currently use alcohol.  Patient's living condition: lives with spouse    Post Discharge Medication Reconciliation Status:   MED REC REQUIRED  Post Medication Reconciliation Status: discharge medications reconciled and changed, per note/orders       Current Outpatient Medications   Medication Sig Dispense Refill    acetaminophen (TYLENOL) 325 MG tablet Take 2 tablets (650 mg) by mouth every 6 hours as needed for mild pain or other (and adjunct with moderate or severe pain or per patient request).      Alcohol Swabs PADS Use to swab the area of the injection or rosenda as directed Per insurance coverage 100 each 0    amLODIPine (NORVASC) 10 MG tablet Take 1 tablet (10 mg) by mouth daily 30 tablet 0    apixaban ANTICOAGULANT (ELIQUIS ANTICOAGULANT) 2.5 MG tablet Take 1 tablet (2.5 mg) by mouth 2 times daily. Do not start before August 23, 2024. 60 tablet 0    blood  glucose (NO BRAND SPECIFIED) lancets standard To use to test glucose level in the blood Use to test blood sugar  2  times daily as directed. To accompany glucose monitor brands per insurance coverage. 100 each 0    blood glucose (NO BRAND SPECIFIED) test strip To use to test glucose level in the blood Use to test blood sugar  2 times daily as directed. To accompany glucose monitor brands per insurance coverage. 100 strip 0    blood glucose calibration (NO BRAND SPECIFIED) solution Used to calibrate the blood glucose monitor as needed and as directed.  To accompany  blood glucose brands per insurance coverage 1 each 0    blood glucose monitoring (NO BRAND SPECIFIED) meter device kit Use as directed , Per insurance coverage 1 kit 0    glipiZIDE (GLUCOTROL XL) 2.5 MG 24 hr tablet Take 3 tablets (7.5 mg) by mouth every morning.      glucose (BD GLUCOSE) 4 g chewable tablet Take 4 tablets by mouth every 15 minutes as needed for low blood sugar 50 tablet 0    HYDROmorphone (DILAUDID) 2 MG tablet Take 1-2 tablets (2-4 mg) by mouth every 3 hours as needed for moderate pain or severe pain. 60 tablet 0    insulin pen needle (32G X 4 MM) 32G X 4 MM miscellaneous Use as directed by provider Per insurance coverage 100 each 0    melatonin 1 MG TABS tablet Take 1 tablet (1 mg) by mouth nightly as needed for sleep.      metoprolol tartrate (LOPRESSOR) 25 MG tablet Take 1 tablet (25 mg) by mouth 2 times daily. 60 tablet 0    polyethylene glycol (MIRALAX) 17 g packet Take 17 g by mouth daily.      QUEtiapine (SEROQUEL) 25 MG tablet Take 0.5 tablets (12.5 mg) by mouth nightly as needed (sleep, agitation).      senna-docusate (SENOKOT-S/PERICOLACE) 8.6-50 MG tablet Take 1 tablet by mouth 2 times daily as needed for constipation.      Sharps Container MISC Use as directed to dispose of needles, lancets and other sharps Per Insurance coverage 1 each 0    tamsulosin (FLOMAX) 0.4 MG capsule Take 1 capsule (0.4 mg) by mouth daily.       "Torsemide 40 MG TABS Take 80 mg by mouth daily 60 tablet 0    traZODone (DESYREL) 50 MG tablet Take 50 mg by mouth at bedtime       No current facility-administered medications for this visit.       ROS:  10 point ROS of systems including Constitutional, Eyes, Respiratory, Cardiovascular, Gastroenterology, Genitourinary, Integumentary, Musculoskeletal, Psychiatric were all negative except for pertinent positives noted in my HPI.    Vitals:  /58   Pulse 66   Temp 98.3  F (36.8  C)   Resp 18   Ht 1.727 m (5' 8\")   Wt 74.8 kg (165 lb)   SpO2 98%   BMI 25.09 kg/m    Exam:  GENERAL APPEARANCE:  Alert, in no distress, oriented, cooperative. Laying in bed   ENT:  Mouth and posterior oropharynx normal, moist mucous membranes  EYES:  EOM, conjunctivae, lids, pupils and irises normal  NECK:  No adenopathy,masses or thyromegaly  RESP:  respiratory effort and palpation of chest normal, lungs clear to auscultation , no respiratory distress  CV:  Palpation and auscultation of heart done , regular rate and rhythm, no murmur, rub, or gallop, no edema to LE  GI/ABDOMEN:  normal bowel sounds, soft, nontender, no hepatosplenomegaly or other masses  M/S:   moves extremities spontaneously. Ambulation not tested   SKIN:  no rashes to exposed skin. No redness, warmth or drainage to incision site, dressing in place   NEURO:   intact   PSYCH:  oriented X 3, normal insight, judgement and memory, affect and mood normal, ? Cognitive decline?      Lab/Diagnostic data:  Recent labs in [facility] reviewed by me today.       Last Comprehensive Metabolic Panel:  Lab Results   Component Value Date     (L) 12/15/2024    POTASSIUM 4.2 12/15/2024    CHLORIDE 96 (L) 12/15/2024    CO2 26 12/15/2024    ANIONGAP 12 12/15/2024     (H) 12/17/2024    BUN 34.5 (H) 12/15/2024    CR 1.74 (H) 12/16/2024    GFRESTIMATED 38 (L) 12/16/2024    SANDRA 8.9 12/15/2024       Lab Results   Component Value Date    WBC 7.7 12/10/2024     Lab Results "   Component Value Date    RBC 4.13 12/10/2024     Lab Results   Component Value Date    HGB 10.5 12/12/2024     Lab Results   Component Value Date    HCT 34.1 12/10/2024     Lab Results   Component Value Date    MCV 83 12/10/2024     Lab Results   Component Value Date    MCH 27.8 12/10/2024     Lab Results   Component Value Date    MCHC 33.7 12/10/2024     Lab Results   Component Value Date    RDW 12.5 12/10/2024     Lab Results   Component Value Date     12/10/2024       ASSESSMENT/PLAN:    (S72.002A) Hip fracture, left, closed, initial encounter (H)  (primary encounter diagnosis)  Comment: s/p left hip hemiarthroplasty , pain controlled. He is on tylenol PRN, dilaudid, Eliquis. Nursing reports limited movement but he does get up into his chair.   Plan: PT/OT, follow up with Ortho, may need hospice referral.   Plan to recheck labs.     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 Right-sided pleural effusion which is mostly lymphocytic and likely malignant from his known pancreatic cancer. Requiring 1  L of oxygen at night. He is now weaned off oxygen.    Plan: Follow up with Oncology , recheck labs     (D64.9) Anemia, unspecified type   hgb stable as above     (J43.1) Panlobular emphysema (H)  Comment: No concerns with breathing , has oxygen at bedside but reports not using.   Plan: Monitor     (N18.32) Stage 3b chronic kidney disease (H)  Comment: Creat stable as above   Plan: Monitor , recheck labs     (E11.59) Type 2 diabetes mellitus with other circulatory complications (H)  Comment: Elevated BS in TCU. Glipizide increased inpatient. He reports he is drinking pop with meals. Review of records show on Metformin in the past.    Plan: Start Lantus 5 units BID, sliding scale insulin, continue meal time insulin. Nursing reports mealtime insulin was on hold but not clear why.   Recheck labs    CHF  Has acute on chronic diastolic CHF.  Status post diuresis with IV Lasix 40 mg twice a day--> resume torsemide 80 mg daily (home dose).  Echocardiogram with EF 55 to 60%, normal right ventricular size and function.  Weights are stable, down 2 pounds. 164.1 pounds today. Recheck labs     Helm in place  Orders for removal 12/29. PVR's in place  Replace if unable to void and schedule Urology follow up          Electronically signed by:  Goldie Estes CNP       Total time greater than 55 minutes reviewing chart in EPIC and PCC, medications, labs, vitals. Discussing with social work, physical therapy, occupational therapy and nursing.

## 2024-12-23 NOTE — TELEPHONE ENCOUNTER
[place] GERIATRIC SERVICES TRIAGE ENCOUNTER    Chief Complaint   Patient presents with    Hyperglycemia       Per Beasley is a 84 year old  (1940), Nurse called today to report: Patient with elevated blood sugars. Lunch was 385, dinner was 467. He was to take 2 units with meals. No s/s of infection, no urinary symptoms, no fevers, no respiratory symptoms    ASSESSMENT/PLAN  He was given additional 2 units for 4 units total for both Lunch and dinner today.  Follow up with NP tomorrow for on going management    Electronically signed by:   Bridgett Logan NP

## 2024-12-26 LAB
PATH REPORT.COMMENTS IMP SPEC: NORMAL
PATH REPORT.COMMENTS IMP SPEC: NORMAL
PATH REPORT.FINAL DX SPEC: NORMAL
PATH REPORT.GROSS SPEC: NORMAL
PATH REPORT.MICROSCOPIC SPEC OTHER STN: NORMAL
PATH REPORT.RELEVANT HX SPEC: NORMAL
PHOTO IMAGE: NORMAL

## 2024-12-26 PROCEDURE — 88304 TISSUE EXAM BY PATHOLOGIST: CPT | Mod: 26 | Performed by: PATHOLOGY

## 2024-12-26 PROCEDURE — 88311 DECALCIFY TISSUE: CPT | Mod: 26 | Performed by: PATHOLOGY

## 2024-12-26 PROCEDURE — 88305 TISSUE EXAM BY PATHOLOGIST: CPT | Mod: 26 | Performed by: PATHOLOGY

## 2024-12-27 PROBLEM — E66.01 MORBID (SEVERE) OBESITY DUE TO EXCESS CALORIES (H): Status: RESOLVED | Noted: 2022-02-28 | Resolved: 2024-12-27

## 2024-12-30 NOTE — PROGRESS NOTES
[facility] GERIATRICS    PRIMARY CARE PROVIDER AND CLINIC:  RADHA SR MD, [address] [facility] [address] / [facility] [address]  Chief Complaint   Patient presents with    Hospital F/U      [facility] Medical Record Number:  [id number]  Place of Service where encounter took place:  [facility] ([facility]) [[id number]]    Per Beasley  is a 84 year old  (1940), admitted to the above facility from  [facility]. Hospital stay 12/8 through 12/17..   HPI: Per has a past history of aortic stenosis, TAVR, sinus bradycardia, CAD, stage IV CKD, COPD, DM2, essential hypertension and A-fib and now with pancreatic adenoma diagnosed in 8/2024.  He is status post CBD stent and was a poor candidate for chemotherapy.  He arrived to [facility] ER after sustaining a mechanical fall and resulted in left hip fracture which required left Tyler arthroplasty.  Also found to have a right apical pneumothorax and moderate pleural effusion.  Chest tube placed on 12/9 and removed 12/13, the fluid was mostly lymphocytic and was thought to be malignant secondary to pancreatic cancer.  He does have CHF with acute on chronic during hospitalization, resumed on torsemide 80 daily.  He was discharged to the TCU for weakness and deconditioning.  He still has his Helm in place.  Glipizide was increased in the hospital to 7.5 daily.    Today: Per seen today lying in bed for TCU admission.  He tells me he lives with his wife, he admits that they have not at all talked about goals of care regarding his pancreatic cancer.  He is breathing comfortably on room air.   Blood sugars have been mildly elevated ranging 266-403.  Nursing is asking about NovoLog, given his frailty we will start with 2 units 3 times daily with meals and try to avoid any sliding scale.  He is denying pain.      CODE STATUS/ADVANCE DIRECTIVES DISCUSSION:  No CPR- Do NOT Intubate  DNR / DNI  ALLERGIES:   Allergies   Allergen Reactions     Blood-Group Specific Substance Other (See Comments)     Patient has a non specific antibody.  Blood product orders may be delayed.  Please draw on red top and 2 purple top tubes for all Type and Screen/ type and Cross match orders.    Hydrochlorothiazide Rash      PAST MEDICAL HISTORY:   Past Medical History:   Diagnosis Date    JONNATHAN (acute kidney injury) (H)     Aortic stenosis     Biliary obstruction (H)     Biliary obstruction (H)     Bradycardia     CAD (coronary artery disease)     CKD (chronic kidney disease) stage 3, GFR 30-59 ml/min (H)     COPD (chronic obstructive pulmonary disease) (H)     Diabetes mellitus, type 2 (H)     Generalized muscle weakness     Heart failure with reduced ejection fraction, NYHA class I (H)     HTN (hypertension)     Paroxysmal atrial fibrillation (H)     Pleural effusion     right      PAST SURGICAL HISTORY:   has a past surgical history that includes hc valve (tavr); s/p PCI with NUBIA to MID LCx 2021; Endoscopic retrograde cholangiopancreatogram (N/A, 8/20/2024); Esophagoscopy, gastroscopy, duodenoscopy (EGD), combined (N/A, 8/20/2024); and Open reduction internal fixation hip bipolar (Left, 12/10/2024).  FAMILY HISTORY: family history is not on file.  SOCIAL HISTORY:   reports that he has quit smoking. His smoking use included cigarettes. He has never used smokeless tobacco. He reports that he does not currently use alcohol.  Patient's living condition: lives with spouse    Post Discharge Medication Reconciliation Status:   MED REC REQUIRED  Post Medication Reconciliation Status:  Discharge medications reconciled and changed, see notes/orders       Current Outpatient Medications   Medication Sig Dispense Refill    acetaminophen (TYLENOL) 325 MG tablet Take 2 tablets (650 mg) by mouth every 6 hours as needed for mild pain or other (and adjunct with moderate or severe pain or per patient request).      Alcohol Swabs PADS Use to swab the area of the injection or rosenda as directed  Per insurance coverage 100 each 0    amLODIPine (NORVASC) 10 MG tablet Take 1 tablet (10 mg) by mouth daily 30 tablet 0    apixaban ANTICOAGULANT (ELIQUIS ANTICOAGULANT) 2.5 MG tablet Take 1 tablet (2.5 mg) by mouth 2 times daily. Do not start before August 23, 2024. 60 tablet 0    blood glucose (NO BRAND SPECIFIED) lancets standard To use to test glucose level in the blood Use to test blood sugar  2  times daily as directed. To accompany glucose monitor brands per insurance coverage. 100 each 0    blood glucose (NO BRAND SPECIFIED) test strip To use to test glucose level in the blood Use to test blood sugar  2 times daily as directed. To accompany glucose monitor brands per insurance coverage. 100 strip 0    blood glucose calibration (NO BRAND SPECIFIED) solution Used to calibrate the blood glucose monitor as needed and as directed.  To accompany  blood glucose brands per insurance coverage 1 each 0    blood glucose monitoring (NO BRAND SPECIFIED) meter device kit Use as directed , Per insurance coverage 1 kit 0    glipiZIDE (GLUCOTROL XL) 2.5 MG 24 hr tablet Take 3 tablets (7.5 mg) by mouth every morning.      glucose (BD GLUCOSE) 4 g chewable tablet Take 4 tablets by mouth every 15 minutes as needed for low blood sugar 50 tablet 0    HYDROmorphone (DILAUDID) 2 MG tablet Take 1-2 tablets (2-4 mg) by mouth every 3 hours as needed for moderate pain or severe pain. 60 tablet 0    insulin pen needle (32G X 4 MM) 32G X 4 MM miscellaneous Use as directed by provider Per insurance coverage 100 each 0    melatonin 1 MG TABS tablet Take 1 tablet (1 mg) by mouth nightly as needed for sleep.      metoprolol tartrate (LOPRESSOR) 25 MG tablet Take 1 tablet (25 mg) by mouth 2 times daily. 60 tablet 0    polyethylene glycol (MIRALAX) 17 g packet Take 17 g by mouth daily.      QUEtiapine (SEROQUEL) 25 MG tablet Take 0.5 tablets (12.5 mg) by mouth nightly as needed (sleep, agitation).      senna-docusate (SENOKOT-S/PERICOLACE)  "8.6-50 MG tablet Take 1 tablet by mouth 2 times daily as needed for constipation.      Sharps Container MISC Use as directed to dispose of needles, lancets and other sharps Per Insurance coverage 1 each 0    tamsulosin (FLOMAX) 0.4 MG capsule Take 1 capsule (0.4 mg) by mouth daily.      Torsemide 40 MG TABS Take 80 mg by mouth daily 60 tablet 0    traZODone (DESYREL) 50 MG tablet Take 50 mg by mouth at bedtime       No current facility-administered medications for this visit.       ROS:  4 point ROS including Respiratory, CV, GI and , other than that noted in the HPI,  is negative    Vitals:  BP (!) 86/30   Pulse 61   Temp 98.3  F (36.8  C)   Resp 20   Ht 1.727 m (5' 8\")   Wt 74.8 kg (165 lb)   SpO2 98%   BMI 25.09 kg/m    Exam:  General appearance: alert, cooperative.   Lungs: respirations unlabored,no wheezing or rales.   Cardiovascular: Regular rate and rhythm.   ABDOMEN: Globular and soft, non tender.    Extremities: extremities normal, atraumatic, no cyanosis or edema  Skin: No rashes or lesions  Neurologic: oriented. No focal deficits.   Psych: interacts well with caregivers,  pleasant    Lab/Diagnostic data:  Recent labs in [facility] reviewed by me today.     ASSESSMENT/PLAN:    1. Malignant neoplasm of pancreas, unspecified location of malignancy (H)    2. Pneumothorax on right    3. Type 2 diabetes mellitus with other circulatory complications (H)    4. Hip fracture, left, closed, initial encounter (H)    5. Congestive heart failure, unspecified HF chronicity, unspecified heart failure type (H)    6. Urinary retention      Malignant neoplasm of pancreas  Pneumothorax and pleural effusion on the right  Suspected pleural effusion was malignancy related.  He tells me he does not have current goals of care regarding his cancer diagnosis.  He was not a candidate for chemotherapy.  He lives with his wife at baseline.  -Follow-up with oncology outpatient.  Consider hospice referral in the future.    Type 2 " diabetes-A1c 7.8  Glipizide was increased in the hospital to 7.5 mg.  Continues to have elevated blood sugars ranging 266-403.  Given frailty and risk for hypoglycemia, will start low and try to avoid sliding scale insulin.  -Start NovoLog 2 units 3 times daily with meals.  -Continue glipizide 7.5 daily.    Mechanical fall  Left hip fracture status post left hemiarthroplasty  -Continue PT and OT  -Continue Dilaudid 2 mg every 3 hours as needed.    Congestive heart failure EF 55 to 60%  Did require some diuresis in the hospital with IV Lasix.  Transition back to PTA torsemide.  -Follow daily weights-admitted around 165 pounds.  -Continue torsemide 80 mg daily.  -Monitor for symptoms, periodic BMPs.    Urinary retention-Helm in place  -Consider trial of void next week, if fails this then refer to urology.    Electronically signed by:  Elham Cotto CNP

## 2024-12-31 ENCOUNTER — TELEPHONE (OUTPATIENT)
Dept: GERIATRICS | Facility: CLINIC | Age: 84
End: 2024-12-31
Payer: MEDICARE

## 2024-12-31 NOTE — TELEPHONE ENCOUNTER
Per's family has been enrolling him in hospice, the plan is to discharge to home however he needs a Pleur-evac for his right lung due to recurrent pleural effusion secondary to malignancy.  Hospice had suggested to family that a Pleur-evac be placed prior to his discharge to home however family says that oncology is no longer involved in his care as there were no further treatment options.  I did agree to write the order for interventional radiology to place for palliative care prior to discharge to home on hospice. Patient's daughter also reached out to the physician from the hospital who had originally suggested this as an option and is hoping they can help coordinate as well.  Ruth Cotto, CNP

## 2025-01-03 PROBLEM — I50.33 ACUTE ON CHRONIC DIASTOLIC (CONGESTIVE) HEART FAILURE (H): Status: ACTIVE | Noted: 2022-02-28

## 2025-01-06 ENCOUNTER — TELEPHONE (OUTPATIENT)
Dept: SURGERY | Facility: HOSPITAL | Age: 85
End: 2025-01-06
Payer: MEDICARE

## 2025-01-06 NOTE — TELEPHONE ENCOUNTER
"Called to follow up  with Tana, wife of Per, regarding the phone message that was forwarded to our team about cath removal. Spoke with Tana and relayed that Brigida VENTURA,  from [facility] saw Per inpatient 12/11/24, Reviewed with Tana the note/ recommendations that were made:  \" 84 yr old male with acute urinary retention s/p left hip arthroplasty 12/10.   - Voiding small amounts with PVR >/=400 ml.   - 14 Fr coude tip catheter placed with some difficulty 2/2 urethral stenosis. Maintain cooper catheter at discharge. Message sent to [facility] to arrange for outpatient TOV with [facility] in 1-2 weeks. Cooper discharge instructs and [facility] contact info added to discharge.\"    Instructed Tana to call [facility] to schedule follow up apt with LORENZO for TOV and possible cath exchange. Number to [facility]  was provided,   [phone]   Larisa Hernandez RN  "

## 2025-01-06 NOTE — TELEPHONE ENCOUNTER
[facility] Call Center    Phone Message    May a detailed message be left on voicemail: yes     Reason for Call: Other: Patient wife has called stating patient needs to be seen for catheter evaluation . They are unsure if cathter should come out ir stay in . Patient was just hospitalized as well .  Please call patients wife Tana    Action Taken: Message routed to:  Other: [facility]    Travel Screening: Not Applicable     Date of Service:

## 2025-01-07 LAB
PATH REPORT.ADDENDUM SPEC: ABNORMAL
PATH REPORT.ADDENDUM SPEC: ABNORMAL
PATH REPORT.COMMENTS IMP SPEC: ABNORMAL
PATH REPORT.COMMENTS IMP SPEC: YES
PATH REPORT.FINAL DX SPEC: ABNORMAL
PATH REPORT.GROSS SPEC: ABNORMAL
PATH REPORT.MICROSCOPIC SPEC OTHER STN: ABNORMAL

## 2025-01-11 ENCOUNTER — HOSPITAL ENCOUNTER (EMERGENCY)
Facility: CLINIC | Age: 85
Discharge: HOME | End: 2025-01-11
Attending: EMERGENCY MEDICINE | Admitting: EMERGENCY MEDICINE
Payer: MEDICARE

## 2025-01-11 VITALS
RESPIRATION RATE: 19 BRPM | HEART RATE: 55 BPM | SYSTOLIC BLOOD PRESSURE: 111 MMHG | DIASTOLIC BLOOD PRESSURE: 59 MMHG | HEIGHT: 68 IN | WEIGHT: 152 LBS | OXYGEN SATURATION: 93 % | TEMPERATURE: 97.9 F | BODY MASS INDEX: 23.04 KG/M2

## 2025-01-11 DIAGNOSIS — T83.9XXA FOLEY CATHETER PROBLEM, INITIAL ENCOUNTER: ICD-10-CM

## 2025-01-11 DIAGNOSIS — R82.81 PYURIA: ICD-10-CM

## 2025-01-11 LAB
ALBUMIN UR-MCNC: 50 MG/DL
APPEARANCE UR: ABNORMAL
BACTERIA #/AREA URNS HPF: ABNORMAL /HPF
BILIRUB UR QL STRIP: NEGATIVE
COLOR UR AUTO: YELLOW
GLUCOSE BLDC GLUCOMTR-MCNC: 355 MG/DL (ref 70–99)
GLUCOSE UR STRIP-MCNC: >1000 MG/DL
HGB UR QL STRIP: ABNORMAL
KETONES UR STRIP-MCNC: NEGATIVE MG/DL
LEUKOCYTE ESTERASE UR QL STRIP: ABNORMAL
MUCOUS THREADS #/AREA URNS LPF: PRESENT /LPF
NITRATE UR QL: NEGATIVE
PH UR STRIP: 5.5 [PH] (ref 5–7)
RBC URINE: 66 /HPF
SP GR UR STRIP: 1.01 (ref 1–1.03)
SQUAMOUS EPITHELIAL: 1 /HPF
UROBILINOGEN UR STRIP-MCNC: <2 MG/DL
WBC CLUMPS #/AREA URNS HPF: PRESENT /HPF
WBC URINE: >182 /HPF

## 2025-01-11 PROCEDURE — 87186 SC STD MICRODIL/AGAR DIL: CPT | Performed by: EMERGENCY MEDICINE

## 2025-01-11 PROCEDURE — 87088 URINE BACTERIA CULTURE: CPT | Performed by: EMERGENCY MEDICINE

## 2025-01-11 PROCEDURE — 82962 GLUCOSE BLOOD TEST: CPT

## 2025-01-11 PROCEDURE — 250N000013 HC RX MED GY IP 250 OP 250 PS 637: Performed by: EMERGENCY MEDICINE

## 2025-01-11 PROCEDURE — 99283 EMERGENCY DEPT VISIT LOW MDM: CPT | Mod: 25

## 2025-01-11 PROCEDURE — 81001 URINALYSIS AUTO W/SCOPE: CPT | Performed by: EMERGENCY MEDICINE

## 2025-01-11 RX ORDER — LIDOCAINE HYDROCHLORIDE 20 MG/ML
10 JELLY TOPICAL ONCE
Status: COMPLETED | OUTPATIENT
Start: 2025-01-11 | End: 2025-01-11

## 2025-01-11 RX ORDER — CEPHALEXIN 500 MG/1
500 CAPSULE ORAL ONCE
Status: COMPLETED | OUTPATIENT
Start: 2025-01-11 | End: 2025-01-11

## 2025-01-11 RX ORDER — CEPHALEXIN 500 MG/1
500 CAPSULE ORAL 2 TIMES DAILY
Qty: 14 CAPSULE | Refills: 0 | Status: SHIPPED | OUTPATIENT
Start: 2025-01-11 | End: 2025-01-13

## 2025-01-11 RX ADMIN — CEPHALEXIN 500 MG: 500 CAPSULE ORAL at 11:12

## 2025-01-11 ASSESSMENT — ACTIVITIES OF DAILY LIVING (ADL)
ADLS_ACUITY_SCORE: 60
ADLS_ACUITY_SCORE: 60

## 2025-01-11 ASSESSMENT — ENCOUNTER SYMPTOMS: DIFFICULTY URINATING: 1

## 2025-01-11 NOTE — ED TRIAGE NOTES
Pt here with a problem with his catheter. Wife states around 0300 today they heard a pop and his catheter came out. Upon inspection of the catheter it is out and the balloon area is shredded. Denies pain. Hasn't had it changed since December when it was placed. Has had some dribbling since.     Triage Assessment (Adult)       Row Name 01/11/25 1006          Triage Assessment    Airway WDL WDL        Respiratory WDL    Respiratory WDL WDL        Skin Circulation/Temperature WDL    Skin Circulation/Temperature WDL WDL        Cardiac WDL    Cardiac WDL WDL        Peripheral/Neurovascular WDL    Peripheral Neurovascular WDL WDL        Cognitive/Neuro/Behavioral WDL    Cognitive/Neuro/Behavioral WDL WDL

## 2025-01-11 NOTE — ED PROVIDER NOTES
EMERGENCY DEPARTMENT ENCOUNTER       ED Course & Medical Decision Making     10:10 AM I met with the patient for initial interview and encounter. We discussed a plan for treatment and diagnostic interventions.  10:55 AM We discussed the plan for discharge and the patient is agreeable. Reviewed supportive cares, symptomatic treatment, outpatient follow up, and reasons to return to the Emergency Department. All questions and concerns were addressed. Patient to be discharged by ED RN.         I saw and examined the patient.  We examined the catheter that came out and the bulb seems to have all of the material still intact with it.  I have low suspicion any matter has remained in the bladder.  He is not able to void on his own and we replaced the Helm catheter.    Urine was sent that has significant pyuria.  I will be sent for culture.  No previous culture sensitivity to guide therapy and until culture returns I will cover him with Keflex.  He ready has an appointment on Tuesday to see his urologist and he should keep that appointment.    Prescription provided.  Return instructions also provided            Medical Decision Making  Obtained supplemental history:Supplemental history obtained?: No  Reviewed external records: External records reviewed?: Documented in chart and Outpatient Record: [facility] 12/8-12/17/2024   Care impacted by chronic illness:Chronic Kidney Disease and Diabetes  Care significantly affected by social determinants of health:N/A  Did you consider but not order tests?: Work up considered but not performed and documented in chart, if applicable  Did you interpret images independently?: Independent interpretation of ECG and images noted in documentation, when applicable.  Consultation discussion with other provider:Did you involve another provider (consultant, MH, pharmacy, etc.)?: No  Discharge. I prescribed additional prescription strength medication(s) as charted. See documentation for  "any additional details.      MIPS:        Prior to making a final disposition on this patient the results of patient's tests and other diagnostic studies were discussed with the patient. All questions were answered. Patient expressed understanding of the plan and was amenable to it.    Medications   lidocaine (XYLOCAINE) 2 % external gel 10 mL (has no administration in time range)   cephALEXin (KEFLEX) capsule 500 mg (has no administration in time range)       Final Impression     1. Helm catheter problem, initial encounter    2. Pyuria            Chief Complaint     Chief Complaint   Patient presents with    Catheter Problem       Pt here with a problem with his catheter. Wife states around 0300 today they heard a pop and his catheter came out. Upon inspection of the catheter it is out and the balloon area is shredded. Denies pain. Hasn't had it changed since December when it was placed. Has had some dribbling since.     Triage Assessment (Adult)       Row Name 01/11/25 1006          Triage Assessment    Airway WDL WDL        Respiratory WDL    Respiratory WDL WDL        Skin Circulation/Temperature WDL    Skin Circulation/Temperature WDL WDL        Cardiac WDL    Cardiac WDL WDL        Peripheral/Neurovascular WDL    Peripheral Neurovascular WDL WDL        Cognitive/Neuro/Behavioral WDL    Cognitive/Neuro/Behavioral WDL WDL                         HPI       Per Beasely is a 85 year old male who presents to the ED for evaluation of catheter problem.    The patient reports around 3:00 AM today he heard a pop and his catheter came out. His bladder feels full but he has been unable to urinate since then. Denies pain; states he feels \"alright.\" He has had the catheter placed in since 2 months ago for urinary retention after a fractured hip. It was supposed to be changed 5 days ago. No history of UTI.     The patient lives at home with assistance from his wife. He normally ambulates with a walker.     No other " complaints at this time.      Per chart review, the patient was admitted to [facility] 12/8-12/17/2024 for left hip fracture. Underwent left hip hemiarthroplasty. Was also found to have moderate right pleural effusion. He had acute urinary retention. Helm catheter was placed. Discharged to TCU.     I Bridger Moreno am serving as a scribe to document services personally performed by Ricardo Ruby M.D. based on my observation and the provider's statements to me. IRicardo M.D attest that Morganmaria guadalupe Everettg is acting in a scribe capacity, has observed my performance of the services and has documented them in accordance with my direction.    Past Medical History     Past Medical History:   Diagnosis Date    JONNATHAN (acute kidney injury)     Aortic stenosis     Biliary obstruction (H)     Biliary obstruction (H)     Bradycardia     CAD (coronary artery disease)     CKD (chronic kidney disease) stage 3, GFR 30-59 ml/min (H)     COPD (chronic obstructive pulmonary disease) (H)     Diabetes mellitus, type 2 (H)     Generalized muscle weakness     Heart failure with reduced ejection fraction, NYHA class I (H)     HTN (hypertension)     Paroxysmal atrial fibrillation (H)     Pleural effusion      Past Surgical History:   Procedure Laterality Date    ENDOSCOPIC RETROGRADE CHOLANGIOPANCREATOGRAM N/A 8/20/2024    Procedure: ENDOSCOPIC RETROGRADE CHOLANGIOPANCREATOGRAPHY WITH BILIARY SPHINCTEROTOMY AND STENT PLACEMENT;  Surgeon: Oc Magdaleno MD;  [place] [facility] [place]    ESOPHAGOSCOPY, GASTROSCOPY, DUODENOSCOPY (EGD), COMBINED N/A 8/20/2024    Procedure: ESOPHAGOGASTRODUODENOSCOPY, WITH ENDOSCOPIC ULTRASOUND GUIDANCE WITH FINE NEEDLE ASPIRATION OF PANCREATIC HEAD;  Surgeon: Oc Magdaleno MD;  [place] [facility] [place]    HC VALVE (TAVR)      OPEN REDUCTION INTERNAL FIXATION HIP BIPOLAR Left 12/10/2024    Procedure: LEFT HIP HEMIARTHROPLASTY,  BIPOLAR;  Surgeon: Osvaldo Arriaga MD;  [facility]    s/p PCI with NUBIA to MID LCx 2021       History reviewed. No pertinent family history.   Social History     Tobacco Use    Smoking status: Former     Types: Cigarettes    Smokeless tobacco: Never   Substance Use Topics    Alcohol use: Not Currently       Relevant past medical, surgical, family and social history as documented above, has been reviewed and discussed with patient. No changes or additions, unless otherwise noted in the HPI.    Current Medications     cephALEXin (KEFLEX) 500 MG capsule  acetaminophen (TYLENOL) 325 MG tablet  Alcohol Swabs PADS  amLODIPine (NORVASC) 10 MG tablet  apixaban ANTICOAGULANT (ELIQUIS ANTICOAGULANT) 2.5 MG tablet  blood glucose (NO BRAND SPECIFIED) lancets standard  blood glucose (NO BRAND SPECIFIED) test strip  blood glucose calibration (NO BRAND SPECIFIED) solution  blood glucose monitoring (NO BRAND SPECIFIED) meter device kit  glipiZIDE (GLUCOTROL XL) 2.5 MG 24 hr tablet  glucose (BD GLUCOSE) 4 g chewable tablet  HYDROmorphone (DILAUDID) 2 MG tablet  insulin pen needle (32G X 4 MM) 32G X 4 MM miscellaneous  melatonin 1 MG TABS tablet  metoprolol tartrate (LOPRESSOR) 25 MG tablet  polyethylene glycol (MIRALAX) 17 g packet  QUEtiapine (SEROQUEL) 25 MG tablet  senna-docusate (SENOKOT-S/PERICOLACE) 8.6-50 MG tablet  Sharps Container MISC  tamsulosin (FLOMAX) 0.4 MG capsule  Torsemide 40 MG TABS  traZODone (DESYREL) 50 MG tablet        Allergies     Allergies   Allergen Reactions    Blood-Group Specific Substance Other (See Comments)     Patient has a non specific antibody.  Blood product orders may be delayed.  Please draw on red top and 2 purple top tubes for all Type and Screen/ type and Cross match orders.    Hydrochlorothiazide Rash       Review of Systems     Review of Systems   Genitourinary:  Positive for difficulty urinating.        Remainder of systems reviewed, unless noted in HPI all others negative.    Physical Exam     /59   Pulse 55    "Temp 97.9  F (36.6  C)   Resp 19   Ht 1.727 m (5' 8\")   Wt 68.9 kg (152 lb)   SpO2 93%   BMI 23.11 kg/m      Physical Exam  Vitals and nursing note reviewed.   Constitutional:       General: He is not in acute distress.  HENT:      Head: Normocephalic.      Nose: Nose normal.   Eyes:      General: No scleral icterus.  Cardiovascular:      Rate and Rhythm: Normal rate.   Pulmonary:      Effort: Pulmonary effort is normal.   Musculoskeletal:      Cervical back: Neck supple.   Skin:     Findings: No rash.   Neurological:      Mental Status: He is alert. Mental status is at baseline.   Psychiatric:         Mood and Affect: Mood normal.             Labs & Imaging         Labs Ordered and Resulted from Time of ED Arrival to Time of ED Departure   ROUTINE UA WITH MICROSCOPIC REFLEX TO CULTURE - Abnormal       Result Value    Color Urine Yellow      Appearance Urine Cloudy (*)     Glucose Urine >1000 (*)     Bilirubin Urine Negative      Ketones Urine Negative      Specific Gravity Urine 1.012      Blood Urine >1.0 mg/dL (*)     pH Urine 5.5      Protein Albumin Urine 50 (*)     Urobilinogen Urine <2.0      Nitrite Urine Negative      Leukocyte Esterase Urine 500 Dale/uL (*)     Bacteria Urine Many (*)     WBC Clumps Urine Present (*)     Mucus Urine Present (*)     RBC Urine 66 (*)     WBC Urine >182 (*)     Squamous Epithelials Urine 1     URINE CULTURE         Results for orders placed or performed during the hospital encounter of 01/11/25   UA with Microscopic reflex to Culture    Specimen: Urine, Helm Catheter   Result Value Ref Range    Color Urine Yellow Colorless, Straw, Light Yellow, Yellow    Appearance Urine Cloudy (A) Clear    Glucose Urine >1000 (A) Negative mg/dL    Bilirubin Urine Negative Negative    Ketones Urine Negative Negative mg/dL    Specific Gravity Urine 1.012 1.001 - 1.030    Blood Urine >1.0 mg/dL (A) Negative    pH Urine 5.5 5.0 - 7.0    Protein Albumin Urine 50 (A) Negative mg/dL    " Urobilinogen Urine <2.0 <2.0 mg/dL    Nitrite Urine Negative Negative    Leukocyte Esterase Urine 500 Dale/uL (A) Negative    Bacteria Urine Many (A) None Seen /HPF    WBC Clumps Urine Present (A) None Seen /HPF    Mucus Urine Present (A) None Seen /LPF    RBC Urine 66 (H) <=2 /HPF    WBC Urine >182 (H) <=5 /HPF    Squamous Epithelials Urine 1 <=1 /HPF       Ricardo Ruby MD  Emergency Medicine  [facility] EMERGENCY ROOM  [facility] [facility] [address]  [phone]  1/11/2025         Ricardo Ruby MD  01/11/25 1103

## 2025-01-11 NOTE — ED NOTES
Upon cooper insertion, NA found small blister with puss on inner left penis head, mostly covered by foreskin. Per writer's assessment, appears to be a pressure sore. Per patient, keeps the cooper catheter on the left side at all times; educated patient regarding necessary rotation to avoid pressure ulcers. MD made aware.

## 2025-01-13 ENCOUNTER — TELEPHONE (OUTPATIENT)
Dept: NURSING | Facility: CLINIC | Age: 85
End: 2025-01-13
Payer: MEDICARE

## 2025-01-13 DIAGNOSIS — N39.0 URINARY TRACT INFECTION: Primary | ICD-10-CM

## 2025-01-13 LAB — BACTERIA UR CULT: ABNORMAL

## 2025-01-13 RX ORDER — SULFAMETHOXAZOLE AND TRIMETHOPRIM 800; 160 MG/1; MG/1
1 TABLET ORAL DAILY
Qty: 7 TABLET | Refills: 0 | Status: SHIPPED | OUTPATIENT
Start: 2025-01-13 | End: 2025-01-20

## 2025-01-13 NOTE — TELEPHONE ENCOUNTER
[facility], [place]     Reason for call: Lab Result Notification     Lab Result (including Rx patient on, if applicable).  If culture, copy of lab report at bottom.  Lab Result: Urine Culture - See Below    ED Rx: cephALEXin (KEFLEX) 500 MG capsule - Take 1 capsule (500 mg) by mouth 2 times daily for 7 days. (RESISTANT)    Creatinine Level (mg/dl)   Creatinine   Date Value Ref Range Status   12/16/2024 1.74 (H) 0.67 - 1.17 mg/dL Final    Creatinine clearance (ml/min), if applicable    Serum creatinine: 1.74 mg/dL (H) 12/16/24 1209  Estimated creatinine clearance: 29.7 mL/min (A)     ED Symptoms: Presented to the ED with a cooper catheter malfunction.      Current Symptoms: States that his symptoms haven't changed.  Urine looks clear, no abdominal pain, no fevers. Patient seeing urology tomorrow.      Allergic to ATBs: No  Breastfeeding: N/A  Pregnant: N/A  On Coumadin/Warfarin: No  Had any urinary procedures or have urinary   retention, neurogenic bladder, or use a catheter: Yes, cooper catheter    RN Recommendations/Instructions per [place] ED lab result protocol:   [facility] ED lab result protocol utilized: Urine Culture  Advise to discontinue current antibiotic.   Instruct to start antibiotic: Nitrofurantoin at 50% of recommended dose per protocol. Patients creat clearance is 29.7    Patient/care giver notified to contact your PCP clinic or return to the Emergency department if your:  Symptoms do not improve after 3 days on antibiotic.  Symptoms do not resolve after completing antibiotic.  Symptoms worsen or other concerning symptoms.       JESUS OAKLEY RN

## 2025-03-15 ENCOUNTER — HEALTH MAINTENANCE LETTER (OUTPATIENT)
Age: 85
End: 2025-03-15

## (undated) DEVICE — WIRE GUIDE 0.35X270CM STRAIGHT TIP VISIGLIDE G-260-3527S

## (undated) DEVICE — SUTURE VICRYL+ 1 27IN CT-1 UND VCP261H

## (undated) DEVICE — SOL NACL 0.9% INJ 1000ML BAG 2B1324X

## (undated) DEVICE — BONE CLEANING TIP INTERPULSE FEMORAL CANAL 0210-008-000

## (undated) DEVICE — GLOVE BIOGEL PI INDICATOR 8.0 LF 41680

## (undated) DEVICE — SOL WATER IRRIG 1000ML BOTTLE 2F7114

## (undated) DEVICE — HOOD SURG T7 PLUS STRL LF DISP 0416-801-200

## (undated) DEVICE — BONE CLEANING TIP INTERPULSE  0210-010-000

## (undated) DEVICE — GLOVE BIOGEL INDICATOR 7.5 LF 41675

## (undated) DEVICE — ENDO NDL ASPIRATION 22GA SLIMLINE EXPECT EUS M00555510

## (undated) DEVICE — SUCTION MANIFOLD NEPTUNE 2 SYS 4 PORT 0702-020-000

## (undated) DEVICE — CUSTOM PACK TOTAL HIP SOP5BTHHEA

## (undated) DEVICE — SU STRATAFIX PDS PLUS 0 CT-1 30CM SXPP1B450

## (undated) DEVICE — SUTURE MONOCRYL 3-0 18 PS2 UND MCP497G

## (undated) DEVICE — GLOVE BIOGEL PI SZ 7.0 40870

## (undated) DEVICE — DRAPE IOBAN INCISE 23X17" 6650EZ

## (undated) DEVICE — ESU GROUND PAD ADULT REM W/15' CORD E7507DB

## (undated) DEVICE — WIRE GUIDE 0.035"X260CM NAVIPRO ANG 5625 M00556251

## (undated) DEVICE — BLADE SAGITTAL WIDE (SO-618) 2108-118

## (undated) DEVICE — DRAPE IOBAN INCISE 36X23" 6651EZ

## (undated) DEVICE — GLOVE BIOGEL PI SZ 8.5 40885

## (undated) DEVICE — HOOD SURG T7PLUS PEEL AWAY FACE SHIELD STRL LF 0416-801-100

## (undated) DEVICE — SUTURE VICRYL+ 0 27IN CT-1 UND VCP260H

## (undated) DEVICE — HOLDER LIMB VELCRO OR 0814-1533

## (undated) DEVICE — VIAL DECANTER STERILE WHITE DYNJDEC06

## (undated) DEVICE — TUBING SUCTION MEDI-VAC 1/4"X20' N620A

## (undated) DEVICE — ELECTRODE PATIENT RETURN ADULT L10 FT 2 PLATE CORD 0855C

## (undated) DEVICE — SUCTION MANIFOLD NEPTUNE 2 SYS 1 PORT 702-025-000

## (undated) DEVICE — ENDO FUSION OMNI-TOME G31903

## (undated) DEVICE — SUCTION IRR SYSTEM W/O TIP INTERPULSE HANDPIECE 0210-100-000

## (undated) DEVICE — SOL NACL 0.9% IRRIG 1000ML BOTTLE 2F7124

## (undated) DEVICE — SU ETHIBOND 5 V-37 4X30" MB66G

## (undated) DEVICE — BONE CEMENT MIXEVAC HI VAC W/CARTRIDGE 0306-563-000

## (undated) DEVICE — SUTURE VICRYL+ 2-0 27IN CT-1 UND VCP259H

## (undated) RX ORDER — PHENYLEPHRINE HYDROCHLORIDE 10 MG/ML
INJECTION INTRAVENOUS
Status: DISPENSED
Start: 2024-12-10

## (undated) RX ORDER — PROPOFOL 10 MG/ML
INJECTION, EMULSION INTRAVENOUS
Status: DISPENSED
Start: 2024-12-10

## (undated) RX ORDER — FENTANYL CITRATE 50 UG/ML
INJECTION, SOLUTION INTRAMUSCULAR; INTRAVENOUS
Status: DISPENSED
Start: 2024-12-10

## (undated) RX ORDER — LIDOCAINE HYDROCHLORIDE 10 MG/ML
INJECTION, SOLUTION EPIDURAL; INFILTRATION; INTRACAUDAL; PERINEURAL
Status: DISPENSED
Start: 2024-12-10

## (undated) RX ORDER — ONDANSETRON 2 MG/ML
INJECTION INTRAMUSCULAR; INTRAVENOUS
Status: DISPENSED
Start: 2024-12-10

## (undated) RX ORDER — FENTANYL CITRATE 50 UG/ML
INJECTION, SOLUTION INTRAMUSCULAR; INTRAVENOUS
Status: DISPENSED
Start: 2024-12-09

## (undated) RX ORDER — GLYCOPYRROLATE 0.2 MG/ML
INJECTION, SOLUTION INTRAMUSCULAR; INTRAVENOUS
Status: DISPENSED
Start: 2024-12-10

## (undated) RX ORDER — ETOMIDATE 2 MG/ML
INJECTION INTRAVENOUS
Status: DISPENSED
Start: 2024-12-10

## (undated) RX ORDER — DEXAMETHASONE SODIUM PHOSPHATE 4 MG/ML
INJECTION, SOLUTION INTRA-ARTICULAR; INTRALESIONAL; INTRAMUSCULAR; INTRAVENOUS; SOFT TISSUE
Status: DISPENSED
Start: 2024-12-10